# Patient Record
Sex: FEMALE | Race: BLACK OR AFRICAN AMERICAN | NOT HISPANIC OR LATINO | Employment: FULL TIME | URBAN - METROPOLITAN AREA
[De-identification: names, ages, dates, MRNs, and addresses within clinical notes are randomized per-mention and may not be internally consistent; named-entity substitution may affect disease eponyms.]

---

## 2017-01-23 ENCOUNTER — OFFICE VISIT (OUTPATIENT)
Dept: OPHTHALMOLOGY | Facility: CLINIC | Age: 57
End: 2017-01-23
Payer: COMMERCIAL

## 2017-01-23 DIAGNOSIS — H40.1131 PRIMARY OPEN ANGLE GLAUCOMA OF BOTH EYES, MILD STAGE: Primary | ICD-10-CM

## 2017-01-23 DIAGNOSIS — H43.393 FLOATERS, BILATERAL: ICD-10-CM

## 2017-01-23 PROCEDURE — 99999 PR PBB SHADOW E&M-EST. PATIENT-LVL I: CPT | Mod: PBBFAC,,, | Performed by: OPTOMETRIST

## 2017-01-23 PROCEDURE — 92014 COMPRE OPH EXAM EST PT 1/>: CPT | Mod: S$GLB,,, | Performed by: OPTOMETRIST

## 2017-01-23 RX ORDER — DORZOLAMIDE HCL 20 MG/ML
1 SOLUTION/ DROPS OPHTHALMIC 3 TIMES DAILY
Qty: 10 ML | Refills: 12 | Status: SHIPPED | OUTPATIENT
Start: 2017-01-23 | End: 2017-10-03 | Stop reason: SDUPTHER

## 2017-01-23 NOTE — PROGRESS NOTES
HPI     Glaucoma    Additional comments: Latanoprost           Comments   4 months IOP check. Patient notice floaters in both eye in the morning   when she wakes up x 1 month. No flashes of light.  Medication eye drops if any: Latanoprost hs, added trusopt tid today  Date last eye visit: 09/20/2016  Last full exam:04/18/2016  Last IOP : 19/16  Last dilated eye exam and SDP's:04/18/2016  Last HVF and HRT : 09/20/2016  High IOP: 26/24  CCT:530/541 +1/+1  Family Hx POAG: father         Last edited by Cortez Julien, SOUMYA on 1/23/2017 10:23 AM. (History)            Assessment /Plan     For exam results, see Encounter Report.    Primary open angle glaucoma of both eyes, mild stage  -     dorzolamide (TRUSOPT) 2 % ophthalmic solution; Place 1 drop into both eyes 3 (three) times daily.  Dispense: 10 mL; Refill: 12    Floaters, bilateral      Discussed signs and symptoms of retinal detachment.  Informed patient to return to clinic if any worsening of symptoms or decreased vision.    Last gOCT shows progressive thinning TI, increased IOP last 2 visits, will start trusopt tid OU    RTC 2 months IOP ck with new drops

## 2017-01-31 ENCOUNTER — OFFICE VISIT (OUTPATIENT)
Dept: FAMILY MEDICINE | Facility: CLINIC | Age: 57
End: 2017-01-31
Payer: COMMERCIAL

## 2017-01-31 VITALS
HEIGHT: 62 IN | BODY MASS INDEX: 36.35 KG/M2 | TEMPERATURE: 97 F | OXYGEN SATURATION: 99 % | SYSTOLIC BLOOD PRESSURE: 138 MMHG | WEIGHT: 197.56 LBS | HEART RATE: 71 BPM | RESPIRATION RATE: 18 BRPM | DIASTOLIC BLOOD PRESSURE: 98 MMHG

## 2017-01-31 DIAGNOSIS — J06.9 ACUTE URI: Primary | ICD-10-CM

## 2017-01-31 PROCEDURE — 99213 OFFICE O/P EST LOW 20 MIN: CPT | Mod: 25,S$GLB,, | Performed by: REGISTERED NURSE

## 2017-01-31 PROCEDURE — 96372 THER/PROPH/DIAG INJ SC/IM: CPT | Mod: S$GLB,,, | Performed by: REGISTERED NURSE

## 2017-01-31 PROCEDURE — 3075F SYST BP GE 130 - 139MM HG: CPT | Mod: S$GLB,,, | Performed by: REGISTERED NURSE

## 2017-01-31 PROCEDURE — 99999 PR PBB SHADOW E&M-EST. PATIENT-LVL IV: CPT | Mod: PBBFAC,,, | Performed by: REGISTERED NURSE

## 2017-01-31 PROCEDURE — 3080F DIAST BP >= 90 MM HG: CPT | Mod: S$GLB,,, | Performed by: REGISTERED NURSE

## 2017-01-31 RX ORDER — BETAMETHASONE SODIUM PHOSPHATE AND BETAMETHASONE ACETATE 3; 3 MG/ML; MG/ML
12 INJECTION, SUSPENSION INTRA-ARTICULAR; INTRALESIONAL; INTRAMUSCULAR; SOFT TISSUE
Status: COMPLETED | OUTPATIENT
Start: 2017-01-31 | End: 2017-01-31

## 2017-01-31 RX ADMIN — BETAMETHASONE SODIUM PHOSPHATE AND BETAMETHASONE ACETATE 12 MG: 3; 3 INJECTION, SUSPENSION INTRA-ARTICULAR; INTRALESIONAL; INTRAMUSCULAR; SOFT TISSUE at 10:01

## 2017-01-31 NOTE — MR AVS SNAPSHOT
[facility]  [address] [place] [address]  Phone: [phone]                  Carolee Ortega   2017 10:00 AM   Office Visit    Description:  Female : 1960   Provider:  Marquise Walker NP   Department:  [facility]           Reason for Visit     Dizziness           Diagnoses this Visit        Comments    Acute URI    -  Primary            To Do List           Future Appointments        Provider Department Dept Phone    2017 8:00 AM Genie Walter MD [facility] [phone]    3/20/2017 8:15 AM Cortez Julien OD [facility] Ophthalmology [phone]      Goals (5 Years of Data)     None      [facility]     [facility] -  Assistance  Registered nurses in the [facility] On Call Center provide clinical advisement, health education, appointment booking, and other advisory services.  Call for this free service at [phone].             Medications           Message regarding Medications     Verify the changes and/or additions to your medication regime listed below are the same as discussed with your clinician today.  If any of these changes or additions are incorrect, please notify your healthcare provider.        These medications were administered today        Dose Freq    betamethasone acetate-betamethasone sodium phosphate injection 12 mg 12 mg Clinic/HOD 1 time    Sig: Inject 2 mLs (12 mg total) into the muscle one time.    Class: Normal    Route: Intramuscular           Verify that the below list of medications is an accurate representation of the medications you are currently taking.  If none reported, the list may be blank. If incorrect, please contact your healthcare provider. Carry this list with you in case of emergency.           Current Medications     albuterol (ACCUNEB) 1.25 mg/3 mL Nebu Take 1.25 mg by nebulization every 6 (six) hours as needed.    albuterol  "(PROVENTIL HFA) 90 mcg/actuation inhaler Inhale 2 puffs into the lungs every 4 (four) hours as needed.    alprazolam (XANAX) 0.5 MG tablet Take 1 tablet (0.5 mg total) by mouth 3 (three) times daily as needed.    amlodipine (NORVASC) 5 MG tablet Take 1 tablet (5 mg total) by mouth once daily.    diclofenac (CATAFLAM) 50 MG tablet Take 1 tablet (50 mg total) by mouth every meal as needed.    dorzolamide (TRUSOPT) 2 % ophthalmic solution Place 1 drop into both eyes 3 (three) times daily.    hydrOXYzine HCl (ATARAX) 25 MG tablet TAKE ONE TABLET BY MOUTH AT BEDTIME AS NEEDED for itching    metoprolol tartrate (LOPRESSOR) 25 MG tablet TAKE ONE TABLET BY MOUTH ONE TIME DAILY     mometasone (NASONEX) 50 mcg/actuation nasal spray 2 sprays by Nasal route once daily.    naftifine (NAFTIN) 1 % Gel Apply 1 application topically 2 (two) times daily.    tizanidine (ZANAFLEX) 4 MG tablet Take 1 tablet (4 mg total) by mouth 3 (three) times daily as needed.    valacyclovir (VALTREX) 500 MG tablet Take 500 mg by mouth 2 (two) times daily. Pt unsure of dose, taking for shingles outbreak.    latanoprost 0.005 % ophthalmic solution INSTILL ONE DROP INTO EACH EYE EVERY EVENING           Clinical Reference Information           Vital Signs - Last Recorded  Most recent update: 1/31/2017 10:11 AM by Marquise Walker, NP    BP Pulse Temp Resp Ht Wt    (!) 138/98 71 96.8 °F (36 °C) (Tympanic) 18 5' 2" (1.575 m) 89.6 kg (197 lb 8.5 oz)    SpO2 BMI             99% 36.13 kg/m2         Blood Pressure          Most Recent Value    BP  (!)  138/98      Allergies as of 1/31/2017     Flexeril [Cyclobenzaprine]      Immunizations Administered on Date of Encounter - 1/31/2017     None      "

## 2017-02-01 RX ORDER — METOPROLOL TARTRATE 25 MG/1
TABLET, FILM COATED ORAL
Qty: 30 TABLET | Refills: 0 | Status: SHIPPED | OUTPATIENT
Start: 2017-02-01 | End: 2017-03-08 | Stop reason: SDUPTHER

## 2017-02-23 ENCOUNTER — TELEPHONE (OUTPATIENT)
Dept: INTERNAL MEDICINE | Facility: CLINIC | Age: 57
End: 2017-02-23

## 2017-02-23 ENCOUNTER — LAB VISIT (OUTPATIENT)
Dept: LAB | Facility: HOSPITAL | Age: 57
End: 2017-02-23
Attending: FAMILY MEDICINE
Payer: COMMERCIAL

## 2017-02-23 ENCOUNTER — OFFICE VISIT (OUTPATIENT)
Dept: FAMILY MEDICINE | Facility: CLINIC | Age: 57
End: 2017-02-23
Payer: COMMERCIAL

## 2017-02-23 VITALS
HEIGHT: 63 IN | HEART RATE: 73 BPM | WEIGHT: 196.88 LBS | BODY MASS INDEX: 34.88 KG/M2 | RESPIRATION RATE: 18 BRPM | TEMPERATURE: 98 F | DIASTOLIC BLOOD PRESSURE: 82 MMHG | OXYGEN SATURATION: 98 % | SYSTOLIC BLOOD PRESSURE: 130 MMHG

## 2017-02-23 DIAGNOSIS — Z00.00 PREVENTATIVE HEALTH CARE: Primary | ICD-10-CM

## 2017-02-23 DIAGNOSIS — Z12.31 ENCOUNTER FOR SCREENING MAMMOGRAM FOR MALIGNANT NEOPLASM OF BREAST: ICD-10-CM

## 2017-02-23 DIAGNOSIS — J45.20 MILD INTERMITTENT ASTHMA WITHOUT COMPLICATION: ICD-10-CM

## 2017-02-23 DIAGNOSIS — Z00.00 PREVENTATIVE HEALTH CARE: ICD-10-CM

## 2017-02-23 DIAGNOSIS — Z23 NEED FOR TDAP VACCINATION: ICD-10-CM

## 2017-02-23 DIAGNOSIS — I10 ESSENTIAL HYPERTENSION: ICD-10-CM

## 2017-02-23 DIAGNOSIS — F41.9 ANXIETY: ICD-10-CM

## 2017-02-23 LAB
ALBUMIN SERPL BCP-MCNC: 3.7 G/DL
ALP SERPL-CCNC: 73 U/L
ALT SERPL W/O P-5'-P-CCNC: 19 U/L
ANION GAP SERPL CALC-SCNC: 8 MMOL/L
AST SERPL-CCNC: 23 U/L
BASOPHILS # BLD AUTO: 0.02 K/UL
BASOPHILS NFR BLD: 0.4 %
BILIRUB SERPL-MCNC: 0.6 MG/DL
BUN SERPL-MCNC: 11 MG/DL
CALCIUM SERPL-MCNC: 9.5 MG/DL
CHLORIDE SERPL-SCNC: 105 MMOL/L
CHOLEST/HDLC SERPL: 2.6 {RATIO}
CO2 SERPL-SCNC: 27 MMOL/L
CREAT SERPL-MCNC: 0.8 MG/DL
DIFFERENTIAL METHOD: ABNORMAL
EOSINOPHIL # BLD AUTO: 0.1 K/UL
EOSINOPHIL NFR BLD: 1.5 %
ERYTHROCYTE [DISTWIDTH] IN BLOOD BY AUTOMATED COUNT: 14 %
EST. GFR  (AFRICAN AMERICAN): >60 ML/MIN/1.73 M^2
EST. GFR  (NON AFRICAN AMERICAN): >60 ML/MIN/1.73 M^2
GLUCOSE SERPL-MCNC: 49 MG/DL
HCT VFR BLD AUTO: 42.9 %
HDL/CHOLESTEROL RATIO: 38.4 %
HDLC SERPL-MCNC: 198 MG/DL
HDLC SERPL-MCNC: 76 MG/DL
HGB BLD-MCNC: 13.7 G/DL
LDLC SERPL CALC-MCNC: 103.2 MG/DL
LYMPHOCYTES # BLD AUTO: 1.9 K/UL
LYMPHOCYTES NFR BLD: 39.6 %
MCH RBC QN AUTO: 27.5 PG
MCHC RBC AUTO-ENTMCNC: 31.9 %
MCV RBC AUTO: 86 FL
MONOCYTES # BLD AUTO: 0.3 K/UL
MONOCYTES NFR BLD: 7.2 %
NEUTROPHILS # BLD AUTO: 2.4 K/UL
NEUTROPHILS NFR BLD: 51.3 %
NONHDLC SERPL-MCNC: 122 MG/DL
PLATELET # BLD AUTO: 190 K/UL
PMV BLD AUTO: 12.4 FL
POTASSIUM SERPL-SCNC: 4.2 MMOL/L
PROT SERPL-MCNC: 7.4 G/DL
RBC # BLD AUTO: 4.99 M/UL
SODIUM SERPL-SCNC: 140 MMOL/L
TRIGL SERPL-MCNC: 94 MG/DL
TSH SERPL DL<=0.005 MIU/L-ACNC: 3.9 UIU/ML
WBC # BLD AUTO: 4.75 K/UL

## 2017-02-23 PROCEDURE — 90715 TDAP VACCINE 7 YRS/> IM: CPT | Mod: S$GLB,,, | Performed by: FAMILY MEDICINE

## 2017-02-23 PROCEDURE — 99396 PREV VISIT EST AGE 40-64: CPT | Mod: 25,S$GLB,, | Performed by: FAMILY MEDICINE

## 2017-02-23 PROCEDURE — 85025 COMPLETE CBC W/AUTO DIFF WBC: CPT

## 2017-02-23 PROCEDURE — 99999 PR PBB SHADOW E&M-EST. PATIENT-LVL IV: CPT | Mod: PBBFAC,,, | Performed by: FAMILY MEDICINE

## 2017-02-23 PROCEDURE — 3075F SYST BP GE 130 - 139MM HG: CPT | Mod: S$GLB,,, | Performed by: FAMILY MEDICINE

## 2017-02-23 PROCEDURE — 80053 COMPREHEN METABOLIC PANEL: CPT

## 2017-02-23 PROCEDURE — 36415 COLL VENOUS BLD VENIPUNCTURE: CPT | Mod: PO

## 2017-02-23 PROCEDURE — 84443 ASSAY THYROID STIM HORMONE: CPT

## 2017-02-23 PROCEDURE — 90471 IMMUNIZATION ADMIN: CPT | Mod: S$GLB,,, | Performed by: FAMILY MEDICINE

## 2017-02-23 PROCEDURE — 80061 LIPID PANEL: CPT

## 2017-02-23 PROCEDURE — 3079F DIAST BP 80-89 MM HG: CPT | Mod: S$GLB,,, | Performed by: FAMILY MEDICINE

## 2017-02-23 NOTE — MR AVS SNAPSHOT
[facility]  [address] [address]  Phone: [phone]                  Carolee Ortega   2017 8:00 AM   Office Visit    Description:  Female : 1960   Provider:  Genie Walter MD   Department:  [facility]           Reason for Visit     Annual Exam           Diagnoses this Visit        Comments    Preventative health care    -  Primary     Essential hypertension         Mild intermittent asthma without complication         Anxiety         Need for Tdap vaccination         Encounter for screening mammogram for malignant neoplasm of breast                To Do List           Future Appointments        Provider Department Dept Phone    2017 8:15 AM [facility] [id number] [facility] [phone]    3/20/2017 8:15 AM Cortez Julien OD [facility] - Ophthalmology [phone]      Goals (5 Years of Data)     None      [facility]     [facility] Nurse [facility] -  Assistance  Registered nurses in the [facility] On Call Center provide clinical advisement, health education, appointment booking, and other advisory services.  Call for this free service at [phone].             Medications           Message regarding Medications     Verify the changes and/or additions to your medication regime listed below are the same as discussed with your clinician today.  If any of these changes or additions are incorrect, please notify your healthcare provider.        STOP taking these medications     latanoprost 0.005 % ophthalmic solution INSTILL ONE DROP INTO EACH EYE EVERY EVENING    diclofenac (CATAFLAM) 50 MG tablet Take 1 tablet (50 mg total) by mouth every meal as needed.    tizanidine (ZANAFLEX) 4 MG tablet Take 1 tablet (4 mg total) by mouth 3 (three) times daily as needed.    valacyclovir (VALTREX) 500 MG tablet Take 500 mg by mouth 2 (two) times daily. Pt unsure of dose, taking for shingles outbreak.          "  Verify that the below list of medications is an accurate representation of the medications you are currently taking.  If none reported, the list may be blank. If incorrect, please contact your healthcare provider. Carry this list with you in case of emergency.           Current Medications     albuterol (ACCUNEB) 1.25 mg/3 mL Nebu Take 1.25 mg by nebulization every 6 (six) hours as needed.    alprazolam (XANAX) 0.5 MG tablet Take 1 tablet (0.5 mg total) by mouth 3 (three) times daily as needed.    amlodipine (NORVASC) 5 MG tablet Take 1 tablet (5 mg total) by mouth once daily.    dorzolamide (TRUSOPT) 2 % ophthalmic solution Place 1 drop into both eyes 3 (three) times daily.    hydrOXYzine HCl (ATARAX) 25 MG tablet TAKE ONE TABLET BY MOUTH AT BEDTIME AS NEEDED for itching    metoprolol tartrate (LOPRESSOR) 25 MG tablet TAKE 1 TABLET BY MOUTH EVERY DAY    mometasone (NASONEX) 50 mcg/actuation nasal spray 2 sprays by Nasal route once daily.    naftifine (NAFTIN) 1 % Gel Apply 1 application topically 2 (two) times daily.           Clinical Reference Information           Your Vitals Were     BP Pulse Temp Resp Height Weight    130/82 73 97.8 °F (36.6 °C) (Tympanic) 18 5' 3" (1.6 m) 89.3 kg (196 lb 13.9 oz)    SpO2 BMI             98% 34.87 kg/m2         Blood Pressure          Most Recent Value    BP  130/82      Allergies as of 2/23/2017     Flexeril [Cyclobenzaprine]      Immunizations Administered on Date of Encounter - 2/23/2017     Name Date Dose VIS Date Route    TDAP  Incomplete 0.5 mL 2/24/2015 Intramuscular      Orders Placed During Today's Visit      Normal Orders This Visit    Tdap Vaccine     Future Labs/Procedures Expected by Expires    CBC auto differential  2/23/2017 4/24/2018    Comprehensive metabolic panel  2/23/2017 4/24/2018    Lipid panel  2/23/2017 4/24/2018    Mammo Digital Screening Bilat with CAD  2/23/2017 4/25/2018    TSH  2/23/2017 4/24/2018      Language Assistance Services     ATTENTION: " Language assistance services are available, free of charge. Please call [phone].      ATENCIÓN: Si habla sophia, tiene a flanagan disposición servicios gratuitos de asistencia lingüística. Llame al [phone].     CHÚ Ý: N?u b?n nói Ti?ng Vi?t, có các d?ch v? h? tr? ngôn ng? mi?n phí dành cho b?n. G?i s? [phone].         [facility] complies with applicable Federal civil rights laws and does not discriminate on the basis of race, color, national origin, age, disability, or sex.

## 2017-02-23 NOTE — PROGRESS NOTES
CHIEF COMPLAINT: This is a 56-year-old female here for preventive health exam.    SUBJECTIVE: The patient is doing well without complaints.  She has mild intermittent asthma for which she uses albuterol.  Blood pressure is controlled on amlodipine and metoprolol.  Patient has lost 17 pounds in the last year.    Eye exam February 2017. Mammogram February 2016. Colonoscopy January 2010, due again in January 2020. TD booster September 2005. Pneumovax February 2014. Flu vaccine October 2016.     ROS:  GENERAL: Patient denies fever, chills, night sweats. Patient denies weight gain. Patient denies anorexia, fatigue, weakness or swollen glands.  SKIN: Patient denies rash or hair loss.  HEENT: Patient denies sore throat, ear pain, hearing loss, nasal congestion, or runny nose. Patient denies visual disturbance, eye irritation or discharge.  LUNGS: Patient denies cough, wheeze or hemoptysis.  CARDIOVASCULAR: Patient denies chest pain, shortness of breath, palpitations, syncope or lower extremity edema.  GI: Patient denies abdominal pain, nausea, vomiting, diarrhea, constipation, blood in stool or melena.  GENITOURINARY: Patient denies pelvic pain, vaginal discharge, itch or odor. Patient denies irregular vaginal bleeding. Patient denies dysuria, frequency, hematuria, nocturia, urgency or incontinence.  BREASTS: Patient denies breast pain, mass or nipple discharge.  MUSCULOSKELETAL: Patient denies joint pain, swelling, redness or warmth.  NEUROLOGIC: Patient denies headache, vertigo, paresthesias, weakness in limb, dysarthria, dysphagia or abnormality of gait.  PSYCHIATRIC: Patient denies anxiety, depression, or memory loss.     OBJECTIVE:   GENERAL: Well-developed well-nourished, obese, black female alert and oriented x3, in no acute distress. Memory, judgment and cognition without deficit. Weight loss of 17 pounds in the last year.  SKIN: Clear without rash. Normal color and tone.  HEENT: Eyes: Clear conjunctivae. No  scleral icterus. Pupils equal reactive to light and accommodation. Ears: Clear canals. Clear TMs. Nose: Without congestion. Pharynx: Without injection or exudates.  NECK: Supple, normal range of motion. No masses, lymphadenopathy or enlarged thyroid. No JVD. Carotids 2+ and equal. No bruits.  LUNGS: Clear to auscultation. Normal respiratory effort.  CARDIOVASCULAR: Regular rhythm, normal S1, S2 without murmur, gallop or rub.  BACK: No CVA or spinal tenderness.  BREASTS: No masses, tenderness or nipple discharge.  ABDOMEN: Normal appearance. Active bowel sounds. Soft, nontender without mass or organomegaly. No rebound or guarding.  EXTREMITIES: Without cyanosis, clubbing or edema. Distal pulses 2+ and equal. Normal range of motion in all extremities. No joint effusion, erythema or warmth.  NEUROLOGIC: Cranial nerves II through XII without deficit. Motor strength equal bilaterally. Sensation normal to touch. Deep tendon reflexes 2+ and equal. Gait without abnormality. No tremor. Negative cerebellar signs.  PELVIC: External: Without lesions or inflammation.Vaginal: Clear, cuff intact. Bimanual: No masses, nontender.   RECTOVAGINAL: Confirms heme-negative stool x2.      ASSESSMENT:  1. Preventative health care    2. Essential hypertension    3. Mild intermittent asthma without complication    4. Anxiety    5. Need for Tdap vaccination    6. Encounter for screening mammogram for malignant neoplasm of breast      PLAN:   1.  Weight reduction.  Exercise regularly.  2.  Age-appropriate counseling.  3.  Fasting lab.  4.  Mammogram.  5.  Tdap vaccine.  6.  Refill medications as needed.

## 2017-02-24 ENCOUNTER — HOSPITAL ENCOUNTER (OUTPATIENT)
Dept: RADIOLOGY | Facility: HOSPITAL | Age: 57
Discharge: HOME | End: 2017-02-24
Attending: FAMILY MEDICINE
Payer: COMMERCIAL

## 2017-02-24 DIAGNOSIS — Z12.31 ENCOUNTER FOR SCREENING MAMMOGRAM FOR MALIGNANT NEOPLASM OF BREAST: ICD-10-CM

## 2017-02-24 PROCEDURE — 77063 BREAST TOMOSYNTHESIS BI: CPT | Mod: 26,,, | Performed by: RADIOLOGY

## 2017-02-24 PROCEDURE — 77067 SCR MAMMO BI INCL CAD: CPT | Mod: 26,,, | Performed by: RADIOLOGY

## 2017-02-24 PROCEDURE — 77067 SCR MAMMO BI INCL CAD: CPT | Mod: TC

## 2017-02-24 NOTE — TELEPHONE ENCOUNTER
On call physician. Received call at 21:30  Contacted for low glucose. Reviewed progress note. She was asymptomatic and likely had low glucose due to fasting state. This was over 12 hours ago and I opted not to call her since she has likely eaten and self normalized her glucose by now. She is not on insulin.

## 2017-02-27 RX ORDER — MONTELUKAST SODIUM 10 MG/1
TABLET ORAL
Qty: 30 TABLET | Refills: 11 | Status: SHIPPED | OUTPATIENT
Start: 2017-02-27 | End: 2018-02-18 | Stop reason: SDUPTHER

## 2017-02-27 RX ORDER — AMLODIPINE BESYLATE 5 MG/1
TABLET ORAL
Qty: 30 TABLET | Refills: 11 | Status: SHIPPED | OUTPATIENT
Start: 2017-02-27 | End: 2017-12-18 | Stop reason: SDUPTHER

## 2017-03-08 RX ORDER — HYDROXYZINE HYDROCHLORIDE 25 MG/1
TABLET, FILM COATED ORAL
Qty: 30 TABLET | Refills: 5 | Status: SHIPPED | OUTPATIENT
Start: 2017-03-08 | End: 2017-09-17 | Stop reason: SDUPTHER

## 2017-03-08 RX ORDER — METOPROLOL TARTRATE 25 MG/1
TABLET, FILM COATED ORAL
Qty: 30 TABLET | Refills: 11 | Status: SHIPPED | OUTPATIENT
Start: 2017-03-08 | End: 2018-03-08 | Stop reason: SDUPTHER

## 2017-03-20 ENCOUNTER — OFFICE VISIT (OUTPATIENT)
Dept: OPHTHALMOLOGY | Facility: CLINIC | Age: 57
End: 2017-03-20
Payer: COMMERCIAL

## 2017-03-20 DIAGNOSIS — H40.1131 PRIMARY OPEN ANGLE GLAUCOMA OF BOTH EYES, MILD STAGE: Primary | ICD-10-CM

## 2017-03-20 PROCEDURE — 99999 PR PBB SHADOW E&M-EST. PATIENT-LVL I: CPT | Mod: PBBFAC,,, | Performed by: OPTOMETRIST

## 2017-03-20 PROCEDURE — 92012 INTRM OPH EXAM EST PATIENT: CPT | Mod: S$GLB,,, | Performed by: OPTOMETRIST

## 2017-03-20 RX ORDER — LATANOPROST 50 UG/ML
1 SOLUTION/ DROPS OPHTHALMIC NIGHTLY
Qty: 2.5 ML | Refills: 11 | Status: SHIPPED | OUTPATIENT
Start: 2017-03-20 | End: 2017-10-03 | Stop reason: SDUPTHER

## 2017-03-20 NOTE — PROGRESS NOTES
HPI     Glaucoma    Additional comments: Trusopt           Comments   2 months IOP check.  Medication eye drops if any:Trusopt only  Date last eye visit: 01/23/2017  Last full exam: 01/23/2017  Last IOP :20/20  Last dilated eye exam and SDP's: 01/23/2017  Last HVF and HRT : 09/20/2016  High IOP: 26/24  CCT: 530/541 +1/+1  Family Hx POAG: father       Last edited by Cortez Julien, OD on 3/20/2017  8:37 AM. (History)            Assessment /Plan     For exam results, see Encounter Report.    Primary open angle glaucoma of both eyes, mild stage  -     latanoprost (XALATAN) 0.005 % ophthalmic solution; Place 1 drop into both eyes every evening.  Dispense: 2.5 mL; Refill: 11      Pt using only Trusopt by mistake.    Restart latanoprost hs OU  Continue Trusopt tid OU    RTC 3 months IOP ck

## 2017-03-22 ENCOUNTER — OFFICE VISIT (OUTPATIENT)
Dept: FAMILY MEDICINE | Facility: CLINIC | Age: 57
End: 2017-03-22
Payer: COMMERCIAL

## 2017-03-22 VITALS
TEMPERATURE: 98 F | HEART RATE: 75 BPM | BODY MASS INDEX: 34.96 KG/M2 | DIASTOLIC BLOOD PRESSURE: 88 MMHG | WEIGHT: 197.31 LBS | HEIGHT: 63 IN | OXYGEN SATURATION: 98 % | RESPIRATION RATE: 16 BRPM | SYSTOLIC BLOOD PRESSURE: 134 MMHG

## 2017-03-22 DIAGNOSIS — S80.12XA CONTUSION OF LEG, LEFT, INITIAL ENCOUNTER: Primary | ICD-10-CM

## 2017-03-22 PROCEDURE — 3075F SYST BP GE 130 - 139MM HG: CPT | Mod: S$GLB,,, | Performed by: REGISTERED NURSE

## 2017-03-22 PROCEDURE — 1160F RVW MEDS BY RX/DR IN RCRD: CPT | Mod: S$GLB,,, | Performed by: REGISTERED NURSE

## 2017-03-22 PROCEDURE — 99999 PR PBB SHADOW E&M-EST. PATIENT-LVL IV: CPT | Mod: PBBFAC,,, | Performed by: REGISTERED NURSE

## 2017-03-22 PROCEDURE — 99213 OFFICE O/P EST LOW 20 MIN: CPT | Mod: S$GLB,,, | Performed by: REGISTERED NURSE

## 2017-03-22 PROCEDURE — 3079F DIAST BP 80-89 MM HG: CPT | Mod: S$GLB,,, | Performed by: REGISTERED NURSE

## 2017-03-22 NOTE — MR AVS SNAPSHOT
[facility]  [address] [place] [address]  Phone: [phone]                  Carolee Ortega   3/22/2017 9:15 AM   Office Visit    Description:  Female : 1960   Provider:  Marquise Walker NP   Department:  [facility] Medicine           Reason for Visit     Leg Injury           Diagnoses this Visit        Comments    Contusion of leg, left, initial encounter    -  Primary            To Do List           Future Appointments        Provider Department Dept Phone    3/24/2017 10:30 AM [facility] [phone]    2017 8:15 AM Cortez Julien OD [facility] - Ophthalmology [phone]      Goals (5 Years of Data)     None      [facility] On Call     [facility] On Call Nurse Care Line -  Assistance  Registered nurses in the [facility] On Call Center provide clinical advisement, health education, appointment booking, and other advisory services.  Call for this free service at [phone].             Medications           Message regarding Medications     Verify the changes and/or additions to your medication regime listed below are the same as discussed with your clinician today.  If any of these changes or additions are incorrect, please notify your healthcare provider.             Verify that the below list of medications is an accurate representation of the medications you are currently taking.  If none reported, the list may be blank. If incorrect, please contact your healthcare provider. Carry this list with you in case of emergency.           Current Medications     albuterol (ACCUNEB) 1.25 mg/3 mL Nebu Take 1.25 mg by nebulization every 6 (six) hours as needed.    alprazolam (XANAX) 0.5 MG tablet Take 1 tablet (0.5 mg total) by mouth 3 (three) times daily as needed.    amlodipine (NORVASC) 5 MG tablet TAKE 1 TABLET BY MOUTH EVERY DAY    dorzolamide (TRUSOPT) 2 % ophthalmic solution Place 1 drop into both eyes 3 (three)  "times daily.    hydrOXYzine HCl (ATARAX) 25 MG tablet TAKE 1 TABLET BY MOUTH AT BEDTIME AS NEEDED FOR ITCHING    latanoprost (XALATAN) 0.005 % ophthalmic solution Place 1 drop into both eyes every evening.    metoprolol tartrate (LOPRESSOR) 25 MG tablet TAKE 1 TABLET BY MOUTH EVERY DAY    mometasone (NASONEX) 50 mcg/actuation nasal spray 2 sprays by Nasal route once daily.    montelukast (SINGULAIR) 10 mg tablet TAKE 1 TABLET BY MOUTH IN THE EVENING    naftifine (NAFTIN) 1 % Gel Apply 1 application topically 2 (two) times daily.           Clinical Reference Information           Your Vitals Were     BP Pulse Temp Resp Height Weight    134/88 (BP Location: Left arm, Patient Position: Sitting, BP Method: Manual) 75 98.4 °F (36.9 °C) (Tympanic) 16 5' 3" (1.6 m) 89.5 kg (197 lb 5 oz)    SpO2 BMI             98% 34.95 kg/m2         Blood Pressure          Most Recent Value    BP  134/88      Allergies as of 3/22/2017     Flexeril [Cyclobenzaprine]      Immunizations Administered on Date of Encounter - 3/22/2017     None      Orders Placed During Today's Visit     Future Labs/Procedures Expected by Expires    US Soft Tissue Misc  3/22/2017 3/22/2018      Language Assistance Services     ATTENTION: Language assistance services are available, free of charge. Please call [phone].      ATENCIÓN: Si habla kashifañol, tiene a flanagan disposición servicios gratuitos de asistencia lingüística. Llame al [phone].     PAULO Ý: N?u b?n nói Ti?ng Vi?t, có các d?ch v? h? tr? ngôn ng? mi?n phí dành cho b?n. G?i s? [phone].         [facility] complies with applicable Federal civil rights laws and does not discriminate on the basis of race, color, national origin, age, disability, or sex.        "

## 2017-03-23 ENCOUNTER — TELEPHONE (OUTPATIENT)
Dept: RADIOLOGY | Facility: HOSPITAL | Age: 57
End: 2017-03-23

## 2017-03-24 ENCOUNTER — HOSPITAL ENCOUNTER (OUTPATIENT)
Dept: RADIOLOGY | Facility: HOSPITAL | Age: 57
Discharge: HOME | End: 2017-03-24
Attending: REGISTERED NURSE
Payer: COMMERCIAL

## 2017-03-24 DIAGNOSIS — S80.12XA CONTUSION OF LEG, LEFT, INITIAL ENCOUNTER: ICD-10-CM

## 2017-03-24 PROCEDURE — 76999 ECHO EXAMINATION PROCEDURE: CPT | Mod: TC,PO

## 2017-03-24 PROCEDURE — 76882 US LMTD JT/FCL EVL NVASC XTR: CPT | Mod: 26,,, | Performed by: RADIOLOGY

## 2017-03-27 ENCOUNTER — TELEPHONE (OUTPATIENT)
Dept: FAMILY MEDICINE | Facility: CLINIC | Age: 57
End: 2017-03-27

## 2017-03-27 NOTE — TELEPHONE ENCOUNTER
Patient has concern if this is going to travel? What is the ice going to help with hematoma itself or will it help with tenderness? Should she be concerns will it move? How ling should use ice/heat?

## 2017-03-27 NOTE — TELEPHONE ENCOUNTER
US of leg shows small hematoma.  Apply ice 3 to 4 times day with elevation as much as possible.  If worsens or persists, may need to see General Surgery for excision.

## 2017-03-27 NOTE — PROGRESS NOTES
"Subjective:       Patient ID: Carolee Ortega is a 56 y.o. female.    Chief Complaint: Leg Injury    HPI     Mrs. Ortega is here today with c/o hitting her LT lateral leg about 3 months ago.  She applied ice when injury first occurred but nothing since that time.  She has noticed a small lump to there area, pain only mainly w/ touching area.  Denies limping.    Review of Systems   Constitutional: Negative.    Respiratory: Negative.    Cardiovascular: Negative.    Musculoskeletal: Positive for myalgias (LT leg injury).   Neurological: Negative.        Objective:       Vitals:    03/22/17 0918   BP: 134/88   BP Location: Left arm   Patient Position: Sitting   BP Method: Manual   Pulse: 75   Resp: 16   Temp: 98.4 °F (36.9 °C)   TempSrc: Tympanic   SpO2: 98%   Weight: 89.5 kg (197 lb 5 oz)   Height: 5' 3" (1.6 m)       Physical Exam   Constitutional: She is oriented to person, place, and time. She appears well-developed and well-nourished.   Cardiovascular: Intact distal pulses.    Musculoskeletal:        Left lower leg: She exhibits tenderness (small area of injury, with palpable knot (possibly hematoma), skin slightly discolored).        Legs:  Neurological: She is alert and oriented to person, place, and time. She has normal strength. No sensory deficit. She exhibits normal muscle tone. Coordination and gait normal.       Assessment:       1. Contusion of leg, left, initial encounter        Plan:       Carolee was seen today for leg injury.    Diagnoses and all orders for this visit:    Contusion of leg, left, initial encounter  -     US Soft Tissue Misc; Future      US pending.  Ice/heat, elevate.  RTC prn.  "

## 2017-03-28 NOTE — TELEPHONE ENCOUNTER
Does not normally cause blood clots b/c the blood is not in circulation anymore --- is under the skin outside of the vessel.  The ice/heat will help the body reabsorb the blood and heal the area.  Can take up to a few months after injury, but should resolve on its own.

## 2017-04-27 ENCOUNTER — OFFICE VISIT (OUTPATIENT)
Dept: FAMILY MEDICINE | Facility: CLINIC | Age: 57
End: 2017-04-27
Payer: COMMERCIAL

## 2017-04-27 VITALS
HEIGHT: 63 IN | DIASTOLIC BLOOD PRESSURE: 84 MMHG | WEIGHT: 190.94 LBS | RESPIRATION RATE: 18 BRPM | BODY MASS INDEX: 33.83 KG/M2 | OXYGEN SATURATION: 97 % | SYSTOLIC BLOOD PRESSURE: 122 MMHG | HEART RATE: 78 BPM | TEMPERATURE: 97 F

## 2017-04-27 DIAGNOSIS — J06.9 ACUTE URI: Primary | ICD-10-CM

## 2017-04-27 PROCEDURE — 99999 PR PBB SHADOW E&M-EST. PATIENT-LVL IV: CPT | Mod: PBBFAC,,, | Performed by: REGISTERED NURSE

## 2017-04-27 PROCEDURE — 99213 OFFICE O/P EST LOW 20 MIN: CPT | Mod: S$GLB,,, | Performed by: REGISTERED NURSE

## 2017-04-27 PROCEDURE — 3074F SYST BP LT 130 MM HG: CPT | Mod: S$GLB,,, | Performed by: REGISTERED NURSE

## 2017-04-27 PROCEDURE — 1160F RVW MEDS BY RX/DR IN RCRD: CPT | Mod: S$GLB,,, | Performed by: REGISTERED NURSE

## 2017-04-27 PROCEDURE — 3079F DIAST BP 80-89 MM HG: CPT | Mod: S$GLB,,, | Performed by: REGISTERED NURSE

## 2017-04-27 RX ORDER — PROMETHAZINE HYDROCHLORIDE AND DEXTROMETHORPHAN HYDROBROMIDE 6.25; 15 MG/5ML; MG/5ML
5 SYRUP ORAL
Qty: 180 ML | Refills: 0 | Status: SHIPPED | OUTPATIENT
Start: 2017-04-27 | End: 2017-10-03

## 2017-04-27 RX ORDER — PREDNISONE 20 MG/1
TABLET ORAL
Qty: 10 TABLET | Refills: 0 | Status: SHIPPED | OUTPATIENT
Start: 2017-04-27 | End: 2017-07-03 | Stop reason: ALTCHOICE

## 2017-04-27 NOTE — MR AVS SNAPSHOT
[facility]  [address] [facility] [address]  Phone: [phone]                  Carolee Ortega   2017 1:00 PM   Office Visit    Description:  Female : 1960   Provider:  Marquise Walker NP   Department:  [facility]           Reason for Visit     Cough     Nasal Congestion     Sore Throat           Diagnoses this Visit        Comments    Acute URI    -  Primary            To Do List           Future Appointments        Provider Department Dept Phone    2017 8:15 AM Cortez Julien OD [facility] - Ophthalmology [phone]      Goals (5 Years of Data)     None       These Medications        Disp Refills Start End    predniSONE (DELTASONE) 20 MG tablet 10 tablet 0 2017     Take 2 tab daily x 3 days, 1 tab daily x 3 days, then 1/2 tab daily x 2 days.    Pharmacy: [facility]/pharmacy #[id number] [address]. Ph #: [phone]       promethazine-dextromethorphan (PROMETHAZINE-DM) 6.25-15 mg/5 mL Syrp 180 mL 0 2017     Take 5 mLs by mouth every 4 to 6 hours as needed. - Oral    Pharmacy: [facility]/pharmacy #[id number] [address] Ph #: [phone]         [facility]     [facility] Nurse Care Line - 24/7 Assistance  Unless otherwise directed by your provider, please contact [facility], our nurse care line that is available for 24/7 assistance.     Registered nurses in the [facility] provide: appointment scheduling, clinical advisement, health education, and other advisory services.  Call: [phone] (toll free)               Medications           Message regarding Medications     Verify the changes and/or additions to your medication regime listed below are the same as discussed with your clinician today.  If any of these changes or additions are incorrect, please notify your healthcare provider.        START taking these NEW medications        Refills    predniSONE  (DELTASONE) 20 MG tablet 0    Sig: Take 2 tab daily x 3 days, 1 tab daily x 3 days, then 1/2 tab daily x 2 days.    Class: Normal    promethazine-dextromethorphan (PROMETHAZINE-DM) 6.25-15 mg/5 mL Syrp 0    Sig: Take 5 mLs by mouth every 4 to 6 hours as needed.    Class: Normal    Route: Oral           Verify that the below list of medications is an accurate representation of the medications you are currently taking.  If none reported, the list may be blank. If incorrect, please contact your healthcare provider. Carry this list with you in case of emergency.           Current Medications     albuterol (ACCUNEB) 1.25 mg/3 mL Nebu Take 1.25 mg by nebulization every 6 (six) hours as needed.    alprazolam (XANAX) 0.5 MG tablet Take 1 tablet (0.5 mg total) by mouth 3 (three) times daily as needed.    amlodipine (NORVASC) 5 MG tablet TAKE 1 TABLET BY MOUTH EVERY DAY    dorzolamide (TRUSOPT) 2 % ophthalmic solution Place 1 drop into both eyes 3 (three) times daily.    hydrOXYzine HCl (ATARAX) 25 MG tablet TAKE 1 TABLET BY MOUTH AT BEDTIME AS NEEDED FOR ITCHING    latanoprost (XALATAN) 0.005 % ophthalmic solution Place 1 drop into both eyes every evening.    metoprolol tartrate (LOPRESSOR) 25 MG tablet TAKE 1 TABLET BY MOUTH EVERY DAY    mometasone (NASONEX) 50 mcg/actuation nasal spray 2 sprays by Nasal route once daily.    montelukast (SINGULAIR) 10 mg tablet TAKE 1 TABLET BY MOUTH IN THE EVENING    naftifine (NAFTIN) 1 % Gel Apply 1 application topically 2 (two) times daily.    predniSONE (DELTASONE) 20 MG tablet Take 2 tab daily x 3 days, 1 tab daily x 3 days, then 1/2 tab daily x 2 days.    promethazine-dextromethorphan (PROMETHAZINE-DM) 6.25-15 mg/5 mL Syrp Take 5 mLs by mouth every 4 to 6 hours as needed.           Clinical Reference Information           Your Vitals Were     BP Pulse Temp Resp    122/84 (BP Location: Left arm, Patient Position: Sitting, BP Method: Manual) 78 97.3 °F (36.3 °C) (Tympanic) 18    Height  "Weight SpO2 BMI    5' 3" (1.6 m) 86.6 kg (190 lb 14.7 oz) 97% 33.82 kg/m2      Blood Pressure          Most Recent Value    BP  122/84      Allergies as of 4/27/2017     Flexeril [Cyclobenzaprine]      Immunizations Administered on Date of Encounter - 4/27/2017     None      Language Assistance Services     ATTENTION: Language assistance services are available, free of charge. Please call [phone].      ATENCIÓN: Si habla español, tiene a flanagan disposición servicios gratuitos de asistencia lingüística. Llame al [phone].     PAULO Ý: N?u b?n nói Ti?ng Vi?t, có các d?ch v? h? tr? ngôn ng? mi?n phí dành cho b?n. G?i s? [phone].         [facility] complies with applicable Federal civil rights laws and does not discriminate on the basis of race, color, national origin, age, disability, or sex.        "

## 2017-04-27 NOTE — PROGRESS NOTES
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]    Procedure(s) (LRB):  CLOSURE-FISTULA-TRACHEAL (N/A)    Anesthesia: Local MAC    Description of Procedure: Removal of tracheo-cutaneous fistula tract    Description of the findings of the procedure: see op note    Estimated Blood Loss: * No values recorded between 2/2/2017  7:23 AM and 2/2/2017  7:41 AM *         Specimens:   Specimen (12h ago through future)    Start     Ordered    02/02/17 0733  Specimen to Pathology - Surgery  Once     Comments:  Specimen to pathology :  1)  Tracheal Fistula   -permanent, fridge, to pathology    02/02/17 0747         Subjective:       Patient ID: Carolee Ortega is a 56 y.o. female.    Chief Complaint: Cough; Nasal Congestion; and Sore Throat      HPI    Mrs. Ortega is here today with c/o illness x 3 days.  Reports NC, RN, PND, sore throat, and productive (yellow) cough.  Taking Singulair as ordered.  Drinking hot tea, honey/lemon for throat.      Review of Systems   Constitutional: Negative for chills and fever.   HENT: Positive for congestion, postnasal drip, rhinorrhea and sore throat. Negative for ear pain, facial swelling, sinus pressure, sneezing, trouble swallowing and voice change.    Eyes: Negative.    Respiratory: Positive for cough. Negative for shortness of breath and wheezing.    Cardiovascular: Negative.    Neurological: Negative.    Hematological: Negative for adenopathy.         Patient Active Problem List   Diagnosis    Essential hypertension    Herpes labialis    Glaucoma (increased eye pressure)    Obesity (BMI 30-39.9)    Asthma    Allergic rhinitis    Anxiety    Benign paroxysmal positional vertigo         Objective:     Physical Exam   Constitutional: She is oriented to person, place, and time. She appears well-developed and well-nourished.   HENT:   Head: Normocephalic and atraumatic.   Right Ear: Tympanic membrane, external ear and ear canal normal.   Left Ear: Tympanic membrane, external ear and ear canal normal.   Nose: Mucosal edema present. No rhinorrhea. Right sinus exhibits no maxillary sinus tenderness and no frontal sinus tenderness. Left sinus exhibits no maxillary sinus tenderness and no frontal sinus tenderness.   Mouth/Throat: Mucous membranes are normal. Posterior oropharyngeal erythema (PND noted) present. No oropharyngeal exudate or posterior oropharyngeal edema.   Eyes: Conjunctivae and EOM are normal. Pupils are equal, round, and reactive to light. Right eye exhibits no discharge. Left eye exhibits no discharge. No scleral icterus.   Cardiovascular: Normal rate, regular rhythm  and normal heart sounds.    Pulmonary/Chest: Effort normal and breath sounds normal.   Lymphadenopathy:     She has no cervical adenopathy.   Neurological: She is alert and oriented to person, place, and time.   Vitals reviewed.        Medication List with Changes/Refills   Current Medications    ALBUTEROL (ACCUNEB) 1.25 MG/3 ML NEBU    Take 1.25 mg by nebulization every 6 (six) hours as needed.    ALPRAZOLAM (XANAX) 0.5 MG TABLET    Take 1 tablet (0.5 mg total) by mouth 3 (three) times daily as needed.    AMLODIPINE (NORVASC) 5 MG TABLET    TAKE 1 TABLET BY MOUTH EVERY DAY    DORZOLAMIDE (TRUSOPT) 2 % OPHTHALMIC SOLUTION    Place 1 drop into both eyes 3 (three) times daily.    HYDROXYZINE HCL (ATARAX) 25 MG TABLET    TAKE 1 TABLET BY MOUTH AT BEDTIME AS NEEDED FOR ITCHING    LATANOPROST (XALATAN) 0.005 % OPHTHALMIC SOLUTION    Place 1 drop into both eyes every evening.    METOPROLOL TARTRATE (LOPRESSOR) 25 MG TABLET    TAKE 1 TABLET BY MOUTH EVERY DAY    MOMETASONE (NASONEX) 50 MCG/ACTUATION NASAL SPRAY    2 sprays by Nasal route once daily.    MONTELUKAST (SINGULAIR) 10 MG TABLET    TAKE 1 TABLET BY MOUTH IN THE EVENING    NAFTIFINE (NAFTIN) 1 % GEL    Apply 1 application topically 2 (two) times daily.           Diagnosis       1. Acute URI          Assessment/ Plan     Acute URI  -     predniSONE (DELTASONE) 20 MG tablet; Take 2 tab daily x 3 days, 1 tab daily x 3 days, then 1/2 tab daily x 2 days.  Dispense: 10 tablet; Refill: 0  -     promethazine-dextromethorphan (PROMETHAZINE-DM) 6.25-15 mg/5 mL Syrp; Take 5 mLs by mouth every 4 to 6 hours as needed.  Dispense: 180 mL; Refill: 0      Symptomatic care, rest, fluids, hydration.  Follow-up in clinic as needed.      BRET Mays  [facility]

## 2017-07-03 ENCOUNTER — OFFICE VISIT (OUTPATIENT)
Dept: OPHTHALMOLOGY | Facility: CLINIC | Age: 57
End: 2017-07-03
Payer: COMMERCIAL

## 2017-07-03 DIAGNOSIS — H40.1131 PRIMARY OPEN ANGLE GLAUCOMA OF BOTH EYES, MILD STAGE: Primary | ICD-10-CM

## 2017-07-03 PROCEDURE — 92012 INTRM OPH EXAM EST PATIENT: CPT | Mod: S$GLB,,, | Performed by: OPTOMETRIST

## 2017-07-03 PROCEDURE — 99999 PR PBB SHADOW E&M-EST. PATIENT-LVL I: CPT | Mod: PBBFAC,,, | Performed by: OPTOMETRIST

## 2017-07-03 NOTE — PROGRESS NOTES
HPI     3 months IOP check. No visual complaints.  Medication eye drops if any: Trusopt and Latanoprost  Date last eye visit: 03/20/2017  Last full exam: 01/23/2017  Last IOP : 23/20  Last dilated eye exam and SDP's: 01/23/2017  Last HVF and HRT : 09/20/2016  High IOP: 26/24  CCT: 530/541 +1/+1  Family Hx POAG: father    Last edited by Octavia Watts on 7/3/2017  9:00 AM. (History)            Assessment /Plan     For exam results, see Encounter Report.    Primary open angle glaucoma of both eyes, mild stage      Continued elevated IOP  Started Trusopt in Jan 2017      Continue latanoprost hs, trusopt tid OU, reinstructed instillation    RTC 3 months IOP ck vf goct

## 2017-09-11 ENCOUNTER — HOSPITAL ENCOUNTER (OUTPATIENT)
Dept: RADIOLOGY | Facility: HOSPITAL | Age: 57
Discharge: HOME | End: 2017-09-11
Attending: FAMILY MEDICINE
Payer: COMMERCIAL

## 2017-09-11 ENCOUNTER — OFFICE VISIT (OUTPATIENT)
Dept: FAMILY MEDICINE | Facility: CLINIC | Age: 57
End: 2017-09-11
Payer: COMMERCIAL

## 2017-09-11 VITALS
HEART RATE: 77 BPM | TEMPERATURE: 98 F | DIASTOLIC BLOOD PRESSURE: 86 MMHG | SYSTOLIC BLOOD PRESSURE: 120 MMHG | OXYGEN SATURATION: 98 % | RESPIRATION RATE: 16 BRPM | HEIGHT: 63 IN | BODY MASS INDEX: 36.29 KG/M2 | WEIGHT: 204.81 LBS

## 2017-09-11 DIAGNOSIS — S69.92XA THUMB INJURY, LEFT, INITIAL ENCOUNTER: ICD-10-CM

## 2017-09-11 DIAGNOSIS — S62.525A CLOSED NONDISPLACED FRACTURE OF DISTAL PHALANX OF LEFT THUMB, INITIAL ENCOUNTER: Primary | ICD-10-CM

## 2017-09-11 PROCEDURE — 3079F DIAST BP 80-89 MM HG: CPT | Mod: S$GLB,,, | Performed by: FAMILY MEDICINE

## 2017-09-11 PROCEDURE — 99213 OFFICE O/P EST LOW 20 MIN: CPT | Mod: S$GLB,,, | Performed by: FAMILY MEDICINE

## 2017-09-11 PROCEDURE — 3074F SYST BP LT 130 MM HG: CPT | Mod: S$GLB,,, | Performed by: FAMILY MEDICINE

## 2017-09-11 PROCEDURE — 3008F BODY MASS INDEX DOCD: CPT | Mod: S$GLB,,, | Performed by: FAMILY MEDICINE

## 2017-09-11 PROCEDURE — 73140 X-RAY EXAM OF FINGER(S): CPT | Mod: TC,PO

## 2017-09-11 PROCEDURE — 99999 PR PBB SHADOW E&M-EST. PATIENT-LVL IV: CPT | Mod: PBBFAC,,, | Performed by: FAMILY MEDICINE

## 2017-09-11 PROCEDURE — 73140 X-RAY EXAM OF FINGER(S): CPT | Mod: 26,LT,, | Performed by: RADIOLOGY

## 2017-09-11 NOTE — PROGRESS NOTES
CHIEF COMPLAINT: This is a 56-year-old female complaining of injury to left thumb.    SUBJECTIVE: Patient struck the acrylic nail of her left thumb on the car door, causing hyperextension injury approximately 9 days ago.  She continues to have pain at the base of her nail and at the IP joint.  She's concerned of possible infection around the base of her nail.  She's been wearing a splint, which has helped.  She had significant bruising of the pad of her thumb which has mostly resolved.    ROS:  GENERAL: Patient denies fever, chills, night sweats.  Patient denies weight gain or loss. Patient denies anorexia, fatigue, weakness or swollen glands.  SKIN: Patient denies rash.  LUNGS: Patient denies cough, wheeze or hemoptysis.  CARDIOVASCULAR: Patient denies chest pain, shortness of breath, palpitations, syncope or lower extremity edema.  MUSCULOSKELETAL: Patient denies joint swelling, redness or warmth.  NEUROLOGIC: Patient denies headache, vertigo, numbness, tingling, weakness in limb, or abnormality of gait.    OBJECTIVE:   GENERAL: Well-developed well-nourished, obese, black female alert and oriented x3, in no acute distress. Memory, judgment and cognition without deficit.   SKIN: Clear without rash.  Resolving contusion had of left thumb.  HEENT: Eyes: Clear conjunctivae. No scleral icterus.   NECK: Supple, normal range of motion.   LUNGS: Normal respiratory effort.  CARDIOVASCULAR: Regular rhythm, normal S1, S2 without murmur, gallop or rub.  EXTREMITIES: Without cyanosis, clubbing or edema. Distal pulses 2+ and equal. Normal range of motion in left thumb. No joint effusion, erythema or warmth.  Tenderness distal phalanx at base of the nail.  Tenderness with passive range of motion IP joint.   NEUROLOGIC: Motor strength equal bilaterally. Sensation normal to touch. Deep tendon reflexes 2+ and equal. Gait without abnormality. No tremor.     X-ray left thumb: Fracture distal phalanx with minimal  displacement.    ASSESSMENT:  1. Closed nondisplaced fracture of distal phalanx of left thumb, initial encounter    2. Thumb injury, left, initial encounter      PLAN:   1.  Continue wearing splint with IP joint in extension.  2.  Orthopedic consult.

## 2017-09-15 ENCOUNTER — OFFICE VISIT (OUTPATIENT)
Dept: ORTHOPEDICS | Facility: CLINIC | Age: 57
End: 2017-09-15
Payer: COMMERCIAL

## 2017-09-15 VITALS
BODY MASS INDEX: 37.4 KG/M2 | DIASTOLIC BLOOD PRESSURE: 77 MMHG | RESPIRATION RATE: 12 BRPM | WEIGHT: 203.25 LBS | HEART RATE: 61 BPM | SYSTOLIC BLOOD PRESSURE: 118 MMHG | HEIGHT: 62 IN

## 2017-09-15 DIAGNOSIS — M79.645 PAIN OF LEFT THUMB: Primary | ICD-10-CM

## 2017-09-15 DIAGNOSIS — S62.525A CLOSED NONDISPLACED FRACTURE OF DISTAL PHALANX OF LEFT THUMB, INITIAL ENCOUNTER: Primary | ICD-10-CM

## 2017-09-15 PROCEDURE — 3078F DIAST BP <80 MM HG: CPT | Mod: S$GLB,,, | Performed by: PHYSICIAN ASSISTANT

## 2017-09-15 PROCEDURE — 3008F BODY MASS INDEX DOCD: CPT | Mod: S$GLB,,, | Performed by: PHYSICIAN ASSISTANT

## 2017-09-15 PROCEDURE — 99999 PR PBB SHADOW E&M-EST. PATIENT-LVL III: CPT | Mod: PBBFAC,,, | Performed by: PHYSICIAN ASSISTANT

## 2017-09-15 PROCEDURE — 99214 OFFICE O/P EST MOD 30 MIN: CPT | Mod: S$GLB,,, | Performed by: PHYSICIAN ASSISTANT

## 2017-09-15 PROCEDURE — 3074F SYST BP LT 130 MM HG: CPT | Mod: S$GLB,,, | Performed by: PHYSICIAN ASSISTANT

## 2017-09-15 NOTE — LETTER
September 18, 2017      Genie Walter MD  [address]  [place] [address]           [facility]  [address]  [place] [address]  Phone: [phone]  Fax: [phone]          Patient: Carolee Ortega   MR Number: [id number]   YOB: 1960   Date of Visit: 9/15/2017       Dear Dr. Genie Walter:    Thank you for referring Carolee Ortega to me for evaluation. Attached you will find relevant portions of my assessment and plan of care.    If you have questions, please do not hesitate to call me. I look forward to following Carolee Ortega along with you.    Sincerely,        Enclosure  CC:  No Recipients    If you would like to receive this communication electronically, please contact [email or web address] or [phone] to request more information on [facility] access.    For providers and/or their staff who would like to refer a patient to [facility], please contact us through our one-stop-shop provider referral line, Thien Mathews, at [phone].    If you feel you have received this communication in error or would no longer like to receive these types of communications, please e-mail [email or web address]

## 2017-09-18 RX ORDER — HYDROXYZINE HYDROCHLORIDE 25 MG/1
TABLET, FILM COATED ORAL
Qty: 30 TABLET | Refills: 0 | Status: SHIPPED | OUTPATIENT
Start: 2017-09-18 | End: 2017-10-15 | Stop reason: SDUPTHER

## 2017-10-03 ENCOUNTER — OFFICE VISIT (OUTPATIENT)
Dept: OPHTHALMOLOGY | Facility: CLINIC | Age: 57
End: 2017-10-03
Payer: COMMERCIAL

## 2017-10-03 DIAGNOSIS — H40.1131 PRIMARY OPEN ANGLE GLAUCOMA OF BOTH EYES, MILD STAGE: ICD-10-CM

## 2017-10-03 PROCEDURE — 92133 CPTRZD OPH DX IMG PST SGM ON: CPT | Mod: S$GLB,,, | Performed by: OPTOMETRIST

## 2017-10-03 PROCEDURE — 92012 INTRM OPH EXAM EST PATIENT: CPT | Mod: S$GLB,,, | Performed by: OPTOMETRIST

## 2017-10-03 PROCEDURE — 92083 EXTENDED VISUAL FIELD XM: CPT | Mod: S$GLB,,, | Performed by: OPTOMETRIST

## 2017-10-03 PROCEDURE — 99999 PR PBB SHADOW E&M-EST. PATIENT-LVL I: CPT | Mod: PBBFAC,,, | Performed by: OPTOMETRIST

## 2017-10-03 RX ORDER — DORZOLAMIDE HYDROCHLORIDE AND TIMOLOL MALEATE 20; 5 MG/ML; MG/ML
1 SOLUTION/ DROPS OPHTHALMIC EVERY 12 HOURS
Qty: 1 BOTTLE | Refills: 6 | Status: SHIPPED | OUTPATIENT
Start: 2017-10-03 | End: 2018-09-28 | Stop reason: SDUPTHER

## 2017-10-03 RX ORDER — DORZOLAMIDE HCL 20 MG/ML
1 SOLUTION/ DROPS OPHTHALMIC 3 TIMES DAILY
Qty: 10 ML | Refills: 12 | Status: SHIPPED | OUTPATIENT
Start: 2017-10-03 | End: 2017-10-03 | Stop reason: ALTCHOICE

## 2017-10-03 RX ORDER — LATANOPROST 50 UG/ML
1 SOLUTION/ DROPS OPHTHALMIC NIGHTLY
Qty: 2.5 ML | Refills: 11 | Status: SHIPPED | OUTPATIENT
Start: 2017-10-03 | End: 2018-07-24 | Stop reason: SDUPTHER

## 2017-10-03 NOTE — PROGRESS NOTES
HPI     3 months IOP check. Review 24-2 and GOCT.  Decrease near visual acuity   hard to see small print  Medication eye drops if any: Trusopt and Latanoprost  Date last eye visit: 07/03/2017  Last full exam: 01/23/2017  Last IOP : 24/22  Last dilated eye exam and SDP's: 01/23/2017  Last HVF and HRT : today  High IOP: 26/24  CCT: 530/541 +1/+1  Family Hx POAG: father    Last edited by Octavia Watts on 10/3/2017  8:40 AM. (History)            Assessment /Plan     For exam results, see Encounter Report.    Primary open angle glaucoma of both eyes, mild stage  -     latanoprost (XALATAN) 0.005 % ophthalmic solution; Place 1 drop into both eyes every evening.  Dispense: 2.5 mL; Refill: 11  -     Discontinue: dorzolamide (TRUSOPT) 2 % ophthalmic solution; Place 1 drop into both eyes 3 (three) times daily.  Dispense: 10 mL; Refill: 12  -     Nolen Visual Field - OU - Extended - Both Eyes  -     Posterior Segment OCT Optic Nerve- Both eyes  -     dorzolamide-timolol 2-0.5% (COSOPT) 22.3-6.8 mg/mL ophthalmic solution; Place 1 drop into both eyes every 12 (twelve) hours.  Dispense: 1 Bottle; Refill: 6    gOCT  No evidence of glaucomatous changes OS.  OD shows continuing thinning at TI, possible corresponding notch forming.    VF normal OU    IOP has decreased from last visit.    Change Trusopt tid to Cosopt bid OU  Continue latanoprost hs OU    RTC 3 months IOP ck and SDP's, consider SLT

## 2017-10-04 ENCOUNTER — IMMUNIZATION (OUTPATIENT)
Dept: FAMILY MEDICINE | Facility: CLINIC | Age: 57
End: 2017-10-04
Payer: COMMERCIAL

## 2017-10-04 PROCEDURE — 90471 IMMUNIZATION ADMIN: CPT | Mod: S$GLB,,, | Performed by: INTERNAL MEDICINE

## 2017-10-04 PROCEDURE — 90686 IIV4 VACC NO PRSV 0.5 ML IM: CPT | Mod: S$GLB,,, | Performed by: INTERNAL MEDICINE

## 2017-10-13 ENCOUNTER — HOSPITAL ENCOUNTER (OUTPATIENT)
Dept: RADIOLOGY | Facility: HOSPITAL | Age: 57
Discharge: HOME | End: 2017-10-13
Attending: PHYSICIAN ASSISTANT
Payer: COMMERCIAL

## 2017-10-13 ENCOUNTER — OFFICE VISIT (OUTPATIENT)
Dept: ORTHOPEDICS | Facility: CLINIC | Age: 57
End: 2017-10-13
Payer: COMMERCIAL

## 2017-10-13 VITALS
BODY MASS INDEX: 37.4 KG/M2 | WEIGHT: 203.25 LBS | RESPIRATION RATE: 14 BRPM | SYSTOLIC BLOOD PRESSURE: 162 MMHG | HEART RATE: 56 BPM | HEIGHT: 62 IN | DIASTOLIC BLOOD PRESSURE: 16 MMHG

## 2017-10-13 DIAGNOSIS — M79.645 PAIN OF LEFT THUMB: Primary | ICD-10-CM

## 2017-10-13 DIAGNOSIS — S62.525D NONDISPLACED FRACTURE OF DISTAL PHALANX OF LEFT THUMB, SUBSEQUENT ENCOUNTER FOR FRACTURE WITH ROUTINE HEALING: Primary | ICD-10-CM

## 2017-10-13 DIAGNOSIS — M79.645 PAIN OF LEFT THUMB: ICD-10-CM

## 2017-10-13 PROCEDURE — 99213 OFFICE O/P EST LOW 20 MIN: CPT | Mod: S$GLB,,, | Performed by: PHYSICIAN ASSISTANT

## 2017-10-13 PROCEDURE — 73140 X-RAY EXAM OF FINGER(S): CPT | Mod: TC,PO

## 2017-10-13 PROCEDURE — 99999 PR PBB SHADOW E&M-EST. PATIENT-LVL III: CPT | Mod: PBBFAC,,, | Performed by: PHYSICIAN ASSISTANT

## 2017-10-13 PROCEDURE — 73140 X-RAY EXAM OF FINGER(S): CPT | Mod: 26,LT,, | Performed by: RADIOLOGY

## 2017-10-13 NOTE — PROGRESS NOTES
Patient ID: Carolee Ortega is a 56 y.o. female.    Chief Complaint: Finger Injury and Pain of the Left Hand      HPI: Carolee Ortega  is a 56 y.o. female who c/o Finger Injury and Pain of the Left Hand   for duration of just over 3 weeks.  I saw her about 2 weeks ago and put her into a Stax splint for her left thumb distal phalanx fracture.  Pain is much improved.  It's only worsened when she hit it against something.  It is 0 out of 10 at present.  When she hits it the quality is aching, throbbing, and sharp.  It's improved with over-the-counter medications.        Objective:                Left Hand/Wrist Exam     Comments:  2+ radial pulse.  Capillary refill less than 2 seconds.  Slightly decreased sensaion on the left thumb tip.  However, sensation is intact otherwise.  Extension and flexion are intact to the IP joint of the left thumb as well as the MCP joint.  No atrophy of the hand is noted.  Swelling is improved.  No ecchymosis.  Strength testing not assessed in the left thumb secondary to known fracture in the distal phalanx.  Thumb distal phalanx fracture not displaced                  X for repeat x-rays ay:   Left thumb from today images and report were reviewed today.  I agree with the radiologist's interpretation.  There is a transverse healing fracture involving the distal phalanx of 1st digit.  The alignment is stable when compared to prior exam.  No significant callus formation noted.  Fracture margins are more blurred.    Assessment:       Encounter Diagnosis   Name Primary?    Nondisplaced fracture of distal phalanx of left thumb, subsequent encounter for fracture with routine healing Yes          Plan:       Carolee was seen today for finger injury and pain.    Diagnoses and all orders for this visit:    Nondisplaced fracture of distal phalanx of left thumb, subsequent encounter for fracture with routine healing    Ms. Zarco comes in today and follow-up on her left thumb fracture.  Fracture  alignment is unchanged.  It is essentially nondisplaced.  Fracture line is still clearly evident.  I recommend continued use of the splint.  She can start gentle range of motion exercises to decrease the likelihood of stiffness in the IP joint of the left thumb.  She should do this a couple of times a day, but otherwise have the splint in place.  Patient verbalizes understanding and agrees with the above.  I will see her in approximately 4 weeks for repeat x-rays.  Return in about 4 weeks (around 11/10/2017).          The patient understands, chooses and consents to this plan and accepts all   the risks which include but are not limited to the risks mentioned above.     Disclaimer: This note was prepared using a voice recognition system and is likely to have sound alike errors within the text.

## 2017-10-16 RX ORDER — HYDROXYZINE HYDROCHLORIDE 25 MG/1
TABLET, FILM COATED ORAL
Qty: 30 TABLET | Refills: 0 | Status: SHIPPED | OUTPATIENT
Start: 2017-10-16 | End: 2017-11-15 | Stop reason: SDUPTHER

## 2017-10-23 ENCOUNTER — PATIENT MESSAGE (OUTPATIENT)
Dept: ADMINISTRATIVE | Facility: OTHER | Age: 57
End: 2017-10-23

## 2017-11-07 RX ORDER — HYDROXYZINE HYDROCHLORIDE 25 MG/1
25 TABLET, FILM COATED ORAL NIGHTLY PRN
Qty: 90 TABLET | Refills: 0 | OUTPATIENT
Start: 2017-11-07

## 2017-11-09 ENCOUNTER — TELEPHONE (OUTPATIENT)
Dept: OPHTHALMOLOGY | Facility: CLINIC | Age: 57
End: 2017-11-09

## 2017-11-09 NOTE — TELEPHONE ENCOUNTER
----- Message from More Watts sent at 11/9/2017  7:47 AM CST -----  Contact: pt  [phone] is returning Mrs. Landa call from yesterday pls call back this morning

## 2017-11-10 ENCOUNTER — OFFICE VISIT (OUTPATIENT)
Dept: ORTHOPEDICS | Facility: CLINIC | Age: 57
End: 2017-11-10
Payer: COMMERCIAL

## 2017-11-10 ENCOUNTER — HOSPITAL ENCOUNTER (OUTPATIENT)
Dept: RADIOLOGY | Facility: HOSPITAL | Age: 57
Discharge: HOME | End: 2017-11-10
Attending: PHYSICIAN ASSISTANT
Payer: COMMERCIAL

## 2017-11-10 VITALS — RESPIRATION RATE: 12 BRPM | HEART RATE: 62 BPM | DIASTOLIC BLOOD PRESSURE: 82 MMHG | SYSTOLIC BLOOD PRESSURE: 124 MMHG

## 2017-11-10 DIAGNOSIS — S62.525D NONDISPLACED FRACTURE OF DISTAL PHALANX OF LEFT THUMB, SUBSEQUENT ENCOUNTER FOR FRACTURE WITH ROUTINE HEALING: Primary | ICD-10-CM

## 2017-11-10 DIAGNOSIS — G89.29 CHRONIC PAIN OF LEFT THUMB: Primary | ICD-10-CM

## 2017-11-10 DIAGNOSIS — M79.645 CHRONIC PAIN OF LEFT THUMB: Primary | ICD-10-CM

## 2017-11-10 DIAGNOSIS — M79.645 PAIN OF LEFT THUMB: ICD-10-CM

## 2017-11-10 PROCEDURE — 99999 PR PBB SHADOW E&M-EST. PATIENT-LVL III: CPT | Mod: PBBFAC,,, | Performed by: PHYSICIAN ASSISTANT

## 2017-11-10 PROCEDURE — 73140 X-RAY EXAM OF FINGER(S): CPT | Mod: 26,LT,, | Performed by: RADIOLOGY

## 2017-11-10 PROCEDURE — 73140 X-RAY EXAM OF FINGER(S): CPT | Mod: TC,PO

## 2017-11-10 PROCEDURE — 99213 OFFICE O/P EST LOW 20 MIN: CPT | Mod: S$GLB,,, | Performed by: PHYSICIAN ASSISTANT

## 2017-11-10 NOTE — PROGRESS NOTES
Patient ID: Carolee Ortega is a 56 y.o. female.    Chief Complaint: Pain of the Left Hand      HPI: Carolee Ortega  is a 56 y.o. female who c/o Pain of the Left Hand   for duration of nearly 2 mos.  she comes in today for reevaluation of left thumb distal phalanx fracture.  Pain level at present is 0 out of 10.  She has still been using her splint intermittently.  Quality is an aching pain that is intermittent.  At present is 0 out of 10.  Actors include protecting it.  Aggravating factors include bumping the thumb and bending it.          Objective:        General    Nursing note and vitals reviewed.  Constitutional: She is oriented to person, place, and time. She appears well-developed and well-nourished.   HENT:   Head: Normocephalic and atraumatic.   Eyes: EOM are normal.   Cardiovascular: Normal rate and regular rhythm.    Pulmonary/Chest: Effort normal.   Abdominal: Soft.   Neurological: She is alert and oriented to person, place, and time.   Psychiatric: She has a normal mood and affect. Her behavior is normal.         Left Hand/Wrist Exam     Inspection   Scars: Hand -  absent  Effusion: Hand -  absent  Bruising: Hand -  absent  Deformity: Hand -  absent    Range of Motion     Wrist   Extension: normal   Flexion: normal   Pronation: normal   Supination: normal     Finger Flexion   MP Thumb: normal   DIP Thumb: abnormal     Finger Extension   MP Thumb: normal  DIP Thumb: normal    Tests     Atrophy  Thenar:  Negative  Hypothenar:  negative  Intrinsic: negative  1st Dorsal Interosseous:  negative    Other     Sensory Exam  Median Distribution: normal  Ulnar Distribution: normal  Radial Distribution: normal    Comments:  2+ radial pulse.  Mild TTP over the distal phalanx left thumb.            Muscle Strength   Left Upper Extremity  Wrist Extension: 5/5/5   Wrist Flexion: 5/5/5   :  5/5/5   Pinch Mechanism: 5/5    Vascular Exam       Capillary Refill  Left Hand: normal capillary refill            Xray:    Left thumb from today images and report were reviewed today.  I agree with the radiologist's interpretation.  Decreased soft tissue swelling of first digit.  Healing transverse fracture through the mid shaft of the first digit distal phalanx again noted with good alignment and approximation of fragments.  Normal articulation at the IP joint.  Mild degenerative change at the first CMC joint.    Assessment:       Encounter Diagnosis   Name Primary?    Nondisplaced fracture of distal phalanx of left thumb, subsequent encounter for fracture with routine healing Yes          Plan:       Carolee was seen today for pain.    Diagnoses and all orders for this visit:    Nondisplaced fracture of distal phalanx of left thumb, subsequent encounter for fracture with routine healing    Ms. Zarco comes in today for an established problem of the left thumb.  She is improving.  I want her to discontinue the splint altogether.  I'm encouraging active and passive range of motion of the left thumb.  Her stance she should not be doing any heavy lifting or a lot of grasping and gripping with the left hand.  While the fracture is healing, it is not fully healed.  I have offered physical therapy to work on range of motion in the thumb, but she is on a fixed income as she is retired.  Therapy is a financial limitations for her.  I will plan to see her back in the office in 2 months for what will hopefully be final x-rays.  If she has problems before then, she will notify the office.  Patient verbalizes understanding and agrees.  Return in about 2 months (around 1/10/2018).        The patient understands, chooses and consents to this plan and accepts all   the risks which include but are not limited to the risks mentioned above.     Disclaimer: This note was prepared using a voice recognition system and is likely to have sound alike errors within the text.

## 2017-11-15 ENCOUNTER — PATIENT MESSAGE (OUTPATIENT)
Dept: ADMINISTRATIVE | Facility: OTHER | Age: 57
End: 2017-11-15

## 2017-11-15 RX ORDER — HYDROXYZINE HYDROCHLORIDE 25 MG/1
25 TABLET, FILM COATED ORAL EVERY 4 HOURS PRN
Qty: 30 TABLET | Refills: 3 | Status: SHIPPED | OUTPATIENT
Start: 2017-11-15 | End: 2018-02-26 | Stop reason: SDUPTHER

## 2017-11-19 RX ORDER — HYDROXYZINE HYDROCHLORIDE 25 MG/1
TABLET, FILM COATED ORAL
Qty: 30 TABLET | Refills: 0 | Status: SHIPPED | OUTPATIENT
Start: 2017-11-19 | End: 2018-01-09 | Stop reason: SDUPTHER

## 2017-11-30 ENCOUNTER — PATIENT MESSAGE (OUTPATIENT)
Dept: ADMINISTRATIVE | Facility: OTHER | Age: 57
End: 2017-11-30

## 2017-12-01 ENCOUNTER — PATIENT OUTREACH (OUTPATIENT)
Dept: OTHER | Facility: OTHER | Age: 57
End: 2017-12-01

## 2017-12-01 NOTE — PROGRESS NOTES
Mrs. Carolee Ortega is a 57 y.o. female who is newly enrolled in the [facility].     The following information was reviewed/updated:  Preferred pharmacy   [facility]pharm[facility] #[id number] - [place] - [address].  [address].  [place] [address]  Phone: [phone] Fax: [phone]    Patient prefers a 90 days supply  Review of patient's allergies indicates:   Allergen Reactions    Flexeril [cyclobenzaprine] Palpitations     Current Outpatient Prescriptions   Medication Sig    albuterol (ACCUNEB) 1.25 mg/3 mL Nebu Take 1.25 mg by nebulization every 6 (six) hours as needed.    alprazolam (XANAX) 0.5 MG tablet Take 1 tablet (0.5 mg total) by mouth 3 (three) times daily as needed.    amlodipine (NORVASC) 5 MG tablet TAKE 1 TABLET BY MOUTH EVERY DAY    dorzolamide-timolol 2-0.5% (COSOPT) 22.3-6.8 mg/mL ophthalmic solution Place 1 drop into both eyes every 12 (twelve) hours.    hydrOXYzine HCl (ATARAX) 25 MG tablet TAKE 1 TABLET BY MOUTH AT BEDTIME AS NEEDED FOR ITCHING    hydrOXYzine HCl (ATARAX) 25 MG tablet Take 1 tablet (25 mg total) by mouth every 4 (four) hours as needed for Itching.    latanoprost (XALATAN) 0.005 % ophthalmic solution Place 1 drop into both eyes every evening.    metoprolol tartrate (LOPRESSOR) 25 MG tablet TAKE 1 TABLET BY MOUTH EVERY DAY    mometasone (NASONEX) 50 mcg/actuation nasal spray 2 sprays by Nasal route once daily.    montelukast (SINGULAIR) 10 mg tablet TAKE 1 TABLET BY MOUTH IN THE EVENING     No current facility-administered medications for this visit.          Reviewed non-pharmacologic therapies and impact on BP:    1. Low-sodium diet- 11 mmHg reduction 2-4 weeks. I have reviewed [facility] diet sodium restrictions (<2000mg/day).  cooks and avoids salt. She does not add salt to foods and isn't currently measuring her sodium intake.   2. Exercise- 7 mmHg reduction 4-12 weeks. I have recommended patient engage in exercise as  "tolerated at least 30 minutes 5x per week to improve cardiovascular health. Patient does not exercise at this time.  3. Alcohol intake- 3 mmHg reduction 4-12 weeks. I have discussed with patient the maximum recommended number of 2 drink(s) per day for men/women. Patient is a social drinker and consumes 2 glass of wine per week.    Explained that we expect patient to obtain several blood pressures per week at random times of day.   Our goal is to get  BP to consistently below 130/80mmHg and make the process convenient so patient can avoid extra trips to the office. Getting your blood pressure below 130/80mmHg (definition of control) will reduce your risk for heart attack, kidney failure, stroke and death (as well as kidney failure, eye disease, & dementia).     Patient is not meeting the goal already.   When asked what the patient thinks is causing BP to be elevated, she attributes her weight to the issue. She stated she has "no stress. Wonderful life."    Instructed patient not to allow anyone else to use phone and BP cuff.   I'm not available for emergencies. Patient will call [facility] on-call ([phone] or [phone]) or [phone] if needed.     Discussed appropriate BP measuring technique:  Before taking your blood pressure  Find a quiet place. You will need to listen for your heartbeat.  Roll up the sleeve on your left arm or remove any tight-sleeved clothing, if needed. (It's best to take your blood pressure from your left arm if you are right-handed.You can use the other arm if you have been told by your health care provider to do so.)  Rest in a chair next to a table for 5 to 10 minutes. (Your left arm should rest comfortably at heart level.)  Sit up straight with your back against the chair, legs uncrossed and on the ground.  Rest your forearm on the table with the palm of your hand facing up.  You should not talk, read the newspaper, or watch television during this process.      Patient and I agreed that " she will continue to monitor blood pressure and sodium intake, and continue to remain adherent to medications.     I will plan to follow-up with the patient in 2 weeks.   Emailed patient link to [facility]'s HTN webpage and my contact information in case she has any questions.

## 2017-12-04 ENCOUNTER — PATIENT OUTREACH (OUTPATIENT)
Dept: OTHER | Facility: OTHER | Age: 57
End: 2017-12-04

## 2017-12-04 NOTE — PROGRESS NOTES
Last 5 Patient Entered Readings Current 30 Day Average: 144/91     Recent Readings 12/3/2017 12/3/2017 12/2/2017 12/2/2017 12/1/2017    Systolic BP (mmHg) 153 154 150 135 139    Diastolic BP (mmHg) 103 96 91 88 85    Pulse 59 58 67 55 55          Patient states being unaware of how long she should wait until she takes her BP readings. Patient is aware of amount of time to wait until she takes BP reading.     Hypertension Digital Medicine Program (HDMP): Health  Introduction    Introduced Ms. Carolee Ortega to HDMP. Discussed health  role and recommended lifestyle modifications.    Diet (i.e. Low sodium, food labels): Patient reports her  cooking. She states not adding salt to her meals. Patient reports eating shrimp with fried chicken wings. Patient reports baked chicken with green vegetables. Patient reports drinking a lot of water. Patient reports drinking green tea. Patient reports eating chicken salads often. Patient expresses eating healthy as evidenced by her stating not eating out frequently. Patient requested DASH Diet information.  Exercise: Patients reports achieving little activity. She states achieving lite exercise due to house chores 1 or 2x a week.  Alcohol/Tobacco: Patient reports being a non-smoker. She reports drinking 2-3 cocktails a week.  Medication Adherence: has been compliant with the medicaiton regimen Patient reports no abnormal signs or symptoms with BP medication.   Other goals:    Reviewed AHA recommendations:  Limit sodium intake to <2000mg/day  Perform 150 minutes of physical activity per week    Patient is aware of the importance of taking daily BP readings at various times of the day  Patient aware that the health  can be used as a resource for lifestyle modifications to help reduce or maintain a healthy BP  Patient is aware of the importance of medication adherence.  Patient is aware that HDMP team is not available for emergencies. Patient should call 911 or  [facility] on [facility] if one arises.

## 2017-12-15 ENCOUNTER — PATIENT OUTREACH (OUTPATIENT)
Dept: OTHER | Facility: OTHER | Age: 57
End: 2017-12-15

## 2017-12-15 DIAGNOSIS — I10 ESSENTIAL HYPERTENSION: Primary | ICD-10-CM

## 2017-12-15 NOTE — PROGRESS NOTES
Last 5 Patient Entered Readings Current 30 Day Average: 134/87       Units 12/15/2017 12/15/2017 12/14/2017 12/14/2017 12/14/2017    Time -  9:31 AM 12:00 AM  5:22 PM  8:34 AM 12:00 AM    Systolic Blood Pressure - 143 - 149 136 -    Diastolic Blood Pressure - 94 - 97 94 -    Pulse bpm 62 - 59 63 -    Steps Walked - - 2211 - - 1125        Patient's BP average is above goal of <130/80.     Patient denies s/s of hypotension (lightheadedness, dizziness, nausea, fatigue) associated with low readings. Instructed patient to inform me if this occurs, patient confirms understanding.      Patient denies s/s of hypertension (SOB, CP, severe headaches, changes in vision) associated with high readings. Instructed patient to go to the ED if BP > 180/110 and accompanied by hypertensive s/s, patient confirms understanding.    Will continue to monitor regularly. Will follow up in 2-3 weeks, sooner if BP begins to trend upward or downward.    Patient has my contact information and knows to call with any concerns or clinical changes.     Current HTN regimen:  Hypertension Medications             amlodipine (NORVASC) 5 MG tablet TAKE 1 TABLET BY MOUTH EVERY DAY    metoprolol tartrate (LOPRESSOR) 25 MG tablet TAKE 1 TABLET BY MOUTH EVERY DAY      Patient's blood pressure is on an downward trend. Patient and I agreed to maintain her current blood pressure regimen and will follow-up in two weeks. If her blood pressure remains above goal, I will recommend increasing amlodipine to 10mg daily.

## 2017-12-18 RX ORDER — AMLODIPINE BESYLATE 5 MG/1
10 TABLET ORAL DAILY
Qty: 30 TABLET | Refills: 11
Start: 2017-12-18 | End: 2018-01-12 | Stop reason: SDUPTHER

## 2017-12-18 NOTE — PROGRESS NOTES
Last 5 Patient Entered Readings Current 30 Day Average: 133/86       Units 12/18/2017 12/18/2017 12/17/2017 12/16/2017 12/16/2017    Time -  9:36 AM 12:00 AM 12:00 AM  8:40 AM 12:00 AM    Systolic Blood Pressure - 133 - - 142 -    Diastolic Blood Pressure - 79 - - 92 -    Pulse bpm 61 - - 63 -    Steps Walked - - 407 401 - 1896        Ms. Ortega spoke with her  and they decided to go ahead and increase amlodipine to 10mg daily today. I agree with this recommendation. We went through hypotensive symptoms and side effects of an increase in amlodipine. I will need to call in a new prescription for amlodipine 10mg daily when we speak in two weeks as long as she can tolerate the change in dose.

## 2017-12-29 ENCOUNTER — PATIENT OUTREACH (OUTPATIENT)
Dept: OTHER | Facility: OTHER | Age: 57
End: 2017-12-29

## 2018-01-03 ENCOUNTER — PATIENT OUTREACH (OUTPATIENT)
Dept: OTHER | Facility: OTHER | Age: 58
End: 2018-01-03

## 2018-01-03 NOTE — PROGRESS NOTES
Last 5 Patient Entered Readings Current 30 Day Average: 129/85     Recent Readings 1/2/2018 12/31/2017 12/31/2017 12/30/2017 12/29/2017    SBP (mmHg) 132 136 141 132 135    DBP (mmHg) 82 93 83 81 85    Pulse 64 63 64 59 58        Patient's BP average is controlled based on 2017 ACC/AHA HTN guidelines of goal BP <130/80.      Patient denies s/s of hypotension (lightheadedness, dizziness, nausea, fatigue) associated with low readings. Instructed patient to inform me if this occurs, patient confirms understanding.      Patient denies s/s of hypertension (SOB, CP, severe headaches, changes in vision) associated with high readings. Instructed patient to go to the ED if BP > 180/110 and accompanied by hypertensive s/s, patient confirms understanding.     Patient's health , Hector Yu, will be following up every 3-4 weeks. I will continue to monitor regularly and will follow up in one month, sooner if BP begins to trend upward or downward.    Patient has my contact information and knows to call with any concerns or clinical changes.     Current HTN regimen:  Hypertension Medications             amLODIPine (NORVASC) 5 MG tablet Take 2 tablets (10 mg total) by mouth once daily.    metoprolol tartrate (LOPRESSOR) 25 MG tablet TAKE 1 TABLET BY MOUTH EVERY DAY      Patient denies any side effects since starting amlodipine. She will maintain her current regimen with follow-up in one month. Her BP is dropping nicely and close to goal.

## 2018-01-09 ENCOUNTER — HOSPITAL ENCOUNTER (OUTPATIENT)
Dept: RADIOLOGY | Facility: HOSPITAL | Age: 58
Discharge: HOME | End: 2018-01-09
Attending: PHYSICIAN ASSISTANT
Payer: COMMERCIAL

## 2018-01-09 ENCOUNTER — OFFICE VISIT (OUTPATIENT)
Dept: OPHTHALMOLOGY | Facility: CLINIC | Age: 58
End: 2018-01-09
Payer: COMMERCIAL

## 2018-01-09 DIAGNOSIS — G89.29 CHRONIC PAIN OF LEFT THUMB: ICD-10-CM

## 2018-01-09 DIAGNOSIS — M79.645 CHRONIC PAIN OF LEFT THUMB: ICD-10-CM

## 2018-01-09 DIAGNOSIS — H40.1131 PRIMARY OPEN ANGLE GLAUCOMA OF BOTH EYES, MILD STAGE: Primary | ICD-10-CM

## 2018-01-09 PROCEDURE — 99999 PR PBB SHADOW E&M-EST. PATIENT-LVL I: CPT | Mod: PBBFAC,,, | Performed by: OPTOMETRIST

## 2018-01-09 PROCEDURE — 92014 COMPRE OPH EXAM EST PT 1/>: CPT | Mod: S$GLB,,, | Performed by: OPTOMETRIST

## 2018-01-09 PROCEDURE — 73140 X-RAY EXAM OF FINGER(S): CPT | Mod: 26,LT,, | Performed by: RADIOLOGY

## 2018-01-09 PROCEDURE — 73140 X-RAY EXAM OF FINGER(S): CPT | Mod: TC,FY,PO

## 2018-01-09 NOTE — PROGRESS NOTES
HPI     3 months IOP check and SDP's DFE.  Medication eye drops if any: Cosopt and Latanoprost  Date last eye visit: 10/03/2017  Last full exam: 01/23/2017  Last IOP : 18/16  Last dilated eye exam and SDP's: 01/23/2017  Last HVF and HRT : 10/03/2017  High IOP: 26/24  CCT: 530/541 +1/+1  Family Hx POAG: father    Last edited by Octavia Watts on 1/9/2018  8:11 AM. (History)            Assessment /Plan     For exam results, see Encounter Report.    Primary open angle glaucoma of both eyes, mild stage      Elevated IOP    Continue cosopt and latanoprost OU    gOCT unchanged, SDP's next visit    RTC 4 months IOP ck

## 2018-01-12 ENCOUNTER — PATIENT MESSAGE (OUTPATIENT)
Dept: OTHER | Facility: OTHER | Age: 58
End: 2018-01-12

## 2018-01-12 DIAGNOSIS — I10 ESSENTIAL HYPERTENSION: ICD-10-CM

## 2018-01-12 RX ORDER — AMLODIPINE BESYLATE 10 MG/1
10 TABLET ORAL DAILY
Qty: 30 TABLET | Refills: 5 | Status: SHIPPED | OUTPATIENT
Start: 2018-01-12 | End: 2018-02-19

## 2018-01-13 ENCOUNTER — PATIENT MESSAGE (OUTPATIENT)
Dept: ORTHOPEDICS | Facility: CLINIC | Age: 58
End: 2018-01-13

## 2018-01-19 ENCOUNTER — PATIENT OUTREACH (OUTPATIENT)
Dept: OTHER | Facility: OTHER | Age: 58
End: 2018-01-19

## 2018-01-19 NOTE — PROGRESS NOTES
Last 5 Patient Entered Readings                                      Current 30 Day Average: 128/82     Recent Readings 1/17/2018 1/17/2018 1/16/2018 1/16/2018 1/15/2018    SBP (mmHg) 134 117 128 128 110    DBP (mmHg) 84 86 68 84 72    Pulse 61 62 59 66 67        Patient was very short with responses and short encounter.     Hypertension Digital Medicine Program (HDMP): Health  Follow Up    Lifestyle Modifications:    1.Low sodium diet: yes Patient reports eating a lot of fresh fruits and vegetables in her diet. She states she has decreased eating a lot of fried foods and increased her salad consumption. Patient reports drinking a lot of water also. Congratulated patient on maintaining her diet.    2.Physical activity: no Patient states she has not increased her physical activity. She states needing to increase her physical activity. Encouraged patient to increase her activity level when able.     3.Hypotension/Hypertension symptoms: no Patient reports no abnormal signs or symptoms with BP medication.   Frequency/Alleviating factors/Precipitating factors, etc.     4.Patient has been compliant with the medication regimen.     Follow up with Ms. Carolee Ortega completed. No further questions or concerns. I will follow up in a few weeks to assess progress.

## 2018-01-31 ENCOUNTER — PATIENT OUTREACH (OUTPATIENT)
Dept: OTHER | Facility: OTHER | Age: 58
End: 2018-01-31

## 2018-01-31 NOTE — PROGRESS NOTES
Last 5 Patient Entered Readings                                      Current 30 Day Average: 126/80     Recent Readings 1/30/2018 1/30/2018 1/30/2018 1/29/2018 1/28/2018    SBP (mmHg) 144 150 146 110 129    DBP (mmHg) 90 89 84 72 86    Pulse 51 51 52 66 62        Patient's BP average is controlled based on 2017 ACC/AHA HTN guidelines of goal BP <130/80.      Patient denies s/s of hypotension (lightheadedness, dizziness, nausea, fatigue) associated with low readings. Instructed patient to inform me if this occurs, patient confirms understanding.      Patient denies s/s of hypertension (SOB, CP, severe headaches, changes in vision) associated with high readings. Instructed patient to go to the ED if BP > 180/110 and accompanied by hypertensive s/s, patient confirms understanding.     Patient's health , Hector Yu, will be following up every 3-4 weeks. I will continue to monitor regularly and will follow up in 3-4 months, sooner if BP begins to trend upward or downward.    Patient has my contact information and knows to call with any concerns or clinical changes.     Current HTN regimen:  Hypertension Medications             amLODIPine (NORVASC) 10 MG tablet Take 1 tablet (10 mg total) by mouth once daily.    metoprolol tartrate (LOPRESSOR) 25 MG tablet TAKE 1 TABLET BY MOUTH EVERY DAY

## 2018-02-01 NOTE — PROGRESS NOTES
Last 5 Patient Entered Readings                                      Current 30 Day Average: 127/80     Recent Readings 2/1/2018 2/1/2018 1/30/2018 1/30/2018 1/30/2018    SBP (mmHg) 145 145 144 150 146    DBP (mmHg) 81 82 90 89 84    Pulse 57 58 51 51 52        Patient called this morning informing me she has a new job at [employer] and working from 11:30am-8pm. She would like our team to call her in the morning. Her BP has increased since starting the medication over stress of a new job and learning the job. She is feeling fine and if this continues, I will adjust medications with our next visit on 2/12.

## 2018-02-14 ENCOUNTER — PATIENT OUTREACH (OUTPATIENT)
Dept: OTHER | Facility: OTHER | Age: 58
End: 2018-02-14

## 2018-02-14 NOTE — PROGRESS NOTES
Last 5 Patient Entered Readings                                      Current 30 Day Average: 127/80     Recent Readings 2/13/2018 2/12/2018 2/12/2018 2/10/2018 2/9/2018    SBP (mmHg) 132 122 142 118 135    DBP (mmHg) 84 76 82 76 81    Pulse 55 61 63 53 61        2/14-Patient answered stating she was on her way to take  to ER as he was having an asthma attack. Patient states she will return call when available. Will call again on 2/28.    Hypertension Digital Medicine Program (HDMP): Health  Follow Up    Lifestyle Modifications:    1.Low sodium diet: no Patient states her  cooks most foods. She states having a bad diet. Patient reports trying to make positive changes but her  has long work hours and struggles with wanting to incorporate him in these changes. Patient states her  will have a shift change soon and she plans on communicating with him the importance of making changes. Encouraged patient to communicate importance of living healthier to .     2.Physical activity: no Patient reports she is not exercising as she should be. Patient reports enjoying walking but does not have a walking partner. She states having work out videos and plans on using them this next week. Encouraged patient to use the DVDs at least once by next week.    3.Hypotension/Hypertension symptoms: no Patient reports no abnormal signs or symptoms with BP medication.  Frequency/Alleviating factors/Precipitating factors, etc.     4.Patient has been compliant with the medication regimen.     Follow up with Mrs. Carolee PARISH Shannon completed. No further questions or concerns. I will follow up in a few weeks to assess progress.

## 2018-02-19 ENCOUNTER — PATIENT OUTREACH (OUTPATIENT)
Dept: OTHER | Facility: OTHER | Age: 58
End: 2018-02-19

## 2018-02-19 DIAGNOSIS — I10 ESSENTIAL HYPERTENSION: Primary | ICD-10-CM

## 2018-02-19 RX ORDER — AMLODIPINE BESYLATE 5 MG/1
TABLET ORAL
Qty: 30 TABLET | Refills: 0 | Status: SHIPPED | OUTPATIENT
Start: 2018-02-19 | End: 2018-03-09 | Stop reason: SDUPTHER

## 2018-02-19 RX ORDER — MONTELUKAST SODIUM 10 MG/1
TABLET ORAL
Qty: 30 TABLET | Refills: 0 | Status: SHIPPED | OUTPATIENT
Start: 2018-02-19 | End: 2018-03-20 | Stop reason: SDUPTHER

## 2018-02-19 NOTE — PROGRESS NOTES
Last 5 Patient Entered Readings                                      Current 30 Day Average: 127/81     Recent Readings 2/16/2018 2/13/2018 2/12/2018 2/12/2018 2/10/2018    SBP (mmHg) 128 132 122 142 118    DBP (mmHg) 82 84 76 82 76    Pulse 55 55 61 63 53

## 2018-02-26 ENCOUNTER — HOSPITAL ENCOUNTER (OUTPATIENT)
Dept: RADIOLOGY | Facility: HOSPITAL | Age: 58
Discharge: HOME | End: 2018-02-26
Attending: FAMILY MEDICINE
Payer: COMMERCIAL

## 2018-02-26 ENCOUNTER — OFFICE VISIT (OUTPATIENT)
Dept: FAMILY MEDICINE | Facility: CLINIC | Age: 58
End: 2018-02-26
Payer: COMMERCIAL

## 2018-02-26 VITALS
WEIGHT: 213.38 LBS | RESPIRATION RATE: 18 BRPM | DIASTOLIC BLOOD PRESSURE: 80 MMHG | BODY MASS INDEX: 39.27 KG/M2 | TEMPERATURE: 98 F | HEIGHT: 62 IN | HEART RATE: 71 BPM | SYSTOLIC BLOOD PRESSURE: 138 MMHG

## 2018-02-26 DIAGNOSIS — Z12.31 ENCOUNTER FOR SCREENING MAMMOGRAM FOR MALIGNANT NEOPLASM OF BREAST: ICD-10-CM

## 2018-02-26 DIAGNOSIS — E66.9 OBESITY (BMI 30-39.9): ICD-10-CM

## 2018-02-26 DIAGNOSIS — I10 ESSENTIAL HYPERTENSION: ICD-10-CM

## 2018-02-26 DIAGNOSIS — Z00.00 PREVENTATIVE HEALTH CARE: Primary | ICD-10-CM

## 2018-02-26 DIAGNOSIS — F41.9 ANXIETY: ICD-10-CM

## 2018-02-26 DIAGNOSIS — J30.89 CHRONIC NONSEASONAL ALLERGIC RHINITIS DUE TO OTHER ALLERGEN: ICD-10-CM

## 2018-02-26 DIAGNOSIS — B00.1 HERPES LABIALIS: ICD-10-CM

## 2018-02-26 PROBLEM — S62.525D: Status: RESOLVED | Noted: 2017-10-13 | Resolved: 2018-02-26

## 2018-02-26 PROCEDURE — 77067 SCR MAMMO BI INCL CAD: CPT | Mod: TC,PO

## 2018-02-26 PROCEDURE — 99396 PREV VISIT EST AGE 40-64: CPT | Mod: S$GLB,,, | Performed by: FAMILY MEDICINE

## 2018-02-26 PROCEDURE — 77063 BREAST TOMOSYNTHESIS BI: CPT | Mod: 26,,, | Performed by: RADIOLOGY

## 2018-02-26 PROCEDURE — 77067 SCR MAMMO BI INCL CAD: CPT | Mod: 26,,, | Performed by: RADIOLOGY

## 2018-02-26 PROCEDURE — 99999 PR PBB SHADOW E&M-EST. PATIENT-LVL III: CPT | Mod: PBBFAC,,, | Performed by: FAMILY MEDICINE

## 2018-02-26 RX ORDER — HYDROXYZINE HYDROCHLORIDE 25 MG/1
25 TABLET, FILM COATED ORAL EVERY 4 HOURS PRN
Qty: 30 TABLET | Refills: 5 | Status: SHIPPED | OUTPATIENT
Start: 2018-02-26 | End: 2018-09-17 | Stop reason: SDUPTHER

## 2018-02-26 RX ORDER — VALACYCLOVIR HYDROCHLORIDE 1 G/1
2000 TABLET, FILM COATED ORAL EVERY 12 HOURS
Qty: 4 TABLET | Refills: 5 | Status: SHIPPED | OUTPATIENT
Start: 2018-02-26 | End: 2021-05-26 | Stop reason: SDUPTHER

## 2018-02-26 NOTE — PROGRESS NOTES
CHIEF COMPLAINT: This is a 57-year-old female here for preventive health exam.    SUBJECTIVE: The patient complains of stiffness and pain in middle finger, especially in the morning.  She often cannot make a fist easily.  The patient has mild intermittent asthma for which she uses albuterol.  Blood pressure is controlled on amlodipine and metoprolol.  Patient takes hydroxyzine 1 tablet daily for itching.  Patient uses Nasonex for allergic rhinitis.  She is followed by ophthalmology for glaucoma.     Eye exam February 2018. Mammogram February 2017. Colonoscopy January 2010, due again in January 2020.  Tdap February 2017. Pneumovax February 2014. Flu vaccine October 2017.     ROS:  GENERAL: Patient denies fever, chills, night sweats. Patient denies weight gain. Patient denies anorexia, fatigue, weakness or swollen glands.  SKIN: Patient denies rash or hair loss.  HEENT: Patient denies sore throat, ear pain, hearing loss, nasal congestion, or runny nose. Patient denies visual disturbance, eye irritation or discharge.  LUNGS: Patient denies cough, wheeze or hemoptysis.  CARDIOVASCULAR: Patient denies chest pain, shortness of breath, palpitations, syncope or lower extremity edema.  GI: Patient denies abdominal pain, nausea, vomiting, diarrhea, constipation, blood in stool or melena.  GENITOURINARY: Patient denies pelvic pain, vaginal discharge, itch or odor. Patient denies irregular vaginal bleeding. Patient denies dysuria, frequency, hematuria, nocturia, urgency or incontinence.  BREASTS: Patient denies breast pain, mass or nipple discharge.  MUSCULOSKELETAL: Patient denies joint pain, swelling, redness or warmth.  NEUROLOGIC: Patient denies headache, vertigo, paresthesias, weakness in limb, dysarthria, dysphagia or abnormality of gait.  PSYCHIATRIC: Patient denies anxiety, depression, or memory loss.     OBJECTIVE:   GENERAL: Well-developed well-nourished, obese, black female alert and oriented x3, in no acute  distress. Memory, judgment and cognition without deficit. Weight gain of 17 pounds in the last year.  SKIN: Clear without rash. Normal color and tone.  Papulovesicular lesion left upper lip with crustiness.  HEENT: Eyes: Clear conjunctivae. No scleral icterus. Pupils equal reactive to light and accommodation. Ears: Clear canals. Clear TMs. Nose: Without congestion. Pharynx: Without injection or exudates.  NECK: Supple, normal range of motion. No masses, lymphadenopathy or enlarged thyroid. No JVD. Carotids 2+ and equal. No bruits.  LUNGS: Clear to auscultation. Normal respiratory effort.  CARDIOVASCULAR: Regular rhythm, normal S1, S2 without murmur, gallop or rub.  BACK: No CVA or spinal tenderness.  BREASTS: No masses, tenderness or nipple discharge.  ABDOMEN: Normal appearance. Active bowel sounds. Soft, nontender without mass or organomegaly. No rebound or guarding.  EXTREMITIES: Without cyanosis, clubbing or edema. Distal pulses 2+ and equal. Normal range of motion in all extremities. No joint effusion, erythema or warmth.  NEUROLOGIC: Cranial nerves II through XII without deficit. Motor strength equal bilaterally. Sensation normal to touch. Deep tendon reflexes 2+ and equal. Gait without abnormality. No tremor. Negative cerebellar signs.  PELVIC: External: Without lesions or inflammation.Vaginal: Clear, cuff intact. Bimanual: No masses, nontender.   RECTOVAGINAL: Confirms heme-negative stool x2.     ASSESSMENT:  1. Preventative health care    2. Chronic nonseasonal allergic rhinitis due to other allergen    3. Anxiety    4. Essential hypertension    5. Obesity (BMI 30-39.9)    6. Herpes labialis    7. Encounter for screening mammogram for malignant neoplasm of breast      PLAN:   1.  Weight reduction.  Exercise regularly.  2.  Age-appropriate counseling.  3.  Fasting lab.  4.  Mammogram.  5.  Refill medications.  6.  Take ibuprofen or Aleve at bedtime for finger joint pain.  7.  Valtrex 1000 mg 2 tablets now  and repeat in 12 hours.  Dispense #4 with 5 refills.

## 2018-03-07 ENCOUNTER — PATIENT MESSAGE (OUTPATIENT)
Dept: FAMILY MEDICINE | Facility: CLINIC | Age: 58
End: 2018-03-07

## 2018-03-08 RX ORDER — METOPROLOL TARTRATE 25 MG/1
TABLET, FILM COATED ORAL
Qty: 30 TABLET | Refills: 11 | Status: SHIPPED | OUTPATIENT
Start: 2018-03-08 | End: 2019-01-20 | Stop reason: SDUPTHER

## 2018-03-09 RX ORDER — AMLODIPINE BESYLATE 5 MG/1
10 TABLET ORAL DAILY
Qty: 30 TABLET | Refills: 0
Start: 2018-03-09 | End: 2018-04-30

## 2018-03-09 NOTE — PROGRESS NOTES
Last 5 Patient Entered Readings                                      Current 30 Day Average: 128/81     Recent Readings 3/8/2018 3/7/2018 3/7/2018 3/6/2018 3/1/2018    SBP (mmHg) 130 107 109 111 126    DBP (mmHg) 85 77 79 82 91    Pulse 56 63 64 72 62        Patient's BP average is controlled based on 2017 ACC/AHA HTN guidelines of goal BP <130/80.      Patient denies s/s of hypotension (lightheadedness, dizziness, nausea, fatigue) associated with low readings. Instructed patient to inform me if this occurs, patient confirms understanding.      Patient denies s/s of hypertension (SOB, CP, severe headaches, changes in vision) associated with high readings. Instructed patient to go to the ED if BP > 180/110 and accompanied by hypertensive s/s, patient confirms understanding.     Patient's health , Hector Yu, will be following up every 3-4 weeks. I will continue to monitor regularly and will follow up in 2 months, sooner if BP begins to trend upward or downward.    Patient has my contact information and knows to call with any concerns or clinical changes.     Current HTN regimen:  Hypertension Medications             amLODIPine (NORVASC) 10 MG tablet Take 10 mg by mouth once daily.    metoprolol tartrate (LOPRESSOR) 25 MG tablet TAKE 1 TABLET BY MOUTH EVERY DAY        Patient is taking amlodipine 10mg daily with no side effects. I will follow-up in 8 weeks or sooner if a negative trend occurs.

## 2018-03-20 RX ORDER — MONTELUKAST SODIUM 10 MG/1
TABLET ORAL
Qty: 30 TABLET | Refills: 11 | Status: SHIPPED | OUTPATIENT
Start: 2018-03-20 | End: 2019-02-12 | Stop reason: SDUPTHER

## 2018-03-21 ENCOUNTER — PATIENT OUTREACH (OUTPATIENT)
Dept: OTHER | Facility: OTHER | Age: 58
End: 2018-03-21

## 2018-03-21 NOTE — PROGRESS NOTES
Last 5 Patient Entered Readings                                      Current 30 Day Average: 129/83     Recent Readings 3/19/2018 3/16/2018 3/16/2018 3/15/2018 3/14/2018    SBP (mmHg) 139 110 120 132 128    DBP (mmHg) 92 85 99 82 85    Pulse 74 66 71 54 57        Patient reports not achieving much exercise due to her father-in-law dying. She states her and her  have been eating healthier overall.    Hypertension Digital Medicine Program (HDMP): Health  Follow Up    Lifestyle Modifications:    1.Low sodium diet: yes Patient reports consuming mostly salads. She states eliminating fried foods. Patient states increasing her fruits and vegetables intake.     2.Physical activity: no  Patient reports not achieving much exercise.    3.Hypotension/Hypertension symptoms: no Patient reports no abnormal signs or symptoms with BP medication.   Frequency/Alleviating factors/Precipitating factors, etc.     4.Patient has been compliant with the medication regimen.     Follow up with Mrs. Carolee PARISH Shannon completed. No further questions or concerns. I will follow up in a few weeks to assess progress.

## 2018-03-26 ENCOUNTER — PATIENT MESSAGE (OUTPATIENT)
Dept: FAMILY MEDICINE | Facility: CLINIC | Age: 58
End: 2018-03-26

## 2018-03-26 RX ORDER — PENCICLOVIR 10 MG/G
CREAM TOPICAL
Qty: 1.5 G | Refills: 5 | Status: SHIPPED | OUTPATIENT
Start: 2018-03-26 | End: 2024-02-26

## 2018-04-11 ENCOUNTER — PATIENT OUTREACH (OUTPATIENT)
Dept: OTHER | Facility: OTHER | Age: 58
End: 2018-04-11

## 2018-04-11 NOTE — PROGRESS NOTES
"Last 5 Patient Entered Readings                                      Current 30 Day Average: 129/84     Recent Readings 4/9/2018 4/9/2018 4/7/2018 4/6/2018 3/29/2018    SBP (mmHg) 139 144 121 124 119    DBP (mmHg) 78 87 76 86 78    Pulse 63 67 60 69 54        4/11-LVM. Follow up attempt. Will call again on 4/19.    Hypertension Digital Medicine Program (HDMP): Health  Follow Up    Lifestyle Modifications:    1.Low sodium diet: no Patient reports having a seasoned turkey surekha, a banana, and green tea. She state she has not been consuming candy bars. She states consuming a grilled steak and added "a pinch of salt" to the steak while it was cooking. Encouraged patient to be mindful of amount of salt she is consuming and read food labels.      2.Physical activity: yes Patient reports she and her  have been walking 2 miles, 2x week. She states they will try to walk at least 3x week. She states she is using her stationary bike on days she is not walking. She expresses the importance of her losing weight to previous chronic diseases. She expresses the importance of doing some form of activity at least 3x week. Created SMG. Encouraged patient to continue the workout regimen.      3.Hypotension/Hypertension symptoms: no Patient reports no abnormal signs or symptoms with BP medication.   Frequency/Alleviating factors/Precipitating factors, etc.     4.Patient has been compliant with the medication regimen.     Follow up with Mrs. Carolee PARISH Pérezthomas completed. No further questions or concerns. I will follow up in a few weeks to assess progress.       "

## 2018-04-30 ENCOUNTER — TELEPHONE (OUTPATIENT)
Dept: FAMILY MEDICINE | Facility: CLINIC | Age: 58
End: 2018-04-30

## 2018-04-30 ENCOUNTER — HOSPITAL ENCOUNTER (OUTPATIENT)
Dept: RADIOLOGY | Facility: HOSPITAL | Age: 58
Discharge: HOME | End: 2018-04-30
Attending: REGISTERED NURSE
Payer: COMMERCIAL

## 2018-04-30 ENCOUNTER — OFFICE VISIT (OUTPATIENT)
Dept: FAMILY MEDICINE | Facility: CLINIC | Age: 58
End: 2018-04-30
Payer: COMMERCIAL

## 2018-04-30 VITALS
OXYGEN SATURATION: 99 % | RESPIRATION RATE: 18 BRPM | HEART RATE: 82 BPM | BODY MASS INDEX: 38.99 KG/M2 | HEIGHT: 62 IN | DIASTOLIC BLOOD PRESSURE: 84 MMHG | SYSTOLIC BLOOD PRESSURE: 122 MMHG | WEIGHT: 211.88 LBS | TEMPERATURE: 98 F

## 2018-04-30 DIAGNOSIS — M79.644 PAIN OF RIGHT MIDDLE FINGER: ICD-10-CM

## 2018-04-30 DIAGNOSIS — M79.644 PAIN OF RIGHT MIDDLE FINGER: Primary | ICD-10-CM

## 2018-04-30 PROCEDURE — 73140 X-RAY EXAM OF FINGER(S): CPT | Mod: 26,F7,, | Performed by: RADIOLOGY

## 2018-04-30 PROCEDURE — 3079F DIAST BP 80-89 MM HG: CPT | Mod: CPTII,S$GLB,, | Performed by: REGISTERED NURSE

## 2018-04-30 PROCEDURE — 73140 X-RAY EXAM OF FINGER(S): CPT | Mod: TC,FY,PO

## 2018-04-30 PROCEDURE — 99213 OFFICE O/P EST LOW 20 MIN: CPT | Mod: S$GLB,,, | Performed by: REGISTERED NURSE

## 2018-04-30 PROCEDURE — 99999 PR PBB SHADOW E&M-EST. PATIENT-LVL IV: CPT | Mod: PBBFAC,,, | Performed by: REGISTERED NURSE

## 2018-04-30 PROCEDURE — 3074F SYST BP LT 130 MM HG: CPT | Mod: CPTII,S$GLB,, | Performed by: REGISTERED NURSE

## 2018-04-30 RX ORDER — AMLODIPINE BESYLATE 10 MG/1
10 TABLET ORAL DAILY
Refills: 5 | COMMUNITY
Start: 2018-04-20 | End: 2018-08-03

## 2018-04-30 RX ORDER — DICLOFENAC SODIUM 75 MG/1
75 TABLET, DELAYED RELEASE ORAL 2 TIMES DAILY PRN
Qty: 60 TABLET | Refills: 2 | Status: SHIPPED | OUTPATIENT
Start: 2018-04-30 | End: 2018-09-21

## 2018-05-01 NOTE — TELEPHONE ENCOUNTER
Spoke with pt, aware of results, Expressed understanding. Wants to know what it is causing the pain?

## 2018-05-04 ENCOUNTER — PATIENT OUTREACH (OUTPATIENT)
Dept: OTHER | Facility: OTHER | Age: 58
End: 2018-05-04

## 2018-05-04 DIAGNOSIS — I10 ESSENTIAL HYPERTENSION: Primary | ICD-10-CM

## 2018-05-04 RX ORDER — CHLORTHALIDONE 25 MG/1
TABLET ORAL
Qty: 20 TABLET | Refills: 3 | Status: SHIPPED | OUTPATIENT
Start: 2018-05-04 | End: 2018-10-02 | Stop reason: SDUPTHER

## 2018-05-04 NOTE — PROGRESS NOTES
Last 5 Patient Entered Readings                                      Current 30 Day Average: 122/83     Recent Readings 4/28/2018 4/26/2018 4/23/2018 4/23/2018 4/23/2018    SBP (mmHg) 117 130 128 140 135    DBP (mmHg) 89 81 91 97 104    Pulse 60 55 53 56 60      Patient's BP average is above goal of <130/80.     Patient denies s/s of hypotension (lightheadedness, dizziness, nausea, fatigue) associated with low readings. Instructed patient to inform me if this occurs, patient confirms understanding.      Patient denies s/s of hypertension (SOB, CP, severe headaches, changes in vision) associated with high readings. Instructed patient to go to the ED if BP > 180/110 and accompanied by hypertensive s/s, patient confirms understanding.    Will continue to monitor regularly. Will follow up in 2-3 weeks, sooner if BP begins to trend upward or downward.    Patient has my contact information and knows to call with any concerns or clinical changes.     Current HTN regimen:  Hypertension Medications             amLODIPine (NORVASC) 10 MG tablet Take 10 mg by mouth once daily.    metoprolol tartrate (LOPRESSOR) 25 MG tablet TAKE 1 TABLET BY MOUTH EVERY DAY      Patient is walking 3-4 times a week for 3-4 miles with her . I am starting chlorthalidone 12.5mg daily. She will have a BMP 5/18 and I will follow-up 5/21.

## 2018-05-08 ENCOUNTER — OFFICE VISIT (OUTPATIENT)
Dept: OPHTHALMOLOGY | Facility: CLINIC | Age: 58
End: 2018-05-08
Payer: COMMERCIAL

## 2018-05-08 DIAGNOSIS — H40.1131 PRIMARY OPEN ANGLE GLAUCOMA OF BOTH EYES, MILD STAGE: Primary | ICD-10-CM

## 2018-05-08 PROCEDURE — 92012 INTRM OPH EXAM EST PATIENT: CPT | Mod: S$GLB,,, | Performed by: OPTOMETRIST

## 2018-05-08 PROCEDURE — 99999 PR PBB SHADOW E&M-EST. PATIENT-LVL I: CPT | Mod: PBBFAC,,, | Performed by: OPTOMETRIST

## 2018-05-08 NOTE — PROGRESS NOTES
HPI     Glaucoma    Additional comments: Cosopt, Latanoprost           Comments   3 months IOP check . No visual complaints.  Medication eye drops if any: Cosopt and Latanoprost  Date last eye visit: 01/09/2018  Last full exam: 01/09/2018  Last IOP : 21/19  Last dilated eye exam and SDP's: 01/09/2018  Last HVF and HRT : 10/03/2017  High IOP: 26/24  CCT: 530/541 +1/+1  Family Hx POAG: father       Last edited by Octavia Watts on 5/8/2018  8:10 AM. (History)            Assessment /Plan     For exam results, see Encounter Report.    Primary open angle glaucoma of both eyes, mild stage      Lower IOP    RTC 4 months 24-2 gOCT IOP ck

## 2018-05-14 ENCOUNTER — PATIENT MESSAGE (OUTPATIENT)
Dept: ADMINISTRATIVE | Facility: OTHER | Age: 58
End: 2018-05-14

## 2018-05-15 ENCOUNTER — PATIENT OUTREACH (OUTPATIENT)
Dept: OTHER | Facility: OTHER | Age: 58
End: 2018-05-15

## 2018-05-15 NOTE — PROGRESS NOTES
"Last 5 Patient Entered Readings                                      Current 30 Day Average: 118/82     Recent Readings 5/14/2018 5/10/2018 5/9/2018 5/9/2018 5/9/2018    SBP (mmHg) 116 112 - - -    DBP (mmHg) 79 78 - - -    Pulse 64 60 56 56 55          Digital Medicine: Health  Follow Up    Lifestyle Modifications:    1.Dietary Modifications (Sodium intake <2,000mg/day, food labels, dining out): Patient reports not adding salt to foods. She states making "healthy smoothies." She states consuming a boiled egg, bowl of cheerios, and turkey absurto for breakfast. Patient states consuming chicken breast and side salad on lunch. Patient reports reading food labels often. She states trying not to eat after 6 and if she does its a fruit and small piece of protein-chicken. Patient states her  will make turkey meat and vegetables on the grill often.     2.Physical Activity: Patient reports walking or using the stationary bike at least 3x week for 30-45 minutes. Encouraged patient to continue her exercise regimen.     3.Medication Therapy: Patient has been compliant with the medication regimen.    4.Patient has the following medication side effects/concerns:   (Frequency/Alleviating factors/Precipitating factors, etc.)     Follow up with Mrs. Carolee PARISH Shannon completed. No further questions or concerns. Will continue follow up to achieve health goals.  "

## 2018-05-18 ENCOUNTER — LAB VISIT (OUTPATIENT)
Dept: LAB | Facility: HOSPITAL | Age: 58
End: 2018-05-18
Attending: FAMILY MEDICINE
Payer: COMMERCIAL

## 2018-05-18 ENCOUNTER — OFFICE VISIT (OUTPATIENT)
Dept: ORTHOPEDICS | Facility: CLINIC | Age: 58
End: 2018-05-18
Payer: COMMERCIAL

## 2018-05-18 VITALS
DIASTOLIC BLOOD PRESSURE: 81 MMHG | HEIGHT: 62 IN | WEIGHT: 206.81 LBS | RESPIRATION RATE: 12 BRPM | SYSTOLIC BLOOD PRESSURE: 119 MMHG | HEART RATE: 60 BPM | BODY MASS INDEX: 38.06 KG/M2

## 2018-05-18 DIAGNOSIS — S62.525D NONDISPLACED FRACTURE OF DISTAL PHALANX OF LEFT THUMB, SUBSEQUENT ENCOUNTER FOR FRACTURE WITH ROUTINE HEALING: ICD-10-CM

## 2018-05-18 DIAGNOSIS — M65.331 TRIGGER FINGER, RIGHT MIDDLE FINGER: Primary | ICD-10-CM

## 2018-05-18 DIAGNOSIS — I10 ESSENTIAL HYPERTENSION: ICD-10-CM

## 2018-05-18 LAB
ANION GAP SERPL CALC-SCNC: 9 MMOL/L
BUN SERPL-MCNC: 23 MG/DL
CALCIUM SERPL-MCNC: 9.9 MG/DL
CHLORIDE SERPL-SCNC: 101 MMOL/L
CO2 SERPL-SCNC: 29 MMOL/L
CREAT SERPL-MCNC: 0.8 MG/DL
EST. GFR  (AFRICAN AMERICAN): >60 ML/MIN/1.73 M^2
EST. GFR  (NON AFRICAN AMERICAN): >60 ML/MIN/1.73 M^2
GLUCOSE SERPL-MCNC: 104 MG/DL
POTASSIUM SERPL-SCNC: 4.2 MMOL/L
SODIUM SERPL-SCNC: 139 MMOL/L

## 2018-05-18 PROCEDURE — 99214 OFFICE O/P EST MOD 30 MIN: CPT | Mod: 25,S$GLB,, | Performed by: PHYSICIAN ASSISTANT

## 2018-05-18 PROCEDURE — 36415 COLL VENOUS BLD VENIPUNCTURE: CPT | Mod: PO

## 2018-05-18 PROCEDURE — 99999 PR PBB SHADOW E&M-EST. PATIENT-LVL IV: CPT | Mod: PBBFAC,,, | Performed by: PHYSICIAN ASSISTANT

## 2018-05-18 PROCEDURE — 20550 NJX 1 TENDON SHEATH/LIGAMENT: CPT | Mod: F7,S$GLB,, | Performed by: PHYSICIAN ASSISTANT

## 2018-05-18 PROCEDURE — 3074F SYST BP LT 130 MM HG: CPT | Mod: CPTII,S$GLB,, | Performed by: PHYSICIAN ASSISTANT

## 2018-05-18 PROCEDURE — 3079F DIAST BP 80-89 MM HG: CPT | Mod: CPTII,S$GLB,, | Performed by: PHYSICIAN ASSISTANT

## 2018-05-18 PROCEDURE — 80048 BASIC METABOLIC PNL TOTAL CA: CPT

## 2018-05-18 PROCEDURE — 3008F BODY MASS INDEX DOCD: CPT | Mod: CPTII,S$GLB,, | Performed by: PHYSICIAN ASSISTANT

## 2018-05-18 RX ORDER — METHYLPREDNISOLONE ACETATE 40 MG/ML
40 INJECTION, SUSPENSION INTRA-ARTICULAR; INTRALESIONAL; INTRAMUSCULAR; SOFT TISSUE ONCE
Status: COMPLETED | OUTPATIENT
Start: 2018-05-18 | End: 2018-05-18

## 2018-05-18 RX ADMIN — METHYLPREDNISOLONE ACETATE 40 MG: 40 INJECTION, SUSPENSION INTRA-ARTICULAR; INTRALESIONAL; INTRAMUSCULAR; SOFT TISSUE at 08:05

## 2018-05-18 NOTE — PROGRESS NOTES
Patient ID: Carolee Ortega is a 57 y.o. female.    Chief Complaint: Finger Pain of the Right Hand      HPI: Carolee Ortega  is a 57 y.o. female who c/o Finger Pain of the Right Hand   for duration of about 6 months.  She denies any inciting injury.  She complains of pain at the right 3rd MCP joint on the palmar aspect of the hand.  She also complains of it locking up on her.  Pain level is 4/10 in severity.  Quality is aching and throbbing.  Alleviating factors include resting the hand.  Aggravating factors include flexion of the 3rd digit. She denies associated numbness and tingling.  She denies swelling. I have also treated her for a left thumb distal phalanx fracture. She has no complaints with it. She says she is doing great.  She is not having any limitations.  Pain is completely resolved.        Objective:        General    Nursing note and vitals reviewed.  Constitutional: She is oriented to person, place, and time. She appears well-developed and well-nourished.   HENT:   Head: Normocephalic and atraumatic.   Eyes: EOM are normal.   Cardiovascular: Normal rate and regular rhythm.    Pulmonary/Chest: Effort normal.   Abdominal: Soft.   Neurological: She is alert and oriented to person, place, and time.   Psychiatric: She has a normal mood and affect. Her behavior is normal.             Right Hand/Wrist Exam     Inspection   Scars: Wrist - absent Hand -  absent  Effusion: Wrist - absent Hand -  absent  Bruising: Wrist - absent Hand -  absent  Deformity: Wrist - deformity Hand -  deformity    Pain   Hand - The patient exhibits pain of the middle MCP.    Tenderness   The patient is tender to palpation of the willams area.    Range of Motion     Wrist   Extension: normal   Flexion: normal   Pronation: normal   Supination: normal     Tests     Atrophy   Thenar:  negative  Hypothenar:  negative  Intrinsic:  negative  1st Dorsal Interosseous: negative    Other     Neuorologic Exam    Median Distribution:  normal  Ulnar Distribution: normal  Radial Distribution: normal    Comments:  2+ radial pulse.  TTP over the A-1 pulley of the middle finger.  Active triggering on exam today      Left Hand/Wrist Exam     Inspection   Scars: Hand -  absent  Effusion: Hand -  absent  Bruising: Hand -  absent  Deformity: Hand -  absent    Range of Motion     Finger Flexion   MP Thumb: normal   DIP Thumb: normal     Finger Extension   MP Thumb: normal  DIP Thumb: normal    Tests     Atrophy  Thenar:  Negative  Hypothenar:  negative  Intrinsic: negative  1st Dorsal Interosseous:  negative    Other     Sensory Exam  Median Distribution: normal  Ulnar Distribution: normal  Radial Distribution: normal    Comments:  No tenderness to palpation over the distal phalanx of the left thumb.  2+ radial pulley.          Muscle Strength   Right Upper Extremity   Wrist Extension: 5/5/5   Wrist Flexion: 5/5/5   : 5/5/5   Pinch Mechanism: 5/5  Left Upper Extremity  :  5/5/5   Pinch Mechanism: 5/5    Vascular Exam       Capillary Refill  Right Hand: normal capillary refill  Left Hand: normal capillary refill            Xray:   Right middle finger from 04/30/2018 images and report were reviewed today.  I agree with the radiologist's interpretation.  There is no evidence to suggest acute fracture or dislocation.    Assessment:       Encounter Diagnoses   Name Primary?    Trigger finger, right middle finger Yes    Nondisplaced fracture of distal phalanx of left thumb, subsequent encounter for fracture with routine healing           Plan:       Carolee was seen today for finger pain.    Diagnoses and all orders for this visit:    Trigger finger, right middle finger  -     methylPREDNISolone acetate injection 40 mg; 1 mL (40 mg total) by Intra-Lesional route once.    Nondisplaced fracture of distal phalanx of left thumb, subsequent encounter for fracture with routine healing     Ms. Zarco comes in today for a new problem of the right middle finger.   She has a trigger finger.  We have discussed risks and benefits of a steroid injection.  She wishes to proceed.  If she does not get any relief from the injection, she may benefit from a trigger finger release.  Additionally, she is functionally healed with the nondisplaced fracture of the distal phalanx on the left thumb.  I offered to get a new x-ray today, but she politely declined stating she has no problems in full motion. She will follow up with me on an as-needed basis.  She verbalizes understanding and agrees.  Follow-up if symptoms worsen or fail to improve.    Right Trigger finger Injection Report:  After verbal consent was obtained for right trigger finger injection, patient ID, site, and side were verified.  The  right middle finger was sterilly prepped at the A-1 pulley in standard fashion.  A 25-gauge needle was introduced at the A-1 pulley site without complication. It was then injected with 10 mg lidocaine plain and 40 mg depomedrol.  A sterile bandaid was applied.  The patient was informed to apply an ice pack approximately 10min once arriving home and not to do anything strenuous for 24hours. She  was instructed to call if there were any problems. The patient was discharged in stable condition.    The patient understands, chooses and consents to this plan and accepts all   the risks which include but are not limited to the risks mentioned above.     Disclaimer: This note was prepared using a voice recognition system and is likely to have sound alike errors within the text.

## 2018-05-21 ENCOUNTER — PATIENT OUTREACH (OUTPATIENT)
Dept: OTHER | Facility: OTHER | Age: 58
End: 2018-05-21

## 2018-05-21 NOTE — PROGRESS NOTES
Last 5 Patient Entered Readings                                      Current 30 Day Average: 121/82     Recent Readings 5/20/2018 5/20/2018 5/20/2018 5/14/2018 5/10/2018    SBP (mmHg) 129 119 119 116 112    DBP (mmHg) 80 85 87 79 78    Pulse 62 52 57 64 60        Patient's BP average is above goal of <130/80.     Patient denies s/s of hypotension (lightheadedness, dizziness, nausea, fatigue) associated with low readings. Instructed patient to inform me if this occurs, patient confirms understanding.      Patient denies s/s of hypertension (SOB, CP, severe headaches, changes in vision) associated with high readings. Instructed patient to go to the ED if BP > 180/110 and accompanied by hypertensive s/s, patient confirms understanding.    Will continue to monitor regularly. Will follow up in 2-3 weeks, sooner if BP begins to trend upward or downward.    Patient has my contact information and knows to call with any concerns or clinical changes.     Current HTN regimen:  Hypertension Medications             amLODIPine (NORVASC) 10 MG tablet Take 10 mg by mouth once daily.    chlorthalidone (HYGROTEN) 25 MG Tab Take one-half tablet (12.5mg) by mouth once daily.    metoprolol tartrate (LOPRESSOR) 25 MG tablet TAKE 1 TABLET BY MOUTH EVERY DAY        Patient's labs returned WNL since starting chlorthalidone. Her blood pressure is trending down nicely. She will maintain her current medications at this time. If he is not within goal in two weeks I will increase chlorthalidone.

## 2018-06-01 ENCOUNTER — PATIENT MESSAGE (OUTPATIENT)
Dept: ORTHOPEDICS | Facility: CLINIC | Age: 58
End: 2018-06-01

## 2018-06-01 NOTE — TELEPHONE ENCOUNTER
Called patient to get her scheduled for a surgery consult with Dr. Fairbanks. Patient is scheduled to be seen in the office of Dr. Fairbanks  on 6/4/18 at 7:40 am.  SJ

## 2018-06-04 ENCOUNTER — PATIENT OUTREACH (OUTPATIENT)
Dept: OTHER | Facility: OTHER | Age: 58
End: 2018-06-04

## 2018-06-04 ENCOUNTER — OFFICE VISIT (OUTPATIENT)
Dept: ORTHOPEDICS | Facility: CLINIC | Age: 58
End: 2018-06-04
Payer: COMMERCIAL

## 2018-06-04 VITALS
HEIGHT: 62 IN | BODY MASS INDEX: 38.3 KG/M2 | SYSTOLIC BLOOD PRESSURE: 135 MMHG | RESPIRATION RATE: 18 BRPM | HEART RATE: 61 BPM | WEIGHT: 208.13 LBS | DIASTOLIC BLOOD PRESSURE: 90 MMHG

## 2018-06-04 DIAGNOSIS — M65.331 TRIGGER MIDDLE FINGER OF RIGHT HAND: Primary | ICD-10-CM

## 2018-06-04 PROCEDURE — 3075F SYST BP GE 130 - 139MM HG: CPT | Mod: CPTII,S$GLB,, | Performed by: ORTHOPAEDIC SURGERY

## 2018-06-04 PROCEDURE — 3080F DIAST BP >= 90 MM HG: CPT | Mod: CPTII,S$GLB,, | Performed by: ORTHOPAEDIC SURGERY

## 2018-06-04 PROCEDURE — 99213 OFFICE O/P EST LOW 20 MIN: CPT | Mod: S$GLB,,, | Performed by: ORTHOPAEDIC SURGERY

## 2018-06-04 PROCEDURE — 99999 PR PBB SHADOW E&M-EST. PATIENT-LVL III: CPT | Mod: PBBFAC,,, | Performed by: ORTHOPAEDIC SURGERY

## 2018-06-04 PROCEDURE — 3008F BODY MASS INDEX DOCD: CPT | Mod: CPTII,S$GLB,, | Performed by: ORTHOPAEDIC SURGERY

## 2018-06-04 NOTE — LETTER
June 4, 2018      Hedy Trejo PA-C  [address] [place] [address]           [facility]  [address]  [place] [address]  Phone: [phone]  Fax: [phone]          Patient: Carolee Ortega   MR Number: [id number]   YOB: 1960   Date of Visit: 6/4/2018       Dear Hedy Trejo:    Thank you for referring Carolee Ortega to me for evaluation. Attached you will find relevant portions of my assessment and plan of care.    If you have questions, please do not hesitate to call me. I look forward to following Carolee Ortega along with you.    Sincerely,    Shaq Fairbanks MD    Enclosure  CC:  No Recipients    If you would like to receive this communication electronically, please contact [email or web address] or [phone] to request more information on [facility] access.    For providers and/or their staff who would like to refer a patient to [facility], please contact us through our one-stop-shop provider referral line, [facility] , at [phone].    If you feel you have received this communication in error or would no longer like to receive these types of communications, please e-mail [email or web address]

## 2018-06-04 NOTE — PROGRESS NOTES
Last 5 Patient Entered Readings                                      Current 30 Day Average: 118/79     Recent Readings 6/4/2018 5/27/2018 5/23/2018 5/22/2018 5/20/2018    SBP (mmHg) 119 119 115 114 129    DBP (mmHg) 85 74 74 81 80    Pulse 56 70 52 61 62        Left voicemail and [facility] message to follow up with Ms. Carolee PARISH Shannon.    Per 30 day average, blood pressure is well controlled 118/79 mmHg. Encouraged adherence to low sodium diet and physical activity guidelines. Advised patient to call or message with questions or concerns. WCB in 8 weeks.

## 2018-06-04 NOTE — PROGRESS NOTES
Subjective:     Patient ID: Carolee Ortega is a 57 y.o. female.    Chief Complaint: No chief complaint on file.    Carolee Ortega is a 57 y.o. female here for right hand trigger finger, had CSI with some mild improvement 2wk ago.      Hand Pain    The pain is present in the right hand. This is a new problem. The current episode started more than 1 month ago. There has been no history of extremity trauma. The injury was the result of a twisting, lifting and pulling action The problem occurs daily. The problem has been gradually worsening. The quality of the pain is described as throbbing. The pain is at a severity of 4/10. Associated symptoms include joint locking. Pertinent negatives include no fever or numbness. The symptoms are aggravated by twisting. She has tried cold and heat for the symptoms. The treatment provided no relief. Physical therapy was not tried.      Past Medical History:   Diagnosis Date    Allergic rhinitis     Allergy     Anxiety     Asthma     JAGDISH (generalized anxiety disorder)     Glaucoma (increased eye pressure)     Herpes labialis     Hypertension     Obesity     Seborrheic dermatitis     Shingles 2016     Past Surgical History:   Procedure Laterality Date    BREAST BIOPSY      right     SECTION      HYSTERECTOMY      MARYANN and USO      Secondary to fibroids    TONSILLECTOMY  2011     Family History   Problem Relation Age of Onset    Heart attack Father     Coronary artery disease Father     Glaucoma Father     Heart disease Mother 32    Breast cancer Maternal Grandmother     Hypertension Sister     Kidney disease Sister     Breast cancer Other         Great aunts    Diabetes Other         Paternal great-grandmother    Breast cancer Maternal Cousin     Breast cancer Maternal Cousin      Social History     Social History    Marital status:      Spouse name: N/A    Number of children: N/A    Years of education: N/A      Occupational History    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                      Medication List with Changes/Refills   Current Medications    ALBUTEROL (ACCUNEB) 1.25 MG/3 ML NEBU    Take 1.25 mg by nebulization every 6 (six) hours as needed.    ALPRAZOLAM (XANAX) 0.5 MG TABLET    Take 1 tablet (0.5 mg total) by mouth 3 (three) times daily as needed.    AMLODIPINE (NORVASC) 10 MG TABLET    Take 10 mg by mouth once daily.    CHLORTHALIDONE (HYGROTEN) 25 MG TAB    Take one-half tablet (12.5mg) by mouth once daily.    DICLOFENAC (VOLTAREN) 75 MG EC TABLET    Take 1 tablet (75 mg total) by mouth 2 (two) times daily as needed (AS NEEDED FOR PAIN//TAKE WITH FOOD).    DORZOLAMIDE-TIMOLOL 2-0.5% (COSOPT) 22.3-6.8 MG/ML OPHTHALMIC SOLUTION    Place 1 drop into both eyes every 12 (twelve) hours.    HYDROXYZINE HCL (ATARAX) 25 MG TABLET    Take 1 tablet (25 mg total) by mouth every 4 (four) hours as needed for Itching.    LATANOPROST (XALATAN) 0.005 % OPHTHALMIC SOLUTION    Place 1 drop into both eyes every evening.    METOPROLOL TARTRATE (LOPRESSOR) 25 MG TABLET    TAKE 1 TABLET BY MOUTH EVERY DAY    MOMETASONE (NASONEX) 50 MCG/ACTUATION NASAL SPRAY    2 sprays by Nasal route once daily.    MONTELUKAST (SINGULAIR) 10 MG TABLET    TAKE 1 TABLET BY MOUTH IN THE EVENING    PENCICLOVIR (DENAVIR) 1 % CREAM    Apply topically every 2 (two) hours.    VALACYCLOVIR (VALTREX) 1000 MG TABLET    Take 2 tablets (2,000 mg total) by mouth every 12 (twelve) hours.     Review of patient's allergies indicates:   Allergen Reactions    Flexeril [cyclobenzaprine] Palpitations     Review of Systems   Constitution: Negative  for fever and night sweats.   HENT: Negative for hearing loss.    Eyes: Negative for blurred vision and visual disturbance.   Cardiovascular: Negative for chest pain and leg swelling.   Respiratory: Negative for shortness of breath.    Endocrine: Negative for polyuria.   Hematologic/Lymphatic: Negative for bleeding problem.   Skin: Negative for rash.   Musculoskeletal: Negative for back pain, joint pain, joint swelling, muscle cramps and muscle weakness.   Gastrointestinal: Negative for melena.   Genitourinary: Negative for hematuria.   Neurological: Negative for loss of balance, numbness and paresthesias.   Psychiatric/Behavioral: Negative for altered mental status.       Objective:   Body mass index is 38.06 kg/m².  Vitals:    06/04/18 0746   BP: (!) 135/90   Pulse: 61   Resp: 18       General: Carolee is well-developed, well-nourished, appears stated age, in no acute distress, alert and oriented to time, place and person.       General    Vitals reviewed.  Constitutional: She is oriented to person, place, and time. She appears well-developed and well-nourished. No distress.   HENT:   Head: Normocephalic and atraumatic.   Right Ear: External ear normal.   Left Ear: External ear normal.   Nose: Nose normal.   Eyes: EOM are normal. Pupils are equal, round, and reactive to light. Right eye exhibits no discharge. Left eye exhibits no discharge.   Neck: Normal range of motion. No tracheal deviation present.   Cardiovascular: Normal rate and regular rhythm.    Pulmonary/Chest: Effort normal and breath sounds normal. No respiratory distress.   Abdominal: Soft.   Neurological: She is alert and oriented to person, place, and time. She has normal reflexes. No cranial nerve deficit. She exhibits normal muscle tone. Coordination normal.   Psychiatric: She has a normal mood and affect. Her behavior is normal. Judgment and thought content normal.             Right Hand/Wrist Exam     Inspection   Scars: Wrist - absent    Effusion: Wrist - absent   Bruising: Wrist - absent   Deformity: Wrist - deformity     Range of Motion     Wrist   Extension:  50 normal   Flexion:  70 normal   Abduction: 20 normal  Adduction: 35 normal    Tests   Phalens Sign: negative  Tinels Sign (Medial Nerve): negative  Carpal Tunnel Compression Test: negative  Cubital Tunnel Compression Test: negative      Other     Neuorologic Exam    Median Distribution: normal  Ulnar Distribution: normal  Radial Distribution: normal      Left Hand/Wrist Exam     Inspection   Scars: Wrist - absent   Effusion: Wrist - absent   Bruising: Wrist - absent   Deformity: Wrist - absent     Range of Motion     Wrist   Extension:  50 normal   Flexion:  70 normal   Abduction: 20 normal  Adduction: 35 normal    Tests   Phalens Sign: negative  Tinels Sign (Medial Nerve): negative  Carpal Tunnel Compression Test: negative  Cubital Tunnel Compression Test: negative      Other     Sensory Exam  Median Distribution: normal  Ulnar Distribution: normal  Radial Distribution: normal      Right Elbow Exam     Inspection   Scars: absent  Effusion: absent  Bruising: absent  Deformity: absent  Atrophy: absent    Range of Motion   Extension:  0 normal   Flexion:  130 normal   Pronation: normal   Supination:  80 normal     Tests Varus: negative  Valgus: negative  Tinel's Sign (cubital tunnel): negative  Tennis Elbow: negative  Golfer's Elbow: negative  Radial Capitellar Grind: negative    Other   Sensation: normal    Comments:  Mild triggering R MF  TTP over a1 pulley      Left Elbow Exam     Inspection   Scars: absent  Effusion: absent  Bruising: absent  Deformity: absent  Atrophy: absent    Range of Motion   Extension:  0 normal   Flexion:  130 normal   Pronation: normal   Supination:  80 normal     Tests Varus: negative  Tinel's Sign (cubital tunnel): negative  Tennis Elbow: negative  Golfer's Elbow: negative  Radial Capitellar Grind: negative    Other   Sensation: normal        Muscle  Strength   Right Upper Extremity   Wrist Extension: 5/5/5   Wrist Flexion: 5/5/5   : 5/5/5   Pinch Mechanism: 5/5  Elbow Pronation:  5/5   Elbow Supination:  5/5   Elbow Extension: 5/5  Elbow Flexion: 5/5  Intrinsics: 5/5  EPL (Extensor Pollicis Longus): 5/5  Left Upper Extremity  Wrist Extension: 5/5/5   Wrist Flexion: 5/5/5   :  5/5/5   Pinch Mechanism: 5/5  Elbow Pronation:  5/5   Elbow Supination:  5/5   Elbow Extension: 5/5  Elbow Flexion: 5/5  Intrinsics: 5/5  EPL (Extensor Pollicis Longus): 5/5    Vascular Exam     Right Pulses      Radial:                    2+      Left Pulses      Radial:                    2+      Capillary Refill  Right Hand: normal capillary refill  Left Hand: normal capillary refill    Edema  Right Forearm: absent  Left Forearm: absent    Xray finger shows no arthritis right middle finger; No fracture or dislocation.      Assessment:     Encounter Diagnosis   Name Primary?    Trigger middle finger of right hand Yes        Plan:     1. Just had steroid 2 wk ago    2. Continue HEP    3. Night splinting    4. Continue NSAIDs    5. F/up 6-8wk

## 2018-06-12 ENCOUNTER — PATIENT OUTREACH (OUTPATIENT)
Dept: OTHER | Facility: OTHER | Age: 58
End: 2018-06-12

## 2018-06-12 NOTE — PROGRESS NOTES
Last 5 Patient Entered Readings                                      Current 30 Day Average: 119/79     Recent Readings 6/7/2018 6/4/2018 5/27/2018 5/23/2018 5/22/2018    SBP (mmHg) 122 119 119 115 114    DBP (mmHg) 78 85 74 74 81    Pulse 60 56 70 52 61          Digital Medicine: Health  Follow Up    Left voicemail to follow up with Mrs. Carolee PARISH Shannon.  Current BP average 119/79 mmHg is at goal, [130/80].

## 2018-07-03 NOTE — PROGRESS NOTES
Last 5 Patient Entered Readings                                      Current 30 Day Average: 117/82     Recent Readings 7/2/2018 6/26/2018 6/21/2018 6/20/2018 6/15/2018    SBP (mmHg) 119 109 120 125 105    DBP (mmHg) 87 80 81 86 81    Pulse 69 63 69 66 70        Digital Medicine: Health  Follow Up    Left voicemail to follow up with Mrs. Carolee PARISH Shannon.  Current BP average 117/82 mmHg is not at goal, [130/80].

## 2018-07-17 NOTE — PROGRESS NOTES
Last 5 Patient Entered Readings                                      Current 30 Day Average: 120/82     Recent Readings 7/10/2018 7/10/2018 7/10/2018 7/10/2018 7/2/2018    SBP (mmHg) 125 137 127 119 119    DBP (mmHg) 78 96 67 76 87    Pulse 76 76 78 66 69          Digital Medicine: Health  Follow Up    Left voicemail to follow up with Mrs. Carolee PARISH Pérezthomas. Sent no contact [employer] message.   Current BP average 120/82 mmHg is not at goal, [130/80].

## 2018-07-24 DIAGNOSIS — H40.1131 PRIMARY OPEN ANGLE GLAUCOMA OF BOTH EYES, MILD STAGE: ICD-10-CM

## 2018-07-24 RX ORDER — LATANOPROST 50 UG/ML
1 SOLUTION/ DROPS OPHTHALMIC NIGHTLY
Qty: 2.5 ML | Refills: 11 | Status: SHIPPED | OUTPATIENT
Start: 2018-07-24 | End: 2018-07-24 | Stop reason: SDUPTHER

## 2018-07-25 RX ORDER — LATANOPROST 50 UG/ML
1 SOLUTION/ DROPS OPHTHALMIC NIGHTLY
Qty: 2.5 ML | Refills: 11 | Status: SHIPPED | OUTPATIENT
Start: 2018-07-25 | End: 2019-08-07 | Stop reason: SDUPTHER

## 2018-08-01 NOTE — PROGRESS NOTES
"Last 5 Patient Entered Readings                                      Current 30 Day Average: 125/82     Recent Readings 7/30/2018 7/30/2018 7/23/2018 7/23/2018 7/20/2018    SBP (mmHg) 132 135 120 123 124    DBP (mmHg) 81 83 82 93 85    Pulse 56 53 59 58 63        Brief encounter as patient was shopping in [employer]. Encounter mostly consisted of patient asking about pursuing a fresh fruit and vegetable smoothie cleanse for 10 days.     Digital Medicine: Health  Follow Up    Lifestyle Modifications:    1.Dietary Modifications (Sodium intake <2,000mg/day, food labels, dining out): Patient reports her diet has been "more salty" than normal. She states she may try a modified, 10-day green smoothie cleanse (i.e. Consume 2 fresh fruit and veg smoothies with few small snacks between smoothies). Encouraged patient to try the smoothies every other day to see if she enjoys them first prior to "jumping in head first" and experiencing early burn out. She states she will try this.     2.Physical Activity: Deferred    3.Medication Therapy: Patient has been compliant with the medication regimen.    4.Patient has the following medication side effects/concerns:   (Frequency/Alleviating factors/Precipitating factors, etc.)     Follow up with Mrs. Carolee PARISH Shannon completed. No further questions or concerns. Will continue follow up to achieve health goals.  "

## 2018-08-03 DIAGNOSIS — I10 ESSENTIAL HYPERTENSION: ICD-10-CM

## 2018-08-03 RX ORDER — AMLODIPINE BESYLATE 10 MG/1
10 TABLET ORAL DAILY
Qty: 30 TABLET | Refills: 5 | Status: SHIPPED | OUTPATIENT
Start: 2018-08-03 | End: 2019-01-15 | Stop reason: SDUPTHER

## 2018-08-07 NOTE — PROGRESS NOTES
Last 5 Patient Entered Readings                                      Current 30 Day Average: 124/81     Recent Readings 8/5/2018 7/30/2018 7/30/2018 7/23/2018 7/23/2018    SBP (mmHg) 113 132 135 120 123    DBP (mmHg) 80 81 83 82 93    Pulse 64 56 53 59 58      Reviewed chart and no medication adjustments are needed at this time. She will maintain her current regimen until follow-up.

## 2018-08-10 ENCOUNTER — OFFICE VISIT (OUTPATIENT)
Dept: ORTHOPEDICS | Facility: CLINIC | Age: 58
End: 2018-08-10
Payer: COMMERCIAL

## 2018-08-10 VITALS
DIASTOLIC BLOOD PRESSURE: 84 MMHG | BODY MASS INDEX: 39.19 KG/M2 | WEIGHT: 212.94 LBS | SYSTOLIC BLOOD PRESSURE: 130 MMHG | HEART RATE: 62 BPM | HEIGHT: 62 IN

## 2018-08-10 DIAGNOSIS — M65.331 TRIGGER MIDDLE FINGER OF RIGHT HAND: ICD-10-CM

## 2018-08-10 DIAGNOSIS — G56.01 CARPAL TUNNEL SYNDROME, RIGHT: Primary | ICD-10-CM

## 2018-08-10 PROCEDURE — 99214 OFFICE O/P EST MOD 30 MIN: CPT | Mod: S$GLB,,, | Performed by: ORTHOPAEDIC SURGERY

## 2018-08-10 PROCEDURE — 3075F SYST BP GE 130 - 139MM HG: CPT | Mod: CPTII,S$GLB,, | Performed by: ORTHOPAEDIC SURGERY

## 2018-08-10 PROCEDURE — 99999 PR PBB SHADOW E&M-EST. PATIENT-LVL III: CPT | Mod: PBBFAC,,, | Performed by: ORTHOPAEDIC SURGERY

## 2018-08-10 PROCEDURE — 3008F BODY MASS INDEX DOCD: CPT | Mod: CPTII,S$GLB,, | Performed by: ORTHOPAEDIC SURGERY

## 2018-08-10 PROCEDURE — 3079F DIAST BP 80-89 MM HG: CPT | Mod: CPTII,S$GLB,, | Performed by: ORTHOPAEDIC SURGERY

## 2018-08-10 RX ORDER — MELOXICAM 15 MG/1
15 TABLET ORAL DAILY
Qty: 30 TABLET | Refills: 2 | Status: SHIPPED | OUTPATIENT
Start: 2018-08-10 | End: 2018-11-15 | Stop reason: SDUPTHER

## 2018-08-10 NOTE — PROGRESS NOTES
Subjective:     Patient ID: Carolee Ortega is a 57 y.o. female.    Chief Complaint: No chief complaint on file.    Carolee Ortega is a 57 y.o. female here for right hand trigger finger, had CSI with some mild improvement but has since returned. Also c/o some numbness in that finger.      Hand Pain    The pain is present in the right hand. This is a new problem. The current episode started more than 1 month ago. There has been no history of extremity trauma. The injury was the result of a twisting, lifting and pulling action The problem occurs constantly. The problem has been gradually worsening. The quality of the pain is described as throbbing. The pain is at a severity of 5/10. Associated symptoms include joint locking. Pertinent negatives include no fever or numbness. The symptoms are aggravated by twisting. She has tried cold and heat for the symptoms. The treatment provided no relief. Physical therapy was not tried.      Past Medical History:   Diagnosis Date    Allergic rhinitis     Allergy     Anxiety     Asthma     JAGDISH (generalized anxiety disorder)     Glaucoma (increased eye pressure)     Herpes labialis     Hypertension     Obesity     Seborrheic dermatitis     Shingles 2016     Past Surgical History:   Procedure Laterality Date    BREAST BIOPSY      right     SECTION      HYSTERECTOMY      MARYANN and USO      Secondary to fibroids    TONSILLECTOMY  2011     Family History   Problem Relation Age of Onset    Heart attack Father     Coronary artery disease Father     Glaucoma Father     Heart disease Mother 32    Breast cancer Maternal Grandmother     Hypertension Sister     Kidney disease Sister     Breast cancer Other         Great aunts    Diabetes Other         Paternal great-grandmother    Breast cancer Maternal Cousin     Breast cancer Maternal Cousin      Social History     Social History    Marital status:      Spouse name: N/A    Number  of children: N/A    Years of education: N/A     Occupational History    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                      Medication List with Changes/Refills   New Medications    MELOXICAM (MOBIC) 15 MG TABLET    Take 1 tablet (15 mg total) by mouth once daily.   Current Medications    ALBUTEROL (ACCUNEB) 1.25 MG/3 ML NEBU    Take 1.25 mg by nebulization every 6 (six) hours as needed.    ALPRAZOLAM (XANAX) 0.5 MG TABLET    Take 1 tablet (0.5 mg total) by mouth 3 (three) times daily as needed.    AMLODIPINE (NORVASC) 10 MG TABLET    TAKE 1 TABLET (10 MG TOTAL) BY MOUTH ONCE DAILY.    CHLORTHALIDONE (HYGROTEN) 25 MG TAB    Take one-half tablet (12.5mg) by mouth once daily.    DICLOFENAC (VOLTAREN) 75 MG EC TABLET    Take 1 tablet (75 mg total) by mouth 2 (two) times daily as needed (AS NEEDED FOR PAIN//TAKE WITH FOOD).    DORZOLAMIDE-TIMOLOL 2-0.5% (COSOPT) 22.3-6.8 MG/ML OPHTHALMIC SOLUTION    Place 1 drop into both eyes every 12 (twelve) hours.    HYDROXYZINE HCL (ATARAX) 25 MG TABLET    Take 1 tablet (25 mg total) by mouth every 4 (four) hours as needed for Itching.    LATANOPROST (XALATAN) 0.005 % OPHTHALMIC SOLUTION    Place 1 drop into both eyes every evening.    METOPROLOL TARTRATE (LOPRESSOR) 25 MG TABLET    TAKE 1 TABLET BY MOUTH EVERY DAY    MOMETASONE (NASONEX) 50 MCG/ACTUATION NASAL SPRAY    2 sprays by Nasal route once daily.    MONTELUKAST (SINGULAIR) 10 MG TABLET    TAKE 1 TABLET BY MOUTH IN THE EVENING    PENCICLOVIR (DENAVIR) 1 % CREAM    Apply topically every 2 (two) hours.    VALACYCLOVIR (VALTREX) 1000 MG TABLET    Take 2 tablets (2,000 mg total) by mouth every 12  (twelve) hours.     Review of patient's allergies indicates:   Allergen Reactions    Flexeril [cyclobenzaprine] Palpitations     Review of Systems   Constitution: Negative for fever and night sweats.   HENT: Negative for hearing loss.    Eyes: Negative for blurred vision and visual disturbance.   Cardiovascular: Negative for chest pain and leg swelling.   Respiratory: Negative for shortness of breath.    Endocrine: Negative for polyuria.   Hematologic/Lymphatic: Negative for bleeding problem.   Skin: Negative for rash.   Musculoskeletal: Positive for joint pain. Negative for back pain, joint swelling, muscle cramps and muscle weakness.   Gastrointestinal: Negative for melena.   Genitourinary: Negative for hematuria.   Neurological: Negative for loss of balance, numbness and paresthesias.   Psychiatric/Behavioral: Negative for altered mental status.       Objective:   Body mass index is 38.95 kg/m².  Vitals:    08/10/18 0735   BP: 130/84   Pulse: 62       General: Carolee is well-developed, well-nourished, appears stated age, in no acute distress, alert and oriented to time, place and person.       General    Vitals reviewed.  Constitutional: She is oriented to person, place, and time. She appears well-developed and well-nourished. No distress.   HENT:   Head: Normocephalic and atraumatic.   Right Ear: External ear normal.   Left Ear: External ear normal.   Nose: Nose normal.   Eyes: EOM are normal. Pupils are equal, round, and reactive to light. Right eye exhibits no discharge. Left eye exhibits no discharge.   Neck: Normal range of motion. No tracheal deviation present.   Cardiovascular: Normal rate and regular rhythm.    Pulmonary/Chest: Effort normal and breath sounds normal. No respiratory distress.   Abdominal: Soft.   Neurological: She is alert and oriented to person, place, and time. She has normal reflexes. No cranial nerve deficit. She exhibits normal muscle tone. Coordination normal.   Psychiatric: She has  a normal mood and affect. Her behavior is normal. Judgment and thought content normal.             Right Hand/Wrist Exam     Inspection   Scars: Wrist - absent   Effusion: Wrist - absent   Bruising: Wrist - absent   Deformity: Wrist - deformity     Range of Motion     Wrist   Extension:  50 normal   Flexion:  70 normal   Abduction: 20 normal  Adduction: 35 normal    Tests   Phalens Sign: negative  Tinels Sign (Medial Nerve): negative  Carpal Tunnel Compression Test: positive  Cubital Tunnel Compression Test: negative      Other     Neuorologic Exam    Median Distribution: normal  Ulnar Distribution: normal  Radial Distribution: normal      Left Hand/Wrist Exam     Inspection   Scars: Wrist - absent   Effusion: Wrist - absent   Bruising: Wrist - absent   Deformity: Wrist - absent     Range of Motion     Wrist   Extension:  50 normal   Flexion:  70 normal   Abduction: 20 normal  Adduction: 35 normal    Tests   Phalens Sign: negative  Tinels Sign (Medial Nerve): negative  Carpal Tunnel Compression Test: negative  Cubital Tunnel Compression Test: negative      Other     Sensory Exam  Median Distribution: normal  Ulnar Distribution: normal  Radial Distribution: normal      Right Elbow Exam     Inspection   Scars: absent  Effusion: absent  Bruising: absent  Deformity: absent  Atrophy: absent    Range of Motion   Extension:  0 normal   Flexion:  130 normal   Pronation: normal   Supination:  80 normal     Tests Varus: negative  Valgus: negative  Tinel's Sign (cubital tunnel): negative  Tennis Elbow: negative  Golfer's Elbow: negative  Radial Capitellar Grind: negative    Other   Sensation: normal    Comments:  Mild triggering R MF with  TTP over a1 pulley      Left Elbow Exam     Inspection   Scars: absent  Effusion: absent  Bruising: absent  Deformity: absent  Atrophy: absent    Range of Motion   Extension:  0 normal   Flexion:  130 normal   Pronation: normal   Supination:  80 normal     Tests Varus: negative  Tinel's  Sign (cubital tunnel): negative  Tennis Elbow: negative  Golfer's Elbow: negative  Radial Capitellar Grind: negative    Other   Sensation: normal        Muscle Strength   Right Upper Extremity   Wrist Extension: 5/5/5   Wrist Flexion: 5/5/5   : 5/5/5   Pinch Mechanism: 5/5  Elbow Pronation:  5/5   Elbow Supination:  5/5   Elbow Extension: 5/5  Elbow Flexion: 5/5  Intrinsics: 5/5  EPL (Extensor Pollicis Longus): 5/5  Left Upper Extremity  Wrist Extension: 5/5/5   Wrist Flexion: 5/5/5   :  5/5/5   Pinch Mechanism: 5/5  Elbow Pronation:  5/5   Elbow Supination:  5/5   Elbow Extension: 5/5  Elbow Flexion: 5/5  Intrinsics: 5/5  EPL (Extensor Pollicis Longus): 5/5    Vascular Exam     Right Pulses      Radial:                    2+      Left Pulses      Radial:                    2+      Capillary Refill  Right Hand: normal capillary refill  Left Hand: normal capillary refill    Edema  Right Forearm: absent  Left Forearm: absent    Xray finger shows no arthritis right middle finger; No fracture or dislocation.    Assessment:     Encounter Diagnoses   Name Primary?    Carpal tunnel syndrome, right Yes    Trigger middle finger of right hand         Plan:     1. EMG/NCS to eval for carpal tunnel    2. Continue night splinting    3. Continue nsaids    4. F/up after above study

## 2018-08-15 ENCOUNTER — PATIENT OUTREACH (OUTPATIENT)
Dept: OTHER | Facility: OTHER | Age: 58
End: 2018-08-15

## 2018-08-15 NOTE — PROGRESS NOTES
Last 5 Patient Entered Readings                                      Current 30 Day Average: 121/80     Recent Readings 8/13/2018 8/5/2018 7/30/2018 7/30/2018 7/23/2018    SBP (mmHg) 106 113 132 135 120    DBP (mmHg) 74 80 81 83 82    Pulse 63 64 56 53 59          Digital Medicine: Health  Follow Up    Lifestyle Modifications:    1.Dietary Modifications (Sodium intake <2,000mg/day, food labels, dining out): Patient reports her and  were adhering to a modified smoothie diet for 10 days.  She reports having 2 green (kale, spinach, and bernie lettuce) smoothies and a hearty salad for dinner. Patient reports her BM were more regular. Patient reports she may return to the diet but is not sure. She states returning to eating chicken breasts and vegetables. Encouraged patient to continue a fresh, limited processed food diet. She states she is trying.    2.Physical Activity: Patient reports her and  are trying to walk 2-3 miles, 3x/week. She states using her stationary bike in the mornings for 10-15 minutes a few days of the week. Encouraged patient to continue increasing her physical activity.      3.Medication Therapy: Patient has been compliant with the medication regimen.    4.Patient has the following medication side effects/concerns:   (Frequency/Alleviating factors/Precipitating factors, etc.)     Follow up with Mrs. Carolee PARISH Shannon completed. No further questions or concerns. Will continue follow up to achieve health goals.

## 2018-09-04 NOTE — PROGRESS NOTES
Last 5 Patient Entered Readings                                      Current 30 Day Average: 121/78     Recent Readings 8/31/2018 8/31/2018 8/29/2018 8/29/2018 8/29/2018    SBP (mmHg) 115 126 144 153 144    DBP (mmHg) 83 86 91 96 87    Pulse 66 67 51 50 52      Reviewed chart and no BP medication adjustments needed at this time. BMP needed 5/19.

## 2018-09-05 ENCOUNTER — PATIENT OUTREACH (OUTPATIENT)
Dept: OTHER | Facility: OTHER | Age: 58
End: 2018-09-05

## 2018-09-05 NOTE — PROGRESS NOTES
Last 5 Patient Entered Readings                                      Current 30 Day Average: 123/78     Recent Readings 8/31/2018 8/31/2018 8/29/2018 8/29/2018 8/29/2018    SBP (mmHg) 115 126 144 153 144    DBP (mmHg) 83 86 91 96 87    Pulse 66 67 51 50 52        Digital Medicine: Health  Follow Up    Left voicemail to follow up with Mrs. Carolee PARISH Shannon.  Current BP average 123/78 mmHg is at goal, [130/80]. BP is controlled.

## 2018-09-06 ENCOUNTER — TELEPHONE (OUTPATIENT)
Dept: ORTHOPEDICS | Facility: CLINIC | Age: 58
End: 2018-09-06

## 2018-09-06 NOTE — TELEPHONE ENCOUNTER
Spoke to patient informing her that we would not have her nerve conduction study results tomorrow for her follow up appointment to review them since she is having the test done tomorrow at 9:00am and her review at 10:20am. Informed her we will need to reschedule. Rescheduled patient for 9/14/18 to review the results. Explained to her our treatment plan would depend on the results of the nerve conduction study. Patient verbalized her understanding and agreed.

## 2018-09-07 ENCOUNTER — OFFICE VISIT (OUTPATIENT)
Dept: PHYSICAL MEDICINE AND REHAB | Facility: CLINIC | Age: 58
End: 2018-09-07
Payer: COMMERCIAL

## 2018-09-07 VITALS
BODY MASS INDEX: 39.01 KG/M2 | SYSTOLIC BLOOD PRESSURE: 127 MMHG | HEART RATE: 64 BPM | HEIGHT: 62 IN | DIASTOLIC BLOOD PRESSURE: 83 MMHG | WEIGHT: 212 LBS | RESPIRATION RATE: 14 BRPM

## 2018-09-07 DIAGNOSIS — G56.03 BILATERAL CARPAL TUNNEL SYNDROME: ICD-10-CM

## 2018-09-07 PROCEDURE — 3008F BODY MASS INDEX DOCD: CPT | Mod: CPTII,S$GLB,, | Performed by: PHYSICAL MEDICINE & REHABILITATION

## 2018-09-07 PROCEDURE — 3074F SYST BP LT 130 MM HG: CPT | Mod: CPTII,S$GLB,, | Performed by: PHYSICAL MEDICINE & REHABILITATION

## 2018-09-07 PROCEDURE — 99999 PR PBB SHADOW E&M-EST. PATIENT-LVL III: CPT | Mod: PBBFAC,,, | Performed by: PHYSICAL MEDICINE & REHABILITATION

## 2018-09-07 PROCEDURE — 95913 NRV CNDJ TEST 13/> STUDIES: CPT | Mod: S$GLB,,, | Performed by: PHYSICAL MEDICINE & REHABILITATION

## 2018-09-07 PROCEDURE — 3079F DIAST BP 80-89 MM HG: CPT | Mod: CPTII,S$GLB,, | Performed by: PHYSICAL MEDICINE & REHABILITATION

## 2018-09-07 PROCEDURE — 99204 OFFICE O/P NEW MOD 45 MIN: CPT | Mod: 25,S$GLB,, | Performed by: PHYSICAL MEDICINE & REHABILITATION

## 2018-09-07 NOTE — PATIENT INSTRUCTIONS
Carpal Tunnel Syndrome Prevention Tips  Some repetitive hand activities put you at higher risk for carpal tunnel syndrome (CTS). But you can reduce your risk. Learn how to change the way you use your hands. Below are tips for at home and on the job. Be sure to also follow the hand and wrist safety policies at your workplace.      Keep your wrist in a neutral (straight) position when exercising.      Keep your wrist in neutral  Keep a neutral (straight) wrist position as often as you can. Dont use your wrist in a bent (flexed) position for long periods of time. This includes extended or twisted positions.  Watch your   Dont just use your thumb and index finger to grasp or lift. This can put stress on your wrist. When you can, use your whole hand and all its fingers to grasp an object.  Minimize repetition  Dont move your arms or hands or hold an object in the same way for long periods of time. Even simple, light tasks can cause injury this way. Instead, alternate tasks or switch hands.  Rest your hands  Give your hands a break from time to time with a rest. Even a few minutes once an hour can help.  Reduce speed and force  Slow down the speed in which you do a forceful, repetitive motion. This gives your wrist time to recover from the effort. Use power tools to help reduce the force.  Strengthen the muscles  Weak muscles may lead to a poor wrist or arm position. Exercises will make your hand and arm muscles stronger. This can help you keep a better position.  Date Last Reviewed: 9/11/2015 © 2000-2017 [employer]. [address] [place] [address], [place] [address]. All rights reserved. This information is not intended as a substitute for professional medical care. Always follow your healthcare professional's instructions.        Carpal Tunnel Syndrome    Carpal tunnel syndrome is a painful condition of the wrist and arm. It is caused by pressure on the median nerve.  The median nerve is one of the  nerves that give feeling and movement to the hand. It passes through a tunnel in the wrist called the carpal tunnel. This tunnel is made up of bones and ligaments. Narrowing of this tunnel or swelling of the tissues inside the tunnel puts pressure on the median nerve. This causes numbness, pins and needles, or electric shooting pains in your hand and forearm. Often the pain is worse at night and may wake you when you are asleep.  Carpal tunnel syndrome may occur during pregnancy and with use of birth control pills. It is more common in workers who must often bend their wrists. It is also common in people who work with power tools that cause strong vibrations.  Home care  · Rest the painful wrist. Avoid repeated bending of the wrist back and forth. This puts pressure on the median nerve. Avoid using power tools with strong vibrations.  · If you were given a splint, wear it at night while you sleep. You may also wear it during the day for comfort.  · Move your fingers and wrists often to avoid stiffness.  · Elevate your arms on pillows when you lie down.  · Try using the unaffected hand more.  · Try not to hold your wrists in a bent, downward position.  · Sometimes changes in the work place may ease symptoms. If you type most of the day, it may help to change the position of your keyboard or add a wrist support. Your wrist should be in a neutral position and not bent back when typing.  · You may use over-the-counter pain medicine to treat pain and inflammation, unless another medicine was prescribed. Anti-inflammatory pain medicines, such as ibuprofen or naproxen may be more effective than acetaminophen, which treats pain, but not inflammation. If you have chronic liver or kidney disease or ever had a stomach ulcer or GI bleeding, talk with your doctor before using these medicines.  · Opioid pain medicine will only give temporary relief and does not treat the problem. If pain continues, you may need a shot of a  steroid drug into your wrist.  · If the above methods fail, you may need surgery. This will open the carpal tunnel and release the pressure on the trapped nerve.  Follow-up care  Follow up with your healthcare provider, or as advised, if the pain doesnt begin to improve within the next week.  If X-rays were taken, you will be notified of any new findings that may affect your care.  When to seek medical advice  Call your healthcare provider right away if any of these occur:  · Pain not improving with the above treatment  · Fingers or hand become cold, blue, numb, or tingly  · Your whole arm becomes swollen or weak  Date Last Reviewed: 11/23/2015  © 5767-8381 [employer]. [address] [place] [address], [place] [address]. All rights reserved. This information is not intended as a substitute for professional medical care. Always follow your healthcare professional's instructions.

## 2018-09-07 NOTE — PROGRESS NOTES
[facility] [address] [place] [address]  Phone: [phone]          Full Name: carrie luke YOB: 1960  Patient ID: [id number]      Visit Date: 9/7/2018 08:58  Age: 57 Years 9 Months Old  Examining Physician: Monica Roche M.D.  Referring Physician:   Reason for Referral: uex pain        Chief Complaint   Patient presents with    Hand Pain     bilateral;   trigger finger on the right hand     HPI: This is a 57 y.o.  female being seen in clinic today for evaluation of bilateral hand pain with right hand 3rd digit trigger finger. She feels achy pain and numbness/tingling in her hands and forearms-often at night when trying to rest.  Shaking her hands, rubbing them, or changing position provide some relief.      History obtained from patient    Past family, medical, social, and surgical history reviewed in chart    Review of Systems:     General- denies lethargy, weight change, fever, chills  Head/neck- denies swallowing difficulties  ENT- denies hearing changes  Cardiovascular-denies chest pain  Pulmonary- denies shortness of breath  GI- denies constipation or bowel incontinence  - denies bladder incontinence  Skin- denies wounds or rashes  Musculoskeletal- denies weakness, +pain  Neurologic- +numbness and tingling  Psychiatric- denies depressive or psychotic features, +anxiety  Lymphatic-denies swelling  Endocrine- denies hypoglycemic symptoms/DM history  All other pertinent systems negative     Physical Examination:  General: Well developed, well nourished female, NAD  HEENT:NCAT EOMI bilaterally   Pulmonary:Normal respirations    Spinal Examination: CERVICAL  Active ROM is within normal limits.  Inspection: No deformity of spinal alignment.    Spinal Examination: LUMBAR or THORACIC  Active ROM is within normal limits.    Musculoskeletal Tests:  Phalen:   Elbow compression (ulnar): neg  Tinels at wrist: +bilaterally    Bilateral Upper and Lower  Extremities:  Pulses are 2+ at radial bilaterally.  Shoulder/Elbow/Wrist/Hand ROM wnl  Hip/Knee/Ankle ROM wnl  Bilateral Extremities show normal capillary refill.  No signs of cyanosis, rubor, edema, skin changes, or dysvascular changes of appendages.  Nails appear intact.    Neurological Exam:  Cranial Nerves:  II-XII grossly intact    Manual Muscle Testing: (Motor 5=normal)  5/5 strength bilateral upper extremities    No focal atrophy is noted of either upper extremity.    Bilateral Reflexes:1+bic tric br  Peres's response is absent bilaterally.    Sensation: tested to light touch  - intact in arms  Gait: Narrow base and good arm swing.      Entire procedure explained to patient prior to proceeding.  Verbal consent obtained        SNC      Nerve / Sites Rec. Site Onset Lat Peak Lat Amp Segments Distance Velocity     ms ms µV  mm m/s   R Median - Digit II (Antidromic)      Wrist Dig II 2.8 3.7 25.4 Wrist - Dig  50   L Median - Digit II (Antidromic)      Wrist Dig II 2.9 3.6 23.4 Wrist - Dig  49   R Ulnar - Digit V (Antidromic)      Wrist Dig V 2.2 3.1 15.8 Wrist - Dig V 140 63   L Ulnar - Digit V (Antidromic)      Wrist Dig V 2.4 3.2 23.8 Wrist - Dig V 140 58   R Radial - Anatomical snuff box (Forearm)      Forearm Wrist 1.8 2.3 16.6 Forearm - Wrist 100 56   L Radial - Anatomical snuff box (Forearm)      Forearm Wrist 1.9 2.2 13.4 Forearm - Wrist 100 53       CSI      Nerve / Sites Rec. Site Peak Lat NP Amp Segments Peak Diff     ms µV  ms   R Median - CSI      Median Thumb 3.1 17.3 Median - Radial 0.8      Radial Thumb 2.3 14.3 Median - Ulnar 0.8      Median Ring 3.7 22.3 Median palm - Ulnar palm       Ulnar Ring 2.9 22.2        CSI    CSI 1.6   L Median - CSI      Median Thumb 2.9 16.7 Median - Radial 0.7      Radial Thumb 2.2 11.3 Median - Ulnar 0.8      Median Ring 3.8 20.2 Median palm - Ulnar palm       Ulnar Ring 3.0 24.9        CSI    CSI 1.5       MNC      Nerve / Sites Muscle Latency  Amplitude Duration Rel Amp Segments Distance Lat Diff Velocity     ms mV ms %  mm ms m/s   R Median - APB      Wrist APB 3.4 10.7 6.7 100 Wrist - APB 80        Elbow APB 7.2 10.8 6.5 101 Elbow - Wrist 225 3.8 59   L Median - APB      Wrist APB 3.6 6.8 7.4 100 Wrist - APB 80        Elbow APB 7.0 6.3 7.3 92.5 Elbow - Wrist 215 3.4 64   R Ulnar - ADM      Wrist ADM 2.4 7.3 7.0 100 Wrist - ADM 80        B.Elbow ADM 5.3 7.1 7.5 98.2 B.Elbow - Wrist 210 2.9 72      A.Elbow ADM 6.8 6.3 7.4 88.4 A.Elbow - B.Elbow 110 1.5 73         A.Elbow - Wrist  4.4    L Ulnar - ADM      Wrist ADM 2.4 8.6 8.0 100 Wrist - ADM 80        B.Elbow ADM 5.4 8.4 7.9 98.3 B.Elbow - Wrist 220 3.0 73      A.Elbow ADM 6.9 7.8 7.7 93.1 A.Elbow - B.Elbow 100 1.5 69         A.Elbow - Wrist  4.5                                         INTERPRETATION  -Bilateral median motor nerve conduction study showed normal latency, amplitude, and conduction velocity  -Bilateral median sensory nerve conduction study showed normal peak latency and amplitude  -Bilateral ulnar motor nerve conduction study showed normal latency, amplitude, and conduction velocity  -Bilateral ulnar sensory nerve conduction study showed normal peak latency and amplitude  -Bilateral radial sensory nerve conduction study showed normal peak latency and amplitude  -Bilateral combined sensory index showed a significant latency of 1.6 msec on the right and 1.5 msec on the left    IMPRESSION  1. ABNORMAL study  2. There is electrodiagnostic evidence of a MILD demyelinating median neuropathy (Carpal tunnel syndrome) across BILATERAL wrists    PLAN  1. Follow up with referring provider: Dr. Shaq Fairbanks  2. Handouts on CTS prevention provided.   3. This study is good for one year. If symptoms worsen or do not improve, please re-consult.    Monica Roche M.D.  Physical Medicine and Rehab

## 2018-09-07 NOTE — LETTER
September 7, 2018      Shaq Fairbanks MD  [address] [place] [id number]           [facility] - Physiatry  [address] [place] [address]  Phone: [phone]  Fax: [phone]          Patient: Carolee Ortega   MR Number: [id number]   YOB: 1960   Date of Visit: 9/7/2018       Dear Dr. Shaq Fairbanks:    Thank you for referring Carolee Ortega to me for evaluation. Attached you will find relevant portions of my assessment and plan of care.    If you have questions, please do not hesitate to call me. I look forward to following Carolee Ortega along with you.    Sincerely,    Monica Roche MD    Enclosure  CC:  No Recipients    If you would like to receive this communication electronically, please contact [email or web address] or [phone] to request more information on "[email or web address] Link access.    For providers and/or their staff who would like to refer a patient to [facility], please contact us through our one-stop-shop provider referral line, [facility], at [phone].    If you feel you have received this communication in error or would no longer like to receive these types of communications, please e-mail [email or web address]

## 2018-09-14 ENCOUNTER — OFFICE VISIT (OUTPATIENT)
Dept: ORTHOPEDICS | Facility: CLINIC | Age: 58
End: 2018-09-14
Payer: COMMERCIAL

## 2018-09-14 ENCOUNTER — CLINICAL SUPPORT (OUTPATIENT)
Dept: CARDIOLOGY | Facility: CLINIC | Age: 58
End: 2018-09-14
Payer: COMMERCIAL

## 2018-09-14 ENCOUNTER — HOSPITAL ENCOUNTER (OUTPATIENT)
Dept: RADIOLOGY | Facility: HOSPITAL | Age: 58
Discharge: HOME | End: 2018-09-14
Attending: ORTHOPAEDIC SURGERY
Payer: COMMERCIAL

## 2018-09-14 VITALS
HEART RATE: 60 BPM | SYSTOLIC BLOOD PRESSURE: 144 MMHG | BODY MASS INDEX: 39.01 KG/M2 | WEIGHT: 212 LBS | HEIGHT: 62 IN | DIASTOLIC BLOOD PRESSURE: 83 MMHG

## 2018-09-14 DIAGNOSIS — Z01.818 PRE-OP TESTING: Primary | ICD-10-CM

## 2018-09-14 DIAGNOSIS — Z01.818 PRE-OP TESTING: ICD-10-CM

## 2018-09-14 DIAGNOSIS — M65.331 TRIGGER MIDDLE FINGER OF RIGHT HAND: ICD-10-CM

## 2018-09-14 DIAGNOSIS — G56.03 BILATERAL CARPAL TUNNEL SYNDROME: ICD-10-CM

## 2018-09-14 PROCEDURE — 93000 ELECTROCARDIOGRAM COMPLETE: CPT | Mod: S$GLB,,, | Performed by: INTERNAL MEDICINE

## 2018-09-14 PROCEDURE — 99214 OFFICE O/P EST MOD 30 MIN: CPT | Mod: S$GLB,,, | Performed by: ORTHOPAEDIC SURGERY

## 2018-09-14 PROCEDURE — 3008F BODY MASS INDEX DOCD: CPT | Mod: CPTII,S$GLB,, | Performed by: ORTHOPAEDIC SURGERY

## 2018-09-14 PROCEDURE — 71046 X-RAY EXAM CHEST 2 VIEWS: CPT | Mod: 26,,, | Performed by: RADIOLOGY

## 2018-09-14 PROCEDURE — 99999 PR PBB SHADOW E&M-EST. PATIENT-LVL III: CPT | Mod: PBBFAC,,, | Performed by: ORTHOPAEDIC SURGERY

## 2018-09-14 PROCEDURE — 3077F SYST BP >= 140 MM HG: CPT | Mod: CPTII,S$GLB,, | Performed by: ORTHOPAEDIC SURGERY

## 2018-09-14 PROCEDURE — 3079F DIAST BP 80-89 MM HG: CPT | Mod: CPTII,S$GLB,, | Performed by: ORTHOPAEDIC SURGERY

## 2018-09-14 PROCEDURE — 71046 X-RAY EXAM CHEST 2 VIEWS: CPT | Mod: TC,FY,PO

## 2018-09-14 NOTE — H&P (VIEW-ONLY)
Subjective:     Patient ID: Carolee Ortega is a 57 y.o. female.    Chief Complaint: No chief complaint on file.    Patient is here for a follow up on her right wrist. Here for the EMG results. States the wrist is doing well today but, her trigger finger is giving her issues.    Carolee Ortega is a 57 y.o. female here for right hand trigger finger, had CSI with some mild improvement but has since returned. Also c/o some numbness in that finger.        Hand Pain    The pain is present in the right hand. This is a new problem. The current episode started more than 1 month ago. There has been no history of extremity trauma. The injury was the result of a twisting, lifting and pulling action The problem occurs constantly. The problem has been unchanged. The quality of the pain is described as dull, aching and throbbing. The pain is at a severity of 3/10. Associated symptoms include joint locking. Pertinent negatives include no fever or numbness. The symptoms are aggravated by lifting (gripping). She has tried cold, heat and NSAIDs for the symptoms. The treatment provided moderate relief. Physical therapy was not tried.      Past Medical History:   Diagnosis Date    Allergic rhinitis     Allergy     Anxiety     Asthma     JAGDISH (generalized anxiety disorder)     Glaucoma (increased eye pressure)     Herpes labialis     Hypertension     Obesity     Seborrheic dermatitis     Shingles 2016     Past Surgical History:   Procedure Laterality Date    BREAST BIOPSY      right     SECTION      HYSTERECTOMY      MARYANN and USO      Secondary to fibroids    TONSILLECTOMY  2011     Family History   Problem Relation Age of Onset    Heart attack Father     Coronary artery disease Father     Glaucoma Father     Heart disease Mother 32    Breast cancer Maternal Grandmother     Hypertension Sister     Kidney disease Sister     Breast cancer Other         Great aunts    Diabetes Other          Paternal great-grandmother    Breast cancer Maternal Cousin     Breast cancer Maternal Cousin      Social History     Socioeconomic History    Marital status:      Spouse name: Not on file    Number of children: Not on file    Years of education: Not on file    Highest education level: Not on file   Social Needs    Financial resource strain: Not on file    Food insecurity - worry: Not on file    Food insecurity - inability: Not on file    Transportation needs - medical: Not on file    Transportation needs - non-medical: Not on file   Occupational History    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                     Medication List           Accurate as of 9/14/18  1:04 PM. If you have any questions, ask your nurse or doctor.               CHANGE how you take these medications    penciclovir 1 % cream  Commonly known as:  DENAVIR  Apply topically every 2 (two) hours.  What changed:    · when to take this  · reasons to take this     valACYclovir 1000 MG tablet  Commonly known as:  VALTREX  Take 2 tablets (2,000 mg total) by mouth every 12 (twelve) hours.  What changed:    · when to take this  · reasons to take this        CONTINUE taking these medications    albuterol 1.25 mg/3 mL Nebu  Commonly known as:  ACCUNEB     ALPRAZolam 0.5 MG tablet  Commonly known as:  XANAX  Take 1 tablet (0.5 mg total) by mouth 3 (three) times daily as needed.     amLODIPine 10 MG tablet  Commonly known as:  NORVASC  TAKE 1 TABLET (10 MG TOTAL) BY MOUTH ONCE DAILY.     chlorthalidone 25 MG Tab  Commonly known as:  HYGROTEN  Take  one-half tablet (12.5mg) by mouth once daily.     diclofenac 75 MG EC tablet  Commonly known as:  VOLTAREN  Take 1 tablet (75 mg total) by mouth 2 (two) times daily as needed (AS NEEDED FOR PAIN//TAKE WITH FOOD).     dorzolamide-timolol 2-0.5% 22.3-6.8 mg/mL ophthalmic solution  Commonly known as:  COSOPT  Place 1 drop into both eyes every 12 (twelve) hours.     hydrOXYzine HCl 25 MG tablet  Commonly known as:  ATARAX  Take 1 tablet (25 mg total) by mouth every 4 (four) hours as needed for Itching.     latanoprost 0.005 % ophthalmic solution  Commonly known as:  XALATAN  Place 1 drop into both eyes every evening.     meloxicam 15 MG tablet  Commonly known as:  MOBIC  Take 1 tablet (15 mg total) by mouth once daily.     metoprolol tartrate 25 MG tablet  Commonly known as:  LOPRESSOR  TAKE 1 TABLET BY MOUTH EVERY DAY     mometasone 50 mcg/actuation nasal spray  Commonly known as:  NASONEX  2 sprays by Nasal route once daily.     montelukast 10 mg tablet  Commonly known as:  SINGULAIR  TAKE 1 TABLET BY MOUTH IN THE EVENING          Review of patient's allergies indicates:   Allergen Reactions    Flexeril [cyclobenzaprine] Palpitations     Review of Systems   Constitution: Negative for fever.   HENT: Negative for hearing loss.    Eyes: Negative for blurred vision.   Cardiovascular: Negative for chest pain and leg swelling.   Respiratory: Negative for shortness of breath.    Endocrine: Negative for polyuria.   Hematologic/Lymphatic: Negative for bleeding problem.   Skin: Negative for rash.   Musculoskeletal: Positive for joint pain. Negative for back pain, joint swelling, muscle cramps and muscle weakness.   Gastrointestinal: Negative for melena.   Genitourinary: Negative for hematuria.   Neurological: Negative for loss of balance, numbness and paresthesias.   Psychiatric/Behavioral: Negative for altered mental status.       Objective:   Body mass index is 38.78 kg/m².  Vitals:    09/14/18 0850   BP: (!) 144/83   Pulse:  60       General: Carolee is well-developed, well-nourished, appears stated age, in no acute distress, alert and oriented to time, place and person.       General    Vitals reviewed.  Constitutional: She is oriented to person, place, and time. She appears well-developed and well-nourished. No distress.   HENT:   Head: Normocephalic and atraumatic.   Right Ear: External ear normal.   Left Ear: External ear normal.   Nose: Nose normal.   Eyes: EOM are normal. Pupils are equal, round, and reactive to light. Right eye exhibits no discharge. Left eye exhibits no discharge.   Neck: Normal range of motion. No tracheal deviation present.   Cardiovascular: Normal rate and regular rhythm.    Pulmonary/Chest: Effort normal and breath sounds normal. No respiratory distress.   Abdominal: Soft.   Neurological: She is alert and oriented to person, place, and time. She has normal reflexes. No cranial nerve deficit. She exhibits normal muscle tone. Coordination normal.   Psychiatric: She has a normal mood and affect. Her behavior is normal. Judgment and thought content normal.             Right Hand/Wrist Exam     Inspection   Scars: Wrist - absent   Effusion: Wrist - absent   Bruising: Wrist - absent   Deformity: Wrist - deformity     Range of Motion     Wrist   Extension:  50 normal   Flexion:  70 normal   Abduction: 20 normal  Adduction: 35 normal    Tests   Phalens Sign: negative  Tinel's sign (median nerve): negative  Carpal Tunnel Compression Test: positive  Cubital Tunnel Compression Test: negative      Other     Neuorologic Exam    Median Distribution: normal  Ulnar Distribution: normal  Radial Distribution: normal      Left Hand/Wrist Exam     Inspection   Scars: Wrist - absent   Effusion: Wrist - absent   Bruising: Wrist - absent   Deformity: Wrist - absent     Range of Motion     Wrist   Extension:  50 normal   Flexion:  70 normal   Abduction: 20 normal  Adduction: 35 normal    Tests   Phalens Sign: negative  Tinel's  sign (median nerve): negative  Carpal Tunnel Compression Test: negative  Cubital Tunnel Compression Test: negative      Other     Sensory Exam  Median Distribution: normal  Ulnar Distribution: normal  Radial Distribution: normal      Right Elbow Exam     Inspection   Scars: absent  Effusion: absent  Bruising: absent  Deformity: absent  Atrophy: absent    Range of Motion   Extension:  0 normal   Flexion:  130 normal   Pronation: normal   Supination:  80 normal     Tests   Varus: negative  Valgus: negative  Tinel's sign (cubital tunnel): negative  Tennis Elbow: negative  Golfer's Elbow: negative  Radial Capitellar Grind: negative    Other   Sensation: normal    Comments:  Mild triggering R MF with  TTP over a1 pulley      Left Elbow Exam     Inspection   Scars: absent  Effusion: absent  Bruising: absent  Deformity: absent  Atrophy: absent    Range of Motion   Extension:  0 normal   Flexion:  130 normal   Pronation: normal   Supination:  80 normal     Tests   Varus: negative  Tinel's sign (cubital tunnel): negative  Tennis Elbow: negative  Golfer's Elbow: negative  Radial Capitellar Grind: negative    Other   Sensation: normal        Muscle Strength   Right Upper Extremity   Wrist extension: 5/5/5   Wrist flexion: 5/5/5   : 5/5/5   Pinch Mechanism: 5/5  Elbow Pronation:  5/5   Elbow Supination:  5/5   Elbow Extension: 5/5  Elbow Flexion: 5/5  Intrinsics: 5/5  EPL (Extensor Pollicis Longus): 5/5  Left Upper Extremity  Wrist extension: 5/5/5   Wrist flexion: 5/5/5   :  5/5/5   Pinch Mechanism: 5/5  Elbow Pronation:  5/5   Elbow Supination:  5/5   Elbow Extension: 5/5  Elbow Flexion: 5/5  Intrinsics: 5/5  EPL (Extensor Pollicis Longus): 5/5    Vascular Exam     Right Pulses      Radial:                    2+      Left Pulses      Radial:                    2+      Capillary Refill  Right Hand: normal capillary refill  Left Hand: normal capillary refill    Edema  Right Forearm: absent  Left Forearm: absent    Xray  finger shows no arthritis right middle finger; No fracture or dislocation.    INTERPRETATION  -Bilateral median motor nerve conduction study showed normal latency, amplitude, and conduction velocity  -Bilateral median sensory nerve conduction study showed normal peak latency and amplitude  -Bilateral ulnar motor nerve conduction study showed normal latency, amplitude, and conduction velocity  -Bilateral ulnar sensory nerve conduction study showed normal peak latency and amplitude  -Bilateral radial sensory nerve conduction study showed normal peak latency and amplitude  -Bilateral combined sensory index showed a significant latency of 1.6 msec on the right and 1.5 msec on the left     IMPRESSION  1. ABNORMAL study  2. There is electrodiagnostic evidence of a MILD demyelinating median neuropathy (Carpal tunnel syndrome) across BILATERAL wrists     PLAN  1. Follow up with referring provider: Dr. Shaq Fairbanks  2. Handouts on CTS prevention provided.   3. This study is good for one year. If symptoms worsen or do not improve, please re-consult.       Assessment:     Encounter Diagnoses   Name Primary?    Pre-op testing Yes    Trigger middle finger of right hand     Bilateral carpal tunnel syndrome         Plan:     We reviewed with Carolee today, the pathology and natural history of her diagnosis. We have discussed a variety of treatment options including medications, physical therapy and other alternative treatments. I also explained the indications, risks and benefits of surgery. After discussion, Carolee decided to proceed with surgery. She is having symptoms of carpal tunnel at least 3-4 times a week it is waking her up. The decision was made to go forward with    1. Right carpal tunnel release   2. Right middle finger trigger release    The details of the surgical procedure were explained, including the location of probable incisions and a description of likely hardware and/or grafts to be used.  The  patient understands the likely convalescence after surgery.  Also, we have thoroughly discussed the risks, benefits and alternatives to surgery, including, but not limited to, the risk of infection, joint stiffness, blood clot (including DVT and/or pulmonary embolus), neurologic and vascular injury.  It was explained that, if tissue has been repaired or reconstructed, there is a chance of failure, which may require further management.      All of the patient's questions were answered and informed consent was obtained. The patient will contact us if they have any questions or concerns in the interim.

## 2018-09-14 NOTE — PROGRESS NOTES
Subjective:     Patient ID: Carolee Ortega is a 57 y.o. female.    Chief Complaint: No chief complaint on file.    Patient is here for a follow up on her right wrist. Here for the EMG results. States the wrist is doing well today but, her trigger finger is giving her issues.    Carolee Ortega is a 57 y.o. female here for right hand trigger finger, had CSI with some mild improvement but has since returned. Also c/o some numbness in that finger.        Hand Pain    The pain is present in the right hand. This is a new problem. The current episode started more than 1 month ago. There has been no history of extremity trauma. The injury was the result of a twisting, lifting and pulling action The problem occurs constantly. The problem has been unchanged. The quality of the pain is described as dull, aching and throbbing. The pain is at a severity of 3/10. Associated symptoms include joint locking. Pertinent negatives include no fever or numbness. The symptoms are aggravated by lifting (gripping). She has tried cold, heat and NSAIDs for the symptoms. The treatment provided moderate relief. Physical therapy was not tried.      Past Medical History:   Diagnosis Date    Allergic rhinitis     Allergy     Anxiety     Asthma     JAGDISH (generalized anxiety disorder)     Glaucoma (increased eye pressure)     Herpes labialis     Hypertension     Obesity     Seborrheic dermatitis     Shingles 2016     Past Surgical History:   Procedure Laterality Date    BREAST BIOPSY      right     SECTION      HYSTERECTOMY      MARYANN and USO      Secondary to fibroids    TONSILLECTOMY  2011     Family History   Problem Relation Age of Onset    Heart attack Father     Coronary artery disease Father     Glaucoma Father     Heart disease Mother 32    Breast cancer Maternal Grandmother     Hypertension Sister     Kidney disease Sister     Breast cancer Other         Great aunts    Diabetes Other          Paternal great-grandmother    Breast cancer Maternal Cousin     Breast cancer Maternal Cousin      Social History     Socioeconomic History    Marital status:      Spouse name: Not on file    Number of children: Not on file    Years of education: Not on file    Highest education level: Not on file   Social Needs    Financial resource strain: Not on file    Food insecurity - worry: Not on file    Food insecurity - inability: Not on file    Transportation needs - medical: Not on file    Transportation needs - non-medical: Not on file   Occupational History    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                     Medication List           Accurate as of 9/14/18  1:04 PM. If you have any questions, ask your nurse or doctor.               CHANGE how you take these medications    penciclovir 1 % cream  Commonly known as:  DENAVIR  Apply topically every 2 (two) hours.  What changed:    · when to take this  · reasons to take this     valACYclovir 1000 MG tablet  Commonly known as:  VALTREX  Take 2 tablets (2,000 mg total) by mouth every 12 (twelve) hours.  What changed:    · when to take this  · reasons to take this        CONTINUE taking these medications    albuterol 1.25 mg/3 mL Nebu  Commonly known as:  ACCUNEB     ALPRAZolam 0.5 MG tablet  Commonly known as:  XANAX  Take 1 tablet (0.5 mg total) by mouth 3 (three) times daily as needed.     amLODIPine 10 MG tablet  Commonly known as:  NORVASC  TAKE 1 TABLET (10 MG TOTAL) BY MOUTH ONCE DAILY.     chlorthalidone 25 MG Tab  Commonly known as:  HYGROTEN  Take  one-half tablet (12.5mg) by mouth once daily.     diclofenac 75 MG EC tablet  Commonly known as:  VOLTAREN  Take 1 tablet (75 mg total) by mouth 2 (two) times daily as needed (AS NEEDED FOR PAIN//TAKE WITH FOOD).     dorzolamide-timolol 2-0.5% 22.3-6.8 mg/mL ophthalmic solution  Commonly known as:  COSOPT  Place 1 drop into both eyes every 12 (twelve) hours.     hydrOXYzine HCl 25 MG tablet  Commonly known as:  ATARAX  Take 1 tablet (25 mg total) by mouth every 4 (four) hours as needed for Itching.     latanoprost 0.005 % ophthalmic solution  Commonly known as:  XALATAN  Place 1 drop into both eyes every evening.     meloxicam 15 MG tablet  Commonly known as:  MOBIC  Take 1 tablet (15 mg total) by mouth once daily.     metoprolol tartrate 25 MG tablet  Commonly known as:  LOPRESSOR  TAKE 1 TABLET BY MOUTH EVERY DAY     mometasone 50 mcg/actuation nasal spray  Commonly known as:  NASONEX  2 sprays by Nasal route once daily.     montelukast 10 mg tablet  Commonly known as:  SINGULAIR  TAKE 1 TABLET BY MOUTH IN THE EVENING          Review of patient's allergies indicates:   Allergen Reactions    Flexeril [cyclobenzaprine] Palpitations     Review of Systems   Constitution: Negative for fever.   HENT: Negative for hearing loss.    Eyes: Negative for blurred vision.   Cardiovascular: Negative for chest pain and leg swelling.   Respiratory: Negative for shortness of breath.    Endocrine: Negative for polyuria.   Hematologic/Lymphatic: Negative for bleeding problem.   Skin: Negative for rash.   Musculoskeletal: Positive for joint pain. Negative for back pain, joint swelling, muscle cramps and muscle weakness.   Gastrointestinal: Negative for melena.   Genitourinary: Negative for hematuria.   Neurological: Negative for loss of balance, numbness and paresthesias.   Psychiatric/Behavioral: Negative for altered mental status.       Objective:   Body mass index is 38.78 kg/m².  Vitals:    09/14/18 0850   BP: (!) 144/83   Pulse:  60       General: Carolee is well-developed, well-nourished, appears stated age, in no acute distress, alert and oriented to time, place and person.       General    Vitals reviewed.  Constitutional: She is oriented to person, place, and time. She appears well-developed and well-nourished. No distress.   HENT:   Head: Normocephalic and atraumatic.   Right Ear: External ear normal.   Left Ear: External ear normal.   Nose: Nose normal.   Eyes: EOM are normal. Pupils are equal, round, and reactive to light. Right eye exhibits no discharge. Left eye exhibits no discharge.   Neck: Normal range of motion. No tracheal deviation present.   Cardiovascular: Normal rate and regular rhythm.    Pulmonary/Chest: Effort normal and breath sounds normal. No respiratory distress.   Abdominal: Soft.   Neurological: She is alert and oriented to person, place, and time. She has normal reflexes. No cranial nerve deficit. She exhibits normal muscle tone. Coordination normal.   Psychiatric: She has a normal mood and affect. Her behavior is normal. Judgment and thought content normal.             Right Hand/Wrist Exam     Inspection   Scars: Wrist - absent   Effusion: Wrist - absent   Bruising: Wrist - absent   Deformity: Wrist - deformity     Range of Motion     Wrist   Extension:  50 normal   Flexion:  70 normal   Abduction: 20 normal  Adduction: 35 normal    Tests   Phalens Sign: negative  Tinel's sign (median nerve): negative  Carpal Tunnel Compression Test: positive  Cubital Tunnel Compression Test: negative      Other     Neuorologic Exam    Median Distribution: normal  Ulnar Distribution: normal  Radial Distribution: normal      Left Hand/Wrist Exam     Inspection   Scars: Wrist - absent   Effusion: Wrist - absent   Bruising: Wrist - absent   Deformity: Wrist - absent     Range of Motion     Wrist   Extension:  50 normal   Flexion:  70 normal   Abduction: 20 normal  Adduction: 35 normal    Tests   Phalens Sign: negative  Tinel's  sign (median nerve): negative  Carpal Tunnel Compression Test: negative  Cubital Tunnel Compression Test: negative      Other     Sensory Exam  Median Distribution: normal  Ulnar Distribution: normal  Radial Distribution: normal      Right Elbow Exam     Inspection   Scars: absent  Effusion: absent  Bruising: absent  Deformity: absent  Atrophy: absent    Range of Motion   Extension:  0 normal   Flexion:  130 normal   Pronation: normal   Supination:  80 normal     Tests   Varus: negative  Valgus: negative  Tinel's sign (cubital tunnel): negative  Tennis Elbow: negative  Golfer's Elbow: negative  Radial Capitellar Grind: negative    Other   Sensation: normal    Comments:  Mild triggering R MF with  TTP over a1 pulley      Left Elbow Exam     Inspection   Scars: absent  Effusion: absent  Bruising: absent  Deformity: absent  Atrophy: absent    Range of Motion   Extension:  0 normal   Flexion:  130 normal   Pronation: normal   Supination:  80 normal     Tests   Varus: negative  Tinel's sign (cubital tunnel): negative  Tennis Elbow: negative  Golfer's Elbow: negative  Radial Capitellar Grind: negative    Other   Sensation: normal        Muscle Strength   Right Upper Extremity   Wrist extension: 5/5/5   Wrist flexion: 5/5/5   : 5/5/5   Pinch Mechanism: 5/5  Elbow Pronation:  5/5   Elbow Supination:  5/5   Elbow Extension: 5/5  Elbow Flexion: 5/5  Intrinsics: 5/5  EPL (Extensor Pollicis Longus): 5/5  Left Upper Extremity  Wrist extension: 5/5/5   Wrist flexion: 5/5/5   :  5/5/5   Pinch Mechanism: 5/5  Elbow Pronation:  5/5   Elbow Supination:  5/5   Elbow Extension: 5/5  Elbow Flexion: 5/5  Intrinsics: 5/5  EPL (Extensor Pollicis Longus): 5/5    Vascular Exam     Right Pulses      Radial:                    2+      Left Pulses      Radial:                    2+      Capillary Refill  Right Hand: normal capillary refill  Left Hand: normal capillary refill    Edema  Right Forearm: absent  Left Forearm: absent    Xray  finger shows no arthritis right middle finger; No fracture or dislocation.    INTERPRETATION  -Bilateral median motor nerve conduction study showed normal latency, amplitude, and conduction velocity  -Bilateral median sensory nerve conduction study showed normal peak latency and amplitude  -Bilateral ulnar motor nerve conduction study showed normal latency, amplitude, and conduction velocity  -Bilateral ulnar sensory nerve conduction study showed normal peak latency and amplitude  -Bilateral radial sensory nerve conduction study showed normal peak latency and amplitude  -Bilateral combined sensory index showed a significant latency of 1.6 msec on the right and 1.5 msec on the left     IMPRESSION  1. ABNORMAL study  2. There is electrodiagnostic evidence of a MILD demyelinating median neuropathy (Carpal tunnel syndrome) across BILATERAL wrists     PLAN  1. Follow up with referring provider: Dr. Shaq Fairbanks  2. Handouts on CTS prevention provided.   3. This study is good for one year. If symptoms worsen or do not improve, please re-consult.       Assessment:     Encounter Diagnoses   Name Primary?    Pre-op testing Yes    Trigger middle finger of right hand     Bilateral carpal tunnel syndrome         Plan:     We reviewed with Carolee today, the pathology and natural history of her diagnosis. We have discussed a variety of treatment options including medications, physical therapy and other alternative treatments. I also explained the indications, risks and benefits of surgery. After discussion, Carolee decided to proceed with surgery. She is having symptoms of carpal tunnel at least 3-4 times a week it is waking her up. The decision was made to go forward with    1. Right carpal tunnel release   2. Right middle finger trigger release    The details of the surgical procedure were explained, including the location of probable incisions and a description of likely hardware and/or grafts to be used.  The  patient understands the likely convalescence after surgery.  Also, we have thoroughly discussed the risks, benefits and alternatives to surgery, including, but not limited to, the risk of infection, joint stiffness, blood clot (including DVT and/or pulmonary embolus), neurologic and vascular injury.  It was explained that, if tissue has been repaired or reconstructed, there is a chance of failure, which may require further management.      All of the patient's questions were answered and informed consent was obtained. The patient will contact us if they have any questions or concerns in the interim.

## 2018-09-17 DIAGNOSIS — M65.331 TRIGGER MIDDLE FINGER OF RIGHT HAND: ICD-10-CM

## 2018-09-17 DIAGNOSIS — G56.01 RIGHT CARPAL TUNNEL SYNDROME: Primary | ICD-10-CM

## 2018-09-17 RX ORDER — HYDROXYZINE HYDROCHLORIDE 25 MG/1
25 TABLET, FILM COATED ORAL EVERY 4 HOURS PRN
Qty: 30 TABLET | Refills: 5 | Status: SHIPPED | OUTPATIENT
Start: 2018-09-17 | End: 2019-03-21 | Stop reason: SDUPTHER

## 2018-09-21 ENCOUNTER — OFFICE VISIT (OUTPATIENT)
Dept: FAMILY MEDICINE | Facility: CLINIC | Age: 58
End: 2018-09-21
Payer: COMMERCIAL

## 2018-09-21 VITALS
HEIGHT: 62 IN | BODY MASS INDEX: 39.71 KG/M2 | TEMPERATURE: 99 F | HEART RATE: 70 BPM | DIASTOLIC BLOOD PRESSURE: 80 MMHG | WEIGHT: 215.81 LBS | SYSTOLIC BLOOD PRESSURE: 124 MMHG | RESPIRATION RATE: 18 BRPM

## 2018-09-21 DIAGNOSIS — Z01.818 PRE-OP EXAMINATION: Primary | ICD-10-CM

## 2018-09-21 DIAGNOSIS — I10 ESSENTIAL HYPERTENSION: ICD-10-CM

## 2018-09-21 DIAGNOSIS — J45.20 MILD INTERMITTENT ASTHMA WITHOUT COMPLICATION: ICD-10-CM

## 2018-09-21 DIAGNOSIS — G56.01 CARPAL TUNNEL SYNDROME OF RIGHT WRIST: ICD-10-CM

## 2018-09-21 DIAGNOSIS — Z23 NEEDS FLU SHOT: ICD-10-CM

## 2018-09-21 DIAGNOSIS — M65.331 TRIGGER FINGER, RIGHT MIDDLE FINGER: ICD-10-CM

## 2018-09-21 PROCEDURE — 99999 PR PBB SHADOW E&M-EST. PATIENT-LVL III: CPT | Mod: PBBFAC,,, | Performed by: FAMILY MEDICINE

## 2018-09-21 PROCEDURE — 99243 OFF/OP CNSLTJ NEW/EST LOW 30: CPT | Mod: 25,S$GLB,, | Performed by: FAMILY MEDICINE

## 2018-09-24 PROCEDURE — 90471 IMMUNIZATION ADMIN: CPT | Mod: S$GLB,,, | Performed by: FAMILY MEDICINE

## 2018-09-24 PROCEDURE — 90686 IIV4 VACC NO PRSV 0.5 ML IM: CPT | Mod: S$GLB,,, | Performed by: FAMILY MEDICINE

## 2018-09-24 NOTE — PROGRESS NOTES
Last 5 Patient Entered Readings                                      Current 30 Day Average: 122/81     Recent Readings 9/19/2018 9/14/2018 9/8/2018 9/8/2018 9/6/2018    SBP (mmHg) 128 113 116 125 110    DBP (mmHg) 82 82 84 81 88    Pulse 53 58 57 58 69            Digital Medicine: Health  Follow Up    Left voicemail to follow up with Mrs. Carolee PARISH Shannon.  Current BP average 122/81 mmHg is not at goal, [130/80].

## 2018-09-27 DIAGNOSIS — F41.9 ANXIETY: ICD-10-CM

## 2018-09-27 RX ORDER — ALPRAZOLAM 0.5 MG/1
0.5 TABLET ORAL 3 TIMES DAILY PRN
Qty: 90 TABLET | Refills: 0 | Status: SHIPPED | OUTPATIENT
Start: 2018-09-27 | End: 2023-01-13 | Stop reason: SDUPTHER

## 2018-09-27 NOTE — PROGRESS NOTES
"Last 5 Patient Entered Readings                                      Current 30 Day Average: 122/83     Recent Readings 9/26/2018 9/26/2018 9/26/2018 9/26/2018 9/19/2018    SBP (mmHg) 131 140 132 141 128    DBP (mmHg) 74 89 93 87 82    Pulse 52 49 52 51 53        9/27-Patient LVM on HC phone.     Patient states "not feeling well and is under the weather."     Patient reports everything is going well. She states no problems with the BP and "is still watching the salt intake." She states trying to exercise.     She states having surgery next week.     HC will call in 4 weeks to allow recovery from illness and surgery. Responded to patient via [employer].     "

## 2018-09-28 DIAGNOSIS — H40.1131 PRIMARY OPEN ANGLE GLAUCOMA OF BOTH EYES, MILD STAGE: ICD-10-CM

## 2018-09-28 RX ORDER — DORZOLAMIDE HYDROCHLORIDE AND TIMOLOL MALEATE 20; 5 MG/ML; MG/ML
1 SOLUTION/ DROPS OPHTHALMIC EVERY 12 HOURS
Qty: 1 BOTTLE | Refills: 12 | Status: SHIPPED | OUTPATIENT
Start: 2018-09-28 | End: 2019-11-08 | Stop reason: SDUPTHER

## 2018-10-02 DIAGNOSIS — I10 ESSENTIAL HYPERTENSION: ICD-10-CM

## 2018-10-02 RX ORDER — CHLORTHALIDONE 25 MG/1
TABLET ORAL
Qty: 20 TABLET | Refills: 3 | Status: SHIPPED | OUTPATIENT
Start: 2018-10-02 | End: 2018-12-18

## 2018-10-02 NOTE — PRE-PROCEDURE INSTRUCTIONS
Pre op instructions reviewed with patient per phone:    To confirm, Your surgeon has instructed you:  Surgery is scheduled 10/4/18 at 1000.      Please report to [facility] 1st floor main lobby by 0830.   Pre admit office to call afternoon prior to surgery with final arrival time      INSTRUCTIONS IMPORTANT!!!  ¨ Do not eat, drink, or smoke after 12 midnight-including water. OK to brush teeth, no gum, candy or mints!    ¨ Take only these medicines with a small swallow of water-morning of surgery.  Cosopt eye drops  ____  Do not wear makeup, including mascara.  ____  No powder, lotions or creams to surgical area.  ____  Please remove all jewelry, including piercings and leave at home.  ____  No money or valuables needed. Please leave at home.  ____  Please bring identification and insurance information to hospital.  ____  If going home the same day, arrange for a ride home. You will not be able to   drive if Anesthesia was used.  ____  Children, under 12 years old, must remain in the waiting room with an adult.  They are not allowed in patient areas.  ____  Wear loose fitting clothing. Allow for dressings, bandages.  ____  Stop Aspirin, Ibuprofen, Motrin and Aleve at least 5-7 days before surgery, unless otherwise instructed by your doctor, or the nurse.   You MAY use Tylenol/acetaminophen until day of surgery.  ____  If you take diabetic medication, do not take am of surgery unless instructed by   Doctor.  ____ Stop taking any Fish Oil supplement or any Vitamins that contain Vitamin E at least 5 days prior to surgery.          Bathing Instructions-- The night before surgery and the morning prior to coming to the hospital:   -Do not shave the surgical area.   -Shower and wash your hair and body as usual with anti-bacterial  soap and shampoo.   -Rinse your hair and body completely.   -Use one packet of hibiclens to wash the surgical site (using your hand) gently for 5 minutes.  Do not scrub  you skin too hard.   -Do not use hibiclens on your head, face, or genitals.   -Do not wash with anti-bacterial soap after you use the hibiclens.   -Rinse your body thoroughly.   -Dry with clean, soft towel.  Do not use lotion, cream, deodorant, or powders on   the surgical site.    Use antibacterial soap in place of hibiclens if your surgery is on the head, face or genitals.         Surgical Site Infection    Prevention of surgical site infections:     -Keep incisions clean and dry.   -Do not soak/submerge incisions in water until completely healed.   -Do not apply lotions, powders, creams, or deodorants to site.   -Always make sure hands are cleaned with antibacterial soap/ alcohol-based   prior to touching the surgical site.  (This includes doctors, nurses, staff, and yourself.)    Signs and symptoms:   -Redness and pain around the area where you had surgery   -Drainage of cloudy fluid from your surgical wound   -Fever over 100.4  I have read or had read and explained to me, and understand the above information.

## 2018-10-03 ENCOUNTER — ANESTHESIA EVENT (OUTPATIENT)
Dept: SURGERY | Facility: HOSPITAL | Age: 58
End: 2018-10-03
Payer: COMMERCIAL

## 2018-10-04 ENCOUNTER — HOSPITAL ENCOUNTER (OUTPATIENT)
Facility: HOSPITAL | Age: 58
Discharge: HOME | End: 2018-10-04
Attending: ORTHOPAEDIC SURGERY | Admitting: ORTHOPAEDIC SURGERY
Payer: COMMERCIAL

## 2018-10-04 ENCOUNTER — ANESTHESIA (OUTPATIENT)
Dept: SURGERY | Facility: HOSPITAL | Age: 58
End: 2018-10-04
Payer: COMMERCIAL

## 2018-10-04 DIAGNOSIS — M65.331 TRIGGER MIDDLE FINGER OF RIGHT HAND: Primary | ICD-10-CM

## 2018-10-04 DIAGNOSIS — G56.03 BILATERAL CARPAL TUNNEL SYNDROME: ICD-10-CM

## 2018-10-04 DIAGNOSIS — G56.01 RIGHT CARPAL TUNNEL SYNDROME: ICD-10-CM

## 2018-10-04 PROCEDURE — 25000003 PHARM REV CODE 250: Performed by: ANESTHESIOLOGY

## 2018-10-04 PROCEDURE — 25000003 PHARM REV CODE 250: Performed by: NURSE ANESTHETIST, CERTIFIED REGISTERED

## 2018-10-04 PROCEDURE — 63600175 PHARM REV CODE 636 W HCPCS: Performed by: NURSE ANESTHETIST, CERTIFIED REGISTERED

## 2018-10-04 PROCEDURE — 71000033 HC RECOVERY, INTIAL HOUR: Performed by: ORTHOPAEDIC SURGERY

## 2018-10-04 PROCEDURE — 71000015 HC POSTOP RECOV 1ST HR: Performed by: ORTHOPAEDIC SURGERY

## 2018-10-04 PROCEDURE — 63600175 PHARM REV CODE 636 W HCPCS: Performed by: ANESTHESIOLOGY

## 2018-10-04 PROCEDURE — S0020 INJECTION, BUPIVICAINE HYDRO: HCPCS | Performed by: ORTHOPAEDIC SURGERY

## 2018-10-04 PROCEDURE — 37000009 HC ANESTHESIA EA ADD 15 MINS: Performed by: ORTHOPAEDIC SURGERY

## 2018-10-04 PROCEDURE — 26055 INCISE FINGER TENDON SHEATH: CPT | Mod: 51,F7,, | Performed by: ORTHOPAEDIC SURGERY

## 2018-10-04 PROCEDURE — 64721 CARPAL TUNNEL SURGERY: CPT | Mod: RT,,, | Performed by: ORTHOPAEDIC SURGERY

## 2018-10-04 PROCEDURE — 36000706: Performed by: ORTHOPAEDIC SURGERY

## 2018-10-04 PROCEDURE — 25000003 PHARM REV CODE 250: Performed by: ORTHOPAEDIC SURGERY

## 2018-10-04 PROCEDURE — 37000008 HC ANESTHESIA 1ST 15 MINUTES: Performed by: ORTHOPAEDIC SURGERY

## 2018-10-04 PROCEDURE — 36000707: Performed by: ORTHOPAEDIC SURGERY

## 2018-10-04 RX ORDER — FENTANYL CITRATE 50 UG/ML
INJECTION, SOLUTION INTRAMUSCULAR; INTRAVENOUS
Status: DISCONTINUED | OUTPATIENT
Start: 2018-10-04 | End: 2018-10-04

## 2018-10-04 RX ORDER — HYDROCODONE BITARTRATE AND ACETAMINOPHEN 5; 325 MG/1; MG/1
1 TABLET ORAL EVERY 4 HOURS PRN
Status: DISCONTINUED | OUTPATIENT
Start: 2018-10-04 | End: 2018-10-04 | Stop reason: HOSPADM

## 2018-10-04 RX ORDER — METOCLOPRAMIDE HYDROCHLORIDE 5 MG/ML
10 INJECTION INTRAMUSCULAR; INTRAVENOUS EVERY 10 MIN PRN
Status: DISCONTINUED | OUTPATIENT
Start: 2018-10-04 | End: 2018-10-04 | Stop reason: HOSPADM

## 2018-10-04 RX ORDER — MIDAZOLAM HYDROCHLORIDE 1 MG/ML
INJECTION, SOLUTION INTRAMUSCULAR; INTRAVENOUS
Status: DISCONTINUED | OUTPATIENT
Start: 2018-10-04 | End: 2018-10-04

## 2018-10-04 RX ORDER — CHLORHEXIDINE GLUCONATE ORAL RINSE 1.2 MG/ML
10 SOLUTION DENTAL 2 TIMES DAILY
Status: DISCONTINUED | OUTPATIENT
Start: 2018-10-04 | End: 2018-10-04 | Stop reason: HOSPADM

## 2018-10-04 RX ORDER — GLYCOPYRROLATE 0.2 MG/ML
INJECTION INTRAMUSCULAR; INTRAVENOUS
Status: DISCONTINUED | OUTPATIENT
Start: 2018-10-04 | End: 2018-10-04

## 2018-10-04 RX ORDER — LIDOCAINE HYDROCHLORIDE 10 MG/ML
INJECTION, SOLUTION EPIDURAL; INFILTRATION; INTRACAUDAL; PERINEURAL
Status: DISCONTINUED | OUTPATIENT
Start: 2018-10-04 | End: 2018-10-04 | Stop reason: HOSPADM

## 2018-10-04 RX ORDER — SODIUM CHLORIDE, SODIUM LACTATE, POTASSIUM CHLORIDE, CALCIUM CHLORIDE 600; 310; 30; 20 MG/100ML; MG/100ML; MG/100ML; MG/100ML
INJECTION, SOLUTION INTRAVENOUS CONTINUOUS
Status: DISCONTINUED | OUTPATIENT
Start: 2018-10-04 | End: 2018-10-04 | Stop reason: HOSPADM

## 2018-10-04 RX ORDER — MEPERIDINE HYDROCHLORIDE 50 MG/ML
12.5 INJECTION INTRAMUSCULAR; INTRAVENOUS; SUBCUTANEOUS ONCE AS NEEDED
Status: DISCONTINUED | OUTPATIENT
Start: 2018-10-04 | End: 2018-10-04 | Stop reason: HOSPADM

## 2018-10-04 RX ORDER — LIDOCAINE HYDROCHLORIDE 10 MG/ML
INJECTION INFILTRATION; PERINEURAL
Status: DISCONTINUED | OUTPATIENT
Start: 2018-10-04 | End: 2018-10-04

## 2018-10-04 RX ORDER — NEOSTIGMINE METHYLSULFATE 1 MG/ML
INJECTION, SOLUTION INTRAVENOUS
Status: DISCONTINUED | OUTPATIENT
Start: 2018-10-04 | End: 2018-10-04

## 2018-10-04 RX ORDER — MORPHINE SULFATE 4 MG/ML
2 INJECTION, SOLUTION INTRAMUSCULAR; INTRAVENOUS EVERY 5 MIN PRN
Status: DISCONTINUED | OUTPATIENT
Start: 2018-10-04 | End: 2018-10-04 | Stop reason: HOSPADM

## 2018-10-04 RX ORDER — BUPIVACAINE HYDROCHLORIDE 5 MG/ML
INJECTION, SOLUTION EPIDURAL; INTRACAUDAL
Status: DISCONTINUED | OUTPATIENT
Start: 2018-10-04 | End: 2018-10-04 | Stop reason: HOSPADM

## 2018-10-04 RX ORDER — SODIUM CHLORIDE 0.9 % (FLUSH) 0.9 %
3 SYRINGE (ML) INJECTION EVERY 8 HOURS
Status: DISCONTINUED | OUTPATIENT
Start: 2018-10-04 | End: 2018-10-04 | Stop reason: HOSPADM

## 2018-10-04 RX ORDER — PROPOFOL 10 MG/ML
VIAL (ML) INTRAVENOUS
Status: DISCONTINUED | OUTPATIENT
Start: 2018-10-04 | End: 2018-10-04

## 2018-10-04 RX ORDER — SODIUM CHLORIDE 0.9 % (FLUSH) 0.9 %
3 SYRINGE (ML) INJECTION
Status: DISCONTINUED | OUTPATIENT
Start: 2018-10-04 | End: 2018-10-04 | Stop reason: HOSPADM

## 2018-10-04 RX ORDER — ROCURONIUM BROMIDE 10 MG/ML
INJECTION, SOLUTION INTRAVENOUS
Status: DISCONTINUED | OUTPATIENT
Start: 2018-10-04 | End: 2018-10-04

## 2018-10-04 RX ORDER — HYDROCODONE BITARTRATE AND ACETAMINOPHEN 5; 325 MG/1; MG/1
1 TABLET ORAL EVERY 6 HOURS PRN
Qty: 20 TABLET | Refills: 0 | Status: ON HOLD | OUTPATIENT
Start: 2018-10-04 | End: 2018-12-06 | Stop reason: HOSPADM

## 2018-10-04 RX ORDER — ONDANSETRON 2 MG/ML
INJECTION INTRAMUSCULAR; INTRAVENOUS
Status: DISCONTINUED | OUTPATIENT
Start: 2018-10-04 | End: 2018-10-04

## 2018-10-04 RX ORDER — OXYCODONE HYDROCHLORIDE 5 MG/1
5 TABLET ORAL
Status: DISCONTINUED | OUTPATIENT
Start: 2018-10-04 | End: 2018-10-04 | Stop reason: HOSPADM

## 2018-10-04 RX ORDER — CHLORHEXIDINE GLUCONATE ORAL RINSE 1.2 MG/ML
10 SOLUTION DENTAL
Status: DISCONTINUED | OUTPATIENT
Start: 2018-10-04 | End: 2018-10-04 | Stop reason: HOSPADM

## 2018-10-04 RX ADMIN — ROCURONIUM BROMIDE 30 MG: 10 INJECTION, SOLUTION INTRAVENOUS at 11:10

## 2018-10-04 RX ADMIN — OXYCODONE HYDROCHLORIDE 5 MG: 5 TABLET ORAL at 01:10

## 2018-10-04 RX ADMIN — MORPHINE SULFATE 2 MG: 4 INJECTION INTRAVENOUS at 01:10

## 2018-10-04 RX ADMIN — NEOSTIGMINE METHYLSULFATE 3 MG: 1 INJECTION INTRAVENOUS at 12:10

## 2018-10-04 RX ADMIN — CEFAZOLIN 2 G: 1 INJECTION, POWDER, FOR SOLUTION INTRAMUSCULAR; INTRAVENOUS at 11:10

## 2018-10-04 RX ADMIN — FENTANYL CITRATE 100 MCG: 50 INJECTION, SOLUTION INTRAMUSCULAR; INTRAVENOUS at 11:10

## 2018-10-04 RX ADMIN — PROPOFOL 200 MG: 10 INJECTION, EMULSION INTRAVENOUS at 11:10

## 2018-10-04 RX ADMIN — LIDOCAINE HYDROCHLORIDE 40 MG: 10 INJECTION, SOLUTION INFILTRATION; PERINEURAL at 11:10

## 2018-10-04 RX ADMIN — MIDAZOLAM 2 MG: 1 INJECTION INTRAMUSCULAR; INTRAVENOUS at 11:10

## 2018-10-04 RX ADMIN — SODIUM CHLORIDE, SODIUM LACTATE, POTASSIUM CHLORIDE, AND CALCIUM CHLORIDE: 600; 310; 30; 20 INJECTION, SOLUTION INTRAVENOUS at 11:10

## 2018-10-04 RX ADMIN — ROBINUL 0.4 MG: 0.2 INJECTION INTRAMUSCULAR; INTRAVENOUS at 12:10

## 2018-10-04 RX ADMIN — ONDANSETRON 4 MG: 2 INJECTION, SOLUTION INTRAMUSCULAR; INTRAVENOUS at 12:10

## 2018-10-04 NOTE — TRANSFER OF CARE
"Anesthesia Transfer of Care Note    Patient: Carolee Ortega    Procedure(s) Performed: Procedure(s) (LRB):  RELEASE, CARPAL TUNNEL (Right)  RELEASE, TRIGGER FINGER (Right)    Patient location: PACU    Anesthesia Type: general    Transport from OR: Transported from OR on room air with adequate spontaneous ventilation    Post pain: adequate analgesia    Post assessment: no apparent anesthetic complications    Post vital signs: stable    Level of consciousness: awake    Nausea/Vomiting: no nausea/vomiting    Complications: none    Transfer of care protocol was followed      Last vitals:   Visit Vitals  BP (!) 150/82 (BP Location: Right arm, Patient Position: Lying)   Pulse (!) 52   Temp 36.7 °C (98.1 °F) (Skin)   Resp 16   Ht 5' 2" (1.575 m)   Wt 97.9 kg (215 lb 13.3 oz)   SpO2 98%   Breastfeeding? No   BMI 39.48 kg/m²     "

## 2018-10-04 NOTE — OP NOTE
PREOPERATIVE DIAGNOSIS:1. Right carpal tunnel syndrome.   2. Right middle finger trigger finger     POSTOPERATIVE DIAGNOSIS: same     PROCEDURES PERFORMED: Right  carpal tunnel release, right middle trigger finger release  SURGEON: Shaq Fairbanks M.D.     ASSISTANT: Ya FELIZ    The use of Ya FELIZ  was medically necessary for patient positioning, skin retraction, closure and assistance with this procedure. The procedure could not be performed properly without the use of her as an assistant surgeon. There was no qualified resident/fellow available for assistance with this procedure.        ANESTHESIA:  General    BLOOD LOSS: 2ml    DRAINS: None.      OPERATIVE INDICATIONS:Carolee Ortega is a 57 y.o. female   who was diagnosed by nerve studies with carpal tunnel and failed conservative treatment and had trigger finger middle finger that failed conservative treatment. Risks and benefits of surgery were discussed and consents were signed pre-operatively.    COMPLICATIONS: None. The patient was moved to the recovery room in stable condition with compartments soft and cap refill less than a second in all digits.      DESCRIPTION OF PROCEDURE: The patient was brought to the Operating Suite after   general anesthesia was induced, the proposed incision sites were numbed with 0.25%   Marcaine and the tourniquet was inflated for hemostasis after   sterile prep and drape. Carpal tunnel was addressed first. A midline incision was utilized in line with the fourth   ray. Dissection was carried down carefully until the   transverse carpal ligament was identified. This was divided in line with the   fourth ray on the ulnar side of the median nerve with care to avoid the   recurrent median nerve branch. The distal   and proximal extents were confirmed with the gloved finger.      Oblique incision made distal to palmar crease over index A1 pulley. Neurovascular structures protected.   Blunt dissection was  carried down carefully until   The A1 pulley was identified.  This was divided on the   carefullly while protecting the NV bundles.     The distal and proximal extents were confirmed with a direct visualization.  The tendons were inspected. No lesion was noted.     The wounds were copiously irrigated and closed with nylon suture. The patient was   placed in a sterile postoperative dressing, awakened, and transferred to the   Recovery Room in good condition with compartments soft and cap refill less than a second in all digits.

## 2018-10-04 NOTE — DISCHARGE SUMMARY
[facility]    Brief Operative Note     SUMMARY     Surgery Date: 10/4/2018     Surgeon(s) and Role:     * Shaq Fairbanks MD - Primary    Assisting Surgeon: None    Pre-op Diagnosis:  Right carpal tunnel syndrome [G56.01]  Trigger middle finger of right hand [M65.331]    Post-op Diagnosis:  Post-Op Diagnosis Codes:     * Right carpal tunnel syndrome [G56.01]     * Trigger middle finger of right hand [M65.331]    Procedure: Procedure(s) (LRB):  RELEASE, CARPAL TUNNEL (Right)  RELEASE, TRIGGER FINGER (Right)    Anesthesia: General    Description of the findings of the procedure: See Dictation    Findings/Key Components: see op note    Estimated Blood Loss: * No values recorded between 10/4/2018 11:57 AM and 10/4/2018 12:38 PM *         Specimens:   Specimen (12h ago, onward)    None          Disposition: Patient tolerated the procedure well and was transferred to PACU in stable condition.      Discharge Note    SUMMARY     Admit Date: 10/4/2018    Discharge Date and Time:   10/04/2018 12:38 PM    Pre-op Diagnosis:  Right carpal tunnel syndrome [G56.01]  Trigger middle finger of right hand [M65.331]    Post-op Diagnosis:  Post-Op Diagnosis Codes:     * Right carpal tunnel syndrome [G56.01]     * Trigger middle finger of right hand [M65.331]    Procedure: Procedure(s) (LRB):  RELEASE, CARPAL TUNNEL (Right)  RELEASE, TRIGGER FINGER (Right)    Hospital Course (synopsis of major diagnoses, care, treatment, and services provided during the course of the hospital stay):  Patient underwent outpatient hand surgery and was transferred to PACU in stable condition.  In PACU, patient received appropriate post-operative care and discharged home with plans for  follow-up with the operative surgeon.       Final Diagnosis: Post-Op Diagnosis Codes:     * Right carpal tunnel syndrome [G56.01]     * Trigger middle finger of right hand [M65.331]    Disposition: Home or Self Care    Follow Up/Patient Instructions:      Medications:  Reconciled Home Medications:      Medication List      START taking these medications    HYDROcodone-acetaminophen 5-325 mg per tablet  Commonly known as:  NORCO  Take 1 tablet by mouth every 6 (six) hours as needed for Pain.        CHANGE how you take these medications    amLODIPine 10 MG tablet  Commonly known as:  NORVASC  TAKE 1 TABLET (10 MG TOTAL) BY MOUTH ONCE DAILY.  What changed:  when to take this     chlorthalidone 25 MG Tab  Commonly known as:  HYGROTEN  TAKE ONE-HALF TABLET (12.5MG) BY MOUTH ONCE DAILY.  What changed:  See the new instructions.     metoprolol tartrate 25 MG tablet  Commonly known as:  LOPRESSOR  TAKE 1 TABLET BY MOUTH EVERY DAY  What changed:  See the new instructions.     penciclovir 1 % cream  Commonly known as:  DENAVIR  Apply topically every 2 (two) hours.  What changed:    · when to take this  · reasons to take this     valACYclovir 1000 MG tablet  Commonly known as:  VALTREX  Take 2 tablets (2,000 mg total) by mouth every 12 (twelve) hours.  What changed:    · when to take this  · reasons to take this        CONTINUE taking these medications    albuterol 1.25 mg/3 mL Nebu  Commonly known as:  ACCUNEB  Take 1.25 mg by nebulization every 6 (six) hours as needed.     ALPRAZolam 0.5 MG tablet  Commonly known as:  XANAX  Take 1 tablet (0.5 mg total) by mouth 3 (three) times daily as needed.     dorzolamide-timolol 2-0.5% 22.3-6.8 mg/mL ophthalmic solution  Commonly known as:  COSOPT  Place 1 drop into both eyes every 12 (twelve) hours.     hydrOXYzine HCl 25 MG tablet  Commonly known as:  ATARAX  Take 1 tablet (25 mg total) by mouth every 4 (four) hours as needed for Itching.     latanoprost 0.005 % ophthalmic solution  Commonly known as:  XALATAN  Place 1 drop into both eyes every evening.     meloxicam 15 MG tablet  Commonly known as:  MOBIC  Take 1 tablet (15 mg total) by mouth once daily.     mometasone 50 mcg/actuation nasal spray  Commonly known as:  NASONEX  2  sprays by Nasal route once daily.     montelukast 10 mg tablet  Commonly known as:  SINGULAIR  TAKE 1 TABLET BY MOUTH IN THE EVENING          Discharge Procedure Orders   Diet general     Call MD for:  temperature >100.4     Call MD for:  persistent nausea and vomiting     Call MD for:  severe uncontrolled pain     Call MD for:  difficulty breathing, headache or visual disturbances     Call MD for:  redness, tenderness, or signs of infection (pain, swelling, redness, odor or green/yellow discharge around incision site)     Call MD for:  hives     Call MD for:  persistent dizziness or light-headedness     Call MD for:  extreme fatigue     Remove dressing in 72 hours   Order Comments: Place waterproof bandages over incision. Keep dry at all times     Weight bearing restrictions (specify)   Order Comments: 2lb     Follow-up Information     Shaq Fairbanks MD In 2 weeks.    Specialty:  Orthopedic Surgery  Contact information:  [address]  [place] [address] [phone]

## 2018-10-04 NOTE — ANESTHESIA PREPROCEDURE EVALUATION
10/04/2018  Carolee Ortega is a 57 y.o., female.    Pre-op Assessment    I have reviewed the Patient Summary Reports.     I have reviewed the Nursing Notes.   I have reviewed the Medications.     Review of Systems  Anesthesia Hx:  No problems with previous Anesthesia  Denies Family Hx of Anesthesia complications.   Denies Personal Hx of Anesthesia complications.   Social:  Non-Smoker    Cardiovascular:   Hypertension    Pulmonary:   Asthma mild    Neurological:   Neuromuscular Disease,    Psych:   Psychiatric History          Physical Exam  General:  Well nourished    Airway/Jaw/Neck:  Airway Findings: Tongue: Normal Mallampati: I      Dental:  Partial is out   Chest/Lungs:  Chest/Lungs Findings: Normal Respiratory Rate     Heart/Vascular:  Heart Findings: Normal            Anesthesia Plan  Type of Anesthesia, risks & benefits discussed:  Anesthesia Type:  general  Patient's Preference:   Intra-op Monitoring Plan:   Intra-op Monitoring Plan Comments:   Post Op Pain Control Plan:   Post Op Pain Control Plan Comments:   Induction:   IV  Beta Blocker:  Patient is not currently on a Beta-Blocker (No further documentation required).       Informed Consent: Patient understands risks and agrees with Anesthesia plan.  Questions answered.   ASA Score: 2     Day of Surgery Review of History & Physical: I have interviewed and examined the patient. I have reviewed the patient's H&P dated:  There are no significant changes.

## 2018-10-04 NOTE — ANESTHESIA POSTPROCEDURE EVALUATION
"Anesthesia Post Evaluation    Patient: Carolee Ortega    Procedure(s) Performed: Procedure(s) (LRB):  RELEASE, CARPAL TUNNEL (Right)  RELEASE, TRIGGER FINGER (Right)    Final Anesthesia Type: general  Patient location during evaluation: PACU  Patient participation: Yes- Able to Participate  Level of consciousness: awake and alert  Post-procedure vital signs: reviewed and stable  Pain management: adequate  Airway patency: patent  PONV status at discharge: No PONV  Anesthetic complications: no      Cardiovascular status: blood pressure returned to baseline and stable  Respiratory status: unassisted and spontaneous ventilation          Visit Vitals  BP (!) 166/79   Pulse (!) 50   Temp 36.4 °C (97.5 °F) (Temporal)   Resp 16   Ht 5' 2" (1.575 m)   Wt 97.9 kg (215 lb 13.3 oz)   SpO2 100%   Breastfeeding? No   BMI 39.48 kg/m²       Pain/Jeanna Score: Pain Assessment Performed: Yes (10/4/2018  1:25 PM)  Presence of Pain: denies (10/4/2018  1:25 PM)  Pain Rating Prior to Med Admin: 6 (10/4/2018  1:09 PM)  Jeanna Score: 10 (10/4/2018  1:25 PM)        "

## 2018-10-04 NOTE — INTERVAL H&P NOTE
The patient has been examined and the H&P has been reviewed:    I concur with the findings and no changes have occurred since H&P was written.    Anesthesia/Surgery risks, benefits and alternative options discussed and understood by patient/family.          Active Hospital Problems    Diagnosis  POA    Right carpal tunnel syndrome [G56.01]  Yes      Resolved Hospital Problems   No resolved problems to display.

## 2018-10-08 VITALS
DIASTOLIC BLOOD PRESSURE: 79 MMHG | BODY MASS INDEX: 39.71 KG/M2 | OXYGEN SATURATION: 100 % | HEART RATE: 50 BPM | TEMPERATURE: 98 F | HEIGHT: 62 IN | WEIGHT: 215.81 LBS | SYSTOLIC BLOOD PRESSURE: 166 MMHG | RESPIRATION RATE: 16 BRPM

## 2018-10-09 ENCOUNTER — OFFICE VISIT (OUTPATIENT)
Dept: OPHTHALMOLOGY | Facility: CLINIC | Age: 58
End: 2018-10-09
Payer: COMMERCIAL

## 2018-10-09 DIAGNOSIS — H40.1131 PRIMARY OPEN ANGLE GLAUCOMA OF BOTH EYES, MILD STAGE: Primary | ICD-10-CM

## 2018-10-09 PROCEDURE — 92012 INTRM OPH EXAM EST PATIENT: CPT | Mod: S$GLB,,, | Performed by: OPTOMETRIST

## 2018-10-09 PROCEDURE — 99999 PR PBB SHADOW E&M-EST. PATIENT-LVL I: CPT | Mod: PBBFAC,,, | Performed by: OPTOMETRIST

## 2018-10-09 PROCEDURE — 92133 CPTRZD OPH DX IMG PST SGM ON: CPT | Mod: S$GLB,,, | Performed by: OPTOMETRIST

## 2018-10-09 PROCEDURE — 92083 EXTENDED VISUAL FIELD XM: CPT | Mod: S$GLB,,, | Performed by: OPTOMETRIST

## 2018-10-09 NOTE — PROGRESS NOTES
HPI     Medication eye drops if any:dorzolamide-timolol (COSOPT) 1 drop OU BID    Latanoprost 1 drop OU QHS  Date last eye visit:05/08/2018  Last full exam:01/09/2018  Last IOP :19/17  Last dilated eye exam and SDP's:01/09/2018  Last HVF and HRT : today  High IOP:26/24  CCT:530/541 9 +1/+1  Family Hx POAG: Father     No visual complaints     Last edited by Cortez Julien, OD on 10/9/2018  8:20 AM. (History)            Assessment /Plan     For exam results, see Encounter Report.    Primary open angle glaucoma of both eyes, mild stage  -     Nolen Visual Field - OU - Extended - Both Eyes  -     Posterior Segment OCT Optic Nerve- Both eyes      TI thinning OD  Normal gOCT OS    VF normal OU    Continue cosopt latanoprost OU    RTC 4 months DFE

## 2018-10-19 ENCOUNTER — OFFICE VISIT (OUTPATIENT)
Dept: ORTHOPEDICS | Facility: CLINIC | Age: 58
End: 2018-10-19
Payer: COMMERCIAL

## 2018-10-19 VITALS
HEIGHT: 62 IN | SYSTOLIC BLOOD PRESSURE: 131 MMHG | BODY MASS INDEX: 39.56 KG/M2 | HEART RATE: 66 BPM | DIASTOLIC BLOOD PRESSURE: 81 MMHG | WEIGHT: 215 LBS

## 2018-10-19 DIAGNOSIS — G56.01 RIGHT CARPAL TUNNEL SYNDROME: Primary | ICD-10-CM

## 2018-10-19 DIAGNOSIS — M65.331 TRIGGER MIDDLE FINGER OF RIGHT HAND: ICD-10-CM

## 2018-10-19 PROCEDURE — 99999 PR PBB SHADOW E&M-EST. PATIENT-LVL III: CPT | Mod: PBBFAC,,, | Performed by: ORTHOPAEDIC SURGERY

## 2018-10-19 PROCEDURE — 99024 POSTOP FOLLOW-UP VISIT: CPT | Mod: S$GLB,,, | Performed by: ORTHOPAEDIC SURGERY

## 2018-10-19 NOTE — PROGRESS NOTES
Subjective:     Patient ID: Carolee Ortega is a 57 y.o. female.    Chief Complaint: Pain and Post-op Evaluation of the Right Hand    2wk postop. Doing well, much improvement in her symptoms.  PREOPERATIVE DIAGNOSIS:  1. Right carpal tunnel syndrome.   2. Right middle finger trigger finger     POSTOPERATIVE DIAGNOSIS: same     PROCEDURES PERFORMED: Right  carpal tunnel release, right middle trigger finger release  SURGEON: Shaq Fairbanks M.D.         Hand Pain    The pain is present in the right hand. This is a new problem. The current episode started more than 1 month ago. There has been no history of extremity trauma. The injury was the result of a twisting, lifting and pulling action The problem occurs constantly. The problem has been unchanged. The quality of the pain is described as dull, aching and throbbing. The pain is at a severity of 0/10. Associated symptoms include joint locking. Pertinent negatives include no fever or numbness. The symptoms are aggravated by lifting (gripping). She has tried cold, heat and NSAIDs for the symptoms. The treatment provided moderate relief. Physical therapy was not tried.      Past Medical History:   Diagnosis Date    Allergic rhinitis     Allergy     Anxiety     Asthma     JAGDISH (generalized anxiety disorder)     Glaucoma (increased eye pressure)     Herpes labialis     Hypertension     Obesity     Seborrheic dermatitis     Shingles 2016     Past Surgical History:   Procedure Laterality Date    BREAST BIOPSY      right    CARPAL TUNNEL RELEASE Right 10/4/2018    Procedure: RELEASE, CARPAL TUNNEL;  Surgeon: Shaq Fairbanks MD;  Location: [facility];  Service: Orthopedics;  Laterality: Right;     SECTION      x1    RELEASE, CARPAL TUNNEL Right 10/4/2018    Performed by Shaq Fairbanks MD at [facility] OR    RELEASE, TRIGGER FINGER Right 10/4/2018    Performed by Shaq Fairbanks MD at [facility] OR    [facility] and O      Secondary to  fibroids    TONSILLECTOMY  April 2011    TRIGGER FINGER RELEASE Right 10/4/2018    Procedure: RELEASE, TRIGGER FINGER;  Surgeon: Shaq Fairbanks MD;  Location: [facility];  Service: Orthopedics;  Laterality: Right;  right middle finger      Family History   Problem Relation Age of Onset    Heart attack Father     Coronary artery disease Father     Glaucoma Father     Heart disease Mother 32    Breast cancer Maternal Grandmother     Hypertension Sister     Kidney disease Sister     Breast cancer Other         Great aunts    Diabetes Other         Paternal great-grandmother    Breast cancer Maternal Cousin     Breast cancer Maternal Cousin      Social History     Socioeconomic History    Marital status:      Spouse name: Not on file    Number of children: Not on file    Years of education: Not on file    Highest education level: Not on file   Social Needs    Financial resource strain: Not on file    Food insecurity - worry: Not on file    Food insecurity - inability: Not on file    Transportation needs - medical: Not on file    Transportation needs - non-medical: Not on file   Occupational History    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         Medication List           Accurate as of 10/19/18  9:14 AM. If you have any questions, ask your nurse or doctor.               CHANGE how you take these medications    amLODIPine 10 MG tablet  Commonly known as:  NORVASC  TAKE 1 TABLET (10 MG TOTAL) BY MOUTH ONCE DAILY.  What changed:  when to take this     chlorthalidone 25 MG Tab  Commonly known as:  HYGROTEN  TAKE  ONE-HALF TABLET (12.5MG) BY MOUTH ONCE DAILY.  What changed:  See the new instructions.     metoprolol tartrate 25 MG tablet  Commonly known as:  LOPRESSOR  TAKE 1 TABLET BY MOUTH EVERY DAY  What changed:    · how much to take  · how to take this  · when to take this     penciclovir 1 % cream  Commonly known as:  DENAVIR  Apply topically every 2 (two) hours.  What changed:    · when to take this  · reasons to take this     valACYclovir 1000 MG tablet  Commonly known as:  VALTREX  Take 2 tablets (2,000 mg total) by mouth every 12 (twelve) hours.  What changed:    · when to take this  · reasons to take this        CONTINUE taking these medications    albuterol 1.25 mg/3 mL Nebu  Commonly known as:  ACCUNEB     ALPRAZolam 0.5 MG tablet  Commonly known as:  XANAX  Take 1 tablet (0.5 mg total) by mouth 3 (three) times daily as needed.     dorzolamide-timolol 2-0.5% 22.3-6.8 mg/mL ophthalmic solution  Commonly known as:  COSOPT  Place 1 drop into both eyes every 12 (twelve) hours.     HYDROcodone-acetaminophen 5-325 mg per tablet  Commonly known as:  NORCO  Take 1 tablet by mouth every 6 (six) hours as needed for Pain.     hydrOXYzine HCl 25 MG tablet  Commonly known as:  ATARAX  Take 1 tablet (25 mg total) by mouth every 4 (four) hours as needed for Itching.     latanoprost 0.005 % ophthalmic solution  Commonly known as:  XALATAN  Place 1 drop into both eyes every evening.     meloxicam 15 MG tablet  Commonly known as:  MOBIC  Take 1 tablet (15 mg total) by mouth once daily.     mometasone 50 mcg/actuation nasal spray  Commonly known as:  NASONEX  2 sprays by Nasal route once daily.     montelukast 10 mg tablet  Commonly known as:  SINGULAIR  TAKE 1 TABLET BY MOUTH IN THE EVENING          Review of patient's allergies indicates:   Allergen Reactions    Flexeril [cyclobenzaprine] Palpitations     Review of Systems   Constitution: Negative for fever.   HENT: Negative for hearing loss.    Eyes: Negative for blurred vision.    Cardiovascular: Negative for chest pain and leg swelling.   Respiratory: Negative for shortness of breath.    Endocrine: Negative for polyuria.   Hematologic/Lymphatic: Negative for bleeding problem.   Skin: Negative for rash.   Musculoskeletal: Positive for joint pain. Negative for back pain, joint swelling, muscle cramps and muscle weakness.   Gastrointestinal: Negative for melena.   Genitourinary: Negative for hematuria.   Neurological: Negative for loss of balance, numbness and paresthesias.   Psychiatric/Behavioral: Negative for altered mental status.       Objective:   Body mass index is 39.32 kg/m².  Vitals:    10/19/18 0729   BP: 131/81   Pulse: 66       General: Carolee is well-developed, well-nourished, appears stated age, in no acute distress, alert and oriented to time, place and person.                   Right Hand/Wrist Exam     Other     Neuorologic Exam    Median Distribution: normal  Ulnar Distribution: normal  Radial Distribution: normal    Comments:  Incisions clean/dry/intact  Sutures in place  No erythema/drainage/signs of infection  No calf tenderness            Muscle Strength   Right Upper Extremity   : 4/5/5     Vascular Exam       Capillary Refill  Right Hand: normal capillary refill    Xray finger shows no arthritis right middle finger; No fracture or dislocation.    INTERPRETATION  -Bilateral median motor nerve conduction study showed normal latency, amplitude, and conduction velocity  -Bilateral median sensory nerve conduction study showed normal peak latency and amplitude  -Bilateral ulnar motor nerve conduction study showed normal latency, amplitude, and conduction velocity  -Bilateral ulnar sensory nerve conduction study showed normal peak latency and amplitude  -Bilateral radial sensory nerve conduction study showed normal peak latency and amplitude  -Bilateral combined sensory index showed a significant latency of 1.6 msec on the right and 1.5 msec on the  left     IMPRESSION  1. ABNORMAL study  2. There is electrodiagnostic evidence of a MILD demyelinating median neuropathy (Carpal tunnel syndrome) across BILATERAL wrists     PLAN  1. Follow up with referring provider: Dr. Shaq Fairbanks  2. Handouts on CTS prevention provided.   3. This study is good for one year. If symptoms worsen or do not improve, please re-consult.       Assessment:     Encounter Diagnoses   Name Primary?    Right carpal tunnel syndrome Yes    Trigger middle finger of right hand         Plan:     1. Sutures out    2.  strength exercises    3. F/up 3mo

## 2018-10-22 ENCOUNTER — PATIENT MESSAGE (OUTPATIENT)
Dept: ORTHOPEDICS | Facility: CLINIC | Age: 58
End: 2018-10-22

## 2018-10-22 ENCOUNTER — PATIENT OUTREACH (OUTPATIENT)
Dept: OTHER | Facility: OTHER | Age: 58
End: 2018-10-22

## 2018-11-02 ENCOUNTER — PATIENT MESSAGE (OUTPATIENT)
Dept: ORTHOPEDICS | Facility: CLINIC | Age: 58
End: 2018-11-02

## 2018-11-06 ENCOUNTER — PATIENT OUTREACH (OUTPATIENT)
Dept: OTHER | Facility: OTHER | Age: 58
End: 2018-11-06

## 2018-11-06 NOTE — PROGRESS NOTES
Last 5 Patient Entered Readings                                      Current 30 Day Average: 120/85     Recent Readings 11/4/2018 10/26/2018 10/21/2018 10/21/2018 10/13/2018    SBP (mmHg) 123 123 120 127 115    DBP (mmHg) 81 84 77 88 87    Pulse 72 65 69 70 59      LMFCB to assess an increase in her diastolic, hypotension symptoms and review HTN medications.

## 2018-11-12 ENCOUNTER — PATIENT OUTREACH (OUTPATIENT)
Dept: OTHER | Facility: OTHER | Age: 58
End: 2018-11-12

## 2018-11-12 NOTE — PROGRESS NOTES
"Last 5 Patient Entered Readings                                      Current 30 Day Average: 121/84     Recent Readings 11/12/2018 11/4/2018 10/26/2018 10/21/2018 10/21/2018    SBP (mmHg) 126 123 123 120 127    DBP (mmHg) 81 81 84 77 88    Pulse 55 72 65 69 70          Digital Medicine: Health  Follow Up    Lifestyle Modifications:    1.Dietary Modifications (Sodium intake <2,000mg/day, food labels, dining out): Patient states she does her grocery shopping on the weekend.  Has been trying to eat healthy, but states she finds it difficult.  Informed patient about low sodium recipes.  Will send to email: [email or web address]    2.Physical Activity: States she has been trying to get some more movement in.  States she is retired and "does not get out a whole lot."  Informed patient about at home exercises.  Patient expressed interest.  Will send to email.    3.Medication Therapy: Patient has been compliant with the medication regimen.    4.Patient has the following medication side effects/concerns: none  (Frequency/Alleviating factors/Precipitating factors, etc.)     Follow up with Mrs. Carolee PARISH Shannon completed. No further questions or concerns. Will continue to follow up to achieve health goals.        "

## 2018-11-15 DIAGNOSIS — G56.01 CARPAL TUNNEL SYNDROME, RIGHT: ICD-10-CM

## 2018-11-15 RX ORDER — MELOXICAM 15 MG/1
TABLET ORAL
Qty: 30 TABLET | Refills: 2 | Status: ON HOLD | OUTPATIENT
Start: 2018-11-15 | End: 2018-12-06 | Stop reason: HOSPADM

## 2018-11-16 ENCOUNTER — LAB VISIT (OUTPATIENT)
Dept: LAB | Facility: HOSPITAL | Age: 58
End: 2018-11-16
Attending: ORTHOPAEDIC SURGERY
Payer: COMMERCIAL

## 2018-11-16 ENCOUNTER — OFFICE VISIT (OUTPATIENT)
Dept: ORTHOPEDICS | Facility: CLINIC | Age: 58
End: 2018-11-16
Payer: COMMERCIAL

## 2018-11-16 VITALS
BODY MASS INDEX: 39.56 KG/M2 | HEART RATE: 61 BPM | DIASTOLIC BLOOD PRESSURE: 84 MMHG | SYSTOLIC BLOOD PRESSURE: 126 MMHG | WEIGHT: 215 LBS | HEIGHT: 62 IN

## 2018-11-16 DIAGNOSIS — G56.02 LEFT CARPAL TUNNEL SYNDROME: Primary | ICD-10-CM

## 2018-11-16 DIAGNOSIS — Z01.818 PRE-OP TESTING: ICD-10-CM

## 2018-11-16 DIAGNOSIS — Z01.818 PREOP TESTING: Primary | ICD-10-CM

## 2018-11-16 DIAGNOSIS — G56.02 LEFT CARPAL TUNNEL SYNDROME: ICD-10-CM

## 2018-11-16 DIAGNOSIS — Z01.818 PRE-OP TESTING: Primary | ICD-10-CM

## 2018-11-16 LAB
ALBUMIN SERPL BCP-MCNC: 3.9 G/DL
ALP SERPL-CCNC: 57 U/L
ALT SERPL W/O P-5'-P-CCNC: 22 U/L
ANION GAP SERPL CALC-SCNC: 9 MMOL/L
AST SERPL-CCNC: 22 U/L
BASOPHILS # BLD AUTO: 0.02 K/UL
BASOPHILS NFR BLD: 0.4 %
BILIRUB SERPL-MCNC: 0.4 MG/DL
BUN SERPL-MCNC: 13 MG/DL
CALCIUM SERPL-MCNC: 10.2 MG/DL
CHLORIDE SERPL-SCNC: 100 MMOL/L
CO2 SERPL-SCNC: 31 MMOL/L
CREAT SERPL-MCNC: 0.9 MG/DL
DIFFERENTIAL METHOD: ABNORMAL
EOSINOPHIL # BLD AUTO: 0.1 K/UL
EOSINOPHIL NFR BLD: 1.2 %
ERYTHROCYTE [DISTWIDTH] IN BLOOD BY AUTOMATED COUNT: 13.5 %
EST. GFR  (AFRICAN AMERICAN): >60 ML/MIN/1.73 M^2
EST. GFR  (NON AFRICAN AMERICAN): >60 ML/MIN/1.73 M^2
GLUCOSE SERPL-MCNC: 88 MG/DL
HCT VFR BLD AUTO: 42.2 %
HGB BLD-MCNC: 13.1 G/DL
IMM GRANULOCYTES # BLD AUTO: 0 K/UL
IMM GRANULOCYTES NFR BLD AUTO: 0 %
LYMPHOCYTES # BLD AUTO: 2.1 K/UL
LYMPHOCYTES NFR BLD: 41.4 %
MCH RBC QN AUTO: 26.3 PG
MCHC RBC AUTO-ENTMCNC: 31 G/DL
MCV RBC AUTO: 85 FL
MONOCYTES # BLD AUTO: 0.4 K/UL
MONOCYTES NFR BLD: 7.2 %
NEUTROPHILS # BLD AUTO: 2.6 K/UL
NEUTROPHILS NFR BLD: 49.8 %
NRBC BLD-RTO: 0 /100 WBC
PLATELET # BLD AUTO: 252 K/UL
PMV BLD AUTO: 12 FL
POTASSIUM SERPL-SCNC: 3.5 MMOL/L
PROT SERPL-MCNC: 8 G/DL
RBC # BLD AUTO: 4.98 M/UL
SODIUM SERPL-SCNC: 140 MMOL/L
WBC # BLD AUTO: 5.17 K/UL

## 2018-11-16 PROCEDURE — 85025 COMPLETE CBC W/AUTO DIFF WBC: CPT

## 2018-11-16 PROCEDURE — 36415 COLL VENOUS BLD VENIPUNCTURE: CPT | Mod: PO

## 2018-11-16 PROCEDURE — 99214 OFFICE O/P EST MOD 30 MIN: CPT | Mod: 24,S$GLB,, | Performed by: ORTHOPAEDIC SURGERY

## 2018-11-16 PROCEDURE — 80053 COMPREHEN METABOLIC PANEL: CPT

## 2018-11-16 PROCEDURE — 3079F DIAST BP 80-89 MM HG: CPT | Mod: CPTII,S$GLB,, | Performed by: ORTHOPAEDIC SURGERY

## 2018-11-16 PROCEDURE — 3008F BODY MASS INDEX DOCD: CPT | Mod: CPTII,S$GLB,, | Performed by: ORTHOPAEDIC SURGERY

## 2018-11-16 PROCEDURE — 3074F SYST BP LT 130 MM HG: CPT | Mod: CPTII,S$GLB,, | Performed by: ORTHOPAEDIC SURGERY

## 2018-11-16 PROCEDURE — 99999 PR PBB SHADOW E&M-EST. PATIENT-LVL III: CPT | Mod: PBBFAC,,, | Performed by: ORTHOPAEDIC SURGERY

## 2018-11-16 NOTE — H&P (VIEW-ONLY)
Subjective:     Patient ID: Carolee Ortega is a 57 y.o. female.    Chief Complaint: Pain and Post-op Evaluation of the Right Hand    6wk postop. Doing well, much improvement in her symptoms.  PREOPERATIVE DIAGNOSIS:  1. Right carpal tunnel syndrome.   2. Right middle finger trigger finger     POSTOPERATIVE DIAGNOSIS: same     PROCEDURES PERFORMED: Right  carpal tunnel release, right middle trigger finger release  SURGEON: Shaq Fairbanks M.D.         Hand Pain    The pain is present in the right hand. This is a new problem. The current episode started more than 1 month ago. There has been no history of extremity trauma. The injury was the result of a twisting, lifting and pulling action The problem occurs constantly. The problem has been unchanged. The quality of the pain is described as dull, aching and throbbing. The pain is at a severity of 0/10. Associated symptoms include joint locking. Pertinent negatives include no fever or numbness. The symptoms are aggravated by lifting (gripping). She has tried cold, heat and NSAIDs for the symptoms. The treatment provided moderate relief. Physical therapy was not tried.      Past Medical History:   Diagnosis Date    Allergic rhinitis     Allergy     Anxiety     Asthma     JAGDISH (generalized anxiety disorder)     Glaucoma (increased eye pressure)     Herpes labialis     Hypertension     Obesity     Seborrheic dermatitis     Shingles 2016     Past Surgical History:   Procedure Laterality Date    BREAST BIOPSY      right    CARPAL TUNNEL RELEASE Right 10/4/2018    Procedure: RELEASE, CARPAL TUNNEL;  Surgeon: Shaq Fairbanks MD;  Location: [facility] OR;  Service: Orthopedics;  Laterality: Right;     SECTION      x1    RELEASE, CARPAL TUNNEL Right 10/4/2018    Performed by Shaq Fairbanks MD at [facility] OR    RELEASE, TRIGGER FINGER Right 10/4/2018    Performed by Shaq Fairbanks MD at [facility] OR    [facility] O      Secondary to  fibroids    TONSILLECTOMY  April 2011    TRIGGER FINGER RELEASE Right 10/4/2018    Procedure: RELEASE, TRIGGER FINGER;  Surgeon: Shaq Fairbanks MD;  Location: [facility];  Service: Orthopedics;  Laterality: Right;  right middle finger      Family History   Problem Relation Age of Onset    Heart attack Father     Coronary artery disease Father     Glaucoma Father     Heart disease Mother 32    Breast cancer Maternal Grandmother     Hypertension Sister     Kidney disease Sister     Breast cancer Other         Great aunts    Diabetes Other         Paternal great-grandmother    Breast cancer Maternal Cousin     Breast cancer Maternal Cousin      Social History     Socioeconomic History    Marital status:      Spouse name: Not on file    Number of children: Not on file    Years of education: Not on file    Highest education level: Not on file   Social Needs    Financial resource strain: Not on file    Food insecurity - worry: Not on file    Food insecurity - inability: Not on file    Transportation needs - medical: Not on file    Transportation needs - non-medical: Not on file   Occupational History    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         Medication List           Accurate as of 11/16/18  8:00 AM. If you have any questions, ask your nurse or doctor.               CHANGE how you take these medications    amLODIPine 10 MG tablet  Commonly known as:  NORVASC  TAKE 1 TABLET (10 MG TOTAL) BY MOUTH ONCE DAILY.  What changed:  when to take this     chlorthalidone 25 MG Tab  Commonly known as:  HYGROTEN  TAKE  ONE-HALF TABLET (12.5MG) BY MOUTH ONCE DAILY.  What changed:  See the new instructions.     metoprolol tartrate 25 MG tablet  Commonly known as:  LOPRESSOR  TAKE 1 TABLET BY MOUTH EVERY DAY  What changed:    · how much to take  · how to take this  · when to take this     penciclovir 1 % cream  Commonly known as:  DENAVIR  Apply topically every 2 (two) hours.  What changed:    · when to take this  · reasons to take this     valACYclovir 1000 MG tablet  Commonly known as:  VALTREX  Take 2 tablets (2,000 mg total) by mouth every 12 (twelve) hours.  What changed:    · when to take this  · reasons to take this        CONTINUE taking these medications    albuterol 1.25 mg/3 mL Nebu  Commonly known as:  ACCUNEB     ALPRAZolam 0.5 MG tablet  Commonly known as:  XANAX  Take 1 tablet (0.5 mg total) by mouth 3 (three) times daily as needed.     dorzolamide-timolol 2-0.5% 22.3-6.8 mg/mL ophthalmic solution  Commonly known as:  COSOPT  Place 1 drop into both eyes every 12 (twelve) hours.     HYDROcodone-acetaminophen 5-325 mg per tablet  Commonly known as:  NORCO  Take 1 tablet by mouth every 6 (six) hours as needed for Pain.     hydrOXYzine HCl 25 MG tablet  Commonly known as:  ATARAX  Take 1 tablet (25 mg total) by mouth every 4 (four) hours as needed for Itching.     latanoprost 0.005 % ophthalmic solution  Commonly known as:  XALATAN  Place 1 drop into both eyes every evening.     meloxicam 15 MG tablet  Commonly known as:  MOBIC  TAKE 1 TABLET BY MOUTH EVERY DAY     mometasone 50 mcg/actuation nasal spray  Commonly known as:  NASONEX  2 sprays by Nasal route once daily.     montelukast 10 mg tablet  Commonly known as:  SINGULAIR  TAKE 1 TABLET BY MOUTH IN THE EVENING          Review of patient's allergies indicates:   Allergen Reactions    Flexeril [cyclobenzaprine] Palpitations     Review of Systems   Constitution: Negative for fever.   HENT: Negative for hearing loss.    Eyes: Negative for blurred vision.    Cardiovascular: Negative for chest pain and leg swelling.   Respiratory: Negative for shortness of breath.    Endocrine: Negative for polyuria.   Hematologic/Lymphatic: Negative for bleeding problem.   Skin: Negative for rash.   Musculoskeletal: Positive for joint pain. Negative for back pain, joint swelling, muscle cramps and muscle weakness.   Gastrointestinal: Negative for melena.   Genitourinary: Negative for hematuria.   Neurological: Negative for loss of balance, numbness and paresthesias.   Psychiatric/Behavioral: Negative for altered mental status.       Objective:   Body mass index is 39.32 kg/m².  Vitals:    11/16/18 0733   BP: 126/84   Pulse: 61       General: Carolee is well-developed, well-nourished, appears stated age, in no acute distress, alert and oriented to time, place and person.                   Right Hand/Wrist Exam     Other     Neuorologic Exam    Median Distribution: normal  Ulnar Distribution: normal  Radial Distribution: normal    Comments:  Incisions well healed  No erythema/drainage/signs of infection  No calf tenderness  No triggering          Muscle Strength   Right Upper Extremity   : 4/5/5     Vascular Exam       Capillary Refill  Right Hand: normal capillary refill    Xray finger shows no arthritis right middle finger; No fracture or dislocation.    INTERPRETATION  -Bilateral median motor nerve conduction study showed normal latency, amplitude, and conduction velocity  -Bilateral median sensory nerve conduction study showed normal peak latency and amplitude  -Bilateral ulnar motor nerve conduction study showed normal latency, amplitude, and conduction velocity  -Bilateral ulnar sensory nerve conduction study showed normal peak latency and amplitude  -Bilateral radial sensory nerve conduction study showed normal peak latency and amplitude  -Bilateral combined sensory index showed a significant latency of 1.6 msec on the right and 1.5 msec on the left     IMPRESSION  1. ABNORMAL  study  2. There is electrodiagnostic evidence of a MILD demyelinating median neuropathy (Carpal tunnel syndrome) across BILATERAL wrists     PLAN  1. Follow up with referring provider: Dr. Shaq Fairbanks  2. Handouts on CTS prevention provided.   3. This study is good for one year. If symptoms worsen or do not improve, please re-consult.       Assessment:     Encounter Diagnoses   Name Primary?    Pre-op testing Yes    Left carpal tunnel syndrome         Plan:     Doing well with right hand, desires left carpal tunnel release.    We reviewed with Carolee today, the pathology and natural history of her diagnosis. We have discussed a variety of treatment options including medications, physical therapy and other alternative treatments. I also explained the indications, risks and benefits of surgery. After discussion, Carolee decided to proceed with surgery. The decision was made to go forward with    1. Left carpal tunnel release    The details of the surgical procedure were explained, including the location of probable incisions and a description of likely hardware and/or grafts to be used.  The patient understands the likely convalescence after surgery.  Also, we have thoroughly discussed the risks, benefits and alternatives to surgery, including, but not limited to, the risk of infection, joint stiffness, blood clot (including DVT and/or pulmonary embolus), neurologic and vascular injury.  It was explained that, if tissue has been repaired or reconstructed, there is a chance of failure, which may require further management.      All of the patient's questions were answered and informed consent was obtained. The patient will contact us if they have any questions or concerns in the interim.

## 2018-11-16 NOTE — PROGRESS NOTES
Subjective:     Patient ID: Carolee Ortega is a 57 y.o. female.    Chief Complaint: Pain and Post-op Evaluation of the Right Hand    6wk postop. Doing well, much improvement in her symptoms.  PREOPERATIVE DIAGNOSIS:  1. Right carpal tunnel syndrome.   2. Right middle finger trigger finger     POSTOPERATIVE DIAGNOSIS: same     PROCEDURES PERFORMED: Right  carpal tunnel release, right middle trigger finger release  SURGEON: Shaq Fairbanks M.D.         Hand Pain    The pain is present in the right hand. This is a new problem. The current episode started more than 1 month ago. There has been no history of extremity trauma. The injury was the result of a twisting, lifting and pulling action The problem occurs constantly. The problem has been unchanged. The quality of the pain is described as dull, aching and throbbing. The pain is at a severity of 0/10. Associated symptoms include joint locking. Pertinent negatives include no fever or numbness. The symptoms are aggravated by lifting (gripping). She has tried cold, heat and NSAIDs for the symptoms. The treatment provided moderate relief. Physical therapy was not tried.      Past Medical History:   Diagnosis Date    Allergic rhinitis     Allergy     Anxiety     Asthma     JAGDISH (generalized anxiety disorder)     Glaucoma (increased eye pressure)     Herpes labialis     Hypertension     Obesity     Seborrheic dermatitis     Shingles 2016     Past Surgical History:   Procedure Laterality Date    BREAST BIOPSY      right    CARPAL TUNNEL RELEASE Right 10/4/2018    Procedure: RELEASE, CARPAL TUNNEL;  Surgeon: Shaq Fairbanks MD;  Location: [facility] OR;  Service: Orthopedics;  Laterality: Right;     SECTION      x1    RELEASE, CARPAL TUNNEL Right 10/4/2018    Performed by Shaq Fairbanks MD at [facility] OR    RELEASE, TRIGGER FINGER Right 10/4/2018    Performed by Shaq Fairbanks MD at [facility] OR    [facility]      Secondary to  fibroids    TONSILLECTOMY  April 2011    TRIGGER FINGER RELEASE Right 10/4/2018    Procedure: RELEASE, TRIGGER FINGER;  Surgeon: Shaq Fairbanks MD;  Location: [facility];  Service: Orthopedics;  Laterality: Right;  right middle finger      Family History   Problem Relation Age of Onset    Heart attack Father     Coronary artery disease Father     Glaucoma Father     Heart disease Mother 32    Breast cancer Maternal Grandmother     Hypertension Sister     Kidney disease Sister     Breast cancer Other         Great aunts    Diabetes Other         Paternal great-grandmother    Breast cancer Maternal Cousin     Breast cancer Maternal Cousin      Social History     Socioeconomic History    Marital status:      Spouse name: Not on file    Number of children: Not on file    Years of education: Not on file    Highest education level: Not on file   Social Needs    Financial resource strain: Not on file    Food insecurity - worry: Not on file    Food insecurity - inability: Not on file    Transportation needs - medical: Not on file    Transportation needs - non-medical: Not on file   Occupational History    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         Medication List           Accurate as of 11/16/18  8:00 AM. If you have any questions, ask your nurse or doctor.               CHANGE how you take these medications    amLODIPine 10 MG tablet  Commonly known as:  NORVASC  TAKE 1 TABLET (10 MG TOTAL) BY MOUTH ONCE DAILY.  What changed:  when to take this     chlorthalidone 25 MG Tab  Commonly known as:  HYGROTEN  TAKE  ONE-HALF TABLET (12.5MG) BY MOUTH ONCE DAILY.  What changed:  See the new instructions.     metoprolol tartrate 25 MG tablet  Commonly known as:  LOPRESSOR  TAKE 1 TABLET BY MOUTH EVERY DAY  What changed:    · how much to take  · how to take this  · when to take this     penciclovir 1 % cream  Commonly known as:  DENAVIR  Apply topically every 2 (two) hours.  What changed:    · when to take this  · reasons to take this     valACYclovir 1000 MG tablet  Commonly known as:  VALTREX  Take 2 tablets (2,000 mg total) by mouth every 12 (twelve) hours.  What changed:    · when to take this  · reasons to take this        CONTINUE taking these medications    albuterol 1.25 mg/3 mL Nebu  Commonly known as:  ACCUNEB     ALPRAZolam 0.5 MG tablet  Commonly known as:  XANAX  Take 1 tablet (0.5 mg total) by mouth 3 (three) times daily as needed.     dorzolamide-timolol 2-0.5% 22.3-6.8 mg/mL ophthalmic solution  Commonly known as:  COSOPT  Place 1 drop into both eyes every 12 (twelve) hours.     HYDROcodone-acetaminophen 5-325 mg per tablet  Commonly known as:  NORCO  Take 1 tablet by mouth every 6 (six) hours as needed for Pain.     hydrOXYzine HCl 25 MG tablet  Commonly known as:  ATARAX  Take 1 tablet (25 mg total) by mouth every 4 (four) hours as needed for Itching.     latanoprost 0.005 % ophthalmic solution  Commonly known as:  XALATAN  Place 1 drop into both eyes every evening.     meloxicam 15 MG tablet  Commonly known as:  MOBIC  TAKE 1 TABLET BY MOUTH EVERY DAY     mometasone 50 mcg/actuation nasal spray  Commonly known as:  NASONEX  2 sprays by Nasal route once daily.     montelukast 10 mg tablet  Commonly known as:  SINGULAIR  TAKE 1 TABLET BY MOUTH IN THE EVENING          Review of patient's allergies indicates:   Allergen Reactions    Flexeril [cyclobenzaprine] Palpitations     Review of Systems   Constitution: Negative for fever.   HENT: Negative for hearing loss.    Eyes: Negative for blurred vision.    Cardiovascular: Negative for chest pain and leg swelling.   Respiratory: Negative for shortness of breath.    Endocrine: Negative for polyuria.   Hematologic/Lymphatic: Negative for bleeding problem.   Skin: Negative for rash.   Musculoskeletal: Positive for joint pain. Negative for back pain, joint swelling, muscle cramps and muscle weakness.   Gastrointestinal: Negative for melena.   Genitourinary: Negative for hematuria.   Neurological: Negative for loss of balance, numbness and paresthesias.   Psychiatric/Behavioral: Negative for altered mental status.       Objective:   Body mass index is 39.32 kg/m².  Vitals:    11/16/18 0733   BP: 126/84   Pulse: 61       General: Carolee is well-developed, well-nourished, appears stated age, in no acute distress, alert and oriented to time, place and person.                   Right Hand/Wrist Exam     Other     Neuorologic Exam    Median Distribution: normal  Ulnar Distribution: normal  Radial Distribution: normal    Comments:  Incisions well healed  No erythema/drainage/signs of infection  No calf tenderness  No triggering          Muscle Strength   Right Upper Extremity   : 4/5/5     Vascular Exam       Capillary Refill  Right Hand: normal capillary refill    Xray finger shows no arthritis right middle finger; No fracture or dislocation.    INTERPRETATION  -Bilateral median motor nerve conduction study showed normal latency, amplitude, and conduction velocity  -Bilateral median sensory nerve conduction study showed normal peak latency and amplitude  -Bilateral ulnar motor nerve conduction study showed normal latency, amplitude, and conduction velocity  -Bilateral ulnar sensory nerve conduction study showed normal peak latency and amplitude  -Bilateral radial sensory nerve conduction study showed normal peak latency and amplitude  -Bilateral combined sensory index showed a significant latency of 1.6 msec on the right and 1.5 msec on the left     IMPRESSION  1. ABNORMAL  study  2. There is electrodiagnostic evidence of a MILD demyelinating median neuropathy (Carpal tunnel syndrome) across BILATERAL wrists     PLAN  1. Follow up with referring provider: Dr. Shaq Fairbanks  2. Handouts on CTS prevention provided.   3. This study is good for one year. If symptoms worsen or do not improve, please re-consult.       Assessment:     Encounter Diagnoses   Name Primary?    Pre-op testing Yes    Left carpal tunnel syndrome         Plan:     Doing well with right hand, desires left carpal tunnel release.    We reviewed with Carolee today, the pathology and natural history of her diagnosis. We have discussed a variety of treatment options including medications, physical therapy and other alternative treatments. I also explained the indications, risks and benefits of surgery. After discussion, Carolee decided to proceed with surgery. The decision was made to go forward with    1. Left carpal tunnel release    The details of the surgical procedure were explained, including the location of probable incisions and a description of likely hardware and/or grafts to be used.  The patient understands the likely convalescence after surgery.  Also, we have thoroughly discussed the risks, benefits and alternatives to surgery, including, but not limited to, the risk of infection, joint stiffness, blood clot (including DVT and/or pulmonary embolus), neurologic and vascular injury.  It was explained that, if tissue has been repaired or reconstructed, there is a chance of failure, which may require further management.      All of the patient's questions were answered and informed consent was obtained. The patient will contact us if they have any questions or concerns in the interim.

## 2018-11-20 ENCOUNTER — PATIENT MESSAGE (OUTPATIENT)
Dept: ADMINISTRATIVE | Facility: OTHER | Age: 58
End: 2018-11-20

## 2018-11-20 NOTE — PROGRESS NOTES
Last 5 Patient Entered Readings                                      Current 30 Day Average: 123/82     Recent Readings 11/12/2018 11/4/2018 10/26/2018 10/21/2018 10/21/2018    SBP (mmHg) 126 123 123 120 127    DBP (mmHg) 81 81 84 77 88    Pulse 55 72 65 69 70        Patient's BP average is close to controlled based on 2017 ACC/AHA HTN guidelines of goal BP <130/80.      Patient denies s/s of hypotension (lightheadedness, dizziness, nausea, fatigue) associated with low readings. Instructed patient to inform me if this occurs, patient confirms understanding.      Patient denies s/s of hypertension (SOB, CP, severe headaches, changes in vision) associated with high readings. Instructed patient to go to the ED if BP > 180/110 and accompanied by hypertensive s/s, patient confirms understanding.     Patient gained seven pounds over the past six weeks attributing to her higher blood pressure readings. She is trying to watch what she eats to loose the weight.    Patient is compliant with her current medications.    She would like to continue working on lifestyle for the next month before making medication adjustments. If her BP does not improve in a month, I will increase chlorthalidone to 25mg daily.      Patient's health , Uzair Lantigua, will be following up every 3-4 weeks. I will continue to monitor regularly and will follow up in 3-4 months, sooner if BP begins to trend upward or downward.    Patient has my contact information and knows to call with any concerns or clinical changes.     Current HTN regimen:  Hypertension Medications             amLODIPine (NORVASC) 10 MG tablet TAKE 1 TABLET (10 MG TOTAL) BY MOUTH ONCE DAILY.    chlorthalidone (HYGROTEN) 25 MG Tab TAKE ONE-HALF TABLET (12.5MG) BY MOUTH ONCE DAILY.    metoprolol tartrate (LOPRESSOR) 25 MG tablet TAKE 1 TABLET BY MOUTH EVERY DAY

## 2018-11-29 ENCOUNTER — PATIENT MESSAGE (OUTPATIENT)
Dept: SURGERY | Facility: HOSPITAL | Age: 58
End: 2018-11-29

## 2018-11-29 ENCOUNTER — PATIENT MESSAGE (OUTPATIENT)
Dept: ORTHOPEDICS | Facility: CLINIC | Age: 58
End: 2018-11-29

## 2018-12-04 NOTE — PRE ADMISSION SCREENING
Pre op instructions reviewed with patient per phone:    To confirm, Your surgeon has instructed you:  Surgery is scheduled 12/6/18 at 0730.      Please report to [facility] 1st floor main lobby by 0600.   Pre admit office to call afternoon prior to surgery with final arrival time      INSTRUCTIONS IMPORTANT!!!  ¨ Do not eat, drink, or smoke after 12 midnight-including water. OK to brush teeth, no gum, candy or mints!    ¨ Take only these medicines with a small swallow of water-morning of surgery.  Use Cosopt ophth.      Remover artificial nails/polish from operative extremity per Dr Fairbanks-states she does not have to remove from RUE          ____  Do not wear makeup, including mascara.  ____  No powder, lotions or creams to surgical area.  ____  Please remove all jewelry, including piercings and leave at home.  ____  No money or valuables needed. Please leave at home.  ____  Please bring identification and insurance information to hospital.  ____  If going home the same day, arrange for a ride home. You will not be able to   drive if Anesthesia was used.  ____  Children, under 12 years old, must remain in the waiting room with an adult.  They are not allowed in patient areas.  ____  Wear loose fitting clothing. Allow for dressings, bandages.  ____  Stop Aspirin, Ibuprofen, Motrin and Aleve at least 5-7 days before surgery, unless otherwise instructed by your doctor, or the nurse.   You MAY use Tylenol/acetaminophen until day of surgery.  ____  If you take diabetic medication, do not take am of surgery unless instructed by   Doctor.  ____ Stop taking any Fish Oil supplement or any Vitamins that contain Vitamin E at least 5 days prior to surgery.          Bathing Instructions-- The night before surgery and the morning prior to coming to the hospital:   -Do not shave the surgical area.   -Shower and wash your hair and body as usual with anti-bacterial  soap and shampoo.   -Rinse your hair and  body completely.   -Use one packet of hibiclens to wash the surgical site (using your hand) gently for 5 minutes.  Do not scrub you skin too hard.   -Do not use hibiclens on your head, face, or genitals.   -Do not wash with anti-bacterial soap after you use the hibiclens.   -Rinse your body thoroughly.   -Dry with clean, soft towel.  Do not use lotion, cream, deodorant, or powders on   the surgical site.    Use antibacterial soap in place of hibiclens if your surgery is on the head, face or genitals.         Surgical Site Infection    Prevention of surgical site infections:     -Keep incisions clean and dry.   -Do not soak/submerge incisions in water until completely healed.   -Do not apply lotions, powders, creams, or deodorants to site.   -Always make sure hands are cleaned with antibacterial soap/ alcohol-based   prior to touching the surgical site.  (This includes doctors, nurses, staff, and yourself.)    Signs and symptoms:   -Redness and pain around the area where you had surgery   -Drainage of cloudy fluid from your surgical wound   -Fever over 100.4  I have read or had read and explained to me, and understand the above information.

## 2018-12-05 ENCOUNTER — ANESTHESIA EVENT (OUTPATIENT)
Dept: SURGERY | Facility: HOSPITAL | Age: 58
End: 2018-12-05
Payer: COMMERCIAL

## 2018-12-06 ENCOUNTER — ANESTHESIA (OUTPATIENT)
Dept: SURGERY | Facility: HOSPITAL | Age: 58
End: 2018-12-06
Payer: COMMERCIAL

## 2018-12-06 ENCOUNTER — TELEPHONE (OUTPATIENT)
Dept: ORTHOPEDICS | Facility: CLINIC | Age: 58
End: 2018-12-06

## 2018-12-06 ENCOUNTER — HOSPITAL ENCOUNTER (OUTPATIENT)
Facility: HOSPITAL | Age: 58
Discharge: HOME | End: 2018-12-06
Attending: ORTHOPAEDIC SURGERY | Admitting: ORTHOPAEDIC SURGERY
Payer: COMMERCIAL

## 2018-12-06 DIAGNOSIS — J39.2 THROAT MASS: Primary | ICD-10-CM

## 2018-12-06 DIAGNOSIS — G56.02 LEFT CARPAL TUNNEL SYNDROME: Primary | ICD-10-CM

## 2018-12-06 PROCEDURE — 37000009 HC ANESTHESIA EA ADD 15 MINS: Performed by: ORTHOPAEDIC SURGERY

## 2018-12-06 PROCEDURE — 36000707: Performed by: ORTHOPAEDIC SURGERY

## 2018-12-06 PROCEDURE — S0020 INJECTION, BUPIVICAINE HYDRO: HCPCS | Performed by: ORTHOPAEDIC SURGERY

## 2018-12-06 PROCEDURE — 63600175 PHARM REV CODE 636 W HCPCS: Performed by: CLINIC/CENTER

## 2018-12-06 PROCEDURE — 37000008 HC ANESTHESIA 1ST 15 MINUTES: Performed by: ORTHOPAEDIC SURGERY

## 2018-12-06 PROCEDURE — 36000706: Performed by: ORTHOPAEDIC SURGERY

## 2018-12-06 PROCEDURE — 25000003 PHARM REV CODE 250: Performed by: ANESTHESIOLOGY

## 2018-12-06 PROCEDURE — 63600175 PHARM REV CODE 636 W HCPCS: Performed by: ANESTHESIOLOGY

## 2018-12-06 PROCEDURE — 25000003 PHARM REV CODE 250: Performed by: ORTHOPAEDIC SURGERY

## 2018-12-06 PROCEDURE — 25000003 PHARM REV CODE 250: Performed by: PHYSICIAN ASSISTANT

## 2018-12-06 PROCEDURE — 71000015 HC POSTOP RECOV 1ST HR: Performed by: ORTHOPAEDIC SURGERY

## 2018-12-06 PROCEDURE — 71000033 HC RECOVERY, INTIAL HOUR: Performed by: ORTHOPAEDIC SURGERY

## 2018-12-06 PROCEDURE — 25000003 PHARM REV CODE 250: Performed by: CLINIC/CENTER

## 2018-12-06 PROCEDURE — 64721 CARPAL TUNNEL SURGERY: CPT | Mod: 79,LT,, | Performed by: ORTHOPAEDIC SURGERY

## 2018-12-06 RX ORDER — CHLORHEXIDINE GLUCONATE ORAL RINSE 1.2 MG/ML
10 SOLUTION DENTAL 2 TIMES DAILY
Status: DISCONTINUED | OUTPATIENT
Start: 2018-12-06 | End: 2018-12-06 | Stop reason: HOSPADM

## 2018-12-06 RX ORDER — SODIUM CHLORIDE 0.9 % (FLUSH) 0.9 %
3 SYRINGE (ML) INJECTION EVERY 8 HOURS
Status: DISCONTINUED | OUTPATIENT
Start: 2018-12-06 | End: 2018-12-06 | Stop reason: HOSPADM

## 2018-12-06 RX ORDER — ONDANSETRON 2 MG/ML
INJECTION INTRAMUSCULAR; INTRAVENOUS
Status: DISCONTINUED | OUTPATIENT
Start: 2018-12-06 | End: 2018-12-06

## 2018-12-06 RX ORDER — SUCCINYLCHOLINE CHLORIDE 20 MG/ML
INJECTION INTRAMUSCULAR; INTRAVENOUS
Status: DISCONTINUED | OUTPATIENT
Start: 2018-12-06 | End: 2018-12-06

## 2018-12-06 RX ORDER — MIDAZOLAM HYDROCHLORIDE 1 MG/ML
INJECTION, SOLUTION INTRAMUSCULAR; INTRAVENOUS
Status: DISCONTINUED | OUTPATIENT
Start: 2018-12-06 | End: 2018-12-06

## 2018-12-06 RX ORDER — PROPOFOL 10 MG/ML
VIAL (ML) INTRAVENOUS
Status: DISCONTINUED | OUTPATIENT
Start: 2018-12-06 | End: 2018-12-06

## 2018-12-06 RX ORDER — BUPIVACAINE HYDROCHLORIDE 5 MG/ML
INJECTION, SOLUTION EPIDURAL; INTRACAUDAL
Status: DISCONTINUED | OUTPATIENT
Start: 2018-12-06 | End: 2018-12-06 | Stop reason: HOSPADM

## 2018-12-06 RX ORDER — OXYCODONE HYDROCHLORIDE 5 MG/1
5 TABLET ORAL
Status: DISCONTINUED | OUTPATIENT
Start: 2018-12-06 | End: 2018-12-06 | Stop reason: HOSPADM

## 2018-12-06 RX ORDER — MORPHINE SULFATE 4 MG/ML
2 INJECTION, SOLUTION INTRAMUSCULAR; INTRAVENOUS EVERY 5 MIN PRN
Status: DISCONTINUED | OUTPATIENT
Start: 2018-12-06 | End: 2018-12-06 | Stop reason: HOSPADM

## 2018-12-06 RX ORDER — CHLORHEXIDINE GLUCONATE ORAL RINSE 1.2 MG/ML
10 SOLUTION DENTAL
Status: DISCONTINUED | OUTPATIENT
Start: 2018-12-06 | End: 2018-12-06 | Stop reason: HOSPADM

## 2018-12-06 RX ORDER — LIDOCAINE HYDROCHLORIDE 10 MG/ML
INJECTION INFILTRATION; PERINEURAL
Status: DISCONTINUED | OUTPATIENT
Start: 2018-12-06 | End: 2018-12-06

## 2018-12-06 RX ORDER — SODIUM CHLORIDE 0.9 % (FLUSH) 0.9 %
3 SYRINGE (ML) INJECTION
Status: DISCONTINUED | OUTPATIENT
Start: 2018-12-06 | End: 2018-12-06 | Stop reason: HOSPADM

## 2018-12-06 RX ORDER — LIDOCAINE HYDROCHLORIDE 10 MG/ML
INJECTION, SOLUTION EPIDURAL; INFILTRATION; INTRACAUDAL; PERINEURAL
Status: DISCONTINUED | OUTPATIENT
Start: 2018-12-06 | End: 2018-12-06 | Stop reason: HOSPADM

## 2018-12-06 RX ORDER — SODIUM CHLORIDE 0.9 % (FLUSH) 0.9 %
3 SYRINGE (ML) INJECTION EVERY 8 HOURS
Status: CANCELLED | OUTPATIENT
Start: 2018-12-06

## 2018-12-06 RX ORDER — METOCLOPRAMIDE HYDROCHLORIDE 5 MG/ML
10 INJECTION INTRAMUSCULAR; INTRAVENOUS EVERY 10 MIN PRN
Status: CANCELLED | OUTPATIENT
Start: 2018-12-06

## 2018-12-06 RX ORDER — MEPERIDINE HYDROCHLORIDE 50 MG/ML
12.5 INJECTION INTRAMUSCULAR; INTRAVENOUS; SUBCUTANEOUS ONCE AS NEEDED
Status: CANCELLED | OUTPATIENT
Start: 2018-12-06 | End: 2018-12-06

## 2018-12-06 RX ORDER — SODIUM CHLORIDE, SODIUM LACTATE, POTASSIUM CHLORIDE, CALCIUM CHLORIDE 600; 310; 30; 20 MG/100ML; MG/100ML; MG/100ML; MG/100ML
INJECTION, SOLUTION INTRAVENOUS CONTINUOUS
Status: DISCONTINUED | OUTPATIENT
Start: 2018-12-06 | End: 2018-12-06 | Stop reason: HOSPADM

## 2018-12-06 RX ORDER — DEXAMETHASONE SODIUM PHOSPHATE 4 MG/ML
INJECTION, SOLUTION INTRA-ARTICULAR; INTRALESIONAL; INTRAMUSCULAR; INTRAVENOUS; SOFT TISSUE
Status: DISCONTINUED | OUTPATIENT
Start: 2018-12-06 | End: 2018-12-06

## 2018-12-06 RX ORDER — METOCLOPRAMIDE HYDROCHLORIDE 5 MG/ML
10 INJECTION INTRAMUSCULAR; INTRAVENOUS EVERY 10 MIN PRN
Status: DISCONTINUED | OUTPATIENT
Start: 2018-12-06 | End: 2018-12-06 | Stop reason: HOSPADM

## 2018-12-06 RX ORDER — HYDROCODONE BITARTRATE AND ACETAMINOPHEN 5; 325 MG/1; MG/1
1 TABLET ORAL EVERY 6 HOURS PRN
Qty: 20 TABLET | Refills: 0 | Status: SHIPPED | OUTPATIENT
Start: 2018-12-06 | End: 2019-02-26

## 2018-12-06 RX ORDER — OXYCODONE HYDROCHLORIDE 5 MG/1
5 TABLET ORAL
Status: CANCELLED | OUTPATIENT
Start: 2018-12-06

## 2018-12-06 RX ORDER — HYDROCODONE BITARTRATE AND ACETAMINOPHEN 5; 325 MG/1; MG/1
1 TABLET ORAL EVERY 4 HOURS PRN
Status: DISCONTINUED | OUTPATIENT
Start: 2018-12-06 | End: 2018-12-06 | Stop reason: HOSPADM

## 2018-12-06 RX ORDER — GLYCOPYRROLATE 0.2 MG/ML
INJECTION INTRAMUSCULAR; INTRAVENOUS
Status: DISCONTINUED | OUTPATIENT
Start: 2018-12-06 | End: 2018-12-06

## 2018-12-06 RX ORDER — MEPERIDINE HYDROCHLORIDE 50 MG/ML
12.5 INJECTION INTRAMUSCULAR; INTRAVENOUS; SUBCUTANEOUS ONCE AS NEEDED
Status: DISCONTINUED | OUTPATIENT
Start: 2018-12-06 | End: 2018-12-06 | Stop reason: HOSPADM

## 2018-12-06 RX ORDER — ROCURONIUM BROMIDE 10 MG/ML
INJECTION, SOLUTION INTRAVENOUS
Status: DISCONTINUED | OUTPATIENT
Start: 2018-12-06 | End: 2018-12-06

## 2018-12-06 RX ORDER — MORPHINE SULFATE 4 MG/ML
2 INJECTION, SOLUTION INTRAMUSCULAR; INTRAVENOUS EVERY 5 MIN PRN
Status: CANCELLED | OUTPATIENT
Start: 2018-12-06

## 2018-12-06 RX ORDER — FENTANYL CITRATE 50 UG/ML
INJECTION, SOLUTION INTRAMUSCULAR; INTRAVENOUS
Status: DISCONTINUED | OUTPATIENT
Start: 2018-12-06 | End: 2018-12-06

## 2018-12-06 RX ADMIN — MORPHINE SULFATE 2 MG: 4 INJECTION, SOLUTION INTRAMUSCULAR; INTRAVENOUS at 09:12

## 2018-12-06 RX ADMIN — MIDAZOLAM 2 MG: 1 INJECTION INTRAMUSCULAR; INTRAVENOUS at 07:12

## 2018-12-06 RX ADMIN — PROPOFOL 150 MG: 10 INJECTION, EMULSION INTRAVENOUS at 07:12

## 2018-12-06 RX ADMIN — PROPOFOL 50 MG: 10 INJECTION, EMULSION INTRAVENOUS at 07:12

## 2018-12-06 RX ADMIN — ONDANSETRON 4 MG: 2 INJECTION, SOLUTION INTRAMUSCULAR; INTRAVENOUS at 07:12

## 2018-12-06 RX ADMIN — FENTANYL CITRATE 100 MCG: 50 INJECTION, SOLUTION INTRAMUSCULAR; INTRAVENOUS at 07:12

## 2018-12-06 RX ADMIN — ROBINUL 0.2 MG: 0.2 INJECTION INTRAMUSCULAR; INTRAVENOUS at 07:12

## 2018-12-06 RX ADMIN — CEFAZOLIN 2 G: 330 INJECTION, POWDER, FOR SOLUTION INTRAMUSCULAR; INTRAVENOUS at 07:12

## 2018-12-06 RX ADMIN — LIDOCAINE HYDROCHLORIDE 10 MG: 10 INJECTION, SOLUTION INFILTRATION; PERINEURAL at 07:12

## 2018-12-06 RX ADMIN — HYDROCODONE BITARTRATE AND ACETAMINOPHEN 1 TABLET: 5; 325 TABLET ORAL at 08:12

## 2018-12-06 RX ADMIN — SODIUM CHLORIDE, SODIUM LACTATE, POTASSIUM CHLORIDE, AND CALCIUM CHLORIDE: 600; 310; 30; 20 INJECTION, SOLUTION INTRAVENOUS at 07:12

## 2018-12-06 RX ADMIN — SUCCINYLCHOLINE CHLORIDE 100 MG: 20 INJECTION, SOLUTION INTRAMUSCULAR; INTRAVENOUS at 07:12

## 2018-12-06 RX ADMIN — ROCURONIUM BROMIDE 5 MG: 10 INJECTION, SOLUTION INTRAVENOUS at 07:12

## 2018-12-06 RX ADMIN — DEXAMETHASONE SODIUM PHOSPHATE 4 MG: 4 INJECTION, SOLUTION INTRA-ARTICULAR; INTRALESIONAL; INTRAMUSCULAR; INTRAVENOUS; SOFT TISSUE at 07:12

## 2018-12-06 RX ADMIN — LIDOCAINE HYDROCHLORIDE 40 MG: 10 INJECTION, SOLUTION INFILTRATION; PERINEURAL at 07:12

## 2018-12-06 NOTE — PLAN OF CARE
Dr. Kennedy made aware of pt's low HR of low to mid 40's but asymptomatic, no new orders obtained. Will cont to monitor.

## 2018-12-06 NOTE — OP NOTE
DATE OF PROCEDURE: 12/06/2018    PREOPERATIVE DIAGNOSIS: left carpal tunnel syndrome.     POSTOPERATIVE DIAGNOSIS: left carpal tunnel syndrome.     PROCEDURES PERFORMED: left carpal tunnel release.     SURGEON: Shaq Fairbanks M.D.     ANESTHESIA: general    BLOOD LOSS: Minimal.     DRAINS: None.     OPERATIVE INDICATIONS: Carolee Ortega is a 58 y.o. female   who was diagnosed by nerve studies with carpal tunnel and failed conservative treatment. Risks and benefits of surgery were discussed and consents were signed pre-operatively.    COMPLICATIONS: None. The patient was moved to the recovery room in stable condition with compartments soft and cap refill less than a second in all digits.     DESCRIPTION OF PROCEDURE: The patient was brought to the Operating Suite after   general anesthesia was induced, the proposed incision site was numbed with 0.25%   Marcaine and the tourniquet was inflated for hemostasis after   sterile prep and drape. A midline incision was utilized in line with the fourth   ray. Dissection was carried down carefully until the   transverse carpal ligament was identified. This was divided in line with the   fourth ray on the ulnar side of the median nerve with care to avoid the   recurrent median nerve branch. The distal   and proximal extents were confirmed with the gloved finger. The wound was   copiously irrigated and closed with nylon suture. The patient was   placed in a sterile postoperative dressing, awakened, and transferred to the   Recovery Room in good condition with compartments soft and cap refill less than a second in all digits.

## 2018-12-06 NOTE — ANESTHESIA POSTPROCEDURE EVALUATION
"Anesthesia Post Evaluation    Patient: Carolee Ortega    Procedure(s) Performed: Procedure(s) (LRB):  RELEASE, CARPAL TUNNEL (Left)    Final Anesthesia Type: general  Patient location during evaluation: PACU  Patient participation: Yes- Able to Participate  Level of consciousness: awake and alert  Post-procedure vital signs: reviewed and stable  Pain management: adequate  Airway patency: patent  PONV status at discharge: No PONV  Anesthetic complications: no      Cardiovascular status: blood pressure returned to baseline  Respiratory status: unassisted  Hydration status: euvolemic  Follow-up not needed.        Visit Vitals  BP (!) 149/75   Pulse (!) 53   Temp 36.3 °C (97.4 °F) (Temporal)   Resp 16   Ht 5' 2" (1.575 m)   Wt 99.8 kg (220 lb 0.3 oz)   SpO2 100%   Breastfeeding? No   BMI 40.24 kg/m²       Pain/Jeanna Score: Pain Assessment Performed: Yes (12/6/2018  9:12 AM)  Presence of Pain: complains of pain/discomfort (12/6/2018  9:12 AM)  Pain Rating Prior to Med Admin: 7 (12/6/2018  9:06 AM)  Jeanna Score: 10 (12/6/2018  9:12 AM)        "

## 2018-12-06 NOTE — ANESTHESIA PREPROCEDURE EVALUATION
12/06/2018  Carolee Ortega is a 58 y.o., female.    Anesthesia Evaluation      I have reviewed the Medications.     Review of Systems  Anesthesia Hx:  History of prior surgery of interest to airway management or planning: Denies Personal Hx of Anesthesia complications.   Social:  Non-Smoker, No Alcohol Use    Hematology/Oncology:  Hematology Normal   Oncology Normal   Denies Current/Recent Cancer   Cardiovascular:   Hypertension Denies MI.    Denies Angina. ECG has been reviewed.    Pulmonary:   Asthma asymptomatic Denies Shortness of breath.    Renal/:  Renal/ Normal     Hepatic/GI:  Hepatic/GI Normal Denies Liver Disease.  Denies Hepatitis.    Neurological:   Neuromuscular Disease,    Endocrine:   Denies Diabetes.    Psych:   anxiety          Physical Exam  General:  Well nourished    Airway/Jaw/Neck:  Airway Findings: Mouth Opening: Normal Tongue: Normal  General Airway Assessment: Adult  Mallampati: I  TM Distance: Normal, at least 6 cm          Chest/Lungs:  Chest/Lungs Findings: Clear to auscultation, Normal Respiratory Rate     Heart/Vascular:  Heart Findings: Rhythm: Regular Rhythm  Sounds: Normal        Mental Status:  Mental Status Findings:  Cooperative, Alert and Oriented         Anesthesia Plan  Type of Anesthesia, risks & benefits discussed:  Anesthesia Type:  general  Patient's Preference:   Intra-op Monitoring Plan:   Intra-op Monitoring Plan Comments:   Post Op Pain Control Plan: multimodal analgesia  Post Op Pain Control Plan Comments:   Induction:   IV  Beta Blocker:  Patient is on a Beta-Blocker and has received one dose within the past 24 hours (No further documentation required).       Informed Consent: Patient understands risks and agrees with Anesthesia plan.  Questions answered. Anesthesia consent signed with patient.  ASA Score: 2     Day of Surgery Review of History &  Physical:            Ready For Surgery From Anesthesia Perspective.

## 2018-12-06 NOTE — PLAN OF CARE
Pt resting on stretcher, no s&s of pain/distress at present. Respirations even and unlabored on 98% face tent with O2 sats of 100% and will wean to room air shortly. VSS. Will cont to monitor.

## 2018-12-06 NOTE — DISCHARGE SUMMARY
[facility]    Brief Operative Note     SUMMARY     Surgery Date: 12/6/2018     Surgeon(s) and Role:     * Shaq Fairbanks MD - Primary    Assisting Surgeon: None    Pre-op Diagnosis:  Left carpal tunnel syndrome [G56.02]    Post-op Diagnosis:  Post-Op Diagnosis Codes:     * Left carpal tunnel syndrome [G56.02]    Procedure: Procedure(s) (LRB):  RELEASE, CARPAL TUNNEL (Left)    Anesthesia: General    Description of the findings of the procedure: See Dictation    Findings/Key Components: see op note    Estimated Blood Loss: 2ml         Specimens:   Specimen (12h ago, onward)    None          Disposition: Patient tolerated the procedure well and was transferred to PACU in stable condition.      Discharge Note    SUMMARY     Admit Date: 12/6/2018    Discharge Date and Time:   12/06/2018 8:09 AM    Pre-op Diagnosis:  Left carpal tunnel syndrome [G56.02]    Post-op Diagnosis:  Post-Op Diagnosis Codes:     * Left carpal tunnel syndrome [G56.02]    Procedure: Procedure(s) (LRB):  RELEASE, CARPAL TUNNEL (Left)    Hospital Course (synopsis of major diagnoses, care, treatment, and services provided during the course of the hospital stay):  Patient underwent outpatient hand surgery and was transferred to PACU in stable condition.  In PACU, patient received appropriate post-operative care and discharged home with plans for  follow-up with the operative surgeon.       Final Diagnosis: Post-Op Diagnosis Codes:     * Left carpal tunnel syndrome [G56.02]    Disposition: Home or Self Care    Follow Up/Patient Instructions:     Medications:  Reconciled Home Medications:      Medication List      CHANGE how you take these medications    amLODIPine 10 MG tablet  Commonly known as:  NORVASC  TAKE 1 TABLET (10 MG TOTAL) BY MOUTH ONCE DAILY.  What changed:  when to take this     chlorthalidone 25 MG Tab  Commonly known as:  HYGROTEN  TAKE ONE-HALF TABLET (12.5MG) BY MOUTH ONCE DAILY.  What changed:  See the new  instructions.     hydrOXYzine HCl 25 MG tablet  Commonly known as:  ATARAX  Take 1 tablet (25 mg total) by mouth every 4 (four) hours as needed for Itching.  What changed:  when to take this     metoprolol tartrate 25 MG tablet  Commonly known as:  LOPRESSOR  TAKE 1 TABLET BY MOUTH EVERY DAY  What changed:    · how much to take  · how to take this  · when to take this     mometasone 50 mcg/actuation nasal spray  Commonly known as:  NASONEX  2 sprays by Nasal route once daily.  What changed:    · when to take this  · reasons to take this     penciclovir 1 % cream  Commonly known as:  DENAVIR  Apply topically every 2 (two) hours.  What changed:    · when to take this  · reasons to take this     valACYclovir 1000 MG tablet  Commonly known as:  VALTREX  Take 2 tablets (2,000 mg total) by mouth every 12 (twelve) hours.  What changed:    · when to take this  · reasons to take this        CONTINUE taking these medications    albuterol 1.25 mg/3 mL Nebu  Commonly known as:  ACCUNEB  Take 1.25 mg by nebulization every 6 (six) hours as needed.     ALPRAZolam 0.5 MG tablet  Commonly known as:  XANAX  Take 1 tablet (0.5 mg total) by mouth 3 (three) times daily as needed.     dorzolamide-timolol 2-0.5% 22.3-6.8 mg/mL ophthalmic solution  Commonly known as:  COSOPT  Place 1 drop into both eyes every 12 (twelve) hours.     HYDROcodone-acetaminophen 5-325 mg per tablet  Commonly known as:  NORCO  Take 1 tablet by mouth every 6 (six) hours as needed for Pain.     latanoprost 0.005 % ophthalmic solution  Commonly known as:  XALATAN  Place 1 drop into both eyes every evening.     montelukast 10 mg tablet  Commonly known as:  SINGULAIR  TAKE 1 TABLET BY MOUTH IN THE EVENING        STOP taking these medications    meloxicam 15 MG tablet  Commonly known as:  MOBIC          Discharge Procedure Orders   Diet general     Call MD for:  temperature >100.4     Call MD for:  persistent nausea and vomiting     Call MD for:  severe uncontrolled  pain     Call MD for:  difficulty breathing, headache or visual disturbances     Call MD for:  redness, tenderness, or signs of infection (pain, swelling, redness, odor or green/yellow discharge around incision site)     Call MD for:  hives     Call MD for:  persistent dizziness or light-headedness     Call MD for:  extreme fatigue     Remove dressing in 72 hours   Order Comments: Place waterproof bandages over incision. Keep dry at all times     Follow-up Information     Ya Chamberlain PA-C In 2 weeks.    Specialty:  Orthopedic Surgery  Contact information:  [address] [place] [address] [address] [place] [id number] [phone]

## 2018-12-06 NOTE — TRANSFER OF CARE
"Anesthesia Transfer of Care Note    Patient: Carolee Ortega    Procedure(s) Performed: Procedure(s) (LRB):  RELEASE, CARPAL TUNNEL (Left)    Patient location: PACU    Anesthesia Type: general    Transport from OR: Transported from OR on room air with adequate spontaneous ventilation    Post pain: adequate analgesia    Post assessment: no apparent anesthetic complications and tolerated procedure well    Post vital signs: stable    Level of consciousness: sedated    Nausea/Vomiting: no nausea/vomiting    Complications: none    Transfer of care protocol was followed      Last vitals:   Visit Vitals  BP (!) 142/77   Pulse 65   Temp 36.2 °C (97.1 °F) (Temporal)   Resp 19   Ht 5' 2" (1.575 m)   Wt 99.8 kg (220 lb 0.3 oz)   SpO2 98%   Breastfeeding? No   BMI 40.24 kg/m²     "

## 2018-12-06 NOTE — INTERVAL H&P NOTE
The patient has been examined and the H&P has been reviewed:    I concur with the findings and no changes have occurred since H&P was written.    Anesthesia/Surgery risks, benefits and alternative options discussed and understood by patient/family.          Active Hospital Problems    Diagnosis  POA    Left carpal tunnel syndrome [G56.02]  Yes      Resolved Hospital Problems   No resolved problems to display.

## 2018-12-06 NOTE — DISCHARGE INSTRUCTIONS
What to expect during recovery    Pain  · You will experience some level of pain after surgery.  Pain medication should help with the pain, but may not be able to eliminate it entirely.  Pain will decrease with time, and most pain will be gone by 4 to 6 weeks after surgery.  · Ice packs may help with pain and can reduce swelling.  · Your prescription pain medication may contain acetaminophen (Tylenol).  If so, you should not take additional acetaminophen (Tylenol) at the same time as your pain medication.   · Do not drive, operate machinery or power tools, or sign legal papers for 24 hours or as long as you are on your postoperative pain medication.   · Prescription pain medication should be taken only as directed.  We are not able to replace pain medication that has been lost or stolen.    Nausea/vomiting  · You may experience nausea or vomiting as a result of anesthesia or pain medication.  · If you experience severe nausea or you are unable to keep fluids down, contact your doctor.    Bleeding  · A small amount of clear or reddish drainage from the incision is normal after surgery.  · For mild bleeding from the incision, apply pressure for five minutes.  · If bleeding is severe or does not stop with pressure, contact your doctor.    Signs of infection  · Notify your doctor if you have the following signs of infection:  · Fever over 101 degrees  · Worsening redness around incsion  · Thick drainage from incision  · Foul smell from incision  · You may experience low fever/chills, this is normal after surgery.    Other post-operative symptoms  · It is safe to take over-the-counter medications for constipation, heartburn, sleep, or itching if needed.    · You may experience light-headedness, dizziness, and sleepiness following surgery. Please do not stay alone. A responsible adult should be with you for this 24 hour period.     Activity  · Try to rest and avoid strenuous activity, but also get out of bed regularly  unless your doctor has ordered strict bedrest.  · Several times every hour while you are awake, pump and flex your feet 5-6 times and do foot circles. This will help prevent blood clots.  · Several times every hour while you are awake, take 2 to 3 deep breaths and cough. If you had stomach surgery, hold a pillow or rolled towel firmly against your stomach before you cough. This will help with any pain the cough might cause.  · Do not smoke after surgery, it decreases your ability to heal and increases the risk of infection and pneumonia.    Nozin: Nasal   · Nozin reduces nasal germs to help decrease the risk of infection after surgery.  · Continue Nozin provided at discharge twice daily for 7 days or until the incision is healed.    · Place 4 drops to cotton swab tip and swab both nostril rims 6 times in each direction.  · See pamphlet for more information.     Diet  · Drink lots of fluids after surgery, unless otherwise instructed.  · You might not have much appetite at first.  Progress slowly to a normal diet unless given other specific diet instructions by your doctor.  Begin with liquids, then soup and crackers, working up to solid foods.  · Do not drink alcoholic beverages including beer for 24 hours or as long as you are on post-operative pain medication.    Follow-up after surgery  · You can contact your doctor through the patient portal using the [employer] [facility] or at [email or web address].  · You can also contact your doctor at any time by calling [phone] for the [facility] on [facility], or [phone] for the [facility] on [facility].  · A nurse will be calling you sometime after surgery. Do not be alarmed. This is our way of finding out how you are doing.

## 2018-12-10 ENCOUNTER — OFFICE VISIT (OUTPATIENT)
Dept: OTOLARYNGOLOGY | Facility: CLINIC | Age: 58
End: 2018-12-10
Payer: COMMERCIAL

## 2018-12-10 VITALS
SYSTOLIC BLOOD PRESSURE: 135 MMHG | BODY MASS INDEX: 40.97 KG/M2 | TEMPERATURE: 98 F | HEART RATE: 57 BPM | WEIGHT: 224 LBS | DIASTOLIC BLOOD PRESSURE: 88 MMHG

## 2018-12-10 DIAGNOSIS — J35.1 LINGUAL TONSIL HYPERTROPHY: Primary | ICD-10-CM

## 2018-12-10 PROCEDURE — 99999 PR PBB SHADOW E&M-EST. PATIENT-LVL II: CPT | Mod: PBBFAC,,, | Performed by: OTOLARYNGOLOGY

## 2018-12-10 PROCEDURE — 31575 DIAGNOSTIC LARYNGOSCOPY: CPT | Mod: S$GLB,,, | Performed by: OTOLARYNGOLOGY

## 2018-12-10 PROCEDURE — 99243 OFF/OP CNSLTJ NEW/EST LOW 30: CPT | Mod: 25,S$GLB,, | Performed by: OTOLARYNGOLOGY

## 2018-12-10 NOTE — PROGRESS NOTES
Referring Provider:    Ya Russell Pa-c  [address] [facility] [place] [address]  Subjective:   Patient: Carolee Ortega [id number], :1960   Visit date:12/10/2018 8:23 AM    Chief Complaint:  Other (mass in throat found by surgeon last 18)    HPI:  Carolee is a 58 y.o. female who I was asked to see in consultation for evaluation of the following issue(s):    Patient underwent CTR on 18. During intubation, they felt that a mass was present. She presents to clinic on 12/10/18  for a follow-up visit regarding abnormal findings. She denies any difficulty swallowing, any chronic throat pain, unintentional weight loss, coughing or spitting up of blood.  She has no smoking history.  She does have a history of tonsillitis and had a tonsillectomy performed a few years ago.    Review of Systems:  Negative unless checked off.  Gen:  []fever   []fatigue  HENT:  []nosebleeds  []dental problem   Eyes:  []photophobia  []visual disturbance  Resp:  []chest tightness []wheezing  Card:  []chest pain  []leg swelling  GI:  []abdominal pain []blood in stool  :  []dysuria  []hematuria  Musc:  []joint swelling  []gait problem  Skin:  []color change  []pallor  Neuro:  []seizures  []numbness  Hem:  []bruise/bleed easily  Psych:  []hallucinations  []behavioral problems  Allergy/Imm: is allergic to flexeril [cyclobenzaprine].    Her meds, allergies, medical, surgical, social & family histories were reviewed & updated:  -     She has a current medication list which includes the following prescription(s): albuterol, alprazolam, amlodipine, chlorthalidone, dorzolamide-timolol 2-0.5%, hydrocodone-acetaminophen, hydroxyzine hcl, latanoprost, metoprolol tartrate, mometasone, montelukast, penciclovir, and valacyclovir.  -     She  has a past medical history of Allergic rhinitis, Allergy, Anxiety, Asthma, JAGDISH (generalized anxiety disorder), Glaucoma (increased eye pressure), Herpes labialis, Hypertension,  Obesity, Seborrheic dermatitis, and Shingles (2016).   -     She does not have any pertinent problems on file.   -     She  has a past surgical history that includes MARYANN and USO (); Breast biopsy (); Tonsillectomy (2011);  section; RELEASE, CARPAL TUNNEL (Left, 2018); RELEASE, CARPAL TUNNEL (Right, 10/4/2018); and RELEASE, TRIGGER FINGER (Right, 10/4/2018).  -     She  reports that  has never smoked. she has never used smokeless tobacco. She reports that she does not drink alcohol or use drugs.  -     Her family history includes Breast cancer in her maternal cousin, maternal cousin, maternal grandmother, and other; Coronary artery disease in her father; Diabetes in her other; Glaucoma in her father; Heart attack in her father; Heart disease (age of onset: 32) in her mother; Hypertension in her sister; Kidney disease in her sister.  -     She is allergic to flexeril [cyclobenzaprine].    Objective:     Physical Exam:  Vitals:  /88   Pulse (!) 57   Temp 98.2 °F (36.8 °C) (Tympanic)   Wt 101.6 kg (223 lb 15.8 oz)   BMI 40.97 kg/m²   General appearance:  Well developed, well nourished    Eyes:  Extraocular motions intact, PERRL    Communication:  no hoarseness, no dysphonia    Ears:  Otoscopy of external auditory canals and tympanic membranes was normal, clinical speech reception thresholds grossly intact, no mass/lesion of auricle.  Nose:  No masses/lesions of external nose, nasal mucosa, septum, and turbinates were within normal limits.  Mouth:  No mass/lesion of lips, teeth, gums, hard/soft palate, tongue, tonsils, or oropharynx.    Cardiovascular:  No pedal edema; Radial Pulses +2     Neck & Lymphatics:  No cervical lymphadenopathy, no neck mass/crepitus/ asymmetry, trachea is midline, no thyroid enlargement/tenderness/mass.    Psych: Oriented x3,  Alert with normal mood and affect.     Respiration/Chest:  Symmetric expansion during respiration, normal respiratory  effort.    Skin:  Warm and intact. No ulcerations of face, scalp, neck.    Assessment & Plan:   Carolee was seen today for other.    Diagnoses and all orders for this visit:    Lingual tonsil hypertrophy      Asymptomatic lingual tonsil hypertrophy.  We discussed her medical conditions, treatments and plan.   Furthermore, I counseled Carolee that while at this point I do not see any evidence of head and neck cancer, this does not eliminate the possibility of a very small malignancy that is currently undetectable on examination or developing head and neck cancer in the future.  I advised them of the signs of head and neck cancer and that they should alert my office should they occur for more than 10-14 days (unintentional weight loss, pain or difficulty with swallowing, bleeding in the oral cavity or throat, coughing or spitting up blood, pain with mouth opening, persistent ulceration of the skin or mucosa, persistent or enlarging neck masses/swelling).  Carolee seemed to have a clear understanding of the concerning nature of these conditions.  Carolee should return to clinic if any issues arise (symptoms worsen or persist), otherwise we will see her back in the clinic only as needed.         Thank you for allowing me to participate in the care of Carolee.    Itz Cobian MD        Patient: Carolee Ortega [id number], :1960  Procedure date:12/10/2018  Patient's medications, allergies, past medical, surgical, social and family histories were reviewed and updated as appropriate.  Chief Complaint:  Other (mass in throat found by surgeon last 18)    HPI:  Carolee is a 58 y.o. female with the history of present illness as discussed in the clinic note from today.    Procedure: Risks, benefits, and alternatives of the procedure were discussed with the patient, and the patient consented to the fiberoptic examination.  We applied a topical nasal decongestant and analgesic.  After adequate anesthesia was obtained,  the flexible fiberoptic scope was passed into each nostril independently.  Each nasal cavity, the entire pharynx (nasopharynx to hypopharynx) and the larynx were visualized. At the end of the examination, the scope was removed. The patient tolerated the procedure well with no complications.     Findings:  -     Laryngeal mucosa is normal  -     Posterior commissure has no hypertrophy  -     Lingual tonsils have significant hypertrophy  -     Adenoids have no  hypertrophy  -     Right vocal fold: normal mobility     mass/lesion: none  -     Left vocal fold: normal mobility     mass/lesion: none  -     Other findings: none    Assessment & Plan:  - see today's clinic note

## 2018-12-10 NOTE — LETTER
December 10, 2018      Ya Chamberlain PA-C  [address] [place] [address]           [facility]  [address] [place] [address]  Phone: [phone]  Fax: [phone]          Patient: Carolee Ortega   MR Number: [id number]   YOB: 1960   Date of Visit: 12/10/2018       Dear Ya Chamberlain:    Thank you for referring Carolee Ortega to me for evaluation. Attached you will find relevant portions of my assessment and plan of care.    If you have questions, please do not hesitate to call me. I look forward to following Carolee Ortega along with you.    Sincerely,    Itz Cobian MD    Enclosure  CC:  No Recipients    If you would like to receive this communication electronically, please contact [email or web address] or [phone] to request more information on [facility] Link access.    For providers and/or their staff who would like to refer a patient to [facility], please contact us through our one-stop-shop provider referral line, [facility], at [phone].    If you feel you have received this communication in error or would no longer like to receive these types of communications, please e-mail [email or web address]

## 2018-12-11 VITALS
SYSTOLIC BLOOD PRESSURE: 149 MMHG | HEIGHT: 62 IN | WEIGHT: 220 LBS | RESPIRATION RATE: 16 BRPM | TEMPERATURE: 97 F | OXYGEN SATURATION: 100 % | BODY MASS INDEX: 40.48 KG/M2 | DIASTOLIC BLOOD PRESSURE: 75 MMHG | HEART RATE: 53 BPM

## 2018-12-12 ENCOUNTER — PATIENT OUTREACH (OUTPATIENT)
Dept: OTHER | Facility: OTHER | Age: 58
End: 2018-12-12

## 2018-12-12 NOTE — PROGRESS NOTES
Last 5 Patient Entered Readings                                      Current 30 Day Average: 132/85     Recent Readings 12/7/2018 11/29/2018 11/28/2018 11/28/2018 11/21/2018    SBP (mmHg) 130 129 146 126 137    DBP (mmHg) 78 83 93 91 88    Pulse 50 52 59 56 51          Digital Medicine: Health  Follow Up    Lifestyle Modifications:    1.Dietary Modifications (Sodium intake <2,000mg/day, food labels, dining out): States she tried some of the recipes and will start halving the recipe because it made too much for her and her .  States they were great.    2.Physical Activity: States she and her  joined a gym, so her goal is to lose weight.    3.Medication Therapy: Patient has been compliant with the medication regimen.    4.Patient has the following medication side effects/concerns: none  (Frequency/Alleviating factors/Precipitating factors, etc.)     Follow up with Mrs. Carolee PARISH Shannon completed. No further questions or concerns. Will continue to follow up to achieve health goals.

## 2018-12-17 ENCOUNTER — TELEPHONE (OUTPATIENT)
Dept: ORTHOPEDICS | Facility: CLINIC | Age: 58
End: 2018-12-17

## 2018-12-17 ENCOUNTER — PATIENT MESSAGE (OUTPATIENT)
Dept: ORTHOPEDICS | Facility: CLINIC | Age: 58
End: 2018-12-17

## 2018-12-17 NOTE — TELEPHONE ENCOUNTER
Spoke with pt to reschedule her 12-21-18 appt, Dr. Fairbanks not available for clinic due to surgeries. Pt states she will come 12-24-18 720. Pt aware of new date.

## 2018-12-18 ENCOUNTER — PATIENT MESSAGE (OUTPATIENT)
Dept: ADMINISTRATIVE | Facility: OTHER | Age: 58
End: 2018-12-18

## 2018-12-18 ENCOUNTER — PATIENT OUTREACH (OUTPATIENT)
Dept: OTHER | Facility: OTHER | Age: 58
End: 2018-12-18

## 2018-12-18 DIAGNOSIS — I10 ESSENTIAL HYPERTENSION: Primary | ICD-10-CM

## 2018-12-18 RX ORDER — CHLORTHALIDONE 25 MG/1
25 TABLET ORAL DAILY
Qty: 90 TABLET | Refills: 1 | Status: SHIPPED | OUTPATIENT
Start: 2018-12-18 | End: 2019-01-21 | Stop reason: DRUGHIGH

## 2018-12-18 NOTE — PROGRESS NOTES
Last 5 Patient Entered Readings                                      Current 30 Day Average: 130/84     Recent Readings 12/18/2018 12/17/2018 12/16/2018 12/10/2018 12/7/2018    SBP (mmHg) 132 119 131 119 130    DBP (mmHg) 80 78 85 84 78    Pulse 55 61 64 62 50        Patient's BP average is above goal of <130/80.     Patient denies s/s of hypotension (lightheadedness, dizziness, nausea, fatigue) associated with low readings. Instructed patient to inform me if this occurs, patient confirms understanding.      Patient denies s/s of hypertension (SOB, CP, severe headaches, changes in vision) associated with high readings. Instructed patient to go to the ED if BP > 180/110 and accompanied by hypertensive s/s, patient confirms understanding.\    Current HTN regimen:  Hypertension Medications             amLODIPine (NORVASC) 10 MG tablet TAKE 1 TABLET (10 MG TOTAL) BY MOUTH ONCE DAILY.    chlorthalidone (HYGROTEN) 25 MG Tab TAKE ONE-HALF TABLET (12.5MG) BY MOUTH ONCE DAILY.    metoprolol tartrate (LOPRESSOR) 25 MG tablet TAKE 1 TABLET BY MOUTH EVERY DAY        Patient will increase chlorthalidone to 25mg daily with a BMP in two weeks on 1/3/19. She will continue to work on lifestyle.    Will continue to monitor regularly. Will follow up in 2-3 weeks, sooner if BP begins to trend upward or downward.    Patient has my contact information and knows to call with any concerns or clinical changes.

## 2018-12-19 ENCOUNTER — OFFICE VISIT (OUTPATIENT)
Dept: ORTHOPEDICS | Facility: CLINIC | Age: 58
End: 2018-12-19
Payer: COMMERCIAL

## 2018-12-19 VITALS
WEIGHT: 223 LBS | HEIGHT: 62 IN | BODY MASS INDEX: 41.04 KG/M2 | HEART RATE: 61 BPM | SYSTOLIC BLOOD PRESSURE: 121 MMHG | DIASTOLIC BLOOD PRESSURE: 75 MMHG | TEMPERATURE: 98 F

## 2018-12-19 DIAGNOSIS — G56.03 BILATERAL CARPAL TUNNEL SYNDROME: Primary | ICD-10-CM

## 2018-12-19 PROCEDURE — 99999 PR PBB SHADOW E&M-EST. PATIENT-LVL III: CPT | Mod: PBBFAC,,, | Performed by: ORTHOPAEDIC SURGERY

## 2018-12-19 PROCEDURE — 99024 POSTOP FOLLOW-UP VISIT: CPT | Mod: S$GLB,,, | Performed by: ORTHOPAEDIC SURGERY

## 2018-12-19 NOTE — PROGRESS NOTES
Subjective:     Patient ID: Carolee Ortega is a 58 y.o. female.    Chief Complaint: Pain and Post-op Evaluation of the Left Hand    2wk postop. Doing well, much improvement in her symptoms.  DATE OF PROCEDURE: 12/06/2018     PREOPERATIVE DIAGNOSIS: left carpal tunnel syndrome.      POSTOPERATIVE DIAGNOSIS: left carpal tunnel syndrome.      PROCEDURES PERFORMED: left carpal tunnel release.      SURGEON: Shaq Fairbanks M.D.        10/4/18  PREOPERATIVE DIAGNOSIS:  1. Right carpal tunnel syndrome.   2. Right middle finger trigger finger     POSTOPERATIVE DIAGNOSIS: same     PROCEDURES PERFORMED: Right  carpal tunnel release, right middle trigger finger release  SURGEON: Shaq Fairbanks M.D.         Hand Pain    The pain is present in the right hand. This is a new problem. The current episode started more than 1 month ago. There has been no history of extremity trauma. The injury was the result of a twisting, lifting and pulling action The problem occurs constantly. The problem has been unchanged. The quality of the pain is described as dull, aching and throbbing. The pain is at a severity of 0/10. Associated symptoms include joint locking. Pertinent negatives include no fever or numbness. The symptoms are aggravated by lifting (gripping). She has tried cold, heat and NSAIDs for the symptoms. The treatment provided moderate relief. Physical therapy was not tried.      Past Medical History:   Diagnosis Date    Allergic rhinitis     Allergy     Anxiety     Asthma     JAGDISH (generalized anxiety disorder)     Glaucoma (increased eye pressure)     Herpes labialis     Hypertension     Obesity     Seborrheic dermatitis     Shingles 4/2016     Past Surgical History:   Procedure Laterality Date    BREAST BIOPSY  1983    right    CARPAL TUNNEL RELEASE Right 10/4/2018    Procedure: RELEASE, CARPAL TUNNEL;  Surgeon: Shqa Fairbanks MD;  [facility];  Service: Orthopedics;  Laterality: Right;     CARPAL TUNNEL RELEASE Left 2018    Procedure: RELEASE, CARPAL TUNNEL;  Surgeon: Shaq Fairbanks MD;  Location: [facility];  Service: Orthopedics;  Laterality: Left;     SECTION      x1    RELEASE, CARPAL TUNNEL Left 2018    Performed by Shaq Fairbanks MD at [facility]    RELEASE, CARPAL TUNNEL Right 10/4/2018    Performed by Shaq Fairbanks MD at [facility]    RELEASE, TRIGGER FINGER Right 10/4/2018    Performed by Shaq Fairbanks MD at [facility]    [facility]      Secondary to fibroids    TONSILLECTOMY  2011    TRIGGER FINGER RELEASE Right 10/4/2018    Procedure: RELEASE, TRIGGER FINGER;  Surgeon: Shaq Fairbanks MD;  Location: [facility];  Service: Orthopedics;  Laterality: Right;  right middle finger      Family History   Problem Relation Age of Onset    Heart attack Father     Coronary artery disease Father     Glaucoma Father     Heart disease Mother 32    Breast cancer Maternal Grandmother     Hypertension Sister     Kidney disease Sister     Breast cancer Other         Great aunts    Diabetes Other         Paternal great-grandmother    Breast cancer Maternal Cousin     Breast cancer Maternal Cousin      Social History     Socioeconomic History    Marital status:      Spouse name: Not on file    Number of children: Not on file    Years of education: Not on file    Highest education level: Not on file   Social Needs    Financial resource strain: Not on file    Food insecurity - worry: Not on file    Food insecurity - inability: Not on file    Transportation needs - medical: Not on file    Transportation needs - non-medical: Not on file   Occupational History    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         Medication List           Accurate as of 12/19/18  8:00 AM. If you have any questions, ask your nurse or doctor.               CHANGE how you take these medications    amLODIPine 10 MG tablet  Commonly known as:  NORVASC  TAKE 1 TABLET (10 MG TOTAL) BY MOUTH ONCE DAILY.  What changed:  when to take this     hydrOXYzine HCl 25 MG tablet  Commonly known as:  ATARAX  Take 1 tablet (25 mg total) by mouth every 4 (four) hours as needed for Itching.  What changed:  when to take this     metoprolol tartrate 25 MG tablet  Commonly known as:  LOPRESSOR  TAKE 1 TABLET BY MOUTH EVERY DAY  What changed:    · how much to take  · how to take this  · when to take this     mometasone 50 mcg/actuation nasal spray  Commonly known as:  NASONEX  2 sprays by Nasal route once daily.  What changed:    · when to take this  · reasons to take this     penciclovir 1 % cream  Commonly known as:  DENAVIR  Apply topically every 2 (two) hours.  What changed:    · when to take this  · reasons to take this     valACYclovir 1000 MG tablet  Commonly known as:  VALTREX  Take 2 tablets (2,000 mg total) by mouth every 12 (twelve) hours.  What changed:    · when to take this  · reasons to take this        CONTINUE taking these medications    albuterol 1.25 mg/3 mL Nebu  Commonly known as:  ACCUNEB     ALPRAZolam 0.5 MG tablet  Commonly known as:  XANAX  Take 1 tablet (0.5 mg total) by mouth 3 (three) times daily as needed.     chlorthalidone 25 MG Tab  Commonly known as:  HYGROTEN  Take 1 tablet (25 mg total) by mouth once daily.     dorzolamide-timolol 2-0.5% 22.3-6.8 mg/mL ophthalmic solution  Commonly known as:  COSOPT  Place 1 drop into both eyes every 12 (twelve) hours.     HYDROcodone-acetaminophen 5-325 mg per tablet  Commonly known as:  NORCO  Take 1 tablet by mouth every 6 (six) hours as  needed for Pain.     latanoprost 0.005 % ophthalmic solution  Commonly known as:  XALATAN  Place 1 drop into both eyes every evening.     montelukast 10 mg tablet  Commonly known as:  SINGULAIR  TAKE 1 TABLET BY MOUTH IN THE EVENING          Review of patient's allergies indicates:   Allergen Reactions    Flexeril [cyclobenzaprine] Palpitations     Review of Systems   Constitution: Negative for fever.   HENT: Negative for hearing loss.    Eyes: Negative for blurred vision.   Cardiovascular: Negative for chest pain and leg swelling.   Respiratory: Negative for shortness of breath.    Endocrine: Negative for polyuria.   Hematologic/Lymphatic: Negative for bleeding problem.   Skin: Negative for rash.   Musculoskeletal: Positive for joint pain. Negative for back pain, joint swelling, muscle cramps and muscle weakness.   Gastrointestinal: Negative for melena.   Genitourinary: Negative for hematuria.   Neurological: Negative for loss of balance, numbness and paresthesias.   Psychiatric/Behavioral: Negative for altered mental status.       Objective:   Body mass index is 40.79 kg/m².  Vitals:    12/19/18 0734   BP: 121/75   Pulse: 61   Temp: 97.7 °F (36.5 °C)       General: Carolee is well-developed, well-nourished, appears stated age, in no acute distress, alert and oriented to time, place and person.                   Right Hand/Wrist Exam     Other     Neuorologic Exam    Median Distribution: normal  Ulnar Distribution: normal  Radial Distribution: normal    Comments:  Incisions well healed  No erythema/drainage/signs of infection  No calf tenderness  No triggering          Muscle Strength   Right Upper Extremity   : 4/5/5     Vascular Exam       Capillary Refill  Right Hand: normal capillary refill    Xray finger shows no arthritis right middle finger; No fracture or dislocation.    INTERPRETATION  -Bilateral median motor nerve conduction study showed normal latency, amplitude, and conduction velocity  -Bilateral  median sensory nerve conduction study showed normal peak latency and amplitude  -Bilateral ulnar motor nerve conduction study showed normal latency, amplitude, and conduction velocity  -Bilateral ulnar sensory nerve conduction study showed normal peak latency and amplitude  -Bilateral radial sensory nerve conduction study showed normal peak latency and amplitude  -Bilateral combined sensory index showed a significant latency of 1.6 msec on the right and 1.5 msec on the left     IMPRESSION  1. ABNORMAL study  2. There is electrodiagnostic evidence of a MILD demyelinating median neuropathy (Carpal tunnel syndrome) across BILATERAL wrists     PLAN  1. Follow up with referring provider: Dr. Shaq Fairbanks  2. Handouts on CTS prevention provided.   3. This study is good for one year. If symptoms worsen or do not improve, please re-consult.       Assessment:     Encounter Diagnosis   Name Primary?    Bilateral carpal tunnel syndrome Yes        Plan:     1. Sutures out    2. 2lb weight lifting restriction for 2 more weeks    3.  strength exercises    4. F/up 4wk

## 2018-12-20 ENCOUNTER — PATIENT MESSAGE (OUTPATIENT)
Dept: ORTHOPEDICS | Facility: CLINIC | Age: 58
End: 2018-12-20

## 2018-12-20 ENCOUNTER — TELEPHONE (OUTPATIENT)
Dept: ORTHOPEDICS | Facility: CLINIC | Age: 58
End: 2018-12-20

## 2018-12-20 ENCOUNTER — OFFICE VISIT (OUTPATIENT)
Dept: ORTHOPEDICS | Facility: CLINIC | Age: 58
End: 2018-12-20
Payer: COMMERCIAL

## 2018-12-20 VITALS — HEART RATE: 59 BPM | DIASTOLIC BLOOD PRESSURE: 82 MMHG | SYSTOLIC BLOOD PRESSURE: 121 MMHG | RESPIRATION RATE: 12 BRPM

## 2018-12-20 DIAGNOSIS — Z98.890 POSTOPERATIVE STATE: ICD-10-CM

## 2018-12-20 DIAGNOSIS — G56.02 LEFT CARPAL TUNNEL SYNDROME: Primary | ICD-10-CM

## 2018-12-20 PROCEDURE — 99024 POSTOP FOLLOW-UP VISIT: CPT | Mod: S$GLB,,, | Performed by: PHYSICIAN ASSISTANT

## 2018-12-20 PROCEDURE — 99999 PR PBB SHADOW E&M-EST. PATIENT-LVL III: CPT | Mod: PBBFAC,,, | Performed by: PHYSICIAN ASSISTANT

## 2018-12-20 NOTE — TELEPHONE ENCOUNTER
Spoke with the patient about oozing in her left hand. I informed the patient that Dr. Fairbanks wanted her to come in and see his PA Hedy Trejo today for the oozing. In got the patient scheduled for 1:00 today. Patient verbalized understanding. Patient was thankful for the call. FP

## 2018-12-20 NOTE — PROGRESS NOTES
Patient ID: Carolee Ortega is a 58 y.o. female.    Chief Complaint: Post-op Evaluation, Pain, and Follow-up of the Left Hand      HPI: Carolee Ortega  is a 58 y.o. female who c/o Post-op Evaluation, Pain, and Follow-up of the Left Hand   for duration of 1 day.  She underwent carpal tunnel release approximately 2 weeks ago.  She had a postop follow-up with Dr. seymour in yesterday.  Sutures removed. She called today stating that she had some drainage and wanted to get it checked out. Otherwise no changes since yesterday's exam.  She denies erythema.  She denies purulent drainage.    Past Medical History:   Diagnosis Date    Allergic rhinitis     Allergy     Anxiety     Asthma     JAGDISH (generalized anxiety disorder)     Glaucoma (increased eye pressure)     Herpes labialis     Hypertension     Obesity     Seborrheic dermatitis     Shingles 2016     Past Surgical History:   Procedure Laterality Date    BREAST BIOPSY      right    CARPAL TUNNEL RELEASE Right 10/4/2018    Procedure: RELEASE, CARPAL TUNNEL;  Surgeon: Shaq Fairbanks MD;  Location: [facility] OR;  Service: Orthopedics;  Laterality: Right;    CARPAL TUNNEL RELEASE Left 2018    Procedure: RELEASE, CARPAL TUNNEL;  Surgeon: Shaq Fairbanks MD;  Location: [facility] OR;  Service: Orthopedics;  Laterality: Left;     SECTION      x1    RELEASE, CARPAL TUNNEL Left 2018    Performed by Shaq Fairbanks MD at [facility] OR    RELEASE, CARPAL TUNNEL Right 10/4/2018    Performed by Shaq Fairbanks MD at [facility] OR    RELEASE, TRIGGER FINGER Right 10/4/2018    Performed by Shaq Fairbanks MD at [facility] OR    [facility]      Secondary to fibroids    TONSILLECTOMY  2011    TRIGGER FINGER RELEASE Right 10/4/2018    Procedure: RELEASE, TRIGGER FINGER;  Surgeon: Shaq Fairbanks MD;  Location: [facility] OR;  Service: Orthopedics;  Laterality: Right;  right middle finger      Family History   Problem Relation Age  of Onset    Heart attack Father     Coronary artery disease Father     Glaucoma Father     Heart disease Mother 32    Breast cancer Maternal Grandmother     Hypertension Sister     Kidney disease Sister     Breast cancer Other         Great aunts    Diabetes Other         Paternal great-grandmother    Breast cancer Maternal Cousin     Breast cancer Maternal Cousin      Social History     Socioeconomic History    Marital status:      Spouse name: Not on file    Number of children: Not on file    Years of education: Not on file    Highest education level: Not on file   Social Needs    Financial resource strain: Not on file    Food insecurity - worry: Not on file    Food insecurity - inability: Not on file    Transportation needs - medical: Not on file    Transportation needs - non-medical: Not on file   Occupational History    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         Medication List           Accurate as of 12/20/18  1:18 PM. If you have any questions, ask your nurse or doctor.               CHANGE how you take these medications    amLODIPine 10 MG tablet  Commonly known as:  NORVASC  TAKE 1 TABLET (10 MG TOTAL) BY MOUTH ONCE DAILY.  What changed:  when to take this     hydrOXYzine HCl 25 MG tablet  Commonly known as:  ATARAX  Take 1 tablet (25 mg total) by mouth every 4 (four) hours as needed for Itching.  What changed:  when to take this     metoprolol tartrate 25 MG tablet  Commonly known as:  LOPRESSOR  TAKE 1 TABLET BY MOUTH EVERY DAY  What changed:    · how much to take  · how to take this  · when to take  this     mometasone 50 mcg/actuation nasal spray  Commonly known as:  NASONEX  2 sprays by Nasal route once daily.  What changed:    · when to take this  · reasons to take this     penciclovir 1 % cream  Commonly known as:  DENAVIR  Apply topically every 2 (two) hours.  What changed:    · when to take this  · reasons to take this     valACYclovir 1000 MG tablet  Commonly known as:  VALTREX  Take 2 tablets (2,000 mg total) by mouth every 12 (twelve) hours.  What changed:    · when to take this  · reasons to take this        CONTINUE taking these medications    albuterol 1.25 mg/3 mL Nebu  Commonly known as:  ACCUNEB     ALPRAZolam 0.5 MG tablet  Commonly known as:  XANAX  Take 1 tablet (0.5 mg total) by mouth 3 (three) times daily as needed.     chlorthalidone 25 MG Tab  Commonly known as:  HYGROTEN  Take 1 tablet (25 mg total) by mouth once daily.     dorzolamide-timolol 2-0.5% 22.3-6.8 mg/mL ophthalmic solution  Commonly known as:  COSOPT  Place 1 drop into both eyes every 12 (twelve) hours.     HYDROcodone-acetaminophen 5-325 mg per tablet  Commonly known as:  NORCO  Take 1 tablet by mouth every 6 (six) hours as needed for Pain.     latanoprost 0.005 % ophthalmic solution  Commonly known as:  XALATAN  Place 1 drop into both eyes every evening.     montelukast 10 mg tablet  Commonly known as:  SINGULAIR  TAKE 1 TABLET BY MOUTH IN THE EVENING          Review of patient's allergies indicates:   Allergen Reactions    Flexeril [cyclobenzaprine] Palpitations           Objective:                    Right Hand/Wrist Exam     Other     Neuorologic Exam    Median Distribution: normal  Ulnar Distribution: normal  Radial Distribution: normal    Comments:  Incision CDI  No erythema/drainage/signs of infection  No calf tenderness  No triggering          Muscle Strength   Right Upper Extremity   : 4/5/5     Vascular Exam       Capillary Refill  Right Hand: normal capillary refill              Assessment:       Encounter  Diagnoses   Name Primary?    Left carpal tunnel syndrome Yes    Postoperative state           Plan:       Carolee was seen today for post-op evaluation, pain and follow-up.    Diagnoses and all orders for this visit:    Left carpal tunnel syndrome    Postoperative state        Ms. Zarco is a postop patient comes in just to have hurts incision checked.  The incision looks good today.  There is no erythema no purulence, no sign or symptom of infection.  The top layer of skin is peeling slightly.  The lower layers of skin are well healed at this time. I have let her know that this is a normal healing carpal tunnel.  I applied more suture strips across the incision since the ones fell off already from yesterday.  She can get it wet with clean running water but should not soak her hand in standing water.  She will keep her follow-up appointment as scheduled with Dr. Fairbanks.  She will let us know if she develops any signs or symptoms of infection.  She verbalizes understanding and agrees      The patient understands, chooses and consents to this plan and accepts all   the risks which include but are not limited to the risks mentioned above.     Disclaimer: This note was prepared using a voice recognition system and is likely to have sound alike errors within the text.

## 2018-12-20 NOTE — Clinical Note
Her incision is fine.  It was the top layer of skin it split open.  I let her know that is completely normal for carpal tunnel on the under the layers of skin looked to be well healed.  She will let us know if she develops any signs or symptoms of infection

## 2019-01-03 ENCOUNTER — LAB VISIT (OUTPATIENT)
Dept: LAB | Facility: HOSPITAL | Age: 59
End: 2019-01-03
Attending: FAMILY MEDICINE
Payer: COMMERCIAL

## 2019-01-03 DIAGNOSIS — I10 ESSENTIAL HYPERTENSION: ICD-10-CM

## 2019-01-03 LAB
ANION GAP SERPL CALC-SCNC: 9 MMOL/L
BUN SERPL-MCNC: 10 MG/DL
CALCIUM SERPL-MCNC: 9.7 MG/DL
CHLORIDE SERPL-SCNC: 95 MMOL/L
CO2 SERPL-SCNC: 32 MMOL/L
CREAT SERPL-MCNC: 0.8 MG/DL
EST. GFR  (AFRICAN AMERICAN): >60 ML/MIN/1.73 M^2
EST. GFR  (NON AFRICAN AMERICAN): >60 ML/MIN/1.73 M^2
GLUCOSE SERPL-MCNC: 83 MG/DL
POTASSIUM SERPL-SCNC: 3.1 MMOL/L
SODIUM SERPL-SCNC: 136 MMOL/L

## 2019-01-03 PROCEDURE — 36415 COLL VENOUS BLD VENIPUNCTURE: CPT | Mod: PO

## 2019-01-03 PROCEDURE — 80048 BASIC METABOLIC PNL TOTAL CA: CPT

## 2019-01-04 ENCOUNTER — PATIENT OUTREACH (OUTPATIENT)
Dept: OTHER | Facility: OTHER | Age: 59
End: 2019-01-04

## 2019-01-04 ENCOUNTER — PATIENT MESSAGE (OUTPATIENT)
Dept: FAMILY MEDICINE | Facility: CLINIC | Age: 59
End: 2019-01-04

## 2019-01-04 DIAGNOSIS — I10 ESSENTIAL HYPERTENSION: Primary | ICD-10-CM

## 2019-01-04 RX ORDER — POTASSIUM CHLORIDE 20 MEQ/1
20 TABLET, EXTENDED RELEASE ORAL DAILY
Qty: 30 TABLET | Refills: 1 | Status: SHIPPED | OUTPATIENT
Start: 2019-01-04 | End: 2019-01-04 | Stop reason: CLARIF

## 2019-01-04 NOTE — TELEPHONE ENCOUNTER
It appears that her BP has been well controlled on the chlorthalidone which is good.  This medication is a diuretic and can cause loss of potassium.  It should not cause kidney problems. I think that OTC potassium might not be strong enough but the usual dose is 99 mg.  I can prescribe potassium if she decides to do that.  She could try 1/2 tablet if it maintains BP.

## 2019-01-04 NOTE — PROGRESS NOTES
Last 5 Patient Entered Readings                                      Current 30 Day Average: 125/82     Recent Readings 1/4/2019 1/3/2019 1/1/2019 12/31/2018 12/30/2018    SBP (mmHg) 115 129 118 131 118    DBP (mmHg) 79 86 80 89 82    Pulse 59 63 64 75 64        Patient spoke with Dr. Walter and she wants to try the OTC potassium supplement versus a prescription. I am cancelling her potassium chloride 20mEq prescription.

## 2019-01-04 NOTE — TELEPHONE ENCOUNTER
I spoke with pt and informed her that her potassium level is related to taking chlorthalidone and to increase her potassium intake in her diet, or get an otc potassium, or if she wanted you to call in a strength potassium prescription. Pt stated that she wanted to know if she should continue taking the medication or should she take a half pill of the chlorthalidone or a whole pill because she doesn't want to keep taking the medication if it's going to cause other complications with her body, she also stated that she was told that it can effect her kidneys as well and is not ok with it. She also wanted to know what mg did you want her to take of potassium if she gets it otc.

## 2019-01-04 NOTE — PROGRESS NOTES
Last 5 Patient Entered Readings                                      Current 30 Day Average: 125/82     Recent Readings 1/4/2019 1/3/2019 1/1/2019 12/31/2018 12/30/2018    SBP (mmHg) 115 129 118 131 118    DBP (mmHg) 79 86 80 89 82    Pulse 59 63 64 75 64        Patient's BP average is slightly above goal of <130/80.     Patient denies s/s of hypotension (lightheadedness, dizziness, nausea, fatigue) associated with low readings. Instructed patient to inform me if this occurs, patient confirms understanding.      Patient denies s/s of hypertension (SOB, CP, severe headaches, changes in vision) associated with high readings. Instructed patient to go to the ED if BP > 180/110 and accompanied by hypertensive s/s, patient confirms understanding.    Her recent labs 1/3 reported hypokalemia. She will begin potassium chloride 20mEq daily and will increase her potassium rich foods in her diet. I will repeat a BMP in two weeks, 1/17/19. She will continue chlorthalidone at this time.    Patient is working on lifestyle through improvement in her diet and increasing exercising by walking. She hopes to reduce BP medications and will reduce chlorthalidone when her BP responds to the change in lifestyle.    Will continue to monitor regularly. Will follow up in 2-3 weeks, sooner if BP begins to trend upward or downward.    Patient has my contact information and knows to call with any concerns or clinical changes.     Current HTN regimen:  Hypertension Medications             amLODIPine (NORVASC) 10 MG tablet TAKE 1 TABLET (10 MG TOTAL) BY MOUTH ONCE DAILY.    chlorthalidone (HYGROTEN) 25 MG Tab Take 1 tablet (25 mg total) by mouth once daily.    metoprolol tartrate (LOPRESSOR) 25 MG tablet TAKE 1 TABLET BY MOUTH EVERY DAY

## 2019-01-04 NOTE — TELEPHONE ENCOUNTER
Call patient.  Tell her that her low potassium level is related to taking chlorthalidone.  She could try increasing potassium in her diet and/or a potassium supplement over-the-counter.  Or I can call in a prescription strength potassium.  Let me know which she wants to do.  Will have to recheck potassium level after she has been taking potassium for 2-4 weeks.

## 2019-01-15 DIAGNOSIS — I10 ESSENTIAL HYPERTENSION: ICD-10-CM

## 2019-01-15 RX ORDER — AMLODIPINE BESYLATE 10 MG/1
10 TABLET ORAL DAILY
Qty: 30 TABLET | Refills: 2 | Status: SHIPPED | OUTPATIENT
Start: 2019-01-15 | End: 2019-04-16 | Stop reason: SDUPTHER

## 2019-01-16 ENCOUNTER — PATIENT OUTREACH (OUTPATIENT)
Dept: OTHER | Facility: OTHER | Age: 59
End: 2019-01-16

## 2019-01-18 ENCOUNTER — LAB VISIT (OUTPATIENT)
Dept: LAB | Facility: HOSPITAL | Age: 59
End: 2019-01-18
Attending: FAMILY MEDICINE
Payer: COMMERCIAL

## 2019-01-18 DIAGNOSIS — I10 ESSENTIAL HYPERTENSION: ICD-10-CM

## 2019-01-18 LAB
ANION GAP SERPL CALC-SCNC: 11 MMOL/L
BUN SERPL-MCNC: 15 MG/DL
CALCIUM SERPL-MCNC: 10.1 MG/DL
CHLORIDE SERPL-SCNC: 97 MMOL/L
CO2 SERPL-SCNC: 30 MMOL/L
CREAT SERPL-MCNC: 0.9 MG/DL
EST. GFR  (AFRICAN AMERICAN): >60 ML/MIN/1.73 M^2
EST. GFR  (NON AFRICAN AMERICAN): >60 ML/MIN/1.73 M^2
GLUCOSE SERPL-MCNC: 165 MG/DL
POTASSIUM SERPL-SCNC: 3 MMOL/L
SODIUM SERPL-SCNC: 138 MMOL/L

## 2019-01-18 PROCEDURE — 36415 COLL VENOUS BLD VENIPUNCTURE: CPT | Mod: PO

## 2019-01-18 PROCEDURE — 80048 BASIC METABOLIC PNL TOTAL CA: CPT

## 2019-01-20 ENCOUNTER — PATIENT MESSAGE (OUTPATIENT)
Dept: FAMILY MEDICINE | Facility: CLINIC | Age: 59
End: 2019-01-20

## 2019-01-20 RX ORDER — METOPROLOL TARTRATE 25 MG/1
TABLET, FILM COATED ORAL
Qty: 30 TABLET | Refills: 1 | Status: SHIPPED | OUTPATIENT
Start: 2019-01-20 | End: 2019-03-16 | Stop reason: SDUPTHER

## 2019-01-21 ENCOUNTER — PATIENT OUTREACH (OUTPATIENT)
Dept: OTHER | Facility: OTHER | Age: 59
End: 2019-01-21

## 2019-01-21 DIAGNOSIS — I10 ESSENTIAL HYPERTENSION: Primary | ICD-10-CM

## 2019-01-21 RX ORDER — TRIAMTERENE/HYDROCHLOROTHIAZID 37.5-25 MG
1 TABLET ORAL DAILY
Qty: 30 TABLET | Refills: 3 | Status: SHIPPED | OUTPATIENT
Start: 2019-01-21 | End: 2019-02-15 | Stop reason: SINTOL

## 2019-01-21 NOTE — TELEPHONE ENCOUNTER
It appears that the pharmacist already changed her to a different diuretic that has less tendency to lose potassium.  Verify with the patient that this is the case.

## 2019-01-21 NOTE — TELEPHONE ENCOUNTER
HPI:  Called patient to follow up on her lab work. Her potassium remained low despite starting potassium chloride 20mEq once daily. Patient endorses adherence to medication regimen. Patient denies hypotensive s/sx (lightheadedness, dizziness, nausea, fatigue); patient denies hypertensive s/sx (SOB, CP, severe headaches, changes in vision, dizziness, fatigue, confusion, anxiety, nosebleeds). Instructed patient to seek medical care if BP > 180/110 and is accompanied by hypertensive s/sx associated, patient confirms understanding.     Assessment:   Hypokalemia.  Reviewed recent readings. Per 2017 ACC/ AHA HTN guidelines (goal of BP < 130/80), current 30-day average needs to be addressed more thoroughly today.     Plan:  Repeat a BMP in two weeks. Change chlorthalidone to triamterene/HCTZ.  Continue current medication regimen. I will continue to monitor regularly and will follow-up in 2 to 3 weeks, sooner if blood pressure begins to trend upward or downward.     Current medication regimen:  Amlodipine 10mg daily; metoprolol 25mg daily    Patient denies having questions or concerns. Patient has my contact information and knows to call with any concerns or clinical changes.

## 2019-01-29 NOTE — PROGRESS NOTES
Last 5 Patient Entered Readings                                      Current 30 Day Average: 120/83     Recent Readings 1/24/2019 1/21/2019 1/16/2019 1/8/2019 1/7/2019    SBP (mmHg) 114 117 125 111 120    DBP (mmHg) 84 85 88 82 79    Pulse 64 67 54 63 59        Patient called informing me she needed to reschedule her lab from 2/4 to 2/1. She is starting a new job on 2/4. Will give her a call once I receive the labs. She is feeling well today.

## 2019-02-01 ENCOUNTER — LAB VISIT (OUTPATIENT)
Dept: LAB | Facility: HOSPITAL | Age: 59
End: 2019-02-01
Attending: FAMILY MEDICINE
Payer: COMMERCIAL

## 2019-02-01 DIAGNOSIS — I10 ESSENTIAL HYPERTENSION: ICD-10-CM

## 2019-02-01 LAB
ANION GAP SERPL CALC-SCNC: 11 MMOL/L
BUN SERPL-MCNC: 13 MG/DL
CALCIUM SERPL-MCNC: 9.9 MG/DL
CHLORIDE SERPL-SCNC: 99 MMOL/L
CO2 SERPL-SCNC: 29 MMOL/L
CREAT SERPL-MCNC: 0.9 MG/DL
EST. GFR  (AFRICAN AMERICAN): >60 ML/MIN/1.73 M^2
EST. GFR  (NON AFRICAN AMERICAN): >60 ML/MIN/1.73 M^2
GLUCOSE SERPL-MCNC: 134 MG/DL
POTASSIUM SERPL-SCNC: 3.3 MMOL/L
SODIUM SERPL-SCNC: 139 MMOL/L

## 2019-02-01 PROCEDURE — 80048 BASIC METABOLIC PNL TOTAL CA: CPT

## 2019-02-01 PROCEDURE — 36415 COLL VENOUS BLD VENIPUNCTURE: CPT | Mod: PO

## 2019-02-04 ENCOUNTER — PATIENT MESSAGE (OUTPATIENT)
Dept: FAMILY MEDICINE | Facility: CLINIC | Age: 59
End: 2019-02-04

## 2019-02-05 ENCOUNTER — PATIENT MESSAGE (OUTPATIENT)
Dept: FAMILY MEDICINE | Facility: CLINIC | Age: 59
End: 2019-02-05

## 2019-02-06 ENCOUNTER — PATIENT OUTREACH (OUTPATIENT)
Dept: OTHER | Facility: OTHER | Age: 59
End: 2019-02-06

## 2019-02-06 DIAGNOSIS — I10 ESSENTIAL HYPERTENSION: Primary | ICD-10-CM

## 2019-02-06 NOTE — PROGRESS NOTES
Last 5 Patient Entered Readings                                      Current 30 Day Average: 117/83     Recent Readings 2/3/2019 2/2/2019 1/30/2019 1/24/2019 1/21/2019    SBP (mmHg) 122 112 117 114 117    DBP (mmHg) 81 80 85 84 85    Pulse 57 67 56 64 67        HPI:  Called patient to follow up ion her labs showing hypokalemia. Patient endorses adherence to medication regimen. Patient denies hypotensive s/sx (lightheadedness, dizziness, nausea, fatigue); patient denies hypertensive s/sx (SOB, CP, severe headaches, changes in vision, dizziness, fatigue, confusion, anxiety, nosebleeds). Instructed patient to seek medical care if BP > 180/110 and is accompanied by hypertensive s/sx associated, patient confirms understanding.     Assessment:  Reviewed recent readings. Per 2017 ACC/ AHA HTN guidelines (goal of BP < 130/80), current 30-day average is close to well controlled.     Plan:  Continue current medication regimen. Increase your potassium rich foods and repeat a BMP 2/14. Continue your current exercise routine on the treadmill for an hour a day. I will continue to monitor regularly and will follow-up in 2 to 3 weeks, sooner if blood pressure begins to trend upward or downward.     Current medication regimen:  Hypertension Medications             amLODIPine (NORVASC) 10 MG tablet TAKE 1 TABLET (10 MG TOTAL) BY MOUTH ONCE DAILY.    metoprolol tartrate (LOPRESSOR) 25 MG tablet TAKE 1 TABLET BY MOUTH EVERY DAY    triamterene-hydrochlorothiazide 37.5-25 mg (MAXZIDE-25) 37.5-25 mg per tablet Take 1 tablet by mouth once daily.        Patient denies having questions or concerns. Patient has my contact information and knows to call with any concerns or clinical changes.

## 2019-02-12 RX ORDER — MONTELUKAST SODIUM 10 MG/1
TABLET ORAL
Qty: 30 TABLET | Refills: 0 | Status: SHIPPED | OUTPATIENT
Start: 2019-02-12 | End: 2019-03-17 | Stop reason: SDUPTHER

## 2019-02-14 ENCOUNTER — LAB VISIT (OUTPATIENT)
Dept: LAB | Facility: HOSPITAL | Age: 59
End: 2019-02-14
Payer: COMMERCIAL

## 2019-02-14 ENCOUNTER — OFFICE VISIT (OUTPATIENT)
Dept: OPHTHALMOLOGY | Facility: CLINIC | Age: 59
End: 2019-02-14
Payer: COMMERCIAL

## 2019-02-14 DIAGNOSIS — I10 ESSENTIAL HYPERTENSION: ICD-10-CM

## 2019-02-14 DIAGNOSIS — H40.1131 PRIMARY OPEN ANGLE GLAUCOMA OF BOTH EYES, MILD STAGE: Primary | ICD-10-CM

## 2019-02-14 LAB
ANION GAP SERPL CALC-SCNC: 10 MMOL/L
BUN SERPL-MCNC: 9 MG/DL
CALCIUM SERPL-MCNC: 10.1 MG/DL
CHLORIDE SERPL-SCNC: 99 MMOL/L
CO2 SERPL-SCNC: 30 MMOL/L
CREAT SERPL-MCNC: 0.8 MG/DL
EST. GFR  (AFRICAN AMERICAN): >60 ML/MIN/1.73 M^2
EST. GFR  (NON AFRICAN AMERICAN): >60 ML/MIN/1.73 M^2
GLUCOSE SERPL-MCNC: 77 MG/DL
POTASSIUM SERPL-SCNC: 3.3 MMOL/L
SODIUM SERPL-SCNC: 139 MMOL/L

## 2019-02-14 PROCEDURE — 99999 PR PBB SHADOW E&M-EST. PATIENT-LVL I: ICD-10-PCS | Mod: PBBFAC,,, | Performed by: OPTOMETRIST

## 2019-02-14 PROCEDURE — 92014 COMPRE OPH EXAM EST PT 1/>: CPT | Mod: S$GLB,,, | Performed by: OPTOMETRIST

## 2019-02-14 PROCEDURE — 92014 PR EYE EXAM, EST PATIENT,COMPREHESV: ICD-10-PCS | Mod: S$GLB,,, | Performed by: OPTOMETRIST

## 2019-02-14 PROCEDURE — 99999 PR PBB SHADOW E&M-EST. PATIENT-LVL I: CPT | Mod: PBBFAC,,, | Performed by: OPTOMETRIST

## 2019-02-14 PROCEDURE — 80048 BASIC METABOLIC PNL TOTAL CA: CPT

## 2019-02-14 PROCEDURE — 36415 COLL VENOUS BLD VENIPUNCTURE: CPT

## 2019-02-14 NOTE — PROGRESS NOTES
HPI     Glaucoma      Additional comments: 4 month DFE              Comments     Last seen by TRF on 10/9/18 for HVF  Patient here today for 4 month DFE  Vision stable  No complaints  Using drops as directed  Drops: Cosopt BID OU and Latanoprost QHS OU  Last full exam: 1/9/18  Last IOP: 16/14  Last SDP's 1/9/18  Last HVF: 10/9/18  CCT: 530/541  FHX POAG: Father            Last edited by Saira Martel, PCT on 2/14/2019  8:28 AM. (History)              Assessment /Plan     For exam results, see Encounter Report.    Primary open angle glaucoma of both eyes, mild stage      Stable IOP  Continue latanoprost hs OU, cosopt bid OU    RTC 4 months VF goct iop ck

## 2019-02-15 ENCOUNTER — PATIENT OUTREACH (OUTPATIENT)
Dept: OTHER | Facility: OTHER | Age: 59
End: 2019-02-15

## 2019-02-15 NOTE — PROGRESS NOTES
Last 5 Patient Entered Readings                                      Current 30 Day Average: 120/84     Recent Readings 2/12/2019 2/7/2019 2/3/2019 2/2/2019 1/30/2019    SBP (mmHg) 128 123 122 112 117    DBP (mmHg) 84 87 81 80 85    Pulse 62 53 57 67 56        HPI:  Called patient to follow up with her labs 2/14. Patient endorses adherence to medication regimen. Patient denies hypotensive s/sx (lightheadedness, dizziness, nausea, fatigue); patient denies hypertensive s/sx (SOB, CP, severe headaches, changes in vision). Instructed patient to seek medical care if BP > 180/110 and is accompanied by hypertensive s/sx associated, patient confirms understanding.     Assessment:  Reviewed recent readings. Per 2017 ACC/ AHA HTN guidelines (goal of BP < 130/80), current 30-day average needs to be addressed more thoroughly today.     Plan:  Stop triamterene/HCTZ due to hypokalemia despite increasing her potassium rich foods. She does not want to add further medication by adding a potassium supplement. If her BP increases with this change, I will add an ARB. I will continue to monitor regularly and will follow-up in 2 to 3 weeks, sooner if blood pressure begins to trend upward or downward.     Current medication regimen:  Hypertension Medications             amLODIPine (NORVASC) 10 MG tablet TAKE 1 TABLET (10 MG TOTAL) BY MOUTH ONCE DAILY.    metoprolol tartrate (LOPRESSOR) 25 MG tablet TAKE 1 TABLET BY MOUTH EVERY DAY             Patient denies having questions or concerns. Patient has my contact information and knows to call with any concerns or clinical changes.

## 2019-02-21 ENCOUNTER — PATIENT OUTREACH (OUTPATIENT)
Dept: OTHER | Facility: OTHER | Age: 59
End: 2019-02-21

## 2019-02-21 NOTE — PROGRESS NOTES
Last 5 Patient Entered Readings                                      Current 30 Day Average: 119/82     Recent Readings 2/19/2019 2/19/2019 2/16/2019 2/12/2019 2/7/2019    SBP (mmHg) 121 123 112 128 123    DBP (mmHg) 81 80 73 84 87    Pulse 58 52 59 62 53          Digital Medicine: Health  Follow Up    Lifestyle Modifications:    1.Dietary Modifications (Sodium intake <2,000mg/day, food labels, dining out): hospitals she and her  have been working on eating healthy.  States they have been cooking on the [employer] and using their air fryer.  States they have been eating a lot of salads.    2.Physical Activity: States she and her  joined the gym last month.  States she is down 15lbs.  States she is walking on the treadmill every day for 30min.    3.Medication Therapy: Patient has been compliant with the medication regimen.    4.Patient has the following medication side effects/concerns: States she took her last diuretic last week.  (Frequency/Alleviating factors/Precipitating factors, etc.)     Follow up with Mrs. Carolee PARISH Shannon completed. No further questions or concerns. Will continue to follow up to achieve health goals.    States she is going on a cruise from March 10-17.  Placing patient on hiatus during that time.

## 2019-02-26 ENCOUNTER — LAB VISIT (OUTPATIENT)
Dept: LAB | Facility: HOSPITAL | Age: 59
End: 2019-02-26
Payer: COMMERCIAL

## 2019-02-26 ENCOUNTER — OFFICE VISIT (OUTPATIENT)
Dept: FAMILY MEDICINE | Facility: CLINIC | Age: 59
End: 2019-02-26
Payer: COMMERCIAL

## 2019-02-26 VITALS
DIASTOLIC BLOOD PRESSURE: 80 MMHG | HEIGHT: 62 IN | SYSTOLIC BLOOD PRESSURE: 132 MMHG | WEIGHT: 207 LBS | BODY MASS INDEX: 38.09 KG/M2 | TEMPERATURE: 98 F | OXYGEN SATURATION: 98 % | RESPIRATION RATE: 17 BRPM | HEART RATE: 64 BPM

## 2019-02-26 DIAGNOSIS — Z00.00 PREVENTATIVE HEALTH CARE: Primary | ICD-10-CM

## 2019-02-26 DIAGNOSIS — Z00.00 PREVENTATIVE HEALTH CARE: ICD-10-CM

## 2019-02-26 DIAGNOSIS — I10 ESSENTIAL HYPERTENSION: ICD-10-CM

## 2019-02-26 DIAGNOSIS — Z12.31 ENCOUNTER FOR SCREENING MAMMOGRAM FOR MALIGNANT NEOPLASM OF BREAST: ICD-10-CM

## 2019-02-26 PROBLEM — G56.01 RIGHT CARPAL TUNNEL SYNDROME: Status: RESOLVED | Noted: 2018-10-04 | Resolved: 2019-02-26

## 2019-02-26 PROBLEM — Z01.818 PRE-OP TESTING: Status: RESOLVED | Noted: 2018-11-16 | Resolved: 2019-02-26

## 2019-02-26 PROBLEM — G56.03 BILATERAL CARPAL TUNNEL SYNDROME: Status: RESOLVED | Noted: 2018-09-07 | Resolved: 2019-02-26

## 2019-02-26 PROBLEM — M65.331 TRIGGER MIDDLE FINGER OF RIGHT HAND: Status: RESOLVED | Noted: 2018-06-04 | Resolved: 2019-02-26

## 2019-02-26 PROBLEM — G56.02 LEFT CARPAL TUNNEL SYNDROME: Status: RESOLVED | Noted: 2018-11-16 | Resolved: 2019-02-26

## 2019-02-26 LAB
ALBUMIN SERPL BCP-MCNC: 3.9 G/DL
ALP SERPL-CCNC: 52 U/L
ALT SERPL W/O P-5'-P-CCNC: 27 U/L
ANION GAP SERPL CALC-SCNC: 10 MMOL/L
AST SERPL-CCNC: 30 U/L
BILIRUB SERPL-MCNC: 0.6 MG/DL
BUN SERPL-MCNC: 5 MG/DL
CALCIUM SERPL-MCNC: 9.8 MG/DL
CHLORIDE SERPL-SCNC: 107 MMOL/L
CHOLEST SERPL-MCNC: 167 MG/DL
CHOLEST/HDLC SERPL: 2.8 {RATIO}
CO2 SERPL-SCNC: 25 MMOL/L
CREAT SERPL-MCNC: 0.8 MG/DL
EST. GFR  (AFRICAN AMERICAN): >60 ML/MIN/1.73 M^2
EST. GFR  (NON AFRICAN AMERICAN): >60 ML/MIN/1.73 M^2
GLUCOSE SERPL-MCNC: 71 MG/DL
HDLC SERPL-MCNC: 60 MG/DL
HDLC SERPL: 35.9 %
LDLC SERPL CALC-MCNC: 95.2 MG/DL
NONHDLC SERPL-MCNC: 107 MG/DL
POTASSIUM SERPL-SCNC: 4.3 MMOL/L
PROT SERPL-MCNC: 7.3 G/DL
SODIUM SERPL-SCNC: 142 MMOL/L
TRIGL SERPL-MCNC: 59 MG/DL
TSH SERPL DL<=0.005 MIU/L-ACNC: 1.36 UIU/ML

## 2019-02-26 PROCEDURE — 80061 LIPID PANEL: CPT

## 2019-02-26 PROCEDURE — 84443 ASSAY THYROID STIM HORMONE: CPT

## 2019-02-26 PROCEDURE — 3079F PR MOST RECENT DIASTOLIC BLOOD PRESSURE 80-89 MM HG: ICD-10-PCS | Mod: CPTII,S$GLB,, | Performed by: FAMILY MEDICINE

## 2019-02-26 PROCEDURE — 99999 PR PBB SHADOW E&M-EST. PATIENT-LVL IV: ICD-10-PCS | Mod: PBBFAC,,, | Performed by: FAMILY MEDICINE

## 2019-02-26 PROCEDURE — 3079F DIAST BP 80-89 MM HG: CPT | Mod: CPTII,S$GLB,, | Performed by: FAMILY MEDICINE

## 2019-02-26 PROCEDURE — 99396 PREV VISIT EST AGE 40-64: CPT | Mod: S$GLB,,, | Performed by: FAMILY MEDICINE

## 2019-02-26 PROCEDURE — 99396 PR PREVENTIVE VISIT,EST,40-64: ICD-10-PCS | Mod: S$GLB,,, | Performed by: FAMILY MEDICINE

## 2019-02-26 PROCEDURE — 99999 PR PBB SHADOW E&M-EST. PATIENT-LVL IV: CPT | Mod: PBBFAC,,, | Performed by: FAMILY MEDICINE

## 2019-02-26 PROCEDURE — 3075F PR MOST RECENT SYSTOLIC BLOOD PRESS GE 130-139MM HG: ICD-10-PCS | Mod: CPTII,S$GLB,, | Performed by: FAMILY MEDICINE

## 2019-02-26 PROCEDURE — 3075F SYST BP GE 130 - 139MM HG: CPT | Mod: CPTII,S$GLB,, | Performed by: FAMILY MEDICINE

## 2019-02-26 PROCEDURE — 80053 COMPREHEN METABOLIC PANEL: CPT

## 2019-02-26 PROCEDURE — 36415 COLL VENOUS BLD VENIPUNCTURE: CPT | Mod: PO

## 2019-02-26 NOTE — PROGRESS NOTES
CHIEF COMPLAINT:  This is a 58-year-old female here for preventive health exam.    SUBJECTIVE:  Patient is doing well without complaints.  She has lost 16 lb in the last 3 months and is exercising regularly.  Her blood pressure is controlled on amlodipine and metoprolol.  She was unable to tolerate diuretic because of hypokalemia.  The patient has mild intermittent asthma for which she uses albuterol.  Patient takes hydroxyzine 1 tablet daily for itching.  Patient uses Nasonex for allergic rhinitis.  She is followed by ophthalmology for glaucoma.     Eye exam February 2019.  Mammogram February 2018.   Colonoscopy January 2010, due again in January 2020.  Tdap February 2017. Pneumovax February 2014. Flu vaccine September 2018.    ROS:  GENERAL: Patient denies fever, chills, night sweats. Patient denies weight gain. Patient denies anorexia, fatigue, weakness or swollen glands.  SKIN: Patient denies rash or hair loss.  HEENT: Patient denies sore throat, ear pain, hearing loss, nasal congestion, or runny nose. Patient denies visual disturbance, eye irritation or discharge.  LUNGS: Patient denies cough, wheeze or hemoptysis.  CARDIOVASCULAR: Patient denies chest pain, shortness of breath, palpitations, syncope or lower extremity edema.  GI: Patient denies abdominal pain, nausea, vomiting, diarrhea, constipation, blood in stool or melena.  GENITOURINARY: Patient denies pelvic pain, vaginal discharge, itch or odor. Patient denies irregular vaginal bleeding. Patient denies dysuria, frequency, hematuria, nocturia, urgency or incontinence.  BREASTS: Patient denies breast pain, mass or nipple discharge.  MUSCULOSKELETAL: Patient denies joint pain, swelling, redness or warmth.  NEUROLOGIC: Patient denies headache, vertigo, paresthesias, weakness in limb, dysarthria, dysphagia or abnormality of gait.  PSYCHIATRIC: Patient denies anxiety, depression, or memory loss.     OBJECTIVE:   GENERAL: Well-developed well-nourished, obese,  black female alert and oriented x3, in no acute distress. Memory, judgment and cognition without deficit. Weight gain of 16 pounds in the last year.  SKIN: Clear without rash. Normal color and tone.  Papulovesicular lesion left upper lip with crustiness.  HEENT: Eyes: Clear conjunctivae. No scleral icterus. Pupils equal reactive to light and accommodation. Ears: Clear canals. Clear TMs. Nose: Without congestion. Pharynx: Without injection or exudates.  NECK: Supple, normal range of motion. No masses, lymphadenopathy or enlarged thyroid. No JVD. Carotids 2+ and equal. No bruits.  LUNGS: Clear to auscultation. Normal respiratory effort.  CARDIOVASCULAR: Regular rhythm, normal S1, S2 without murmur, gallop or rub.  BACK: No CVA or spinal tenderness.  BREASTS: No masses, tenderness or nipple discharge.  ABDOMEN: Normal appearance. Active bowel sounds. Soft, nontender without mass or organomegaly. No rebound or guarding.  EXTREMITIES: Without cyanosis, clubbing or edema. Distal pulses 2+ and equal. Normal range of motion in all extremities. No joint effusion, erythema or warmth.  NEUROLOGIC: Cranial nerves II through XII without deficit. Motor strength equal bilaterally. Sensation normal to touch. Deep tendon reflexes 2+ and equal. Gait without abnormality. No tremor. Negative cerebellar signs.  PELVIC: External: Without lesions or inflammation.Vaginal: Clear, cuff intact. Bimanual: No masses, nontender.   RECTOVAGINAL: Confirms heme-negative stool x2.      ASSESSMENT:  1. Preventative health care    2. Essential hypertension    3. Encounter for screening mammogram for malignant neoplasm of breast      PLAN:   1.  Weight reduction.  Exercise regularly.  2.  Age-appropriate counseling.  3.  Fasting lab.  4.  Mammogram.  5.  Refill medications as needed.  6.  Follow-up annually.

## 2019-03-05 ENCOUNTER — HOSPITAL ENCOUNTER (OUTPATIENT)
Dept: RADIOLOGY | Facility: HOSPITAL | Age: 59
Discharge: HOME | End: 2019-03-05
Attending: FAMILY MEDICINE
Payer: COMMERCIAL

## 2019-03-05 VITALS — WEIGHT: 207 LBS | HEIGHT: 62 IN | BODY MASS INDEX: 38.09 KG/M2

## 2019-03-05 DIAGNOSIS — Z12.31 ENCOUNTER FOR SCREENING MAMMOGRAM FOR MALIGNANT NEOPLASM OF BREAST: ICD-10-CM

## 2019-03-05 PROCEDURE — 77067 SCR MAMMO BI INCL CAD: CPT | Mod: TC

## 2019-03-05 PROCEDURE — 77063 MAMMO DIGITAL SCREENING BILAT WITH TOMOSYNTHESIS_CAD: ICD-10-PCS | Mod: 26,,, | Performed by: RADIOLOGY

## 2019-03-05 PROCEDURE — 77067 MAMMO DIGITAL SCREENING BILAT WITH TOMOSYNTHESIS_CAD: ICD-10-PCS | Mod: 26,,, | Performed by: RADIOLOGY

## 2019-03-05 PROCEDURE — 77063 BREAST TOMOSYNTHESIS BI: CPT | Mod: 26,,, | Performed by: RADIOLOGY

## 2019-03-05 PROCEDURE — 77067 SCR MAMMO BI INCL CAD: CPT | Mod: 26,,, | Performed by: RADIOLOGY

## 2019-03-08 NOTE — PROGRESS NOTES
CHIEF COMPLAINT:  This is a 57-year-old female here for preoperative clearance for right middle trigger finger release and right carpal tunnel repair per Dr. Shaq Fairbanks due to patient's chronic medical conditions.    SUBJECTIVE:  Patient complains of pain and locking of right middle finger.  It occurred last night in her sleep and was extremely painful.  The patient has no previous history of problems with general anesthesia.  She denies family history of perioperative complications.    The patient has mild intermittent asthma for which she uses albuterol as needed.  Her blood pressure is controlled on amlodipine and metoprolol.  Patient takes hydroxyzine 1 tablet daily for itching.  Patient uses Nasonex for allergic rhinitis.  She is followed by ophthalmology for glaucoma.      Past Medical History:   Diagnosis Date    Allergic rhinitis     Allergy     Anxiety     Asthma     JAGDISH (generalized anxiety disorder)     Glaucoma (increased eye pressure)     Herpes labialis     Hypertension     Obesity     Seborrheic dermatitis     Shingles 2016       Past Surgical History:   Procedure Laterality Date    BREAST BIOPSY      right     SECTION      MARYANN and USO      Secondary to fibroids    TONSILLECTOMY  2011       Social History     Socioeconomic History    Marital status:      Spouse name: None    Number of children: None    Years of education: None    Highest education level: None   Social Needs    Financial resource strain: None    Food insecurity - worry: None    Food insecurity - inability: None    Transportation needs - medical: None    Transportation needs - non-medical: None   Occupational History    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 If she wanted to go to [facility] that Dr. Vignehs Wilburn was okay with that. She only needed the formal testing done before prescribing. month    Drug use: No    Sexual activity: None   Other Topics Concern    None   Social History Narrative    Patient is  and is retired [employer].        Family History   Problem Relation Age of Onset    Heart attack Father     Coronary artery disease Father     Glaucoma Father     Heart disease Mother 32    Breast cancer Maternal Grandmother     Hypertension Sister     Kidney disease Sister     Breast cancer Other         Great aunts    Diabetes Other         Paternal great-grandmother    Breast cancer Maternal Cousin     Breast cancer Maternal Cousin           ROS:  GENERAL: Patient denies fever, chills, night sweats. Patient denies weight gain. Patient denies anorexia, fatigue, weakness or swollen glands.  SKIN: Patient denies rash or hair loss.  HEENT: Patient denies sore throat, ear pain, hearing loss, nasal congestion, or runny nose. Patient denies visual disturbance, eye irritation or discharge.  LUNGS: Patient denies cough, wheeze or hemoptysis.  CARDIOVASCULAR: Patient denies chest pain, shortness of breath, palpitations, syncope or lower extremity edema.  GI: Patient denies abdominal pain, nausea, vomiting, diarrhea, constipation, blood in stool or melena.  GENITOURINARY: Patient denies pelvic pain, vaginal discharge, itch or odor. Patient denies irregular vaginal bleeding. Patient denies dysuria, frequency, hematuria, nocturia, urgency or incontinence.  BREASTS: Patient denies breast pain, mass or nipple discharge.  MUSCULOSKELETAL: Patient denies joint pain, swelling, redness or warmth.  NEUROLOGIC: Patient denies headache, vertigo, paresthesias, weakness in limb, dysarthria, dysphagia or abnormality of gait.  PSYCHIATRIC: Patient denies anxiety, depression, or memory loss.     OBJECTIVE:   GENERAL: Well-developed well-nourished, obese, black female alert and oriented x3, in no acute distress. Memory, judgment and cognition without deficit. Weight gain of 17 pounds in the last  year.  SKIN: Clear without rash. Normal color and tone.  Papulovesicular lesion left upper lip with crustiness.  HEENT: Eyes: Clear conjunctivae. No scleral icterus. Pupils equal reactive to light and accommodation. Ears: Clear canals. Clear TMs. Nose: Without congestion. Pharynx: Without injection or exudates.  NECK: Supple, normal range of motion. No masses, lymphadenopathy or enlarged thyroid. No JVD. Carotids 2+ and equal. No bruits.  LUNGS: Clear to auscultation. Normal respiratory effort.  CARDIOVASCULAR: Regular rhythm, normal S1, S2 without murmur, gallop or rub.  BACK: No CVA or spinal tenderness.  ABDOMEN: Normal appearance. Active bowel sounds. Soft, nontender without mass or organomegaly. No rebound or guarding.  EXTREMITIES: Without cyanosis, clubbing or edema. Distal pulses 2+ and equal. Normal range of motion in all extremities. No joint effusion, erythema or warmth.  NEUROLOGIC: Cranial nerves II through XII without deficit. Motor strength equal bilaterally. Sensation normal to touch. Deep tendon reflexes 2+ and equal. Gait without abnormality. No tremor.     Lab:  Acceptable.  Chest x-ray:  No acute cardiopulmonary disease.  EKG:  Normal sinus rhythm. Possible inferior infarct cited on or before April 15, 2013.  No acute changes.    ASSESSMENT:  1. Pre-op examination    2. Trigger finger, right middle finger    3. Carpal tunnel syndrome of right wrist    4. Essential hypertension    5. Mild intermittent asthma without complication      PLAN:   1.  Discontinue aspirin, NSAIDs and Mobic 1 week prior to surgery.  2.  Continue regular medications.  3.  Influenza vaccine.  4.  Patient is cleared for surgery.    Thank you for allowing me to participate in care this patient.

## 2019-03-17 RX ORDER — MONTELUKAST SODIUM 10 MG/1
TABLET ORAL
Qty: 30 TABLET | Refills: 11 | Status: SHIPPED | OUTPATIENT
Start: 2019-03-17 | End: 2020-02-02

## 2019-03-17 RX ORDER — METOPROLOL TARTRATE 25 MG/1
TABLET, FILM COATED ORAL
Qty: 30 TABLET | Refills: 11 | Status: SHIPPED | OUTPATIENT
Start: 2019-03-17 | End: 2020-02-02

## 2019-03-18 ENCOUNTER — PATIENT OUTREACH (OUTPATIENT)
Dept: OTHER | Facility: OTHER | Age: 59
End: 2019-03-18

## 2019-03-18 NOTE — PROGRESS NOTES
Last 5 Patient Entered Readings                                      Current 30 Day Average: 123/84     Recent Readings 3/17/2019 3/10/2019 3/10/2019 3/10/2019 3/8/2019    SBP (mmHg) 137 123 141 141 114    DBP (mmHg) 84 78 106 106 74    Pulse 57 53 59 59 49        LMFCB to discuss her upward trend since stopping triamterene/HCTZ and need for an ARB.

## 2019-03-20 NOTE — PROGRESS NOTES
Last 5 Patient Entered Readings                                      Current 30 Day Average: 125/85     Recent Readings 3/17/2019 3/10/2019 3/10/2019 3/10/2019 3/8/2019    SBP (mmHg) 137 123 141 141 114    DBP (mmHg) 84 78 106 106 74    Pulse 57 53 59 59 49        HPI:  Called patient to follow up on her elevated readings. She was on a cruise last week and ate whatever she wanted causing the trend. Patient endorses adherence to medication regimen. Patient denies hypotensive s/sx (lightheadedness, dizziness, nausea, fatigue); patient denies hypertensive s/sx (SOB, CP, severe headaches, changes in vision). Instructed patient to seek medical care if BP > 180/110 and is accompanied by hypertensive s/sx associated, patient confirms understanding.     Assessment:  Reviewed recent readings. Per 2017 ACC/ AHA HTN guidelines (goal of BP < 130/80), current 30-day average needs to be addressed more thoroughly today.     Plan:  Continue current medication regimen and work on returning to your normal eating habits. I will continue to monitor regularly and will follow-up in 2 to 3 weeks, sooner if blood pressure begins to trend upward or downward.     Current medication regimen:  Hypertension Medications             amLODIPine (NORVASC) 10 MG tablet TAKE 1 TABLET (10 MG TOTAL) BY MOUTH ONCE DAILY.    metoprolol tartrate (LOPRESSOR) 25 MG tablet TAKE 1 TABLET BY MOUTH EVERY DAY          Patient denies having questions or concerns. Patient has my contact information and knows to call with any concerns or clinical changes.

## 2019-03-21 RX ORDER — HYDROXYZINE HYDROCHLORIDE 25 MG/1
25 TABLET, FILM COATED ORAL EVERY 4 HOURS PRN
Qty: 30 TABLET | Refills: 5 | Status: SHIPPED | OUTPATIENT
Start: 2019-03-21 | End: 2019-09-19 | Stop reason: SDUPTHER

## 2019-03-29 ENCOUNTER — PATIENT OUTREACH (OUTPATIENT)
Dept: OTHER | Facility: OTHER | Age: 59
End: 2019-03-29

## 2019-03-29 NOTE — PROGRESS NOTES
Last 5 Patient Entered Readings                                      Current 30 Day Average: 126/86     Recent Readings 3/29/2019 3/24/2019 3/20/2019 3/17/2019 3/10/2019    SBP (mmHg) 132 125 122 137 123    DBP (mmHg) 83 83 80 84 78    Pulse 60 63 61 57 53          3/29: LVM.  Will call in 4 weeks.  Patient close to goal.

## 2019-04-01 NOTE — PROGRESS NOTES
Last 5 Patient Entered Readings                                      Current 30 Day Average: 126/86     Recent Readings 3/29/2019 3/24/2019 3/20/2019 3/17/2019 3/10/2019    SBP (mmHg) 132 125 122 137 123    DBP (mmHg) 83 83 80 84 78    Pulse 60 63 61 57 53          Digital Medicine: Health  Follow Up    Lifestyle Modifications:    1.Dietary Modifications (Sodium intake <2,000mg/day, food labels, dining out): Patient reports elevated reading on 3/10 was from cruise.  States she is getting back on track with diet.  States she is cooking with sea salt.  Encouraged patient to cut out all salt and focus on using herbs and spices.  Patient states she thought sea salt was better than regular salt.  Informed patient salt is salt.    2.Physical Activity: Patient reports her hours changed with work, so she is trying to go to the gym on Saturdays and Sundays.  Reports 18lb weight loss.    3.Medication Therapy: Patient has been compliant with the medication regimen.    4.Patient has the following medication side effects/concerns: none  (Frequency/Alleviating factors/Precipitating factors, etc.)     Follow up with Ms. Caroleeoliver Ortega completed. No further questions or concerns. Will continue to follow up to achieve health goals.    Patient reports she has another health  with [facility] named Eze.  Informed patient he is not with [facility].

## 2019-04-03 ENCOUNTER — PATIENT OUTREACH (OUTPATIENT)
Dept: OTHER | Facility: OTHER | Age: 59
End: 2019-04-03

## 2019-04-03 NOTE — PROGRESS NOTES
Last 5 Patient Entered Readings                                      Current 30 Day Average: 126/86     Recent Readings 3/29/2019 3/24/2019 3/20/2019 3/17/2019 3/10/2019    SBP (mmHg) 132 125 122 137 123    DBP (mmHg) 83 83 80 84 78    Pulse 60 63 61 57 53        HPI:  Called patient to follow up.  Patient seen by Dr. Walter 2/26 with an at goal reading.   Patient endorses adherence to medication regimen.   Patient denies hypotensive s/sx (lightheadedness, dizziness, nausea, fatigue); patient denies hypertensive s/sx (SOB, CP, severe headaches, changes in vision). Instructed patient to seek medical care if BP > 180/110 and is accompanied by hypertensive s/sx associated, patient confirms understanding.     Assessment:  Reviewed recent readings. Per 2017 ACC/ AHA HTN guidelines (goal of BP < 130/80), current 30-day average needs to be addressed more thoroughly today.     Plan:  Continue current medication regimen.   Continue working on lifestyle to improve her diastolic.   I will continue to monitor regularly and will follow-up in 4 weeks, sooner if blood pressure begins to trend upward or downward.     Current medication regimen:  Hypertension Medications             amLODIPine (NORVASC) 10 MG tablet TAKE 1 TABLET (10 MG TOTAL) BY MOUTH ONCE DAILY.    metoprolol tartrate (LOPRESSOR) 25 MG tablet TAKE 1 TABLET BY MOUTH EVERY DAY          Patient denies having questions or concerns. Patient has my contact information and knows to call with any concerns or clinical changes.

## 2019-04-16 DIAGNOSIS — I10 ESSENTIAL HYPERTENSION: ICD-10-CM

## 2019-04-16 RX ORDER — AMLODIPINE BESYLATE 10 MG/1
10 TABLET ORAL DAILY
Qty: 30 TABLET | Refills: 11 | Status: SHIPPED | OUTPATIENT
Start: 2019-04-16 | End: 2020-02-02

## 2019-04-30 ENCOUNTER — OFFICE VISIT (OUTPATIENT)
Dept: FAMILY MEDICINE | Facility: CLINIC | Age: 59
End: 2019-04-30
Payer: COMMERCIAL

## 2019-04-30 VITALS
HEART RATE: 62 BPM | DIASTOLIC BLOOD PRESSURE: 80 MMHG | OXYGEN SATURATION: 99 % | BODY MASS INDEX: 39.63 KG/M2 | HEIGHT: 62 IN | WEIGHT: 215.38 LBS | SYSTOLIC BLOOD PRESSURE: 122 MMHG | TEMPERATURE: 98 F

## 2019-04-30 DIAGNOSIS — K62.5 BRBPR (BRIGHT RED BLOOD PER RECTUM): Primary | ICD-10-CM

## 2019-04-30 PROCEDURE — 3079F DIAST BP 80-89 MM HG: CPT | Mod: CPTII,S$GLB,, | Performed by: FAMILY MEDICINE

## 2019-04-30 PROCEDURE — 3079F PR MOST RECENT DIASTOLIC BLOOD PRESSURE 80-89 MM HG: ICD-10-PCS | Mod: CPTII,S$GLB,, | Performed by: FAMILY MEDICINE

## 2019-04-30 PROCEDURE — 99213 PR OFFICE/OUTPT VISIT, EST, LEVL III, 20-29 MIN: ICD-10-PCS | Mod: S$GLB,,, | Performed by: FAMILY MEDICINE

## 2019-04-30 PROCEDURE — 99213 OFFICE O/P EST LOW 20 MIN: CPT | Mod: S$GLB,,, | Performed by: FAMILY MEDICINE

## 2019-04-30 PROCEDURE — 3074F SYST BP LT 130 MM HG: CPT | Mod: CPTII,S$GLB,, | Performed by: FAMILY MEDICINE

## 2019-04-30 PROCEDURE — 99999 PR PBB SHADOW E&M-EST. PATIENT-LVL III: CPT | Mod: PBBFAC,,, | Performed by: FAMILY MEDICINE

## 2019-04-30 PROCEDURE — 3074F PR MOST RECENT SYSTOLIC BLOOD PRESSURE < 130 MM HG: ICD-10-PCS | Mod: CPTII,S$GLB,, | Performed by: FAMILY MEDICINE

## 2019-04-30 PROCEDURE — 3008F PR BODY MASS INDEX (BMI) DOCUMENTED: ICD-10-PCS | Mod: CPTII,S$GLB,, | Performed by: FAMILY MEDICINE

## 2019-04-30 PROCEDURE — 3008F BODY MASS INDEX DOCD: CPT | Mod: CPTII,S$GLB,, | Performed by: FAMILY MEDICINE

## 2019-04-30 PROCEDURE — 99999 PR PBB SHADOW E&M-EST. PATIENT-LVL III: ICD-10-PCS | Mod: PBBFAC,,, | Performed by: FAMILY MEDICINE

## 2019-04-30 NOTE — PROGRESS NOTES
CHIEF COMPLAINT:  This is a 58-year-old female complaining of bright red blood per rectum.    SUBJECTIVE:  The patient reports that she had a bowel movement 1 week ago and when wiping noticed blood on toilet tissue twice.  She denies any pain or burning.  Bowel movement was not hard her difficult to pass.  Patient has regular daily bowel movements without history of constipation or diarrhea.  She denies blood mixed in with stool or melena.  Patient denies abdominal pain, history of hemorrhoids or anal tears.  Last colonoscopy January 2010, due again in January 2020.    ROS:  GENERAL: Patient denies fever, chills, night sweats. Patient denies weight gain. Patient denies anorexia, fatigue, weakness or swollen glands.  SKIN: Patient denies rash.  LUNGS: Patient denies cough, wheeze or hemoptysis.  CARDIOVASCULAR: Patient denies chest pain, shortness of breath, palpitations, syncope or lower extremity edema.  GI: Patient denies abdominal pain, nausea, vomiting, diarrhea, constipation, or melena.  GENITOURINARY: Patient denies pelvic pain, vaginal discharge, itch or odor. Patient denies irregular vaginal bleeding. Patient denies dysuria, frequency, hematuria, nocturia, urgency or incontinence.    OBJECTIVE:   GENERAL: Well-developed well-nourished, obese, black female alert and oriented x3, in no acute distress. Memory, judgment and cognition without deficit.  Accompanied by spouse.  SKIN: Clear without rash. Normal color and tone.  HEENT: Eyes: Clear conjunctivae. No scleral icterus.  NECK: Supple, normal range of motion.  LUNGS: Clear to auscultation. Normal respiratory effort.  CARDIOVASCULAR: Regular rhythm, normal S1, S2 without murmur, gallop or rub.  ABDOMEN: Normal appearance. Active bowel sounds. Soft, nontender without mass or organomegaly. No rebound or guarding.  ANORECTAL EXAM:  First-degree slightly eroded hemorrhoid at 6:00.  No active bleeding.  Heme-negative stool x2.    ASSESSMENT:  1. BRBPR (bright red  blood per rectum)      PLAN:   1.  Reassurance.  2.  Avoid straining.  3.  Avoid vigorous wiping after bowel movement.  4.  Follow up if no improvement or worsening symptoms.    This note is generated with speech recognition software and is subject to transcription error and sound alike phrases that may be missed by proofreading.

## 2019-05-03 ENCOUNTER — PATIENT OUTREACH (OUTPATIENT)
Dept: OTHER | Facility: OTHER | Age: 59
End: 2019-05-03

## 2019-05-03 DIAGNOSIS — I10 ESSENTIAL HYPERTENSION: Primary | ICD-10-CM

## 2019-05-03 NOTE — PROGRESS NOTES
Last 5 Patient Entered Readings                                      Current 30 Day Average: 134/87     Recent Readings 5/1/2019 5/1/2019 4/24/2019 4/24/2019 4/16/2019    SBP (mmHg) 138 137 138 134 139    DBP (mmHg) 89 94 89 93 85    Pulse 67 67 57 55 55        No answer to discuss adding an ARB.

## 2019-05-06 RX ORDER — VALSARTAN 80 MG/1
80 TABLET ORAL DAILY
Qty: 30 TABLET | Refills: 3 | Status: SHIPPED | OUTPATIENT
Start: 2019-05-06 | End: 2019-08-25 | Stop reason: SDUPTHER

## 2019-05-06 NOTE — PROGRESS NOTES
Last 5 Patient Entered Readings                                      Current 30 Day Average: 132/87     Recent Readings 5/6/2019 5/1/2019 5/1/2019 4/24/2019 4/24/2019    SBP (mmHg) 119 138 137 138 134    DBP (mmHg) 84 89 94 89 93    Pulse 55 67 67 57 55      HPI:  Called patient to follow up to discuss adding an ARB. Her BP today was close to goal.  Patient endorses adherence to medication regimen.   Patient denies hypotensive s/sx (lightheadedness, dizziness, nausea, fatigue); patient denies hypertensive s/sx (SOB, CP, severe headaches, changes in vision). Instructed patient to seek medical care if BP > 180/110 and is accompanied by hypertensive s/sx associated, patient confirms understanding.     Assessment:  Reviewed recent readings. Per 2017 ACC/ AHA HTN guidelines (goal of BP < 130/80), current 30-day average needs to be addressed more thoroughly today.     Plan:  Change valsartan 80mg daily.   I will continue to monitor regularly and will follow-up in 2 to 3 weeks, sooner if blood pressure begins to trend upward or downward.     Current medication regimen:  Hypertension Medications             amLODIPine (NORVASC) 10 MG tablet TAKE 1 TABLET (10 MG TOTAL) BY MOUTH ONCE DAILY.    metoprolol tartrate (LOPRESSOR) 25 MG tablet TAKE 1 TABLET BY MOUTH EVERY DAY          Patient denies having questions or concerns. Patient has my contact information and knows to call with any concerns or clinical changes.

## 2019-05-19 ENCOUNTER — PATIENT MESSAGE (OUTPATIENT)
Dept: ADMINISTRATIVE | Facility: OTHER | Age: 59
End: 2019-05-19

## 2019-05-20 ENCOUNTER — PATIENT OUTREACH (OUTPATIENT)
Dept: OTHER | Facility: OTHER | Age: 59
End: 2019-05-20

## 2019-05-20 NOTE — PROGRESS NOTES
Last 5 Patient Entered Readings                                      Current 30 Day Average: 132/89     Recent Readings 5/19/2019 5/19/2019 5/11/2019 5/6/2019 5/1/2019    SBP (mmHg) 143 146 123 119 138    DBP (mmHg) 91 88 81 84 89    Pulse 57 61 60 55 67        No answer to assess how she is tolerating valsartan 80mg daily.

## 2019-05-29 ENCOUNTER — PATIENT OUTREACH (OUTPATIENT)
Dept: OTHER | Facility: OTHER | Age: 59
End: 2019-05-29

## 2019-05-29 NOTE — PROGRESS NOTES
Last 5 Patient Entered Readings                                      Current 30 Day Average: 131/87     Recent Readings 5/28/2019 5/22/2019 5/21/2019 5/19/2019 5/19/2019    SBP (mmHg) 122 133 139 143 146    DBP (mmHg) 85 89 83 91 88    Pulse 70 64 54 57 61        Reports she started working again in February and prefers to be called after 3pm.  Updated preference.    Digital Medicine: Health  Follow Up    Lifestyle Modifications:    1.Dietary Modifications (Sodium intake <2,000mg/day, food labels, dining out): Reports she has not been monitoring salt intake.  Reports her  does most of the cooking at home.  Encouraged patient to use salt free seasonings like Ms Dash and herbs and spices.    2.Physical Activity: Reports she just got her treadmill and plans on using it every evening.  Will follow up about SMG.    3.Medication Therapy: Patient has been compliant with the medication regimen.    4.Patient has the following medication side effects/concerns: none  (Frequency/Alleviating factors/Precipitating factors, etc.)     Follow up with Alexis Carolee JEM Ortega completed. No further questions or concerns. Will continue to follow up to achieve health goals.

## 2019-05-29 NOTE — PROGRESS NOTES
Last 5 Patient Entered Readings                                      Current 30 Day Average: 131/87     Recent Readings 5/28/2019 5/22/2019 5/21/2019 5/19/2019 5/19/2019    SBP (mmHg) 122 133 139 143 146    DBP (mmHg) 85 89 83 91 88    Pulse 70 64 54 57 61          5/29: LVM.  Will call in 2 weeks.  Will review sodium intake.

## 2019-06-10 NOTE — PROGRESS NOTES
Last 5 Patient Entered Readings                                      Current 30 Day Average: 127/84     Recent Readings 6/4/2019 5/28/2019 5/22/2019 5/21/2019 5/19/2019    SBP (mmHg) 101 122 133 139 143    DBP (mmHg) 73 85 89 83 91    Pulse 63 70 64 54 57        HPI:  Called patient to follow up since starting valsartan 80mg daily.   Patient endorses adherence to medication regimen.   Patient denies hypotensive s/sx (lightheadedness, dizziness, nausea, fatigue); patient denies hypertensive s/sx (SOB, CP, severe headaches, changes in vision). Instructed patient to seek medical care if BP > 180/110 and is accompanied by hypertensive s/sx associated, patient confirms understanding.     Assessment:  Reviewed recent readings. Per 2017 ACC/ AHA HTN guidelines (goal of BP < 130/80), current 30-day average is well controlled.     Plan:  Continue current medication regimen.   I will continue to monitor regularly and will follow-up in 2 to 3 weeks, sooner if blood pressure begins to trend upward or downward.     Current medication regimen:  Hypertension Medications             amLODIPine (NORVASC) 10 MG tablet TAKE 1 TABLET (10 MG TOTAL) BY MOUTH ONCE DAILY.    metoprolol tartrate (LOPRESSOR) 25 MG tablet TAKE 1 TABLET BY MOUTH EVERY DAY    valsartan (DIOVAN) 80 MG tablet Take 1 tablet (80 mg total) by mouth once daily.        Patient denies having questions or concerns. Patient has my contact information and knows to call with any concerns or clinical changes.

## 2019-06-13 ENCOUNTER — OFFICE VISIT (OUTPATIENT)
Dept: OPHTHALMOLOGY | Facility: CLINIC | Age: 59
End: 2019-06-13
Payer: COMMERCIAL

## 2019-06-13 DIAGNOSIS — H40.1131 PRIMARY OPEN ANGLE GLAUCOMA OF BOTH EYES, MILD STAGE: Primary | ICD-10-CM

## 2019-06-13 PROCEDURE — 92012 PR EYE EXAM, EST PATIENT,INTERMED: ICD-10-PCS | Mod: S$GLB,,, | Performed by: OPTOMETRIST

## 2019-06-13 PROCEDURE — 99999 PR PBB SHADOW E&M-EST. PATIENT-LVL I: CPT | Mod: PBBFAC,,, | Performed by: OPTOMETRIST

## 2019-06-13 PROCEDURE — 99999 PR PBB SHADOW E&M-EST. PATIENT-LVL I: ICD-10-PCS | Mod: PBBFAC,,, | Performed by: OPTOMETRIST

## 2019-06-13 PROCEDURE — 92133 CPTRZD OPH DX IMG PST SGM ON: CPT | Mod: S$GLB,,, | Performed by: OPTOMETRIST

## 2019-06-13 PROCEDURE — 92083 HUMPHREY VISUAL FIELD - OU - BOTH EYES: ICD-10-PCS | Mod: S$GLB,,, | Performed by: OPTOMETRIST

## 2019-06-13 PROCEDURE — 92133 POSTERIOR SEGMENT OCT OPTIC NERVE(OCULAR COHERENCE TOMOGRAPHY) - OU - BOTH EYES: ICD-10-PCS | Mod: S$GLB,,, | Performed by: OPTOMETRIST

## 2019-06-13 PROCEDURE — 92083 EXTENDED VISUAL FIELD XM: CPT | Mod: S$GLB,,, | Performed by: OPTOMETRIST

## 2019-06-13 PROCEDURE — 92012 INTRM OPH EXAM EST PATIENT: CPT | Mod: S$GLB,,, | Performed by: OPTOMETRIST

## 2019-06-13 NOTE — PROGRESS NOTES
HPI     4 months IOP check .  Review 24-2 and GOCT.  No visual complaints.  Medication eye drops if any: Cosopt BID OU and Latanoprost QHS OU  Date last eye visit: 02/14/2019  Last full exam: 02/14/2019  Last IOP : 16/17  Last dilated eye exam and SDP's: 02/14/2019  Last HVF and HRT : Today  High IOP: 26/24  CCT: 530/541 +1/+1  Family Hx POAG: father    Last edited by Cortez Julien, OD on 6/13/2019  3:51 PM. (History)            Assessment /Plan     For exam results, see Encounter Report.    Primary open angle glaucoma of both eyes, mild stage  -     Nolen Visual Field - OU - Extended - Both Eyes  -     Posterior Segment OCT Optic Nerve- Both eyes      Stable IOP OU. Continue latanoprost hs ou and cosopt bid ou    No changes in VF or gOCT OU    RTC 4 months iop ck

## 2019-07-09 ENCOUNTER — PATIENT OUTREACH (OUTPATIENT)
Dept: OTHER | Facility: OTHER | Age: 59
End: 2019-07-09

## 2019-07-09 NOTE — PROGRESS NOTES
Last 5 Patient Entered Readings                                      Current 30 Day Average: 130/85     Recent Readings 7/5/2019 6/27/2019 6/18/2019 6/17/2019 6/17/2019    SBP (mmHg) 134 123 135 136 156    DBP (mmHg) 85 76 92 90 88    Pulse 63 58 57 56 57          7/9: Unable to LVM.  Will call in 2 weeks.

## 2019-07-10 ENCOUNTER — PATIENT OUTREACH (OUTPATIENT)
Dept: OTHER | Facility: OTHER | Age: 59
End: 2019-07-10

## 2019-07-10 NOTE — PROGRESS NOTES
Last 5 Patient Entered Readings                                      Current 30 Day Average: 130/85     Recent Readings 7/5/2019 6/27/2019 6/18/2019 6/17/2019 6/17/2019    SBP (mmHg) 134 123 135 136 156    DBP (mmHg) 85 76 92 90 88    Pulse 63 58 57 56 57      LMFCB to discuss her upward trend.

## 2019-07-11 NOTE — PROGRESS NOTES
Last 5 Patient Entered Readings                                      Current 30 Day Average: 132/86     Recent Readings 7/5/2019 6/27/2019 6/18/2019 6/17/2019 6/17/2019    SBP (mmHg) 134 123 135 136 156    DBP (mmHg) 85 76 92 90 88    Pulse 63 58 57 56 57          Digital Medicine: Health  Follow Up    Lifestyle Modifications:    1.Dietary Modifications (Sodium intake <2,000mg/day, food labels, dining out): Reports she has been avoiding carbohydrates like breads, pasta, and rice.  States she has been cutting back on salt intake by using fresh herbs and spices.    2.Physical Activity: Reports she has been using her treadmill for about 30min-1hr every day.  States she feels this is helping the decrease in BP readings.    3.Medication Therapy: Patient has been compliant with the medication regimen.    4.Patient has the following medication side effects/concerns: none  (Frequency/Alleviating factors/Precipitating factors, etc.)     Follow up with Mrs. Carolee PARISH Shannon completed. No further questions or concerns. Will continue to follow up to achieve health goals.

## 2019-07-31 NOTE — PROGRESS NOTES
Last 5 Patient Entered Readings                                      Current 30 Day Average: 131/84     Recent Readings 7/25/2019 7/20/2019 7/13/2019 7/13/2019 7/5/2019    SBP (mmHg) 137 128 123 121 134    DBP (mmHg) 89 86 77 84 85    Pulse 57 70 58 59 63        HPI:  Called patient to follow up on her elevated BP.   Patient endorses adherence to medication regimen.   Patient denies hypotensive s/sx (lightheadedness, dizziness, nausea, fatigue); patient denies hypertensive s/sx (SOB, CP, severe headaches, changes in vision). Instructed patient to seek medical care if BP > 180/110 and is accompanied by hypertensive s/sx associated, patient confirms understanding.     Assessment:  Reviewed recent readings. Per 2017 ACC/ AHA HTN guidelines (goal of BP < 130/80), current 30-day average needs to be addressed more thoroughly today.     Plan:  Continue current medication regimen per patient request.   I will continue to monitor regularly and will follow-up in 3 weeks, sooner if blood pressure begins to trend upward or downward. If she remains elevated, I will increase valsartan to 160mg daily.    Current medication regimen:  Hypertension Medications             amLODIPine (NORVASC) 10 MG tablet TAKE 1 TABLET (10 MG TOTAL) BY MOUTH ONCE DAILY.    metoprolol tartrate (LOPRESSOR) 25 MG tablet TAKE 1 TABLET BY MOUTH EVERY DAY    valsartan (DIOVAN) 80 MG tablet Take 1 tablet (80 mg total) by mouth once daily.        Patient denies having questions or concerns. Patient has my contact information and knows to call with any concerns or clinical changes.

## 2019-08-02 ENCOUNTER — PATIENT MESSAGE (OUTPATIENT)
Dept: OTHER | Facility: OTHER | Age: 59
End: 2019-08-02

## 2019-08-07 DIAGNOSIS — H40.1131 PRIMARY OPEN ANGLE GLAUCOMA OF BOTH EYES, MILD STAGE: ICD-10-CM

## 2019-08-07 RX ORDER — LATANOPROST 50 UG/ML
1 SOLUTION/ DROPS OPHTHALMIC NIGHTLY
Qty: 2.5 ML | Refills: 11 | Status: SHIPPED | OUTPATIENT
Start: 2019-08-07 | End: 2020-08-10

## 2019-08-16 ENCOUNTER — PATIENT OUTREACH (OUTPATIENT)
Dept: OTHER | Facility: OTHER | Age: 59
End: 2019-08-16

## 2019-08-25 DIAGNOSIS — I10 ESSENTIAL HYPERTENSION: ICD-10-CM

## 2019-08-26 RX ORDER — VALSARTAN 80 MG/1
TABLET ORAL
Qty: 30 TABLET | Refills: 5 | Status: SHIPPED | OUTPATIENT
Start: 2019-08-26 | End: 2019-12-03 | Stop reason: SDUPTHER

## 2019-08-29 ENCOUNTER — OFFICE VISIT (OUTPATIENT)
Dept: FAMILY MEDICINE | Facility: CLINIC | Age: 59
End: 2019-08-29
Payer: COMMERCIAL

## 2019-08-29 VITALS
HEART RATE: 88 BPM | TEMPERATURE: 99 F | SYSTOLIC BLOOD PRESSURE: 126 MMHG | HEIGHT: 62 IN | DIASTOLIC BLOOD PRESSURE: 70 MMHG | BODY MASS INDEX: 39.68 KG/M2 | WEIGHT: 215.63 LBS

## 2019-08-29 DIAGNOSIS — L72.3 SEBACEOUS CYST: Primary | ICD-10-CM

## 2019-08-29 PROCEDURE — 3008F BODY MASS INDEX DOCD: CPT | Mod: CPTII,S$GLB,, | Performed by: REGISTERED NURSE

## 2019-08-29 PROCEDURE — 99999 PR PBB SHADOW E&M-EST. PATIENT-LVL IV: CPT | Mod: PBBFAC,,, | Performed by: REGISTERED NURSE

## 2019-08-29 PROCEDURE — 3074F SYST BP LT 130 MM HG: CPT | Mod: CPTII,S$GLB,, | Performed by: REGISTERED NURSE

## 2019-08-29 PROCEDURE — 99213 OFFICE O/P EST LOW 20 MIN: CPT | Mod: S$GLB,,, | Performed by: REGISTERED NURSE

## 2019-08-29 PROCEDURE — 3078F DIAST BP <80 MM HG: CPT | Mod: CPTII,S$GLB,, | Performed by: REGISTERED NURSE

## 2019-08-29 PROCEDURE — 3008F PR BODY MASS INDEX (BMI) DOCUMENTED: ICD-10-PCS | Mod: CPTII,S$GLB,, | Performed by: REGISTERED NURSE

## 2019-08-29 PROCEDURE — 3074F PR MOST RECENT SYSTOLIC BLOOD PRESSURE < 130 MM HG: ICD-10-PCS | Mod: CPTII,S$GLB,, | Performed by: REGISTERED NURSE

## 2019-08-29 PROCEDURE — 3078F PR MOST RECENT DIASTOLIC BLOOD PRESSURE < 80 MM HG: ICD-10-PCS | Mod: CPTII,S$GLB,, | Performed by: REGISTERED NURSE

## 2019-08-29 PROCEDURE — 99213 PR OFFICE/OUTPT VISIT, EST, LEVL III, 20-29 MIN: ICD-10-PCS | Mod: S$GLB,,, | Performed by: REGISTERED NURSE

## 2019-08-29 PROCEDURE — 99999 PR PBB SHADOW E&M-EST. PATIENT-LVL IV: ICD-10-PCS | Mod: PBBFAC,,, | Performed by: REGISTERED NURSE

## 2019-08-29 NOTE — PATIENT INSTRUCTIONS
Epidermoid Cyst (Sebaceous Cyst), No Infection  An epidermoid cyst (sebaceous cyst) is a term that refers to 2 similar types of cysts: those found in the skin (epidermoid), and those found around hair follicles (pilar).  Some general facts about these cysts:  · A cyst is a sac filled with material that is often cheesy, fatty, oily, or fibrous. The material in them can be thick (like cottage cheese) or liquid.  · They form slowly under the skin, and can be found on most parts of the body. They are most often found in hairier areas like the scalp, face, upper back, and genitals.  · You can usually move them slightly if you try.  · They can be smaller than a pea or as large as a few inches.  · They are usually not painful, unless they become inflamed or infected.  · The area around the cyst may smell bad. If the cyst breaks open, the material inside it often smells bad too.  Causes  Epidermoid cysts are caused when skin (epidermal) cells move under the skin surface, or are covered over by it. These cells continue to multiply, like skin does normally. They then form a wall around themselves (cyst) and secrete normal skin fluids (keratin). This may be developmental. But it often happens because of an injury to the skin.  Epidermoid cysts are often found around hair follicles. These follicles are like cysts, but they have openings. Normal lubricating oils for your hair are sent out through these openings. A cyst occurs when an opening becomes blocked or the site inflamed. This often occurs when there is damage to the hair follicles by a scrape or wound.    Pilar cysts are similar to epidermoid cysts. But they start from a different part of the hair follicle, and are more likely to be on the scalp.  Symptoms  · Feeling a lump just beneath the skin  · It may or may not be painful  · The cyst may or may not smell bad  · The cyst may become inflamed or red  · The cyst may leak fluid or thick material  Home care  Epidermoid  cysts often go away without any treatment. If your cyst doesnt go away, and it bothers you, it may be drained or removed. If the cyst drains on its own, it may return. Resist the temptation to squeeze, pop, stick a needle in it, or cut it open. This often leads to an infection and scarring. If it gets severely inflamed or infected, you should seek medical care. Be sure to clean the cyst area when bathing or showering. Watch for the signs of infection listed below.  Follow-up care  Follow up with your healthcare provider, or as advised.  When to seek medical advice  Call your healthcare provider right away if any of these occur:  · Swelling, redness, or pain  · Pus coming from the cyst  Date Last Reviewed: 8/1/2016  © 0251-1434 [employer]. [address] [place] [address], [place] [address]. All rights reserved. This information is not intended as a substitute for professional medical care. Always follow your healthcare professional's instructions.

## 2019-08-29 NOTE — PROGRESS NOTES
Subjective:       Patient ID: Carolee Ortega is a 58 y.o. female.    Chief Complaint   Patient presents with    Cyst     rt temple       HPI    Carolee Ortega is here today with c/o lump to the RT temporal area for the past 5 to 6 months.  Slightly tender, no redness or drainage reported.  No dizziness or headaches reported.      Review of Systems   Constitutional: Negative.    Skin: Positive for wound.   Neurological: Negative.          Review of patient's allergies indicates:   Allergen Reactions    Flexeril [cyclobenzaprine] Palpitations         Medication List with Changes/Refills   Current Medications    ALBUTEROL (ACCUNEB) 1.25 MG/3 ML NEBU    Take 1.25 mg by nebulization every 6 (six) hours as needed.    ALPRAZOLAM (XANAX) 0.5 MG TABLET    Take 1 tablet (0.5 mg total) by mouth 3 (three) times daily as needed.    AMLODIPINE (NORVASC) 10 MG TABLET    TAKE 1 TABLET (10 MG TOTAL) BY MOUTH ONCE DAILY.    DORZOLAMIDE-TIMOLOL 2-0.5% (COSOPT) 22.3-6.8 MG/ML OPHTHALMIC SOLUTION    Place 1 drop into both eyes every 12 (twelve) hours.    HYDROXYZINE HCL (ATARAX) 25 MG TABLET    TAKE 1 TABLET (25 MG TOTAL) BY MOUTH EVERY 4 (FOUR) HOURS AS NEEDED FOR ITCHING.    LATANOPROST 0.005 % OPHTHALMIC SOLUTION    PLACE 1 DROP INTO BOTH EYES EVERY EVENING.    METOPROLOL TARTRATE (LOPRESSOR) 25 MG TABLET    TAKE 1 TABLET BY MOUTH EVERY DAY    MOMETASONE (NASONEX) 50 MCG/ACTUATION NASAL SPRAY    2 sprays by Nasal route once daily.    MONTELUKAST (SINGULAIR) 10 MG TABLET    TAKE 1 TABLET BY MOUTH IN THE EVENING    PENCICLOVIR (DENAVIR) 1 % CREAM    Apply topically every 2 (two) hours.    VALACYCLOVIR (VALTREX) 1000 MG TABLET    Take 2 tablets (2,000 mg total) by mouth every 12 (twelve) hours.    VALSARTAN (DIOVAN) 80 MG TABLET    TAKE 1 TABLET BY MOUTH EVERY DAY       Patient Active Problem List   Diagnosis    Essential hypertension    Herpes labialis    Glaucoma (increased eye pressure)    Obesity (BMI 30-39.9)    Asthma  "   Allergic rhinitis    Anxiety    Benign paroxysmal positional vertigo         Past medical, surgical, family and social histories have been reviewed today.        Objective:     Vitals:    08/29/19 1018   BP: 126/70   Pulse: 88   Temp: 99.1 °F (37.3 °C)   Weight: 97.8 kg (215 lb 9.8 oz)   Height: 5' 2" (1.575 m)   PainSc: 0-No pain         Physical Exam   Constitutional: She is oriented to person, place, and time. She appears well-developed and well-nourished. No distress.   HENT:   Head:       Lymphadenopathy:     She has no cervical adenopathy.   Neurological: She is alert and oriented to person, place, and time.   Skin: She is not diaphoretic.         Diagnosis       1. Sebaceous cyst          Assessment/ Plan     Sebaceous cyst  · Local warm compresses as directed.  · Patient handout provided with further information.  · Follow-up in clinic as needed.        Future Appointments   Date Time Provider Department Center   10/10/2019  3:30 PM Cortez Julien OD [facility]   2/24/2020  8:00 AM Genie Walter MD [facility]       Patient Care Team:  Genie Walter MD as PCP - General (Family Medicine)  Cydney Luciano, PharmD as Hypertension Digital Medicine Clinician (Pharmacist)  Genie Walter MD as Hypertension Digital Medicine Responsible Provider (Family Medicine)  Valerie Collins LPN as Care Coordinator (Internal Medicine)  Uzair Lantigua as Digital Medicine Health       BRET Mays  [facility]   "

## 2019-09-06 NOTE — PROGRESS NOTES
Last 5 Patient Entered Readings                                      Current 30 Day Average: 130/84     Recent Readings 9/2/2019 8/25/2019 8/16/2019 8/6/2019 7/31/2019    SBP (mmHg) 133 128 129 133 113    DBP (mmHg) 89 84 80 84 79    Pulse 59 56 56 58 57            Digital Medicine: Health  Follow Up    Unable to leave voicemail to follow up with Mrs. Carolee PARISH Shannon.  Current BP average 130/84 mmHg is not at goal, 130/80 mmHg.    Unable to LVM.  Will call in 3 weeks.

## 2019-09-19 RX ORDER — HYDROXYZINE HYDROCHLORIDE 25 MG/1
25 TABLET, FILM COATED ORAL EVERY 4 HOURS PRN
Qty: 30 TABLET | Refills: 5 | Status: SHIPPED | OUTPATIENT
Start: 2019-09-19 | End: 2020-03-06

## 2019-10-07 ENCOUNTER — PATIENT MESSAGE (OUTPATIENT)
Dept: OTHER | Facility: OTHER | Age: 59
End: 2019-10-07

## 2019-10-10 ENCOUNTER — OFFICE VISIT (OUTPATIENT)
Dept: OPHTHALMOLOGY | Facility: CLINIC | Age: 59
End: 2019-10-10
Payer: COMMERCIAL

## 2019-10-10 DIAGNOSIS — H40.1131 PRIMARY OPEN ANGLE GLAUCOMA OF BOTH EYES, MILD STAGE: Primary | ICD-10-CM

## 2019-10-10 PROCEDURE — 92012 INTRM OPH EXAM EST PATIENT: CPT | Mod: S$GLB,,, | Performed by: OPTOMETRIST

## 2019-10-10 PROCEDURE — 99999 PR PBB SHADOW E&M-EST. PATIENT-LVL I: ICD-10-PCS | Mod: PBBFAC,,, | Performed by: OPTOMETRIST

## 2019-10-10 PROCEDURE — 92012 PR EYE EXAM, EST PATIENT,INTERMED: ICD-10-PCS | Mod: S$GLB,,, | Performed by: OPTOMETRIST

## 2019-10-10 PROCEDURE — 99999 PR PBB SHADOW E&M-EST. PATIENT-LVL I: CPT | Mod: PBBFAC,,, | Performed by: OPTOMETRIST

## 2019-10-10 NOTE — PROGRESS NOTES
HPI     4 months IOP check .  No visual complaints.  Medication eye drops if any: Cosopt BID OU and Latanoprost QHS OU  Date last eye visit: 02/14/2019  Last full exam: 02/14/2019  Last IOP : 16/17  Last dilated eye exam and SDP's: 02/14/2019  High IOP: 26/24  CCT: 530/541 +1/+1  Family Hx POAG: father    Last edited by Cortez Julien, OD on 10/10/2019  3:27 PM. (History)            Assessment /Plan     For exam results, see Encounter Report.    Primary open angle glaucoma of both eyes, mild stage      Lower IOP    Continue cosopt and latanoprost     RTC 4 months DFE

## 2019-10-23 ENCOUNTER — PATIENT OUTREACH (OUTPATIENT)
Dept: OTHER | Facility: OTHER | Age: 59
End: 2019-10-23

## 2019-10-23 ENCOUNTER — IMMUNIZATION (OUTPATIENT)
Dept: FAMILY MEDICINE | Facility: CLINIC | Age: 59
End: 2019-10-23
Payer: COMMERCIAL

## 2019-10-23 PROCEDURE — 90471 IMMUNIZATION ADMIN: CPT | Mod: S$GLB,,, | Performed by: FAMILY MEDICINE

## 2019-10-23 PROCEDURE — 99999 PR PBB SHADOW E&M-EST. PATIENT-LVL II: ICD-10-PCS | Mod: PBBFAC,,,

## 2019-10-23 PROCEDURE — 90686 FLU VACCINE (QUAD) GREATER THAN OR EQUAL TO 3YO PRESERVATIVE FREE IM: ICD-10-PCS | Mod: S$GLB,,, | Performed by: FAMILY MEDICINE

## 2019-10-23 PROCEDURE — 90686 IIV4 VACC NO PRSV 0.5 ML IM: CPT | Mod: S$GLB,,, | Performed by: FAMILY MEDICINE

## 2019-10-23 PROCEDURE — 90471 FLU VACCINE (QUAD) GREATER THAN OR EQUAL TO 3YO PRESERVATIVE FREE IM: ICD-10-PCS | Mod: S$GLB,,, | Performed by: FAMILY MEDICINE

## 2019-10-23 PROCEDURE — 99999 PR PBB SHADOW E&M-EST. PATIENT-LVL II: CPT | Mod: PBBFAC,,,

## 2019-10-23 NOTE — PROGRESS NOTES
VIS given. Patient tolerated injection well in left deltoid. I asked patient to wait in the clinic for 15 minutes before leaving to verify no adverse reactions. Patient verbally verified understanding./vlw

## 2019-10-23 NOTE — PROGRESS NOTES
Digital Medicine: Clinician Follow-Up    The history is provided by the patient.     Follow Up  Follow-up reason(s): reading review      Readings are trending up due to medication adherence.    Adherence Barriers: patient forgets  Patient ran out of valsartan and missed several doses causing her upward trend.              Medication Affordability    This is preventing medication adherence.    Medication Adherence:   She misses doses: more than once a week      Patient identified the following reasons for non-compliance: patient forgets    Patient ran out of valsartan for at least the past week.       INTERVENTION(S)  reviewed appropriate dose schedule    PLAN  patient verbalizes understanding    Patient will continue her current HTN medications after resumed valsartan yesterday.      There are no preventive care reminders to display for this patient.    Last 5 Patient Entered Readings                                      Current 30 Day Average: 137/87     Recent Readings 10/21/2019 10/21/2019 10/20/2019 10/13/2019 10/5/2019    SBP (mmHg) 133 147 156 139 119    DBP (mmHg) 85 96 91 87 77    Pulse 60 58 56 57 68             Hypertension Medications             amLODIPine (NORVASC) 10 MG tablet TAKE 1 TABLET (10 MG TOTAL) BY MOUTH ONCE DAILY.    metoprolol tartrate (LOPRESSOR) 25 MG tablet TAKE 1 TABLET BY MOUTH EVERY DAY    valsartan (DIOVAN) 80 MG tablet TAKE 1 TABLET BY MOUTH EVERY DAY

## 2019-11-06 ENCOUNTER — PATIENT OUTREACH (OUTPATIENT)
Dept: OTHER | Facility: OTHER | Age: 59
End: 2019-11-06

## 2019-11-06 DIAGNOSIS — I10 ESSENTIAL HYPERTENSION: ICD-10-CM

## 2019-11-08 DIAGNOSIS — H40.1131 PRIMARY OPEN ANGLE GLAUCOMA OF BOTH EYES, MILD STAGE: ICD-10-CM

## 2019-11-08 RX ORDER — DORZOLAMIDE HYDROCHLORIDE AND TIMOLOL MALEATE 20; 5 MG/ML; MG/ML
1 SOLUTION/ DROPS OPHTHALMIC EVERY 12 HOURS
Qty: 10 ML | Refills: 12 | Status: SHIPPED | OUTPATIENT
Start: 2019-11-08 | End: 2021-06-29 | Stop reason: SDUPTHER

## 2019-11-15 ENCOUNTER — PATIENT MESSAGE (OUTPATIENT)
Dept: ADMINISTRATIVE | Facility: OTHER | Age: 59
End: 2019-11-15

## 2019-11-26 ENCOUNTER — PATIENT OUTREACH (OUTPATIENT)
Dept: OTHER | Facility: OTHER | Age: 59
End: 2019-11-26

## 2019-11-26 NOTE — PROGRESS NOTES
"Digital Medicine: Health  Follow-Up    Patient reports she did not sit and rest long enough after taking BP reading on 11/20 of 162/94 mmHg.  Reports she just got out of the shower when she decided to take it.    The history is provided by the patient.     Follow Up  Follow-up reason(s): routine education      Routine Education Topics: eating patterns        INTERVENTION(S)  recommended diet modifications, reviewed monitoring technique and encouragement/support    PLAN  patient verbalizes understanding      There are no preventive care reminders to display for this patient.    Last 5 Patient Entered Readings                                      Current 30 Day Average: 138/88     Recent Readings 11/20/2019 11/20/2019 11/12/2019 11/4/2019 10/27/2019    SBP (mmHg) 146 162 129 142 133    DBP (mmHg) 87 94 82 88 88    Pulse 54 51 68 60 66                      Diet Screening   She has the following dietary restrictions: low carb and ketogenic diet    Reports she is doing a 30 day challenge with people she met in a Keto group.  Reports it is not fully "keto" but she is just monitoring what she eats.    Intervention(s): portion control and reducing sodium intake    Physical Activity Screening   No change to exercise routine.          SDOH  "

## 2019-12-03 RX ORDER — VALSARTAN 160 MG/1
160 TABLET ORAL DAILY
Qty: 30 TABLET | Refills: 5 | Status: SHIPPED | OUTPATIENT
Start: 2019-12-03 | End: 2020-03-13

## 2019-12-03 NOTE — PROGRESS NOTES
Digital Medicine: Clinician Follow-Up    Called patient to discuss her higher blood pressure.     The history is provided by the patient.     Follow Up  Follow-up reason(s): reading review and medication change      Readings are trending up Patient has reached the two year alisson with her blood pressure cuff. We discussed purchasing another cuff however she did not want to purchase another cuff today.     Patient denies any changes to warrant her elevated blood pressure.       INTERVENTION(S)  reviewed appropriate dose schedule, reviewed monitoring technique and recommended med change    PLAN  patient verbalizes understanding and patient amenable to changes    Patient will increase valsartan from 80mg daily to 160mg daily. Patient will continue her current dose of metoprolol and amlodipine.     Patient will charge her blood pressure cuff and is aware a new cuff is needed in the future.      There are no preventive care reminders to display for this patient.    Last 5 Patient Entered Readings                                      Current 30 Day Average: 145/91     Recent Readings 11/30/2019 11/30/2019 11/29/2019 11/20/2019 11/20/2019    SBP (mmHg) 147 154 160 146 162    DBP (mmHg) 95 91 101 87 94    Pulse 64 53 58 54 51             Hypertension Medications             amLODIPine (NORVASC) 10 MG tablet TAKE 1 TABLET (10 MG TOTAL) BY MOUTH ONCE DAILY.    metoprolol tartrate (LOPRESSOR) 25 MG tablet TAKE 1 TABLET BY MOUTH EVERY DAY    valsartan (DIOVAN) 80 MG tablet TAKE 1 TABLET BY MOUTH EVERY DAY                             Medication Adherence Screening   She misses doses: never

## 2019-12-17 ENCOUNTER — PATIENT OUTREACH (OUTPATIENT)
Dept: OTHER | Facility: OTHER | Age: 59
End: 2019-12-17

## 2019-12-17 NOTE — PROGRESS NOTES
Digital Medicine: Clinician Follow-Up    Called patient to discuss how she is tolerating the higher dose of valsartan.    The history is provided by the patient.     Follow Up  Follow-up reason(s): reading review and medication change follow-up      Readings are trending down   Medication Change: dose increase    Is patient tolerating med change?:  YesPatient denies hypotension symptoms.    Patient is tolerating the change in dose of valsartan.      INTERVENTION(S)  reviewed appropriate dose schedule    PLAN  patient verbalizes understanding    Patient will maintain her current HTN medication regimen.      There are no preventive care reminders to display for this patient.    Last 5 Patient Entered Readings                                      Current 30 Day Average: 140/92     Recent Readings 12/13/2019 12/7/2019 11/30/2019 11/30/2019 11/29/2019    SBP (mmHg) 130 116 147 154 160    DBP (mmHg) 90 86 95 91 101    Pulse 54 54 64 53 58             Hypertension Medications             amLODIPine (NORVASC) 10 MG tablet TAKE 1 TABLET (10 MG TOTAL) BY MOUTH ONCE DAILY.    metoprolol tartrate (LOPRESSOR) 25 MG tablet TAKE 1 TABLET BY MOUTH EVERY DAY    valsartan (DIOVAN) 160 MG tablet Take 1 tablet (160 mg total) by mouth once daily.                             Medication Adherence Screening   She misses doses: never

## 2019-12-18 ENCOUNTER — PATIENT MESSAGE (OUTPATIENT)
Dept: ADMINISTRATIVE | Facility: OTHER | Age: 59
End: 2019-12-18

## 2020-01-02 ENCOUNTER — PATIENT OUTREACH (OUTPATIENT)
Dept: OTHER | Facility: OTHER | Age: 60
End: 2020-01-02

## 2020-01-14 DIAGNOSIS — I10 ESSENTIAL HYPERTENSION: ICD-10-CM

## 2020-01-14 RX ORDER — VALSARTAN 80 MG/1
TABLET ORAL
Qty: 90 TABLET | Refills: 0 | Status: SHIPPED | OUTPATIENT
Start: 2020-01-14 | End: 2020-01-27

## 2020-01-27 ENCOUNTER — OFFICE VISIT (OUTPATIENT)
Dept: FAMILY MEDICINE | Facility: CLINIC | Age: 60
End: 2020-01-27
Payer: COMMERCIAL

## 2020-01-27 VITALS
HEART RATE: 79 BPM | WEIGHT: 219.38 LBS | HEIGHT: 62 IN | BODY MASS INDEX: 40.37 KG/M2 | DIASTOLIC BLOOD PRESSURE: 75 MMHG | TEMPERATURE: 98 F | SYSTOLIC BLOOD PRESSURE: 130 MMHG

## 2020-01-27 DIAGNOSIS — J32.9 SINUSITIS, UNSPECIFIED CHRONICITY, UNSPECIFIED LOCATION: Primary | ICD-10-CM

## 2020-01-27 PROCEDURE — 3078F PR MOST RECENT DIASTOLIC BLOOD PRESSURE < 80 MM HG: ICD-10-PCS | Mod: CPTII,S$GLB,, | Performed by: REGISTERED NURSE

## 2020-01-27 PROCEDURE — 99999 PR PBB SHADOW E&M-EST. PATIENT-LVL III: CPT | Mod: PBBFAC,,, | Performed by: REGISTERED NURSE

## 2020-01-27 PROCEDURE — 3078F DIAST BP <80 MM HG: CPT | Mod: CPTII,S$GLB,, | Performed by: REGISTERED NURSE

## 2020-01-27 PROCEDURE — 3075F PR MOST RECENT SYSTOLIC BLOOD PRESS GE 130-139MM HG: ICD-10-PCS | Mod: CPTII,S$GLB,, | Performed by: REGISTERED NURSE

## 2020-01-27 PROCEDURE — 3008F PR BODY MASS INDEX (BMI) DOCUMENTED: ICD-10-PCS | Mod: CPTII,S$GLB,, | Performed by: REGISTERED NURSE

## 2020-01-27 PROCEDURE — 99999 PR PBB SHADOW E&M-EST. PATIENT-LVL III: ICD-10-PCS | Mod: PBBFAC,,, | Performed by: REGISTERED NURSE

## 2020-01-27 PROCEDURE — 3008F BODY MASS INDEX DOCD: CPT | Mod: CPTII,S$GLB,, | Performed by: REGISTERED NURSE

## 2020-01-27 PROCEDURE — 3075F SYST BP GE 130 - 139MM HG: CPT | Mod: CPTII,S$GLB,, | Performed by: REGISTERED NURSE

## 2020-01-27 PROCEDURE — 99214 PR OFFICE/OUTPT VISIT, EST, LEVL IV, 30-39 MIN: ICD-10-PCS | Mod: S$GLB,,, | Performed by: REGISTERED NURSE

## 2020-01-27 PROCEDURE — 99214 OFFICE O/P EST MOD 30 MIN: CPT | Mod: S$GLB,,, | Performed by: REGISTERED NURSE

## 2020-01-27 RX ORDER — AMOXICILLIN AND CLAVULANATE POTASSIUM 875; 125 MG/1; MG/1
1 TABLET, FILM COATED ORAL 2 TIMES DAILY
Qty: 20 TABLET | Refills: 0 | Status: SHIPPED | OUTPATIENT
Start: 2020-01-27 | End: 2020-02-06

## 2020-01-27 NOTE — PROGRESS NOTES
Subjective:       Patient ID: Carolee Ortega is a 59 y.o. female.    Chief Complaint   Patient presents with    Sinusitis       HPI    Carolee Ortega is here today with c/o sinus issues.  She reports symptoms started last month, never seemed to fully recover.  Current symptoms for the past 3-4 days without fever chills or body aches.  She has taken over-the-counter Tylenol cold and sinus medication at times, has helped some.  With reports of right ear popping, postnasal drip, runny nose, and sneezing.  Denies cough.  Reports sneezing with a history of allergic rhinitis, currently taking Nasonex  And Singulair as ordered.        Review of Systems   Constitutional: Negative for activity change, chills, fever and unexpected weight change.   HENT: Positive for congestion, ear pain, nosebleeds (slight bloody nasal d/c), postnasal drip, rhinorrhea and sinus pressure. Negative for hearing loss, sore throat and trouble swallowing.    Eyes: Negative for pain, discharge, itching and visual disturbance.   Respiratory: Negative.  Negative for cough, chest tightness and wheezing.    Cardiovascular: Negative.  Negative for chest pain and palpitations.   Gastrointestinal: Negative for blood in stool, constipation, diarrhea and vomiting.   Endocrine: Negative for polydipsia and polyuria.   Genitourinary: Negative for difficulty urinating, dysuria, hematuria and menstrual problem.   Musculoskeletal: Negative for arthralgias, joint swelling and neck pain.   Allergic/Immunologic: Positive for environmental allergies.   Neurological: Negative.  Negative for weakness and headaches.   Psychiatric/Behavioral: Negative for confusion and dysphoric mood.         Review of patient's allergies indicates:   Allergen Reactions    Flexeril [cyclobenzaprine] Palpitations         Patient Active Problem List   Diagnosis    Essential hypertension    Herpes labialis    Glaucoma (increased eye pressure)    Obesity (BMI 30-39.9)    Asthma     "Allergic rhinitis    Anxiety    Benign paroxysmal positional vertigo       Medication List with Changes/Refills   Current Medications    ALBUTEROL (ACCUNEB) 1.25 MG/3 ML NEBU    Take 1.25 mg by nebulization every 6 (six) hours as needed.    ALPRAZOLAM (XANAX) 0.5 MG TABLET    Take 1 tablet (0.5 mg total) by mouth 3 (three) times daily as needed.    AMLODIPINE (NORVASC) 10 MG TABLET    TAKE 1 TABLET (10 MG TOTAL) BY MOUTH ONCE DAILY.    DORZOLAMIDE-TIMOLOL 2-0.5% (COSOPT) 22.3-6.8 MG/ML OPHTHALMIC SOLUTION    PLACE 1 DROP INTO BOTH EYES EVERY 12 (TWELVE) HOURS.    HYDROXYZINE HCL (ATARAX) 25 MG TABLET    TAKE 1 TABLET (25 MG TOTAL) BY MOUTH EVERY 4 (FOUR) HOURS AS NEEDED FOR ITCHING.    LATANOPROST 0.005 % OPHTHALMIC SOLUTION    PLACE 1 DROP INTO BOTH EYES EVERY EVENING.    METOPROLOL TARTRATE (LOPRESSOR) 25 MG TABLET    TAKE 1 TABLET BY MOUTH EVERY DAY    MOMETASONE (NASONEX) 50 MCG/ACTUATION NASAL SPRAY    2 sprays by Nasal route once daily.    MONTELUKAST (SINGULAIR) 10 MG TABLET    TAKE 1 TABLET BY MOUTH IN THE EVENING    PENCICLOVIR (DENAVIR) 1 % CREAM    Apply topically every 2 (two) hours.    VALACYCLOVIR (VALTREX) 1000 MG TABLET    Take 2 tablets (2,000 mg total) by mouth every 12 (twelve) hours.    VALSARTAN (DIOVAN) 160 MG TABLET    Take 1 tablet (160 mg total) by mouth once daily.    VALSARTAN (DIOVAN) 80 MG TABLET    TAKE 1 TABLET BY MOUTH EVERY DAY             Past medical, surgical, family and social histories have been reviewed today.        Objective:     Vitals:    01/27/20 0808   BP: 130/75   Pulse: 79   Temp: 97.9 °F (36.6 °C)   Weight: 99.5 kg (219 lb 5.7 oz)   Height: 5' 2" (1.575 m)   PainSc: 0-No pain       Estimated body mass index is 40.12 kg/m² as calculated from the following:    Height as of this encounter: 5' 2" (1.575 m).    Weight as of this encounter: 99.5 kg (219 lb 5.7 oz).      Physical Exam   Constitutional: She is oriented to person, place, and time. She appears well-developed " and well-nourished.   HENT:   Head: Normocephalic and atraumatic.   Right Ear: Tympanic membrane is bulging. Tympanic membrane is not injected, not perforated, not erythematous and not retracted.   Left Ear: Tympanic membrane is not injected, not perforated, not erythematous, not retracted and not bulging.   Nose: Mucosal edema, rhinorrhea and sinus tenderness present. Right sinus exhibits frontal sinus tenderness. Left sinus exhibits frontal sinus tenderness.   Mouth/Throat: Oropharynx is clear and moist and mucous membranes are normal.   Eyes: Pupils are equal, round, and reactive to light. Conjunctivae are normal. Right eye exhibits no discharge. Left eye exhibits no discharge.   Cardiovascular: Normal rate and regular rhythm.   Pulmonary/Chest: Effort normal and breath sounds normal.   Musculoskeletal: Normal range of motion. She exhibits no edema.   Lymphadenopathy:     She has no cervical adenopathy.   Neurological: She is alert and oriented to person, place, and time.   Skin: Skin is warm and dry. Capillary refill takes less than 2 seconds. No rash noted.   Psychiatric: She has a normal mood and affect. Her behavior is normal. Judgment and thought content normal.   Vitals reviewed.        Diagnosis       1. Sinusitis, unspecified chronicity, unspecified location          Assessment/ Plan     Sinusitis, unspecified chronicity, unspecified location  -     amoxicillin-clavulanate 875-125mg (AUGMENTIN) 875-125 mg per tablet; Take 1 tablet by mouth 2 (two) times daily. for 10 days  Dispense: 20 tablet; Refill: 0        Continue nasal spray and Singulair.  Symptomatic care, rest and fluids.  Follow-up in clinic as needed.        Patient Care Team:  Genie Walter MD as PCP - General (Family Medicine)  Cydney Luciano PharmD as Hypertension Digital Medicine Clinician (Pharmacist)  Genie Walter MD as Hypertension Digital Medicine Responsible Provider (Family Medicine)  Valerie Collins LPN as Care  Coordinator (Internal Medicine)  Uzair Lantigua as Digital Medicine Health   [employer] as Hypertension Digital Medicine Contract      BRET Mays  [facility] probably no

## 2020-02-01 DIAGNOSIS — I10 ESSENTIAL HYPERTENSION: ICD-10-CM

## 2020-02-02 RX ORDER — MONTELUKAST SODIUM 10 MG/1
TABLET ORAL
Qty: 90 TABLET | Refills: 0 | Status: SHIPPED | OUTPATIENT
Start: 2020-02-02 | End: 2020-04-27

## 2020-02-02 RX ORDER — AMLODIPINE BESYLATE 10 MG/1
10 TABLET ORAL DAILY
Qty: 90 TABLET | Refills: 0 | Status: SHIPPED | OUTPATIENT
Start: 2020-02-02 | End: 2020-04-27

## 2020-02-02 RX ORDER — METOPROLOL TARTRATE 25 MG/1
TABLET, FILM COATED ORAL
Qty: 90 TABLET | Refills: 0 | Status: SHIPPED | OUTPATIENT
Start: 2020-02-02 | End: 2020-04-27

## 2020-02-03 NOTE — PROGRESS NOTES
Recent MD appointment 1/27 showed an at goal blood pressure reading. She is trending down. Will space out my call.

## 2020-02-04 NOTE — PROGRESS NOTES
Digital Medicine: Health  Follow-Up    Patient reports she and Cydney increased dose of amlodipine and has been feeling fine.  Denies hypotensive symptoms.  Reports she and her  are renewing their vows on 3/14 and going to [place] from 3/15-21.  States she will bring her BP cuff, so no need for hiatus.  States she has been planning this for over 1 year now and has been crafting most of the decorations.    The history is provided by the patient.     Follow Up  Follow-up reason(s): reading review      Readings are trending down       INTERVENTION(S)  encouragement/support and denied questions    PLAN  patient verbalizes understanding      There are no preventive care reminders to display for this patient.    Last 5 Patient Entered Readings                                      Current 30 Day Average: 134/86     Recent Readings 1/28/2020 1/21/2020 1/16/2020 1/14/2020 1/6/2020    SBP (mmHg) 130 126 131 147 138    DBP (mmHg) 84 85 80 97 86    Pulse 64 57 55 61 61                  Screenings    SDOH

## 2020-02-13 ENCOUNTER — OFFICE VISIT (OUTPATIENT)
Dept: OPHTHALMOLOGY | Facility: CLINIC | Age: 60
End: 2020-02-13
Payer: COMMERCIAL

## 2020-02-13 DIAGNOSIS — H52.4 BILATERAL PRESBYOPIA: ICD-10-CM

## 2020-02-13 DIAGNOSIS — H40.1131 PRIMARY OPEN ANGLE GLAUCOMA OF BOTH EYES, MILD STAGE: Primary | ICD-10-CM

## 2020-02-13 PROCEDURE — 99999 PR PBB SHADOW E&M-EST. PATIENT-LVL I: CPT | Mod: PBBFAC,,, | Performed by: OPTOMETRIST

## 2020-02-13 PROCEDURE — 99999 PR PBB SHADOW E&M-EST. PATIENT-LVL I: ICD-10-PCS | Mod: PBBFAC,,, | Performed by: OPTOMETRIST

## 2020-02-13 PROCEDURE — 92014 COMPRE OPH EXAM EST PT 1/>: CPT | Mod: S$GLB,,, | Performed by: OPTOMETRIST

## 2020-02-13 PROCEDURE — 92015 DETERMINE REFRACTIVE STATE: CPT | Mod: S$GLB,,, | Performed by: OPTOMETRIST

## 2020-02-13 PROCEDURE — 92014 PR EYE EXAM, EST PATIENT,COMPREHESV: ICD-10-PCS | Mod: S$GLB,,, | Performed by: OPTOMETRIST

## 2020-02-13 PROCEDURE — 92015 PR REFRACTION: ICD-10-PCS | Mod: S$GLB,,, | Performed by: OPTOMETRIST

## 2020-02-13 NOTE — PROGRESS NOTES
HPI     4 months full eye exam .  No visual complaints.  Medication eye drops if any: Cosopt BID OU and Latanoprost QHS OU  Date last eye visit: 10/101875  Last full exam: 02/14/2019  Last IOP : 19/16  Last dilated eye exam and SDP's: 02/14/2019  High IOP: 26/24  CCT: 530/541 +1/+1  Family Hx POAG: father    Last edited by Octavia Watts on 2/13/2020  8:09 AM. (History)            Assessment /Plan     For exam results, see Encounter Report.    Primary open angle glaucoma of both eyes, mild stage    Bilateral presbyopia      Low IOP with Cosopt BID OU and Latanoprost QHS OU    Dispense Final Rx for glasses.  RTC 4 months VF gOCT IOP ck  Discussed above and answered questions.

## 2020-02-24 ENCOUNTER — LAB VISIT (OUTPATIENT)
Dept: LAB | Facility: HOSPITAL | Age: 60
End: 2020-02-24
Payer: COMMERCIAL

## 2020-02-24 ENCOUNTER — OFFICE VISIT (OUTPATIENT)
Dept: FAMILY MEDICINE | Facility: CLINIC | Age: 60
End: 2020-02-24
Payer: COMMERCIAL

## 2020-02-24 VITALS
TEMPERATURE: 98 F | DIASTOLIC BLOOD PRESSURE: 88 MMHG | WEIGHT: 218.69 LBS | SYSTOLIC BLOOD PRESSURE: 132 MMHG | HEIGHT: 62 IN | OXYGEN SATURATION: 96 % | BODY MASS INDEX: 40.25 KG/M2 | HEART RATE: 77 BPM

## 2020-02-24 DIAGNOSIS — Z12.31 ENCOUNTER FOR SCREENING MAMMOGRAM FOR MALIGNANT NEOPLASM OF BREAST: ICD-10-CM

## 2020-02-24 DIAGNOSIS — Z23 NEED FOR VACCINATION: ICD-10-CM

## 2020-02-24 DIAGNOSIS — Z12.11 COLON CANCER SCREENING: ICD-10-CM

## 2020-02-24 DIAGNOSIS — Z00.00 PREVENTATIVE HEALTH CARE: ICD-10-CM

## 2020-02-24 DIAGNOSIS — Z00.00 PREVENTATIVE HEALTH CARE: Primary | ICD-10-CM

## 2020-02-24 DIAGNOSIS — I10 ESSENTIAL HYPERTENSION: Chronic | ICD-10-CM

## 2020-02-24 DIAGNOSIS — E66.9 OBESITY (BMI 30-39.9): ICD-10-CM

## 2020-02-24 DIAGNOSIS — J30.89 NON-SEASONAL ALLERGIC RHINITIS DUE TO OTHER ALLERGIC TRIGGER: ICD-10-CM

## 2020-02-24 LAB
ALBUMIN SERPL BCP-MCNC: 3.8 G/DL (ref 3.5–5.2)
ALP SERPL-CCNC: 77 U/L (ref 55–135)
ALT SERPL W/O P-5'-P-CCNC: 18 U/L (ref 10–44)
ANION GAP SERPL CALC-SCNC: 11 MMOL/L (ref 8–16)
AST SERPL-CCNC: 22 U/L (ref 10–40)
BASOPHILS # BLD AUTO: 0.04 K/UL (ref 0–0.2)
BASOPHILS NFR BLD: 1 % (ref 0–1.9)
BILIRUB SERPL-MCNC: 0.4 MG/DL (ref 0.1–1)
BUN SERPL-MCNC: 8 MG/DL (ref 6–20)
CALCIUM SERPL-MCNC: 10 MG/DL (ref 8.7–10.5)
CHLORIDE SERPL-SCNC: 107 MMOL/L (ref 95–110)
CHOLEST SERPL-MCNC: 222 MG/DL (ref 120–199)
CHOLEST/HDLC SERPL: 3.1 {RATIO} (ref 2–5)
CO2 SERPL-SCNC: 24 MMOL/L (ref 23–29)
CREAT SERPL-MCNC: 0.8 MG/DL (ref 0.5–1.4)
DIFFERENTIAL METHOD: ABNORMAL
EOSINOPHIL # BLD AUTO: 0.1 K/UL (ref 0–0.5)
EOSINOPHIL NFR BLD: 1.8 % (ref 0–8)
ERYTHROCYTE [DISTWIDTH] IN BLOOD BY AUTOMATED COUNT: 13.7 % (ref 11.5–14.5)
EST. GFR  (AFRICAN AMERICAN): >60 ML/MIN/1.73 M^2
EST. GFR  (NON AFRICAN AMERICAN): >60 ML/MIN/1.73 M^2
GLUCOSE SERPL-MCNC: 88 MG/DL (ref 70–110)
HCT VFR BLD AUTO: 45.9 % (ref 37–48.5)
HDLC SERPL-MCNC: 72 MG/DL (ref 40–75)
HDLC SERPL: 32.4 % (ref 20–50)
HGB BLD-MCNC: 13.9 G/DL (ref 12–16)
IMM GRANULOCYTES # BLD AUTO: 0 K/UL (ref 0–0.04)
IMM GRANULOCYTES NFR BLD AUTO: 0 % (ref 0–0.5)
LDLC SERPL CALC-MCNC: 133.4 MG/DL (ref 63–159)
LYMPHOCYTES # BLD AUTO: 1.9 K/UL (ref 1–4.8)
LYMPHOCYTES NFR BLD: 47.7 % (ref 18–48)
MCH RBC QN AUTO: 27.2 PG (ref 27–31)
MCHC RBC AUTO-ENTMCNC: 30.3 G/DL (ref 32–36)
MCV RBC AUTO: 90 FL (ref 82–98)
MONOCYTES # BLD AUTO: 0.3 K/UL (ref 0.3–1)
MONOCYTES NFR BLD: 7.1 % (ref 4–15)
NEUTROPHILS # BLD AUTO: 1.7 K/UL (ref 1.8–7.7)
NEUTROPHILS NFR BLD: 42.4 % (ref 38–73)
NONHDLC SERPL-MCNC: 150 MG/DL
NRBC BLD-RTO: 0 /100 WBC
PLATELET # BLD AUTO: 235 K/UL (ref 150–350)
PMV BLD AUTO: 11.7 FL (ref 9.2–12.9)
POTASSIUM SERPL-SCNC: 4.3 MMOL/L (ref 3.5–5.1)
PROT SERPL-MCNC: 7.8 G/DL (ref 6–8.4)
RBC # BLD AUTO: 5.11 M/UL (ref 4–5.4)
SODIUM SERPL-SCNC: 142 MMOL/L (ref 136–145)
TRIGL SERPL-MCNC: 83 MG/DL (ref 30–150)
TSH SERPL DL<=0.005 MIU/L-ACNC: 1.38 UIU/ML (ref 0.4–4)
WBC # BLD AUTO: 3.96 K/UL (ref 3.9–12.7)

## 2020-02-24 PROCEDURE — 3075F SYST BP GE 130 - 139MM HG: CPT | Mod: CPTII,S$GLB,, | Performed by: FAMILY MEDICINE

## 2020-02-24 PROCEDURE — 3079F DIAST BP 80-89 MM HG: CPT | Mod: CPTII,S$GLB,, | Performed by: FAMILY MEDICINE

## 2020-02-24 PROCEDURE — 80061 LIPID PANEL: CPT

## 2020-02-24 PROCEDURE — 80053 COMPREHEN METABOLIC PANEL: CPT

## 2020-02-24 PROCEDURE — 3075F PR MOST RECENT SYSTOLIC BLOOD PRESS GE 130-139MM HG: ICD-10-PCS | Mod: CPTII,S$GLB,, | Performed by: FAMILY MEDICINE

## 2020-02-24 PROCEDURE — 90471 IMMUNIZATION ADMIN: CPT | Mod: S$GLB,,, | Performed by: FAMILY MEDICINE

## 2020-02-24 PROCEDURE — 99396 PR PREVENTIVE VISIT,EST,40-64: ICD-10-PCS | Mod: 25,S$GLB,, | Performed by: FAMILY MEDICINE

## 2020-02-24 PROCEDURE — 84443 ASSAY THYROID STIM HORMONE: CPT

## 2020-02-24 PROCEDURE — 99999 PR PBB SHADOW E&M-EST. PATIENT-LVL III: CPT | Mod: PBBFAC,,, | Performed by: FAMILY MEDICINE

## 2020-02-24 PROCEDURE — 86703 HIV-1/HIV-2 1 RESULT ANTBDY: CPT

## 2020-02-24 PROCEDURE — 90750 HZV VACC RECOMBINANT IM: CPT | Mod: S$GLB,,, | Performed by: FAMILY MEDICINE

## 2020-02-24 PROCEDURE — 3079F PR MOST RECENT DIASTOLIC BLOOD PRESSURE 80-89 MM HG: ICD-10-PCS | Mod: CPTII,S$GLB,, | Performed by: FAMILY MEDICINE

## 2020-02-24 PROCEDURE — 90750 ZOSTER RECOMBINANT VACCINE: ICD-10-PCS | Mod: S$GLB,,, | Performed by: FAMILY MEDICINE

## 2020-02-24 PROCEDURE — 99396 PREV VISIT EST AGE 40-64: CPT | Mod: 25,S$GLB,, | Performed by: FAMILY MEDICINE

## 2020-02-24 PROCEDURE — 99999 PR PBB SHADOW E&M-EST. PATIENT-LVL III: ICD-10-PCS | Mod: PBBFAC,,, | Performed by: FAMILY MEDICINE

## 2020-02-24 PROCEDURE — 36415 COLL VENOUS BLD VENIPUNCTURE: CPT | Mod: PO

## 2020-02-24 PROCEDURE — 90471 ZOSTER RECOMBINANT VACCINE: ICD-10-PCS | Mod: S$GLB,,, | Performed by: FAMILY MEDICINE

## 2020-02-24 PROCEDURE — 85025 COMPLETE CBC W/AUTO DIFF WBC: CPT

## 2020-02-24 NOTE — PROGRESS NOTES
CHIEF COMPLAINT:  This is a 59-year-old female here for preventive health exam.     SUBJECTIVE:  Patient is doing well without complaints.  Her blood pressure is controlled on amlodipine, valsaratan and metoprolol.  The patient has mild intermittent asthma for which she uses albuterol.  Patient takes hydroxyzine 1 tablet daily for itching.  Patient uses Nasonex and Singulair for allergic rhinitis.  She is followed by ophthalmology for glaucoma.     Eye exam February 2020.  Mammogram March 2019.   Colonoscopy January 2010, due again in January 2020.  Tdap February 2017. Pneumovax February 2014. Flu vaccine October 2019.     ROS:  GENERAL: Patient denies fever, chills, night sweats. Patient denies weight gain. Patient denies anorexia, fatigue, weakness or swollen glands.  SKIN: Patient denies rash or hair loss.  HEENT: Patient denies sore throat, ear pain, hearing loss, nasal congestion, or runny nose. Patient denies visual disturbance, eye irritation or discharge.  LUNGS: Patient denies cough, wheeze or hemoptysis.  CARDIOVASCULAR: Patient denies chest pain, shortness of breath, palpitations, syncope or lower extremity edema.  GI: Patient denies abdominal pain, nausea, vomiting, diarrhea, constipation, blood in stool or melena.  GENITOURINARY: Patient denies pelvic pain, vaginal discharge, itch or odor. Patient denies irregular vaginal bleeding. Patient denies dysuria, frequency, hematuria, nocturia, urgency or incontinence.  BREASTS: Patient denies breast pain, mass or nipple discharge.  MUSCULOSKELETAL: Patient denies joint pain, swelling, redness or warmth.  NEUROLOGIC: Patient denies headache, vertigo, paresthesias, weakness in limb, dysarthria, dysphagia or abnormality of gait.  PSYCHIATRIC: Patient denies anxiety, depression, or memory loss.     OBJECTIVE:   GENERAL: Well-developed well-nourished, obese, black female alert and oriented x3, in no acute distress. Memory, judgment and cognition without deficit.  Weight gain of 16 pounds in the last year.  SKIN: Clear without rash. Normal color and tone.  Papulovesicular lesion left upper lip with crustiness.  HEENT: Eyes: Clear conjunctivae. No scleral icterus. Pupils equal reactive to light and accommodation. Ears: Clear canals. Clear TMs. Nose: Without congestion. Pharynx: Without injection or exudates.  NECK: Supple, normal range of motion. No masses, lymphadenopathy or enlarged thyroid. No JVD. Carotids 2+ and equal. No bruits.  LUNGS: Clear to auscultation. Normal respiratory effort.  CARDIOVASCULAR: Regular rhythm, normal S1, S2 without murmur, gallop or rub.  BACK: No CVA or spinal tenderness.  BREASTS: No masses, tenderness or nipple discharge.  ABDOMEN: Normal appearance. Active bowel sounds. Soft, nontender without mass or organomegaly. No rebound or guarding.  EXTREMITIES: Without cyanosis, clubbing or edema. Distal pulses 2+ and equal. Normal range of motion in all extremities. No joint effusion, erythema or warmth.  NEUROLOGIC: Cranial nerves II through XII without deficit. Motor strength equal bilaterally. Sensation normal to touch. Deep tendon reflexes 2+ and equal. Gait without abnormality. No tremor. Negative cerebellar signs.  PELVIC: External: Without lesions or inflammation.Vaginal: Clear, cuff intact. Bimanual: No masses, nontender.   RECTOVAGINAL: Confirms heme-negative stool x2.     ASSESSMENT:  1. Preventative health care    2. Essential hypertension    3. Obesity (BMI 30-39.9)    4. Non-seasonal allergic rhinitis due to other allergic trigger    5. Encounter for screening mammogram for malignant neoplasm of breast    6. Colon cancer screening    7. Need for vaccination      PLAN:  1.  Weight reduction. Exercise regularly.  2.  Age-appropriate counseling.  3.  Fasting lab.  4.  Mammogram.  5.  Colonoscopy.  6.  Follow-up annually.    This note is generated with speech recognition software and is subject to transcription error and sound alike phrases  that may be missed by proofreading.

## 2020-02-26 LAB — HIV 1+2 AB+HIV1 P24 AG SERPL QL IA: NEGATIVE

## 2020-03-06 ENCOUNTER — PATIENT MESSAGE (OUTPATIENT)
Dept: FAMILY MEDICINE | Facility: CLINIC | Age: 60
End: 2020-03-06

## 2020-03-06 ENCOUNTER — HOSPITAL ENCOUNTER (OUTPATIENT)
Dept: RADIOLOGY | Facility: HOSPITAL | Age: 60
Discharge: HOME | End: 2020-03-06
Attending: FAMILY MEDICINE
Payer: COMMERCIAL

## 2020-03-06 VITALS — HEIGHT: 62 IN | BODY MASS INDEX: 40.25 KG/M2 | WEIGHT: 218.69 LBS

## 2020-03-06 DIAGNOSIS — Z12.31 ENCOUNTER FOR SCREENING MAMMOGRAM FOR MALIGNANT NEOPLASM OF BREAST: ICD-10-CM

## 2020-03-06 PROCEDURE — 77063 BREAST TOMOSYNTHESIS BI: CPT | Mod: 26,,, | Performed by: RADIOLOGY

## 2020-03-06 PROCEDURE — 77067 SCR MAMMO BI INCL CAD: CPT | Mod: TC

## 2020-03-06 PROCEDURE — 77067 SCR MAMMO BI INCL CAD: CPT | Mod: 26,,, | Performed by: RADIOLOGY

## 2020-03-06 PROCEDURE — 77067 MAMMO DIGITAL SCREENING BILAT WITH TOMOSYNTHESIS_CAD: ICD-10-PCS | Mod: 26,,, | Performed by: RADIOLOGY

## 2020-03-06 PROCEDURE — 77063 MAMMO DIGITAL SCREENING BILAT WITH TOMOSYNTHESIS_CAD: ICD-10-PCS | Mod: 26,,, | Performed by: RADIOLOGY

## 2020-03-06 RX ORDER — HYDROXYZINE HYDROCHLORIDE 25 MG/1
25 TABLET, FILM COATED ORAL EVERY 4 HOURS PRN
Qty: 30 TABLET | Refills: 5 | Status: SHIPPED | OUTPATIENT
Start: 2020-03-06 | End: 2020-08-24

## 2020-03-13 DIAGNOSIS — I10 ESSENTIAL HYPERTENSION: ICD-10-CM

## 2020-03-13 RX ORDER — VALSARTAN 160 MG/1
160 TABLET ORAL DAILY
Qty: 90 TABLET | Refills: 3 | Status: SHIPPED | OUTPATIENT
Start: 2020-03-13 | End: 2020-03-30 | Stop reason: DRUGHIGH

## 2020-03-16 ENCOUNTER — PATIENT OUTREACH (OUTPATIENT)
Dept: OTHER | Facility: OTHER | Age: 60
End: 2020-03-16

## 2020-03-16 DIAGNOSIS — I10 ESSENTIAL HYPERTENSION: ICD-10-CM

## 2020-03-17 ENCOUNTER — PATIENT MESSAGE (OUTPATIENT)
Dept: OTHER | Facility: OTHER | Age: 60
End: 2020-03-17

## 2020-03-30 RX ORDER — VALSARTAN 160 MG/1
160 TABLET ORAL 2 TIMES DAILY
Qty: 90 TABLET | Refills: 3 | Status: SHIPPED | OUTPATIENT
Start: 2020-03-30 | End: 2020-04-30

## 2020-03-30 NOTE — PROGRESS NOTES
Digital Medicine: Clinician Follow-Up    HPI    Intervention/Plan    There are no preventive care reminders to display for this patient.    Last 5 Patient Entered Readings                                      Current 30 Day Average: 136/88     Recent Readings 3/23/2020 3/10/2020 3/4/2020 2/28/2020 2/20/2020    SBP (mmHg) 136 129 142 145 130    DBP (mmHg) 85 92 88 86 79    Pulse 61 59 56 62 57             Hypertension Medications             amLODIPine (NORVASC) 10 MG tablet TAKE 1 TABLET (10 MG TOTAL) BY MOUTH ONCE DAILY.    metoprolol tartrate (LOPRESSOR) 25 MG tablet TAKE 1 TABLET BY MOUTH EVERY DAY    valsartan (DIOVAN) 160 MG tablet TAKE 1 TABLET (160 MG TOTAL) BY MOUTH ONCE DAILY.                 Screenings

## 2020-03-30 NOTE — PROGRESS NOTES
"Digital Medicine: Clinician Follow-Up    Called patient for hypertension follow-up due to elevated DBP. Mrs. Zarco reports that she and her  are just returning home from a trip to [place]. She confirmed that DBP is elevated and cannot attribute it to any particular reason. "I have not changed anything with my eating habits. Do we need to increase my valsartan?" She confirmed taking all blood pressures medications at night before bedtime.     The history is provided by the patient. No  was used.     Follow Up  Follow-up reason(s): reading review and medication change      Medication Change: dose increase        INTERVENTION(S)  recommended med change and encouragement/support    PLAN  patient verbalizes understanding, patient amenable to changes and continue monitoring    Current 30-day BP avg of 136/88 is uncontrolled and does not meet goal of <130/80.  Reviewed readings: Morning BP are acceptable after nighttime dose of BP meds and closer to goal; however, evening readings tend to be more elevated.   Discussed need for additional morning dose of BP med. Readings are not quite meeting goal with all 3 meds being dosed at night so adding med as opposed to moving med dose schedule.     Start valsartan 160 mg every morning.  Continue valsartan, amlodipine and metoprolol at bedtime.  Measure BP for monitoring.  Counseled on s/sx of hypotension.  Patient understands to call [facility] if she has any questions/concerns.    Follow-up in 2 weeks.       There are no preventive care reminders to display for this patient.    Last 5 Patient Entered Readings                                      Current 30 Day Average: 136/88     Recent Readings 3/23/2020 3/10/2020 3/4/2020 2/28/2020 2/20/2020    SBP (mmHg) 136 129 142 145 130    DBP (mmHg) 85 92 88 86 79    Pulse 61 59 56 62 57             Hypertension Medications             amLODIPine (NORVASC) 10 MG tablet TAKE 1 TABLET (10 MG TOTAL) BY MOUTH ONCE " DAILY.    metoprolol tartrate (LOPRESSOR) 25 MG tablet TAKE 1 TABLET BY MOUTH EVERY DAY    valsartan (DIOVAN) 160 MG tablet Take 1 tablet (160 mg total) by mouth 2 (two) times daily.                 Screenings

## 2020-04-08 ENCOUNTER — PATIENT OUTREACH (OUTPATIENT)
Dept: OTHER | Facility: OTHER | Age: 60
End: 2020-04-08

## 2020-04-08 NOTE — PROGRESS NOTES
Digital Medicine: Health  Follow-Up    Patient reports they enjoyed their trip to [place] after they renewed their wedding vows, but had to cut their trip short due to COVID 19.  States her brother in law passed after being dx with COVID 19 and her sister is in the hospital who also tested positive.    States temporary pharmacist, April, added another dose of Valsartan and states she has been feeling fine.  Denies hypotensive symptoms.    The history is provided by the patient.       INTERVENTION(S)  encouragement/support and denied questions    PLAN  patient verbalizes understanding and continue monitoring      There are no preventive care reminders to display for this patient.    Last 5 Patient Entered Readings                                      Current 30 Day Average: 134/88     Recent Readings 4/7/2020 3/31/2020 3/23/2020 3/10/2020 3/4/2020    SBP (mmHg) 134 136 136 129 142    DBP (mmHg) 91 84 85 92 88    Pulse 57 58 61 59 56                      Diet Screening   No change to diet.      Physical Activity Screening   No change to exercise routine.          SDOH

## 2020-04-22 ENCOUNTER — TELEPHONE (OUTPATIENT)
Dept: ENDOSCOPY | Facility: HOSPITAL | Age: 60
End: 2020-04-22

## 2020-04-27 DIAGNOSIS — I10 ESSENTIAL HYPERTENSION: ICD-10-CM

## 2020-04-27 RX ORDER — METOPROLOL TARTRATE 25 MG/1
TABLET, FILM COATED ORAL
Qty: 90 TABLET | Refills: 3 | Status: SHIPPED | OUTPATIENT
Start: 2020-04-27 | End: 2021-04-04

## 2020-04-27 RX ORDER — MONTELUKAST SODIUM 10 MG/1
TABLET ORAL
Qty: 90 TABLET | Refills: 3 | Status: SHIPPED | OUTPATIENT
Start: 2020-04-27 | End: 2021-04-23

## 2020-04-27 RX ORDER — AMLODIPINE BESYLATE 10 MG/1
TABLET ORAL
Qty: 90 TABLET | Refills: 3 | Status: SHIPPED | OUTPATIENT
Start: 2020-04-27 | End: 2020-04-30

## 2020-04-29 ENCOUNTER — TELEPHONE (OUTPATIENT)
Dept: ENDOSCOPY | Facility: HOSPITAL | Age: 60
End: 2020-04-29

## 2020-04-30 ENCOUNTER — PATIENT OUTREACH (OUTPATIENT)
Dept: OTHER | Facility: OTHER | Age: 60
End: 2020-04-30

## 2020-04-30 ENCOUNTER — PATIENT MESSAGE (OUTPATIENT)
Dept: ADMINISTRATIVE | Facility: OTHER | Age: 60
End: 2020-04-30

## 2020-04-30 DIAGNOSIS — I10 ESSENTIAL HYPERTENSION: ICD-10-CM

## 2020-04-30 RX ORDER — AMLODIPINE AND VALSARTAN 10; 320 MG/1; MG/1
1 TABLET ORAL DAILY
Qty: 90 TABLET | Refills: 3 | Status: SHIPPED | OUTPATIENT
Start: 2020-04-30 | End: 2021-04-23

## 2020-04-30 RX ORDER — SPIRONOLACTONE 25 MG/1
25 TABLET ORAL DAILY
Qty: 90 TABLET | Refills: 0 | Status: SHIPPED | OUTPATIENT
Start: 2020-04-30 | End: 2020-10-09

## 2020-04-30 RX ORDER — SPIRONOLACTONE 25 MG/1
25 TABLET ORAL DAILY
Qty: 90 TABLET | Refills: 3 | Status: SHIPPED | OUTPATIENT
Start: 2020-04-30 | End: 2020-04-30 | Stop reason: SDUPTHER

## 2020-04-30 NOTE — PROGRESS NOTES
Digital Medicine: Clinician Follow-Up    Called patient for hypertension follow-up. Mrs. Zarco sent message requesting combination tablet for valsartan and amlodipine; she also expressed concern that her quantity count from pharmacy has been incorrect for her present sig to take valsartan. She admits to elevated readings over the past week due to some employment changes for her  that has caused stress/worry. She reports following a low sodium diet.    The history is provided by the patient. No  was used.     Follow Up  Follow-up reason(s): reading review, medication change and routine education      Readings are trending up due to lifestyle change.    Medication Change: new med        INTERVENTION(S)  reviewed appropriate dose schedule, recommended med change and encouragement/support    PLAN  patient verbalizes understanding, patient amenable to changes and continue monitoring    Current 30-day BP avg of 139/91 is uncontrolled, does not meet goal of <130/80.  Reviewed readings: trending upwards where both are elevated above goal. Measures about once a week. Elevated readings exist beyond the past week, evidence of elevated readings over the past few months.   Discussed need for additional therapy - amlodipine at max dose, valsartan at max dose, current dose of metoprolol has pulse at lower end of threshold. Past use of Maxzide where BP responded well but use was discontinued due to hypokalemia. Patient willing to try different diuretic - spironolactone.     Initiate spironolactone.  Continue metoprolol, amlodipine and valsartan.  Modified dosing schedule: Take spironolactone and metoprolol in the morning; take amlodipine-valsartan in the evening.   Requested more BP readings to monitor respond to med change over next month.    Follow-up in 2 weeks.       There are no preventive care reminders to display for this patient.    Last 5 Patient Entered Readings                                       Current 30 Day Average: 139/91     Recent Readings 4/28/2020 4/28/2020 4/28/2020 4/22/2020 4/14/2020    SBP (mmHg) 139 151 159 143 142    DBP (mmHg) 84 92 100 89 90    Pulse 63 61 58 62 58             Hypertension Medications             amlodipine-valsartan (EXFORGE)  mg per tablet Take 1 tablet by mouth once daily.    metoprolol tartrate (LOPRESSOR) 25 MG tablet TAKE 1 TABLET BY MOUTH EVERY DAY    spironolactone (ALDACTONE) 25 MG tablet Take 1 tablet (25 mg total) by mouth once daily.                 Screenings

## 2020-05-19 ENCOUNTER — PATIENT MESSAGE (OUTPATIENT)
Dept: ADMINISTRATIVE | Facility: OTHER | Age: 60
End: 2020-05-19

## 2020-05-19 ENCOUNTER — TELEPHONE (OUTPATIENT)
Dept: ENDOSCOPY | Facility: HOSPITAL | Age: 60
End: 2020-05-19

## 2020-05-19 DIAGNOSIS — Z13.9 SCREENING FOR CONDITION: Primary | ICD-10-CM

## 2020-05-19 RX ORDER — SODIUM, POTASSIUM,MAG SULFATES 17.5-3.13G
1 SOLUTION, RECONSTITUTED, ORAL ORAL DAILY
Qty: 1 KIT | Refills: 0 | Status: SHIPPED | OUTPATIENT
Start: 2020-05-19 | End: 2020-05-21

## 2020-05-19 NOTE — TELEPHONE ENCOUNTER
COVID Screening     1. Have you had a fever in the last 7 days or have you used fever reducing medicines for a fever in the last 7 days?  no    2. Are you experiencing shortness of breath, cough, muscle aches, loss of taste or loss of smell?  no    3. Are you residing with anyone who has tested positive for Covid?  no    If answered yes to any of the above questions, the pt must be scheduled for an appointment with their PCP.    A message also needs to be sent to the endoscopist to ensure the patient gets rescheduled at a later date.     ENDO screening    1. Have you been admitted overnight to the hospital in the past 3 months? no   If yes, schedule an appointment with PCP before scheduling endoscopic procedure.     2. Have you had a stent placed in the last 12 months? no   If yes, for a screening visit, cancel and message the ordering provider.  The patient will need a new order when the time is appropriate.     3. Have you had a stroke or heart attack in the past 6 months? no   If yes, cancel and refer patient to ordering provider for clearance, also message ordering provider to inform.     4. Have you had any chest pain in the past 3 months? no   If yes, Have you been evaluated by your PCP and/or cardiologist and it was determined to not be heart related? not applicable   If No, Pt needs to be seen by PCP or Cardiologist .  Pt can be scheduled once clearance obtained by either of those providers.     5. Do you take prescription weight loss medications?  no   If yes, must stop for 2 weeks prior to procedure.     6. Have you been diagnosed with diverticulitis within the past 3 months? no   If yes, must have been seen by GI within the last 3 months, if not schedule with GI [facility].    If pt has been seen by GI, schedule procedure 8-12 weeks post antibiotic treatment.     7. Are you on Dialysis? no  If yes, schedule procedure for the day AFTER dialysis.  Appt time should be 9am or later, patient arrival time is 2 hours  "prior.  Nulytely or    miralax prep for all patients with kidney disease.     8. Are you diabetic?  no   If yes, schedule morning appt. Advise pt to hold all diabetic meds day of procedure.     9. If pt is older than 80 years of age and HAS NOT been seen by GI or PCP within the last 6 months, needs appt with GI [facility].   If pt has been seen by the GI provider or PCP within the past 6  months AND meets criteria, schedule procedure AND send message to the endoscopist.     10. Is patient on a "high risk" medication (blood thinner/antiplatelet agent)?  no   If yes, has cardiac clearance been obtained within the last 60 days? N/A   If no, a new clearance needs to be obtained.       I have reviewed the last colonoscopy for recommendations regarding next procedure bowel prep.  yes  I have reviewed medications and allergies.  yes  I have verified the pharmacy information and appropriate prep sent if needed. yes  Prep instructions have been mailed or sent to portal per patient request. yes    If answers yes to any of the following, schedule at [facility] ONLY. If No, OK for either location.     Is BMI over 45?   Any complaints of chest pain, new onset or at rest?  Does pt have an AICD?  Is there a diagnosis of heart failure?  Does patient have an insulin pump?  If procedure for esophageal banding?  "

## 2020-05-25 ENCOUNTER — CLINICAL SUPPORT (OUTPATIENT)
Dept: FAMILY MEDICINE | Facility: CLINIC | Age: 60
End: 2020-05-25
Payer: COMMERCIAL

## 2020-05-25 ENCOUNTER — TELEPHONE (OUTPATIENT)
Dept: FAMILY MEDICINE | Facility: CLINIC | Age: 60
End: 2020-05-25

## 2020-05-25 DIAGNOSIS — Z23 NEED FOR VACCINATION: Primary | ICD-10-CM

## 2020-05-25 PROCEDURE — 90471 IMMUNIZATION ADMIN: CPT | Mod: S$GLB,,, | Performed by: FAMILY MEDICINE

## 2020-05-25 PROCEDURE — 99999 PR PBB SHADOW E&M-EST. PATIENT-LVL II: CPT | Mod: PBBFAC,,,

## 2020-05-25 PROCEDURE — 99999 PR PBB SHADOW E&M-EST. PATIENT-LVL II: ICD-10-PCS | Mod: PBBFAC,,,

## 2020-05-25 PROCEDURE — 90750 ZOSTER RECOMBINANT VACCINE: ICD-10-PCS | Mod: S$GLB,,, | Performed by: FAMILY MEDICINE

## 2020-05-25 PROCEDURE — 90750 HZV VACC RECOMBINANT IM: CPT | Mod: S$GLB,,, | Performed by: FAMILY MEDICINE

## 2020-05-25 PROCEDURE — 90471 ZOSTER RECOMBINANT VACCINE: ICD-10-PCS | Mod: S$GLB,,, | Performed by: FAMILY MEDICINE

## 2020-05-25 NOTE — TELEPHONE ENCOUNTER
----- Message from Leslie Gandhi sent at 5/25/2020  8:44 AM CDT -----  Contact: pt  Pt states she been waiting outside since 8:30 for nurse, pt need to know why taking so long to come get her, pt coming to door

## 2020-06-02 ENCOUNTER — PATIENT OUTREACH (OUTPATIENT)
Dept: OTHER | Facility: OTHER | Age: 60
End: 2020-06-02

## 2020-06-02 NOTE — PROGRESS NOTES
Digital Medicine: Health  Follow-Up    Patient reports she is unsure why BP was elevated.  States she may need to have BP cuff replaced since she has had it for 2-3 years.    The history is provided by the patient.     Follow Up  Follow-up reason(s): routine education      Routine Education Topics: eating patterns        INTERVENTION(S)  recommended diet modifications, encouragement/support and denied questions    PLAN  patient verbalizes understanding and continue monitoring    States she has an appt later this month and will take her BP cuff to the [facility].      There are no preventive care reminders to display for this patient.    Last 5 Patient Entered Readings                                      Current 30 Day Average: 128/81     Recent Readings 5/31/2020 5/29/2020 5/29/2020 5/28/2020 5/27/2020    SBP (mmHg) 130 107 100 134 134    DBP (mmHg) 80 64 69 82 82    Pulse 58 58 59 62 62                      Diet Screening       Reports she has been using low sodium products, like Ms. MCKEE.    Intervention(s): reducing sodium intake and DASH diet      SDOH

## 2020-06-18 ENCOUNTER — PATIENT OUTREACH (OUTPATIENT)
Dept: OTHER | Facility: OTHER | Age: 60
End: 2020-06-18

## 2020-06-18 DIAGNOSIS — I10 ESSENTIAL HYPERTENSION: Primary | Chronic | ICD-10-CM

## 2020-06-18 NOTE — PROGRESS NOTES
Digital Medicine: Clinician Follow-Up    Called patient to follow-up on her medication change.    The history is provided by the patient.   Follow Up  Follow-up reason(s): reading review and medication change      Medication Change: new med    Patient started new medication.    Is patient tolerating med change?:  YesPatient's blood pressure looks good with the medication change by adding spironolactone. Her blood pressure is well controlled.         INTERVENTION(S)  reviewed appropriate dose schedule and encouragement/support    PLAN  patient verbalizes understanding and continue monitoring    Patient will maintain her current hypertension medication regimen.    Patient will have a BMP scheduled with this change on 6/25.     Patient is aware to contact us with any changes or problems.      There are no preventive care reminders to display for this patient.    Last 5 Patient Entered Readings                                      Current 30 Day Average: 128/80     Recent Readings 6/17/2020 6/11/2020 6/4/2020 6/2/2020 5/31/2020    SBP (mmHg) 117 124 124 138 130    DBP (mmHg) 76 83 81 87 80    Pulse 63 63 63 51 58             Hypertension Medications             amlodipine-valsartan (EXFORGE)  mg per tablet Take 1 tablet by mouth once daily.    metoprolol tartrate (LOPRESSOR) 25 MG tablet TAKE 1 TABLET BY MOUTH EVERY DAY    spironolactone (ALDACTONE) 25 MG tablet Take 1 tablet (25 mg total) by mouth once daily.                 Screenings

## 2020-06-24 ENCOUNTER — PATIENT OUTREACH (OUTPATIENT)
Dept: ADMINISTRATIVE | Facility: OTHER | Age: 60
End: 2020-06-24

## 2020-06-24 DIAGNOSIS — M25.531 RIGHT WRIST PAIN: Primary | ICD-10-CM

## 2020-06-24 NOTE — PROGRESS NOTES
Chart reviewed.   Immunizations: Triggered Imm Registry     Orders placed: n/a  Upcoming appts to satisfy JHOANA topics: n/a

## 2020-06-25 ENCOUNTER — OFFICE VISIT (OUTPATIENT)
Dept: OPHTHALMOLOGY | Facility: CLINIC | Age: 60
End: 2020-06-25
Payer: COMMERCIAL

## 2020-06-25 ENCOUNTER — HOSPITAL ENCOUNTER (OUTPATIENT)
Dept: RADIOLOGY | Facility: HOSPITAL | Age: 60
Discharge: HOME | End: 2020-06-25
Attending: PHYSICIAN ASSISTANT
Payer: COMMERCIAL

## 2020-06-25 ENCOUNTER — OFFICE VISIT (OUTPATIENT)
Dept: ORTHOPEDICS | Facility: CLINIC | Age: 60
End: 2020-06-25
Payer: COMMERCIAL

## 2020-06-25 VITALS
SYSTOLIC BLOOD PRESSURE: 147 MMHG | HEIGHT: 62 IN | DIASTOLIC BLOOD PRESSURE: 90 MMHG | WEIGHT: 218.69 LBS | BODY MASS INDEX: 40.25 KG/M2 | HEART RATE: 54 BPM

## 2020-06-25 DIAGNOSIS — H40.1131 PRIMARY OPEN ANGLE GLAUCOMA OF BOTH EYES, MILD STAGE: Primary | ICD-10-CM

## 2020-06-25 DIAGNOSIS — M65.341 ACQUIRED TRIGGER FINGER OF RIGHT RING FINGER: Primary | ICD-10-CM

## 2020-06-25 DIAGNOSIS — R22.31 MASS OF RIGHT HAND: ICD-10-CM

## 2020-06-25 DIAGNOSIS — M25.531 RIGHT WRIST PAIN: ICD-10-CM

## 2020-06-25 PROCEDURE — 73110 XR WRIST COMPLETE 3 VIEWS RIGHT: ICD-10-PCS | Mod: 26,RT,, | Performed by: RADIOLOGY

## 2020-06-25 PROCEDURE — 92012 INTRM OPH EXAM EST PATIENT: CPT | Mod: S$GLB,,, | Performed by: OPTOMETRIST

## 2020-06-25 PROCEDURE — 92012 PR EYE EXAM, EST PATIENT,INTERMED: ICD-10-PCS | Mod: S$GLB,,, | Performed by: OPTOMETRIST

## 2020-06-25 PROCEDURE — 3077F SYST BP >= 140 MM HG: CPT | Mod: CPTII,S$GLB,, | Performed by: PHYSICIAN ASSISTANT

## 2020-06-25 PROCEDURE — 99999 PR PBB SHADOW E&M-EST. PATIENT-LVL III: ICD-10-PCS | Mod: PBBFAC,,, | Performed by: PHYSICIAN ASSISTANT

## 2020-06-25 PROCEDURE — 92083 HUMPHREY VISUAL FIELD - OU - BOTH EYES: ICD-10-PCS | Mod: S$GLB,,, | Performed by: OPTOMETRIST

## 2020-06-25 PROCEDURE — 92133 CPTRZD OPH DX IMG PST SGM ON: CPT | Mod: S$GLB,,, | Performed by: OPTOMETRIST

## 2020-06-25 PROCEDURE — 20550 NJX 1 TENDON SHEATH/LIGAMENT: CPT | Mod: F8,S$GLB,, | Performed by: PHYSICIAN ASSISTANT

## 2020-06-25 PROCEDURE — 99999 PR PBB SHADOW E&M-EST. PATIENT-LVL II: ICD-10-PCS | Mod: PBBFAC,,, | Performed by: OPTOMETRIST

## 2020-06-25 PROCEDURE — 73110 X-RAY EXAM OF WRIST: CPT | Mod: TC,RT

## 2020-06-25 PROCEDURE — 99999 PR PBB SHADOW E&M-EST. PATIENT-LVL II: CPT | Mod: PBBFAC,,, | Performed by: OPTOMETRIST

## 2020-06-25 PROCEDURE — 92083 EXTENDED VISUAL FIELD XM: CPT | Mod: S$GLB,,, | Performed by: OPTOMETRIST

## 2020-06-25 PROCEDURE — 3077F PR MOST RECENT SYSTOLIC BLOOD PRESSURE >= 140 MM HG: ICD-10-PCS | Mod: CPTII,S$GLB,, | Performed by: PHYSICIAN ASSISTANT

## 2020-06-25 PROCEDURE — 99214 PR OFFICE/OUTPT VISIT, EST, LEVL IV, 30-39 MIN: ICD-10-PCS | Mod: 25,S$GLB,, | Performed by: PHYSICIAN ASSISTANT

## 2020-06-25 PROCEDURE — 3008F PR BODY MASS INDEX (BMI) DOCUMENTED: ICD-10-PCS | Mod: CPTII,S$GLB,, | Performed by: PHYSICIAN ASSISTANT

## 2020-06-25 PROCEDURE — 99214 OFFICE O/P EST MOD 30 MIN: CPT | Mod: 25,S$GLB,, | Performed by: PHYSICIAN ASSISTANT

## 2020-06-25 PROCEDURE — 99999 PR PBB SHADOW E&M-EST. PATIENT-LVL III: CPT | Mod: PBBFAC,,, | Performed by: PHYSICIAN ASSISTANT

## 2020-06-25 PROCEDURE — 92133 POSTERIOR SEGMENT OCT OPTIC NERVE(OCULAR COHERENCE TOMOGRAPHY) - OU - BOTH EYES: ICD-10-PCS | Mod: S$GLB,,, | Performed by: OPTOMETRIST

## 2020-06-25 PROCEDURE — 3080F DIAST BP >= 90 MM HG: CPT | Mod: CPTII,S$GLB,, | Performed by: PHYSICIAN ASSISTANT

## 2020-06-25 PROCEDURE — 3080F PR MOST RECENT DIASTOLIC BLOOD PRESSURE >= 90 MM HG: ICD-10-PCS | Mod: CPTII,S$GLB,, | Performed by: PHYSICIAN ASSISTANT

## 2020-06-25 PROCEDURE — 73110 X-RAY EXAM OF WRIST: CPT | Mod: 26,RT,, | Performed by: RADIOLOGY

## 2020-06-25 PROCEDURE — 3008F BODY MASS INDEX DOCD: CPT | Mod: CPTII,S$GLB,, | Performed by: PHYSICIAN ASSISTANT

## 2020-06-25 PROCEDURE — 20550 TENDON SHEATH: R RING MCP: ICD-10-PCS | Mod: F8,S$GLB,, | Performed by: PHYSICIAN ASSISTANT

## 2020-06-25 RX ADMIN — METHYLPREDNISOLONE ACETATE 40 MG: 40 INJECTION, SUSPENSION INTRA-ARTICULAR; INTRALESIONAL; INTRAMUSCULAR; SOFT TISSUE at 11:06

## 2020-06-25 NOTE — PROGRESS NOTES
HPI     4 months IOP check .  No visual complaints.  Medication eye drops if any: Cosopt BID OU and Latanoprost QHS OU  Date last eye visit: 02/13/2020  Last full exam: 02/13/2020  Last IOP : 16/17  Last dilated eye exam and SDP's: 02/13/2020  Last HVF and GOCT: Today  High IOP: 26/24  CCT: 530/541 +1/+1  Family Hx POAG: father    Last edited by Octavia Watts on 6/25/2020  9:23 AM. (History)            Assessment /Plan     For exam results, see Encounter Report.    Primary open angle glaucoma of both eyes, mild stage  -     Nolen Visual Field - OU - Extended - Both Eyes  -     Posterior Segment OCT Optic Nerve- Both eyes      No evidence of glaucomatous changes OS gOCT, VF OS normal  gOCT OD shows continuing thinning at TI, possible corresponding notch forming. Nasal step OD on VF with defect also on GCL    Continue cosopt bid OU and latanoprost hs OU    RTC 4 months IOP ck

## 2020-06-25 NOTE — PROCEDURES
Tendon Sheath: R ring MCP    Date/Time: 6/25/2020 11:00 AM  Performed by: Hedy Trejo PA-C  Authorized by: Hedy Trejo PA-C     Consent Done?:  Yes (Verbal)  Indications:  Pain  Site marked: the procedure site was marked    Timeout: prior to procedure the correct patient, procedure, and site was verified    Prep: patient was prepped and draped in usual sterile fashion      Local anesthesia used?: Yes    Location:  Ring finger  Site:  R ring MCP  Ultrasonic guidance for needle placement?: No    Needle size:  25 G  Approach:  Volar  Medications:  40 mg methylPREDNISolone acetate 40 mg/mL  Patient tolerance:  Patient tolerated the procedure well with no immediate complications    Additional Comments: The patient was informed to apply an ice pack approximately 10min once arriving home.  She was instructed to call if there were any problems. The patient was discharged in stable condition.

## 2020-06-25 NOTE — PROGRESS NOTES
Patient ID: Carolee Ortega is a 59 y.o. female.    Chief Complaint: Pain of the Right Wrist and Pain of the Right Hand      HPI: Carolee Ortega  is a 59 y.o. female who c/o Pain of the Right Wrist and Pain of the Right Hand   for duration of about a year and a half.  Her main concern is the right ring finger.  She has locking and catching in it.  She says she noticed it starting after her carpal tunnel release about a year and a half ago.  Pain level is 5/10 in severity.  It is worsened with repetitive use in gripping.  Improved with Tylenol, rest, ibuprofen.  Quality is aching in tenderness.  She also complains of a lump on the dorsal aspect of the wrist.  She sees it when she flexes the fingers and extends them.  No real pain associated with it but wanted to get it checked out.  She has also noticed this since time of surgery as well.    She underwent carpal tunnel release in winter of 2018 with Dr. Fairbanks.  She followed up with him for 1 appointment and me for 1 appointment.  She did not return for further follow ups due to life circumstances.    Past Medical History:   Diagnosis Date    Allergic rhinitis     Anxiety     Asthma     JAGDISH (generalized anxiety disorder)     Glaucoma (increased eye pressure)     Herpes labialis     Hypertension     Obesity     Seborrheic dermatitis     Shingles 2016     Past Surgical History:   Procedure Laterality Date    BREAST BIOPSY      right    CARPAL TUNNEL RELEASE Right 10/4/2018    Procedure: RELEASE, CARPAL TUNNEL;  Surgeon: Shaq Fairbanks MD;  Location: [facility] OR;  Service: Orthopedics;  Laterality: Right;    CARPAL TUNNEL RELEASE Left 2018    Procedure: RELEASE, CARPAL TUNNEL;  Surgeon: Shaq Fairbanks MD;  Location: [facility] OR;  Service: Orthopedics;  Laterality: Left;     SECTION      x1    HYSTERECTOMY      MARYANN and USO      Secondary to fibroids    TONSILLECTOMY  2011    TRIGGER FINGER RELEASE Right 10/4/2018     Procedure: RELEASE, TRIGGER FINGER;  Surgeon: Shaq Fairbanks MD;  Location: [facility] OR;  Service: Orthopedics;  Laterality: Right;  right middle finger      Family History   Problem Relation Age of Onset    Heart attack Father     Coronary artery disease Father     Glaucoma Father     Diabetes Father     Heart disease Mother 32    Breast cancer Maternal Grandmother     Hypertension Sister     Kidney disease Sister     Diabetes Other         Paternal great-grandmother    Breast cancer Maternal Cousin     Breast cancer Maternal Cousin      Social History     Socioeconomic History    Marital status:      Spouse name: Not on file    Number of children: Not on file    Years of education: Not on file    Highest education level: Not on file   Occupational History    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     Employer: [employer]     Comment: State of [place]   Social Needs    Financial resource strain: Not hard at all    Food insecurity     Worry: Never true     Inability: Never true    Transportation needs     Medical: No     Non-medical: No   Tobacco Use    Smoking status: Never Smoker    Smokeless tobacco: Never Used   Substance and Sexual Activity    Alcohol use: No     Alcohol/week: 0.0 standard drinks     Frequency: Never     Binge frequency: Never    Drug use: No    Sexual activity: Not on file   Lifestyle    Physical activity     Days per week: 2 days     Minutes per session: 60 min    Stress: Not at all   Relationships    Social connections     Talks on phone: More than three times a week     Gets together: Once a week     Attends Anglican service: Not on file     Active member of club or organization: Yes     Attends meetings of clubs or organizations: More than 4 times per year     Relationship status:    Other Topics Concern    Not on file   Social History Narrative    Patient is  and retired from [employer]/Medicaid. She has 4 adult children.     Medication List with Changes/Refills    Current Medications    ALBUTEROL (ACCUNEB) 1.25 MG/3 ML NEBU    Take 1.25 mg by nebulization every 6 (six) hours as needed.    ALPRAZOLAM (XANAX) 0.5 MG TABLET    Take 1 tablet (0.5 mg total) by mouth 3 (three) times daily as needed.    AMLODIPINE-VALSARTAN (EXFORGE)  MG PER TABLET    Take 1 tablet by mouth once daily.    DORZOLAMIDE-TIMOLOL 2-0.5% (COSOPT) 22.3-6.8 MG/ML OPHTHALMIC SOLUTION    PLACE 1 DROP INTO BOTH EYES EVERY 12 (TWELVE) HOURS.    HYDROXYZINE HCL (ATARAX) 25 MG TABLET    TAKE 1 TABLET (25 MG TOTAL) BY MOUTH EVERY 4 (FOUR) HOURS AS NEEDED FOR ITCHING.    LATANOPROST 0.005 % OPHTHALMIC SOLUTION    PLACE 1 DROP INTO BOTH EYES EVERY EVENING.    METOPROLOL TARTRATE (LOPRESSOR) 25 MG TABLET    TAKE 1 TABLET BY MOUTH EVERY DAY    MOMETASONE (NASONEX) 50 MCG/ACTUATION NASAL SPRAY    2 sprays by Nasal route once daily.    MONTELUKAST (SINGULAIR) 10 MG TABLET    TAKE 1 TABLET BY MOUTH EVERY DAY IN THE EVENING    PENCICLOVIR (DENAVIR) 1 % CREAM    Apply topically every 2 (two) hours.    SPIRONOLACTONE (ALDACTONE) 25 MG TABLET    Take 1 tablet (25 mg total) by mouth once daily.    VALACYCLOVIR (VALTREX) 1000 MG TABLET    Take 2 tablets (2,000 mg total) by mouth every 12 (twelve) hours.     Review of patient's allergies indicates:   Allergen Reactions    Flexeril [cyclobenzaprine] Palpitations       Objective:        General    Nursing note and vitals reviewed.  Constitutional: She is oriented to person, place, and time. She appears well-developed and well-nourished.   HENT:   Head: Normocephalic and atraumatic.   Eyes: EOM are normal.   Neck: Normal range of motion.   Cardiovascular: Regular rhythm.    Pulmonary/Chest: Effort normal. No respiratory distress.   Abdominal: Soft.   Neurological: She is alert and oriented to person, place, and time.   Psychiatric: She has a normal mood and affect. Her behavior is normal.             Right Hand/Wrist Exam     Inspection   Scars: Hand -  absent  Effusion:  Hand -  absent  Bruising: Hand -  absent  Deformity: Hand -  deformity    Tenderness   The patient is tender to palpation of the willams area.    Range of Motion     Wrist   Extension: normal   Flexion: normal   Pronation: normal   Supination: normal     Tests   Phalens Sign: negative  Tinel's sign (median nerve): negative  Finkelstein's test: negative    Atrophy   Thenar:  negative  Hypothenar:  negative  Intrinsic:  negative  1st Dorsal Interosseous: negative    Other     Neuorologic Exam    Median Distribution: normal  Ulnar Distribution: normal  Radial Distribution: normal    Comments:  2+ radial pulse  TTP A1 pulley ring finger  No Active triggering on exam    Small lump dorsal aspect   Appears to be along the flexor tendons and moves when she makes a fist  Non tender, non pulsatile, no fluid fluctuance.   No erythema, no SOI        Left Hand/Wrist Exam     Range of Motion     Wrist   Extension: normal   Flexion: normal   Pronation: normal   Supination: normal       Right Elbow Exam     Tests   Tinel's sign (cubital tunnel): negative          Muscle Strength   Right Upper Extremity   Wrist extension: 5/5/5   Wrist flexion: 5/5/5   : 5/5/5   Pinch Mechanism: 5/5  Left Upper Extremity  Wrist extension: 5/5/5   Wrist flexion: 5/5/5   :  5/5/5   Pinch Mechanism: 5/5    Vascular Exam       Capillary Refill  Right Hand: normal capillary refill      Xray Images and report were reviewed today.  I agree with the radiologist's interpretation.    X-Ray Wrist Complete Right  Order: [id number]  Status:  Final result   Visible to patient:  No (not released)   Next appt:  10/22/2020 at 08:00 AM in Ophthalmology ([place], [place])   Dx:  Right wrist pain  Details    Reading Physician Reading Date Result Priority   Shaji Mao DO  [phone] 6/25/2020 Routine      Narrative & Impression     EXAMINATION:  XR WRIST COMPLETE 3 VIEWS RIGHT     CLINICAL HISTORY:  Pain in right wrist     TECHNIQUE:  PA, lateral, and  oblique views of the right wrist were performed.     COMPARISON:  None     FINDINGS:  No acute fracture or dislocation.  There is mild degenerative change present at the 1st CMC and triscaphe joints.  Minimal degenerative changes also noted at the radiocarpal joint.     Impression:     As above        Electronically signed by: Shaji Mao DO  Date:                                            06/25/2020  Time:                                           09:08               Assessment:       Encounter Diagnoses   Name Primary?    Acquired trigger finger of right ring finger Yes    Mass of right hand           Plan:       Carolee was seen today for pain and pain.    Diagnoses and all orders for this visit:    Acquired trigger finger of right ring finger  -     Tendon Sheath: R ring MCP    Mass of right hand        Carolee Ortega is an established pt who comes in today for we have discussed risks and benefits of a corticosteroid injection into the right ring finger.  She wishes to proceed.  I think this is unrelated in showed up coincidentally around the time of the carpal tunnel surgery.  If she does not improve, she may benefit from trigger finger release.  She will notify me if she would like to consider surgery and I will get her referred to Dr. Mckay.  As far as the mass on the dorsum of the hand, it is not getting bigger and it is nonpainful.  It is non pulsatile.  I would recommend continued watchful observation and if it changes or becomes painful I would have her follow up with Dr. Mckay.  She verbalizes understanding and agrees.    Follow up if symptoms worsen or fail to improve.    The patient understands, chooses and consents to this plan and accepts all   the risks which include but are not limited to the risks mentioned above.     Addendum:  I discussed the mass on the dorsal aspect of the wrist with Dr. Mckay.  He agreed with what full observation.  If the mass increases in size or gets painful,  he will be happy to evaluate her.  He states this could be a ganglion cyst or a giant cell tumor both of which are benign in nature.    Disclaimer: This note was prepared using a voice recognition system and is likely to have sound alike errors within the text.

## 2020-06-26 ENCOUNTER — TELEPHONE (OUTPATIENT)
Dept: ENDOSCOPY | Facility: HOSPITAL | Age: 60
End: 2020-06-26

## 2020-06-26 NOTE — TELEPHONE ENCOUNTER
COVID Screening     1. Have you had a fever in the last 7 days or have you used fever reducing medicines for a fever in the last 7 days?  no    2. Are you experiencing shortness of breath, cough, muscle aches, loss of taste or loss of smell?  no    3. Are you residing with anyone who has tested positive for Covid?  no    If answered yes to any of the above questions, the pt must be scheduled for an appointment with their PCP.    A message also needs to be sent to the endoscopist to ensure the patient gets rescheduled at a later date.     ENDO screening    1. Have you been admitted overnight to the hospital in the past 3 months? no   If yes, schedule an appointment with PCP before scheduling endoscopic procedure.     2. Have you had a stent placed in the last 12 months? no   If yes, for a screening visit, cancel and message the ordering provider.  The patient will need a new order when the time is appropriate.     3. Have you had a stroke or heart attack in the past 6 months? no   If yes, cancel and refer patient to ordering provider for clearance, also message ordering provider to inform.     4. Have you had any chest pain in the past 3 months? no   If yes, Have you been evaluated by your PCP and/or cardiologist and it was determined to not be heart related? not applicable   If No, Pt needs to be seen by PCP or Cardiologist .  Pt can be scheduled once clearance obtained by either of those providers.     5. Do you take prescription weight loss medications?  no   If yes, must stop for 2 weeks prior to procedure.     6. Have you been diagnosed with diverticulitis within the past 3 months? no   If yes, must have been seen by GI within the last 3 months, if not schedule with GI [facility].    If pt has been seen by GI, schedule procedure 8-12 weeks post antibiotic treatment.     7. Are you on Dialysis? no  If yes, schedule procedure for the day AFTER dialysis.  Appt time should be 9am or later, patient arrival time is 2 hours  "prior.  Nulytely or    miralax prep for all patients with kidney disease.     8. Are you diabetic?  no   If yes, schedule morning appt. Advise pt to hold all diabetic meds day of procedure.     9. If pt is older than 80 years of age and HAS NOT been seen by GI or PCP within the last 6 months, needs appt with GI [facility].   If pt has been seen by the GI provider or PCP within the past 6  months AND meets criteria, schedule procedure AND send message to the endoscopist.     10. Is patient on a "high risk" medication (blood thinner/antiplatelet agent)?  no   If yes, has cardiac clearance been obtained within the last 60 days? N/A   If no, a new clearance needs to be obtained.       I have reviewed the last colonoscopy for recommendations regarding next procedure bowel prep.  no  I have reviewed medications and allergies.  yes  I have verified the pharmacy information and appropriate prep sent if needed. yes  Prep instructions have been mailed or sent to portal per patient request. yes    If answers yes to any of the following, schedule at [facility] ONLY. If No, OK for either location.     Is BMI over 45?   Any complaints of chest pain, new onset or at rest?  Does pt have an AICD?  Is there a diagnosis of heart failure?  Does patient have an insulin pump?  If procedure for esophageal banding?  "

## 2020-06-29 RX ORDER — METHYLPREDNISOLONE ACETATE 40 MG/ML
40 INJECTION, SUSPENSION INTRA-ARTICULAR; INTRALESIONAL; INTRAMUSCULAR; SOFT TISSUE
Status: DISCONTINUED | OUTPATIENT
Start: 2020-06-25 | End: 2020-06-29 | Stop reason: HOSPADM

## 2020-06-30 NOTE — PROGRESS NOTES
Last 5 Patient Entered Readings                                      Current 30 Day Average: 126/79     Recent Readings 10/21/2018 10/21/2018 10/13/2018 10/13/2018 10/6/2018    SBP (mmHg) 120 127 115 138 136    DBP (mmHg) 77 88 87 91 76    Pulse 69 70 59 59 57          Encounter mostly consisted of educating patient on waiting 5-10 minutes after initial reading. Patient was taking 2nd reading immediately (i.e. 1 minute) after initial reading. Encouraged patient to wait appropriate amount of time.     Patient also discussed recent surgery.     Lifestyle factors were not discussed. Will further assess lifestyle factors upon next encounter.     Digital Medicine: Health  Follow Up    Lifestyle Modifications:    1.Dietary Modifications (Sodium intake <2,000mg/day, food labels, dining out): Deferred    2.Physical Activity: Deferred    3.Medication Therapy: Patient has been compliant with the medication regimen.    4.Patient has the following medication side effects/concerns:   (Frequency/Alleviating factors/Precipitating factors, etc.)     Follow up with Alexis Carolee JEM Ortega completed. No further questions or concerns. Will continue to follow up to achieve health goals.   pulse oximetry

## 2020-07-07 ENCOUNTER — PATIENT OUTREACH (OUTPATIENT)
Dept: OTHER | Facility: OTHER | Age: 60
End: 2020-07-07

## 2020-07-07 NOTE — PROGRESS NOTES
Digital Medicine: Clinician Follow-Up    Called patient regarding her voicemail around frequency of blood pressure readings. Patient added spironolactone and was informed to take a reading every other day. She wanted to know if she needed to continue.   Patient is feeling well and has at goal blood pressure readings.  Her BMP 6/25 came back WNL.    The history is provided by the patient.   Follow Up  Follow-up reason(s): reading review and medication change follow-up      Medication Change: new med    Patient started new medication.    Is patient tolerating med change?:  Yes      INTERVENTION(S)  reviewed appropriate dose schedule and encouragement/support    PLAN  patient verbalizes understanding and continue monitoring    Patient will maintain her current hypertension medication regimen.  Patient is aware to contact us with any changes or problems.      There are no preventive care reminders to display for this patient.    Last 5 Patient Entered Readings                                      Current 30 Day Average: 122/81     Recent Readings 7/5/2020 6/29/2020 6/24/2020 6/24/2020 6/22/2020    SBP (mmHg) 121 126 140 141 107    DBP (mmHg) 76 77 92 85 78    Pulse 58 59 56 55 57             Hypertension Medications             amlodipine-valsartan (EXFORGE)  mg per tablet Take 1 tablet by mouth once daily.    metoprolol tartrate (LOPRESSOR) 25 MG tablet TAKE 1 TABLET BY MOUTH EVERY DAY    spironolactone (ALDACTONE) 25 MG tablet Take 1 tablet (25 mg total) by mouth once daily.                 Screenings

## 2020-08-04 ENCOUNTER — PATIENT OUTREACH (OUTPATIENT)
Dept: OTHER | Facility: OTHER | Age: 60
End: 2020-08-04

## 2020-08-04 LAB
CHOLEST SERPL-MSCNC: 217 MG/DL (ref 0–200)
HDL/CHOLESTEROL RATIO: 2.4
HDLC SERPL-MCNC: 90 MG/DL
LDLC SERPL CALC-MCNC: 111 MG/DL
TRIGL SERPL-MCNC: 78 MG/DL
VLDL CHOLESTEROL: 16 MG/DL

## 2020-08-04 NOTE — PROGRESS NOTES
"Digital Medicine: Health  Follow-Up    The history is provided by the patient.             Reason for review: Blood pressure not at goal        Topics Covered on Call: physical activity and metropolol          Diet-Not assessed      Additional diet details:    Physical Activity-Change  She removed walking from Her physical activity.    She identified the following barriers to physical activity: Reports her  is working odd hours        Additional physical activity details: States she has not been active lately due to "laziness." Reports she likes to do workout videos on her [employer] and plans to start that up. Encouraged patient to set goal and to make that first step.      Medication Adherence-Medication adherence was asssessed.  Patient continue taking medication as prescribed.          Reports she had her wellness check for her insurance and the NP mentioned that her pulse was very low. Stated it may have something to do with metoprolol. Will route note to clinician to further explain to the patient.  Substance, Sleep, Stress-Not assessed    PLAN  Provided patient education:    Patient verbalizes understanding. Patient did not express questions or concerns and patient has contact information if needed.    Explained the importance of self-monitoring and medication adherence. Encouraged the patient to communicate with their health  for lifestyle modifications to help improve or maintain a healthy lifestyle.        There are no preventive care reminders to display for this patient.    Last 5 Patient Entered Readings                                      Current 30 Day Average: 127/82     Recent Readings 7/29/2020 7/21/2020 7/13/2020 7/5/2020 6/29/2020    SBP (mmHg) 125 140 120 121 126    DBP (mmHg) 90 84 79 76 77    Pulse 56 56 56 58 59               "

## 2020-08-09 ENCOUNTER — LAB VISIT (OUTPATIENT)
Dept: URGENT CARE | Facility: CLINIC | Age: 60
End: 2020-08-09
Payer: COMMERCIAL

## 2020-08-09 VITALS — OXYGEN SATURATION: 98 % | TEMPERATURE: 98 F | RESPIRATION RATE: 18 BRPM | HEART RATE: 59 BPM

## 2020-08-09 DIAGNOSIS — Z13.9 SCREENING FOR CONDITION: ICD-10-CM

## 2020-08-09 PROCEDURE — U0003 INFECTIOUS AGENT DETECTION BY NUCLEIC ACID (DNA OR RNA); SEVERE ACUTE RESPIRATORY SYNDROME CORONAVIRUS 2 (SARS-COV-2) (CORONAVIRUS DISEASE [COVID-19]), AMPLIFIED PROBE TECHNIQUE, MAKING USE OF HIGH THROUGHPUT TECHNOLOGIES AS DESCRIBED BY CMS-2020-01-R: HCPCS

## 2020-08-10 LAB — SARS-COV-2 RNA RESP QL NAA+PROBE: NOT DETECTED

## 2020-08-11 ENCOUNTER — ANESTHESIA EVENT (OUTPATIENT)
Dept: ENDOSCOPY | Facility: HOSPITAL | Age: 60
End: 2020-08-11
Payer: COMMERCIAL

## 2020-08-11 ENCOUNTER — TELEPHONE (OUTPATIENT)
Dept: PREADMISSION TESTING | Facility: HOSPITAL | Age: 60
End: 2020-08-11

## 2020-08-11 ENCOUNTER — PATIENT OUTREACH (OUTPATIENT)
Dept: ADMINISTRATIVE | Facility: HOSPITAL | Age: 60
End: 2020-08-11

## 2020-08-11 NOTE — TELEPHONE ENCOUNTER
PAT call completed and patient educated on the bowel prep, clear liquid diet and procedure instructions. Medical history discussed and patient informed of arrival time 0600 and 2nd prep dose 0230. Pt will be accompanied by spouse and is made aware of limited-visitor policy, and is made aware that  is to remain during entire visit. All questions and concerns addressed. Informed to take AM medications for b/p and xanax if needed. NPO after 2nd bowel prep. Patient verbalizes understanding of teaching and all instructions.

## 2020-08-11 NOTE — ANESTHESIA PREPROCEDURE EVALUATION
08/11/2020  Carolee Ortega is a 59 y.o., female.    Pre-op Assessment    I have reviewed the Patient Summary Reports.     I have reviewed the Nursing Notes. I have reviewed the NPO Status.   I have reviewed the Medications.     Review of Systems  Anesthesia Hx:  No problems with previous Anesthesia  Neg history of prior surgery.  Denies Personal Hx of Anesthesia complications.   Social:  Non-Smoker, No Alcohol Use    Hematology/Oncology:  Hematology Normal   Oncology Normal     Cardiovascular:   Hypertension ECG has been reviewed.    Pulmonary:   Asthma asymptomatic    Renal/:  Renal/ Normal     Hepatic/GI:  Hepatic/GI Normal    Psych:   anxiety          Physical Exam  General:  Obesity    Airway/Jaw/Neck:  Airway Findings: Mouth Opening: Normal Tongue: Normal  General Airway Assessment: Adult  Mallampati: I  TM Distance: Normal, at least 6 cm        Eyes/Ears/Nose:  Eyes/Ears/Nose Findings:    Dental:  Dental Findings: In tact   Chest/Lungs:  Chest/Lungs Findings: Clear to auscultation, Normal Respiratory Rate     Heart/Vascular:  Heart Findings: Rate: Normal  Rhythm: Regular Rhythm  Sounds: Normal  Heart murmur: negative Vascular Findings: Normal    Abdomen:  Abdomen Findings: Normal    Musculoskeletal:  Musculoskeletal Findings: Normal   Skin:  Skin Findings: Normal    Mental Status:  Mental Status Findings:  Alert and Oriented         Anesthesia Plan  Type of Anesthesia, risks & benefits discussed:  Anesthesia Type:  general  Patient's Preference:   Intra-op Monitoring Plan: standard ASA monitors  Intra-op Monitoring Plan Comments:   Post Op Pain Control Plan: per primary service following discharge from PACU  Post Op Pain Control Plan Comments:   Induction:   IV  Beta Blocker:  Patient is on a Beta-Blocker and has received one dose within the past 24 hours (No further documentation required).        Informed Consent: Patient understands risks and agrees with Anesthesia plan.  Questions answered. Anesthesia consent signed with patient.  ASA Score: 2     Day of Surgery Review of History & Physical:    H&P update referred to the surgeon.         Ready For Surgery From Anesthesia Perspective.        No

## 2020-08-11 NOTE — PROGRESS NOTES
Outside Data entered Per Care Coordinator     Yoli AGUIRRE LPN Care Coordinator  Care Coordination Department  [facility]  [phone]

## 2020-08-12 ENCOUNTER — ANESTHESIA (OUTPATIENT)
Dept: ENDOSCOPY | Facility: HOSPITAL | Age: 60
End: 2020-08-12
Payer: COMMERCIAL

## 2020-08-12 ENCOUNTER — HOSPITAL ENCOUNTER (OUTPATIENT)
Facility: HOSPITAL | Age: 60
Discharge: HOME | End: 2020-08-12
Attending: INTERNAL MEDICINE | Admitting: INTERNAL MEDICINE
Payer: COMMERCIAL

## 2020-08-12 VITALS
DIASTOLIC BLOOD PRESSURE: 69 MMHG | TEMPERATURE: 98 F | HEIGHT: 62 IN | HEART RATE: 48 BPM | SYSTOLIC BLOOD PRESSURE: 132 MMHG | WEIGHT: 220 LBS | OXYGEN SATURATION: 100 % | BODY MASS INDEX: 40.48 KG/M2 | RESPIRATION RATE: 20 BRPM

## 2020-08-12 DIAGNOSIS — Z12.11 ENCOUNTER FOR SCREENING COLONOSCOPY: Primary | ICD-10-CM

## 2020-08-12 PROCEDURE — 88305 TISSUE EXAM BY PATHOLOGIST: CPT | Mod: 26,,, | Performed by: PATHOLOGY

## 2020-08-12 PROCEDURE — 45385 COLONOSCOPY W/LESION REMOVAL: CPT | Performed by: INTERNAL MEDICINE

## 2020-08-12 PROCEDURE — D9220A PRA ANESTHESIA: Mod: 33,CRNA,, | Performed by: NURSE ANESTHETIST, CERTIFIED REGISTERED

## 2020-08-12 PROCEDURE — 63600175 PHARM REV CODE 636 W HCPCS: Performed by: NURSE ANESTHETIST, CERTIFIED REGISTERED

## 2020-08-12 PROCEDURE — 37000008 HC ANESTHESIA 1ST 15 MINUTES: Performed by: INTERNAL MEDICINE

## 2020-08-12 PROCEDURE — 45385 PR COLONOSCOPY,REMV LESN,SNARE: ICD-10-PCS | Mod: 33,,, | Performed by: INTERNAL MEDICINE

## 2020-08-12 PROCEDURE — 45385 COLONOSCOPY W/LESION REMOVAL: CPT | Mod: 33,,, | Performed by: INTERNAL MEDICINE

## 2020-08-12 PROCEDURE — 63600175 PHARM REV CODE 636 W HCPCS: Performed by: ANESTHESIOLOGY

## 2020-08-12 PROCEDURE — 88305 TISSUE EXAM BY PATHOLOGIST: ICD-10-PCS | Mod: 26,,, | Performed by: PATHOLOGY

## 2020-08-12 PROCEDURE — 37000009 HC ANESTHESIA EA ADD 15 MINS: Performed by: INTERNAL MEDICINE

## 2020-08-12 PROCEDURE — D9220A PRA ANESTHESIA: ICD-10-PCS | Mod: 33,CRNA,, | Performed by: NURSE ANESTHETIST, CERTIFIED REGISTERED

## 2020-08-12 PROCEDURE — D9220A PRA ANESTHESIA: Mod: 33,ANES,, | Performed by: ANESTHESIOLOGY

## 2020-08-12 PROCEDURE — D9220A PRA ANESTHESIA: ICD-10-PCS | Mod: 33,ANES,, | Performed by: ANESTHESIOLOGY

## 2020-08-12 PROCEDURE — 88305 TISSUE EXAM BY PATHOLOGIST: CPT | Performed by: PATHOLOGY

## 2020-08-12 PROCEDURE — 27201089 HC SNARE, DISP (ANY): Performed by: INTERNAL MEDICINE

## 2020-08-12 RX ORDER — SODIUM CHLORIDE, SODIUM LACTATE, POTASSIUM CHLORIDE, CALCIUM CHLORIDE 600; 310; 30; 20 MG/100ML; MG/100ML; MG/100ML; MG/100ML
INJECTION, SOLUTION INTRAVENOUS CONTINUOUS
Status: DISCONTINUED | OUTPATIENT
Start: 2020-08-12 | End: 2020-08-12 | Stop reason: HOSPADM

## 2020-08-12 RX ORDER — SODIUM CHLORIDE 0.9 % (FLUSH) 0.9 %
10 SYRINGE (ML) INJECTION
Status: DISCONTINUED | OUTPATIENT
Start: 2020-08-12 | End: 2020-08-12 | Stop reason: HOSPADM

## 2020-08-12 RX ORDER — ONDANSETRON 2 MG/ML
4 INJECTION INTRAMUSCULAR; INTRAVENOUS DAILY PRN
Status: DISCONTINUED | OUTPATIENT
Start: 2020-08-12 | End: 2020-08-12 | Stop reason: HOSPADM

## 2020-08-12 RX ORDER — PROPOFOL 10 MG/ML
VIAL (ML) INTRAVENOUS
Status: DISCONTINUED | OUTPATIENT
Start: 2020-08-12 | End: 2020-08-12

## 2020-08-12 RX ORDER — LIDOCAINE HYDROCHLORIDE 20 MG/ML
INJECTION INTRAVENOUS
Status: DISCONTINUED | OUTPATIENT
Start: 2020-08-12 | End: 2020-08-12

## 2020-08-12 RX ADMIN — PROPOFOL 50 MG: 10 INJECTION, EMULSION INTRAVENOUS at 07:08

## 2020-08-12 RX ADMIN — Medication 100 MG: at 06:08

## 2020-08-12 RX ADMIN — PROPOFOL 50 MG: 10 INJECTION, EMULSION INTRAVENOUS at 06:08

## 2020-08-12 RX ADMIN — SODIUM CHLORIDE, SODIUM LACTATE, POTASSIUM CHLORIDE, AND CALCIUM CHLORIDE: 600; 310; 30; 20 INJECTION, SOLUTION INTRAVENOUS at 06:08

## 2020-08-12 RX ADMIN — PROPOFOL 10 MG: 10 INJECTION, EMULSION INTRAVENOUS at 07:08

## 2020-08-12 NOTE — H&P
Short Stay Endoscopy History and Physical    PCP - Genie Walter MD    Procedure - Colonoscopy  ASA - 2  Mallampati - per anesthesia  History of Anesthesia problems - no  Family history Anesthesia problems -  no     HPI:  This is a 59 y.o. female here for evaluation of :   Active Hospital Problems    Diagnosis  POA    *Encounter for screening colonoscopy [Z12.11]  Not Applicable      Resolved Hospital Problems   No resolved problems to display.         Health Maintenance       Date Due Completion Date    Colorectal Cancer Screening 01/28/2020 1/28/2010    Influenza Vaccine (1) 09/01/2020 10/23/2019    Mammogram 03/06/2021 3/6/2020    Override on 2/11/2013: Done    Lipid Panel 08/04/2021 8/4/2020    TETANUS VACCINE 02/23/2027 2/23/2017 (Done)    Override on 2/23/2017: Done          Screening - yes  History of polyps - no  Diarrhea - no  Anemia - no  Blood in stools - no  Abdominal pain - no  Family History of Colon Cancer- no  Other - no    ROS:  CONSTITUTIONAL: Denies weight change,  fatigue, fevers, chills, night sweats.  CARDIOVASCULAR: Denies chest pain, shortness of breath, orthopnea and edema.  RESPIRATORY: Denies cough, hemoptysis, dyspnea, and wheezing.  GI: See HPI.    Medical History:   Past Medical History:   Diagnosis Date    Allergic rhinitis     Anxiety     Asthma     Carpal tunnel syndrome     JAGDISH (generalized anxiety disorder)     Glaucoma (increased eye pressure)     Herpes labialis     Hypertension     Obesity     Seborrheic dermatitis     Shingles 4/2016       Surgical History:   Past Surgical History:   Procedure Laterality Date    BREAST BIOPSY  1983    right    CARPAL TUNNEL RELEASE Right 10/4/2018    Procedure: RELEASE, CARPAL TUNNEL;  Surgeon: Shaq Fairbanks MD;  Location: [facility] OR;  Service: Orthopedics;  Laterality: Right;    CARPAL TUNNEL RELEASE Left 12/6/2018    Procedure: RELEASE, CARPAL TUNNEL;  Surgeon: Shaq Fairbanks MD;  Location: [facility] OR;  Service:  Orthopedics;  Laterality: Left;     SECTION      x1    HYSTERECTOMY      MARYANN and USO      Secondary to fibroids    TONSILLECTOMY  2011    TRIGGER FINGER RELEASE Right 10/4/2018    Procedure: RELEASE, TRIGGER FINGER;  Surgeon: Shaq Fairbanks MD;  Location: [facility];  Service: Orthopedics;  Laterality: Right;  right middle finger        Family History:   Family History   Problem Relation Age of Onset    Heart attack Father     Coronary artery disease Father     Glaucoma Father     Diabetes Father     Heart disease Mother 32    Breast cancer Maternal Grandmother     Hypertension Sister     Kidney disease Sister     Diabetes Other         Paternal great-grandmother    Breast cancer Maternal Cousin     Breast cancer Maternal Cousin        Social History:   Social History     Tobacco Use    Smoking status: Never Smoker    Smokeless tobacco: Never Used   Substance Use Topics    Alcohol use: No     Alcohol/week: 0.0 standard drinks     Frequency: Never     Binge frequency: Never    Drug use: No       Allergies:   Review of patient's allergies indicates:   Allergen Reactions    Flexeril [cyclobenzaprine] Palpitations       Medications:   No current facility-administered medications on file prior to encounter.      Current Outpatient Medications on File Prior to Encounter   Medication Sig Dispense Refill    ALPRAZolam (XANAX) 0.5 MG tablet Take 1 tablet (0.5 mg total) by mouth 3 (three) times daily as needed. 90 tablet 0    mometasone (NASONEX) 50 mcg/actuation nasal spray 2 sprays by Nasal route once daily. (Patient taking differently: 2 sprays by Nasal route daily as needed. ) 17 g 2    albuterol (ACCUNEB) 1.25 mg/3 mL Nebu Take 1.25 mg by nebulization every 6 (six) hours as needed.      dorzolamide-timolol 2-0.5% (COSOPT) 22.3-6.8 mg/mL ophthalmic solution PLACE 1 DROP INTO BOTH EYES EVERY 12 (TWELVE) HOURS. 10 mL 12    penciclovir (DENAVIR) 1 % cream Apply topically every  2 (two) hours. (Patient taking differently: Apply topically every 2 (two) hours as needed. ) 1.5 g 5    valACYclovir (VALTREX) 1000 MG tablet Take 2 tablets (2,000 mg total) by mouth every 12 (twelve) hours. (Patient taking differently: Take 2,000 mg by mouth every 12 (twelve) hours as needed. ) 4 tablet 5       Physical Exam:  Vital Signs:   Vitals:    08/12/20 0557   BP: 139/75   Pulse: (!) 53   Resp: 16   Temp: 97.9 °F (36.6 °C)     General Appearance: Well appearing in no acute distress  ENT: OP clear  Chest: CTA B  CV: RRR, no m/r/g  Abd: s/nt/nd/nabs  Ext: no edema    Labs:Reviewed    IMP:  Active Hospital Problems    Diagnosis  POA    *Encounter for screening colonoscopy [Z12.11]  Not Applicable      Resolved Hospital Problems   No resolved problems to display.         Plan:   I have explained the risks and benefits of colonoscopy to the patient including but not limited to bleeding, perforation, infection, and death. The patient wishes to proceed.

## 2020-08-12 NOTE — [employer] PATIENT INSTRUCTIONS
Discharge Summary/Instructions after an Endoscopic Procedure  Patient Name: Carolee Ortega  Patient MRN: [id number]  Patient YOB: 1960 Wednesday, August 12, 2020  Jessica Camacho MD  RESTRICTIONS:  During your procedure today, you received medications for sedation.  These   medications may affect your judgment, balance and coordination.  Therefore,   for 24 hours, you have the following restrictions:   - DO NOT drive a car, operate machinery, make legal/financial decisions,   sign important papers or drink alcohol.    ACTIVITY:  Today: no heavy lifting, straining or running due to procedural   sedation/anesthesia.  The following day: return to full activity including work.  DIET:  Eat and drink normally unless instructed otherwise.     TREATMENT FOR COMMON SIDE EFFECTS:  - Mild abdominal pain, nausea, belching, bloating or excessive gas:  rest,   eat lightly and use a heating pad.  - Sore Throat: treat with throat lozenges and/or gargle with warm salt   water.  - Because air was used during the procedure, expelling large amounts of air   from your rectum or belching is normal.  - If a bowel prep was taken, you may not have a bowel movement for 1-3 days.    This is normal.  SYMPTOMS TO WATCH FOR AND REPORT TO YOUR PHYSICIAN:  1. Abdominal pain or bloating, other than gas cramps.  2. Chest pain.  3. Back pain.  4. Signs of infection such as: chills or fever occurring within 24 hours   after the procedure.  5. Rectal bleeding, which would show as bright red, maroon, or black stools.   (A tablespoon of blood from the rectum is not serious, especially if   hemorrhoids are present.)  6. Vomiting.  7. Weakness or dizziness.  GO DIRECTLY TO THE NEAREST EMERGENCY ROOM IF YOU HAVE ANY OF THE FOLLOWING:      Difficulty breathing              Chills and/or fever over 101 F   Persistent vomiting and/or vomiting blood   Severe abdominal pain   Severe chest pain   Black, tarry stools   Bleeding- more than one  tablespoon   Any other symptom or condition that you feel may need urgent attention  Your doctor recommends these additional instructions:  If any biopsies were taken, your doctors clinic will contact you in 1 to 2   weeks with any results.  - Discharge patient to home (via wheelchair).   - Resume previous diet.   - Continue present medications.   - Await pathology results.   - Repeat colonoscopy in 5 years for surveillance.   - Telephone GI clinic for pathology results in 2 weeks.   - Patient has a contact number available for emergencies.  The signs and   symptoms of potential delayed complications were discussed with the   patient.  Return to normal activities tomorrow.  Written discharge   instructions were provided to the patient.  For questions, problems or results please call your physician Jessica Camacho MD at Work:  [phone]  If you have any questions about the above instructions, call the GI   department at [phone] or call the endoscopy unit at [phone]   from 7am until 3 pm.  [facility] [place], EMERGENCY ROOM PHONE NUMBER:   [phone]  IF A COMPLICATION OR EMERGENCY SITUATION ARISES AND YOU ARE UNABLE TO REACH   YOUR PHYSICIAN - GO DIRECTLY TO THE EMERGENCY ROOM.  I have read or have had read to me these discharge instructions for my   procedure and have received a written copy.  I understand these   instructions and will follow-up with my physician if I have any questions.     __________________________________       _____________________________________  Nurse Signature                                          Patient/Designated   Responsible Party Signature  MD Jessica Redman MD  8/12/2020 7:16:06 AM  [employer]

## 2020-08-12 NOTE — ANESTHESIA POSTPROCEDURE EVALUATION
Anesthesia Post Evaluation    Patient: Carolee Ortega    Procedure(s) Performed: Procedure(s) (LRB):  COLONOSCOPY (N/A)    Final Anesthesia Type: general    Patient location during evaluation: PACU  Patient participation: Yes- Able to Participate  Level of consciousness: awake and alert and oriented  Post-procedure vital signs: reviewed and stable  Pain management: adequate  Airway patency: patent    PONV status at discharge: No PONV  Anesthetic complications: no      Cardiovascular status: blood pressure returned to baseline, stable and hemodynamically stable  Respiratory status: unassisted  Hydration status: euvolemic  Follow-up not needed.          Vitals Value Taken Time   /69 08/12/20 0800   Temp 36.7 °C (98.1 °F) 08/12/20 0800   Pulse 48 08/12/20 0800   Resp 20 08/12/20 0800   SpO2 100 % 08/12/20 0800         Event Time   Out of Recovery 08:00:00         Pain/Jeanna Score: Jeanna Score: 10 (8/12/2020  8:00 AM)

## 2020-08-12 NOTE — DISCHARGE SUMMARY
Endoscopy Discharge Summary      Admit Date: 8/12/2020    Discharge Date and Time:  8/12/2020 7:20 AM    Attending Physician: Jessica Camacho MD     Discharge Physician: Jessica Camacho MD     Principal Admitting Diagnoses: Encounter for screening colonoscopy         Discharge Diagnosis: The encounter diagnosis was Encounter for screening colonoscopy.     Discharged Condition: Good    Indication for Admission: Encounter for screening colonoscopy     Hospital Course: Patient was admitted for an inpatient procedure and tolerated the procedure well with no complications.    Significant Diagnostic Studies: Colonoscopy with polypectomy    Estimated Blood Loss: 1 ml.    Discussed with: patient.    Disposition: Home.    Follow Up/Patient Instructions:   Current Discharge Medication List      CONTINUE these medications which have NOT CHANGED    Details   ALPRAZolam (XANAX) 0.5 MG tablet Take 1 tablet (0.5 mg total) by mouth 3 (three) times daily as needed.  Qty: 90 tablet, Refills: 0    Associated Diagnoses: Anxiety      amlodipine-valsartan (EXFORGE)  mg per tablet Take 1 tablet by mouth once daily.  Qty: 90 tablet, Refills: 3    Comments: Pt prefers combination tablet  Associated Diagnoses: Essential hypertension      hydrOXYzine HCl (ATARAX) 25 MG tablet TAKE 1 TABLET (25 MG TOTAL) BY MOUTH EVERY 4 (FOUR) HOURS AS NEEDED FOR ITCHING.  Qty: 30 tablet, Refills: 5      metoprolol tartrate (LOPRESSOR) 25 MG tablet TAKE 1 TABLET BY MOUTH EVERY DAY  Qty: 90 tablet, Refills: 3      mometasone (NASONEX) 50 mcg/actuation nasal spray 2 sprays by Nasal route once daily.  Qty: 17 g, Refills: 2      montelukast (SINGULAIR) 10 mg tablet TAKE 1 TABLET BY MOUTH EVERY DAY IN THE EVENING  Qty: 90 tablet, Refills: 3      spironolactone (ALDACTONE) 25 MG tablet Take 1 tablet (25 mg total) by mouth once daily.  Qty: 90 tablet, Refills: 0    Associated Diagnoses: Essential hypertension      albuterol (ACCUNEB) 1.25 mg/3 mL Nebu  Take 1.25 mg by nebulization every 6 (six) hours as needed.      dorzolamide-timolol 2-0.5% (COSOPT) 22.3-6.8 mg/mL ophthalmic solution PLACE 1 DROP INTO BOTH EYES EVERY 12 (TWELVE) HOURS.  Qty: 10 mL, Refills: 12    Associated Diagnoses: Primary open angle glaucoma of both eyes, mild stage      latanoprost 0.005 % ophthalmic solution PLACE 1 DROP INTO BOTH EYES EVERY EVENING.  Qty: 2.5 mL, Refills: 11    Associated Diagnoses: Primary open angle glaucoma of both eyes, mild stage      penciclovir (DENAVIR) 1 % cream Apply topically every 2 (two) hours.  Qty: 1.5 g, Refills: 5      valACYclovir (VALTREX) 1000 MG tablet Take 2 tablets (2,000 mg total) by mouth every 12 (twelve) hours.  Qty: 4 tablet, Refills: 5             No discharge procedures on file.        Jessica Camacho MD  Gastroenterology and Hepatology

## 2020-08-12 NOTE — TRANSFER OF CARE
"Anesthesia Transfer of Care Note    Patient: Carolee Ortega    Procedure(s) Performed: Procedure(s) (LRB):  COLONOSCOPY (N/A)    Patient location: PACU    Anesthesia Type: general    Transport from OR: Transported from OR on room air with adequate spontaneous ventilation    Post pain: adequate analgesia    Post assessment: no apparent anesthetic complications    Post vital signs: stable    Level of consciousness: awake    Nausea/Vomiting: no nausea/vomiting    Complications: none          Last vitals:   Visit Vitals  BP (!) 98/50 (BP Location: Right arm, Patient Position: Lying)   Pulse (!) 50   Temp 36.2 °C (97.2 °F) (Temporal)   Resp 20   Ht 5' 2" (1.575 m)   Wt 99.8 kg (220 lb 0.3 oz)   SpO2 99%   Breastfeeding No   BMI 40.24 kg/m²     "

## 2020-08-12 NOTE — PLAN OF CARE
Discharge instructions reviewed with pt and spouse, handouts given, verbalized understanding with no further questions at this time aware they are awaiting biopsy results. VSS on RA, no pain or nausea noted, tolerating po fluids without difficulty, no other complaints noted. Fall precautions reviewed, consents in chart, PIV removed. Pt waiting for Dr. Camacho to speak with her prior to discharge.

## 2020-08-12 NOTE — DISCHARGE INSTRUCTIONS
Diverticulosis    Diverticulosis means that small pouches have formed in the wall of your large intestine (colon). Most often, this problem causes no symptoms and is common as people age. But the pouches in the colon are at risk of becoming infected. When this happens, the condition is called diverticulitis. Although most people with diverticulosis never develop diverticulitis, it is still not uncommon. Rectal bleeding can also occur and in less common situations, a type of colon inflammation called colitis.  While most people do not have symptoms, some people with diverticulosis may have:  · Abdominal cramps and pain  · Bloating  · Constipation  · Change in bowel habits  Causes  The exact cause of diverticulosis (and diverticulitis) has not been proved, but a few things are associated with the condition:  · Low-fiber diet  · Constipation  · Lack of exercise  Your healthcare provider will talk with you about how to manage your condition. Diet changes may be all that are needed to help control diverticulosis and prevent progression to diverticulitis. If you develop diverticulitis, you will likely need other treatments.  Home care  You may be told to take fiber supplements daily. Fiber adds bulk to the stool so that it passes through the colon more easily. Stool softeners may be recommended. You may also be given medications for pain relief. Be sure to take all medications as directed.  In the past, people were told to avoid corn, nuts, and seeds. This is no longer necessary.  Follow these guidelines when caring for yourself at home:  · Eat unprocessed foods that are high in fiber. Whole grains, fruits, and vegetables are good choices.  · Drink 6 to 8 glasses of water every day unless your healthcare provider has you limit how much fluid you should have.  · Watch for changes in your bowel movements. Tell your provider if you notice any changes.  · Begin an exercise program. Ask your provider how to get started.  Generally, walking is the best.  · Get plenty of rest and sleep.  Follow-up care  Follow up with your healthcare provider, or as advised. Regular visits may be needed to check on your health. Sometimes special procedures such as colonoscopy, are needed after an episode of diverticulitis or blooding. Be sure to keep all your appointments.  If a stool sample was taken, or cultures were done, you should be told if they are positive, or if your treatment needs to be changed. You can call as directed for the results.  If X-rays were done, a radiologist will look at them. You will be told if there is a change in your treatment.  If antibiotics were prescribed, be sure to finish them all.  When to seek medical advice  Call your healthcare provider right away if any of these occur:  · Fever of 100.4°F (38°C) or higher, or as directed by your healthcare provider  · Severe cramps in the lower left side of the abdomen or pain that is getting worse  · Tenderness in the lower left side of the abdomen or worsening pain throughout the abdomen  · Diarrhea or constipation that doesn't get better within 24 hours  · Nausea and vomiting  · Bleeding from the rectum  Call 911  Call emergency services if any of the following occur:  · Trouble breathing  · Confusion  · Very drowsy or trouble awakening  · Fainting or loss of consciousness  · Rapid heart rate  · Chest pain  Date Last Reviewed: 12/30/2015 © 2000-2017 [employer]. [address] [place] [address]. All rights reserved. This information is not intended as a substitute for professional medical care. Always follow your healthcare professional's instructions.        Understanding Colon and Rectal Polyps    The colon (also called the large intestine) is a muscular tube that forms the last part of the digestive tract. It absorbs water and stores food waste. The colon is about 4 to 6 feet long. The rectum is the last 6 inches of the colon. The colon and rectum  have a smooth lining composed of millions of cells. Changes in these cells can lead to growths in the colon that can become cancerous and should be removed. Multiple tests are available to screen for colon cancer, but the colonoscopy is the most recommended test. During colonoscopy, these polyps can be removed. How often you need this test depends on many things including your condition, your family history, symptoms, and what the findings were at the previous colonoscopy.   When the colon lining changes  Changes that happen in the cells that line the colon or rectum can lead to growths called polyps. Over a period of years, polyps can turn cancerous. Removing polyps early may prevent cancer from ever forming.  Polyps  Polyps are fleshy clumps of tissue that form on the lining of the colon or rectum. Small polyps are usually benign (not cancerous). However, over time, cells in a polyp can change and become cancerous. Certain types of polyps known as adenomatous polyps are premalignant. The risk for invasive cancer increases with the size of the polyp and certain cell and gene features. This means that they can become cancerous if they're not removed. Hyperplastic polyps are benign. They can grow quite large and not turn cancerous.   Cancer  Almost all colorectal cancers start when polyp cells begin growing abnormally. As a cancerous tumor grows, it may involve more and more of the colon or rectum. In time, cancer can also grow beyond the colon or rectum and spread to nearby organs or to glands called lymph nodes. The cells can also travel to other parts of the body. This is known as metastasis. The earlier a cancerous tumor is removed, the better the chance of preventing its spread.    Date Last Reviewed: 8/1/2016  © 0165-8401 [employer]. [address] [place] [address], [place] [address]. All rights reserved. This information is not intended as a substitute for professional medical care. Always follow your  healthcare professional's instructions.

## 2020-08-17 LAB
FINAL PATHOLOGIC DIAGNOSIS: NORMAL
GROSS: NORMAL

## 2020-09-15 ENCOUNTER — PATIENT OUTREACH (OUTPATIENT)
Dept: OTHER | Facility: OTHER | Age: 60
End: 2020-09-15

## 2020-09-30 ENCOUNTER — IMMUNIZATION (OUTPATIENT)
Dept: FAMILY MEDICINE | Facility: CLINIC | Age: 60
End: 2020-09-30
Payer: COMMERCIAL

## 2020-09-30 PROCEDURE — 99999 PR PBB SHADOW E&M-EST. PATIENT-LVL II: CPT | Mod: PBBFAC,,,

## 2020-09-30 PROCEDURE — 90471 IMMUNIZATION ADMIN: CPT | Mod: S$GLB,,, | Performed by: FAMILY MEDICINE

## 2020-09-30 PROCEDURE — 90686 IIV4 VACC NO PRSV 0.5 ML IM: CPT | Mod: S$GLB,,, | Performed by: FAMILY MEDICINE

## 2020-09-30 PROCEDURE — 99999 PR PBB SHADOW E&M-EST. PATIENT-LVL II: ICD-10-PCS | Mod: PBBFAC,,,

## 2020-09-30 PROCEDURE — 90686 FLU VACCINE (QUAD) GREATER THAN OR EQUAL TO 3YO PRESERVATIVE FREE IM: ICD-10-PCS | Mod: S$GLB,,, | Performed by: FAMILY MEDICINE

## 2020-09-30 PROCEDURE — 90471 FLU VACCINE (QUAD) GREATER THAN OR EQUAL TO 3YO PRESERVATIVE FREE IM: ICD-10-PCS | Mod: S$GLB,,, | Performed by: FAMILY MEDICINE

## 2020-10-09 DIAGNOSIS — I10 ESSENTIAL HYPERTENSION: ICD-10-CM

## 2020-10-09 RX ORDER — SPIRONOLACTONE 25 MG/1
TABLET ORAL
Qty: 90 TABLET | Refills: 1 | Status: SHIPPED | OUTPATIENT
Start: 2020-10-09 | End: 2021-02-04

## 2020-10-12 NOTE — PROGRESS NOTES
"Digital Medicine: Health  Follow-Up    The history is provided by the patient.             Reason for review: Blood pressure not at goal        Topics Covered on Call: physical activity, Diet and school    Additional Follow-up details: BP close to goal 120/82 mmHg.  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            Diet-Change      Dietary Improvements:Reports she is getting back on the "healthy trip." States she is cooking more at home and making smoothies. States she makes salads and eats fruits and vegetables.            Intervention(s): reducing processed foods and DASH diet      Physical Activity-no change to routine  No change to exercise routine.     Medication Adherence-Medication Adherence not addressed.      Substance, Sleep, Stress-Not assessed      Continue current diet/physical activity routine.  Provided patient education.       Addressed patient questions and patient has my contact information if needed prior to next outreach. Patient verbalizes understanding.      Explained the importance of self-monitoring and medication adherence. Encouraged the patient to communicate with their health  for lifestyle modifications to help improve or maintain a healthy lifestyle.               There are no preventive care reminders to display for this patient.      Last 5 Patient Entered Readings                                      Current 30 Day Average: 120/82     Recent Readings 10/6/2020 9/30/2020 9/21/2020 9/12/2020 9/3/2020    SBP (mmHg) 120 123 126 111 133    DBP (mmHg) 77 87 85 77 76    Pulse 62 57 56 54 62               "

## 2020-10-14 ENCOUNTER — PATIENT OUTREACH (OUTPATIENT)
Dept: OTHER | Facility: OTHER | Age: 60
End: 2020-10-14

## 2020-10-14 NOTE — PROGRESS NOTES
Patient called [facility] stating that she received a text message stating that it has been 7 days since her last BP readings. The last reading she had taken was from 10/6/2020.   It has been 8 days since we last received a reading.  Patient apologized. She admits losing track of the days and did not realize.   Confirmed with patient that readings are transferring over.    Patient thanked me!

## 2020-10-22 ENCOUNTER — OFFICE VISIT (OUTPATIENT)
Dept: OPHTHALMOLOGY | Facility: CLINIC | Age: 60
End: 2020-10-22
Payer: COMMERCIAL

## 2020-10-22 ENCOUNTER — PATIENT MESSAGE (OUTPATIENT)
Dept: OPHTHALMOLOGY | Facility: CLINIC | Age: 60
End: 2020-10-22

## 2020-10-22 ENCOUNTER — TELEPHONE (OUTPATIENT)
Dept: OPHTHALMOLOGY | Facility: CLINIC | Age: 60
End: 2020-10-22

## 2020-10-22 DIAGNOSIS — H40.1131 PRIMARY OPEN ANGLE GLAUCOMA OF BOTH EYES, MILD STAGE: Primary | ICD-10-CM

## 2020-10-22 PROCEDURE — 92012 PR EYE EXAM, EST PATIENT,INTERMED: ICD-10-PCS | Mod: S$GLB,,, | Performed by: OPTOMETRIST

## 2020-10-22 PROCEDURE — 99999 PR PBB SHADOW E&M-EST. PATIENT-LVL II: ICD-10-PCS | Mod: PBBFAC,,, | Performed by: OPTOMETRIST

## 2020-10-22 PROCEDURE — 92012 INTRM OPH EXAM EST PATIENT: CPT | Mod: S$GLB,,, | Performed by: OPTOMETRIST

## 2020-10-22 PROCEDURE — 99999 PR PBB SHADOW E&M-EST. PATIENT-LVL II: CPT | Mod: PBBFAC,,, | Performed by: OPTOMETRIST

## 2020-10-22 RX ORDER — VALSARTAN 160 MG/1
TABLET ORAL
COMMUNITY
Start: 2020-08-13 | End: 2020-12-10

## 2020-10-22 NOTE — PROGRESS NOTES
HPI     Glaucoma      Additional comments: 4 month iop check              Comments     4 months IO P check .  Medication eye drops if any: Cosopt BID OU and Latanoprost QHS OU  Date last eye visit: 02/13/2020  Last full exam: 02/13/2020  Last IOP : 16/17  Last dilated eye exam and SDP's: 02/13/2020  High IOP: 26/24  CCT: 530/541 +1/+1  Family Hx POAG: father          Last edited by Hedy Mayes on 10/22/2020  8:01 AM. (History)            Assessment /Plan     For exam results, see Encounter Report.    Primary open angle glaucoma of both eyes, mild stage      Slightly higher IOP, emphasized consistent use of drops.    RTC 4 months DFE IOP ck

## 2020-10-22 NOTE — TELEPHONE ENCOUNTER
Called patient advise that she do not need surgery yet, if patient wants we can referral her to  or Nik for SLT.

## 2020-10-26 ENCOUNTER — OFFICE VISIT (OUTPATIENT)
Dept: OPHTHALMOLOGY | Facility: CLINIC | Age: 60
End: 2020-10-26
Payer: COMMERCIAL

## 2020-10-26 DIAGNOSIS — H40.1132 PRIMARY OPEN ANGLE GLAUCOMA (POAG) OF BOTH EYES, MODERATE STAGE: Primary | ICD-10-CM

## 2020-10-26 PROCEDURE — 92002 INTRM OPH EXAM NEW PATIENT: CPT | Mod: S$GLB,,, | Performed by: OPHTHALMOLOGY

## 2020-10-26 PROCEDURE — 92133 POSTERIOR SEGMENT OCT OPTIC NERVE(OCULAR COHERENCE TOMOGRAPHY) - OU - BOTH EYES: ICD-10-PCS | Mod: S$GLB,,, | Performed by: OPHTHALMOLOGY

## 2020-10-26 PROCEDURE — 92133 CPTRZD OPH DX IMG PST SGM ON: CPT | Mod: S$GLB,,, | Performed by: OPHTHALMOLOGY

## 2020-10-26 PROCEDURE — 99999 PR PBB SHADOW E&M-EST. PATIENT-LVL II: ICD-10-PCS | Mod: PBBFAC,,, | Performed by: OPHTHALMOLOGY

## 2020-10-26 PROCEDURE — 99999 PR PBB SHADOW E&M-EST. PATIENT-LVL II: CPT | Mod: PBBFAC,,, | Performed by: OPHTHALMOLOGY

## 2020-10-26 PROCEDURE — 92020 PR SPECIAL EYE EVAL,GONIOSCOPY: ICD-10-PCS | Mod: S$GLB,,, | Performed by: OPHTHALMOLOGY

## 2020-10-26 PROCEDURE — 92020 GONIOSCOPY: CPT | Mod: S$GLB,,, | Performed by: OPHTHALMOLOGY

## 2020-10-26 PROCEDURE — 92002 PR EYE EXAM, NEW PATIENT,INTERMED: ICD-10-PCS | Mod: S$GLB,,, | Performed by: OPHTHALMOLOGY

## 2020-10-26 RX ORDER — KETOROLAC TROMETHAMINE 5 MG/ML
SOLUTION OPHTHALMIC
Qty: 1 BOTTLE | Refills: 0 | Status: SHIPPED | OUTPATIENT
Start: 2020-10-26 | End: 2021-03-11 | Stop reason: ALTCHOICE

## 2020-10-26 NOTE — PROGRESS NOTES
SUBJECTIVE  Carolee Ortega is 59 y.o. female  Corrected distance visual acuity was 20/30 -1 in the right eye and 20/25 -2 in the left eye.   Chief Complaint   Patient presents with    Glaucoma     SLT eval per [facility]          HPI     Glaucoma      Additional comments: SLT eval per [facility]              Comments     Pt states she's been on drops for years and want to see if a laser will   help take the place of the drops.     1. POAG  Tmax 26/24  Minimal hemisphere asymmetry OD 30/26 OS 33/31  2. Thin corneas      dorzolamide-timolol (COSOPT) 1 drop OU BID   Latanoprost 1 drop OU QHS          Last edited by Uzair Stevenson on 10/26/2020 10:02 AM. (History)         Assessment /Plan :  1. Primary open angle glaucoma (POAG) of both eyes, moderate stage OD>OS  Doing well, IOP within acceptable range relative to target IOP and no evidence of progression. Continue current treatment. Reviewed importance of continued compliance with treatment and follow up.  Patient is having problems following the gtt regimen.  Discussed RBA of SLT OD, patient agrees  Schedule SLT OD     Return for the SLT OD

## 2020-11-04 ENCOUNTER — OUTSIDE PLACE OF SERVICE (OUTPATIENT)
Dept: ADMINISTRATIVE | Facility: OTHER | Age: 60
End: 2020-11-04
Payer: COMMERCIAL

## 2020-11-04 PROCEDURE — 65855 PR TRABECULOPLASTY LASER SURGERY: ICD-10-PCS | Mod: RT,,, | Performed by: OPHTHALMOLOGY

## 2020-11-04 PROCEDURE — 65855 TRABECULOPLASTY LASER SURG: CPT | Mod: RT,,, | Performed by: OPHTHALMOLOGY

## 2020-11-09 ENCOUNTER — PATIENT MESSAGE (OUTPATIENT)
Dept: ADMINISTRATIVE | Facility: OTHER | Age: 60
End: 2020-11-09

## 2020-11-30 ENCOUNTER — PATIENT OUTREACH (OUTPATIENT)
Dept: OTHER | Facility: OTHER | Age: 60
End: 2020-11-30

## 2020-12-02 ENCOUNTER — TELEPHONE (OUTPATIENT)
Dept: ORTHOPEDICS | Facility: CLINIC | Age: 60
End: 2020-12-02

## 2020-12-04 NOTE — PROGRESS NOTES
Last 5 Patient Entered Readings Current 30 Day Average: 131/85     Recent Readings 12/29/2017 12/28/2017 12/27/2017 12/26/2017 12/26/2017    SBP (mmHg) 135 135 135 138 145    DBP (mmHg) 85 84 93 89 93    Pulse 58 55 67 52 55          Hypertension Digital Medicine Program (HDMP): Health  Follow Up    Lifestyle Modifications:    1.Low sodium diet: no Patient reports she has been splurging with holiday foods lately. She states she has not been eating out very much. Patient reports  does not add salt to the foods he cooks. Patient reports eating chicken with vegetables frequently.     2.Physical activity: yes Patient reports doing lite cardio in her home for 30 minutes, 3x a week. She states achieving a high step count. Patient expresses the importance of staying active in order to lose weight. She states she needs to increase her activity level after the New Year.     3.Hypotension/Hypertension symptoms: no Patient reports no abnormal signs or symptoms with BP medication.   Frequency/Alleviating factors/Precipitating factors, etc.     4.Patient has been compliant with the medication regimen.     Follow up with Ms. Carolee Ortega completed. No further questions or concerns. I will follow up in a few weeks to assess progress.     
no

## 2020-12-09 ENCOUNTER — OFFICE VISIT (OUTPATIENT)
Dept: ORTHOPEDICS | Facility: CLINIC | Age: 60
End: 2020-12-09
Payer: COMMERCIAL

## 2020-12-09 VITALS
HEART RATE: 56 BPM | SYSTOLIC BLOOD PRESSURE: 144 MMHG | WEIGHT: 220 LBS | HEIGHT: 62 IN | DIASTOLIC BLOOD PRESSURE: 79 MMHG | BODY MASS INDEX: 40.48 KG/M2

## 2020-12-09 DIAGNOSIS — M67.431 GANGLION OF RIGHT WRIST: ICD-10-CM

## 2020-12-09 DIAGNOSIS — M65.30 TRIGGER FINGER, ACQUIRED: Primary | ICD-10-CM

## 2020-12-09 PROCEDURE — 3078F DIAST BP <80 MM HG: CPT | Mod: CPTII,S$GLB,, | Performed by: ORTHOPAEDIC SURGERY

## 2020-12-09 PROCEDURE — 3077F SYST BP >= 140 MM HG: CPT | Mod: CPTII,S$GLB,, | Performed by: ORTHOPAEDIC SURGERY

## 2020-12-09 PROCEDURE — 99999 PR PBB SHADOW E&M-EST. PATIENT-LVL V: ICD-10-PCS | Mod: PBBFAC,,, | Performed by: ORTHOPAEDIC SURGERY

## 2020-12-09 PROCEDURE — 1126F PR PAIN SEVERITY QUANTIFIED, NO PAIN PRESENT: ICD-10-PCS | Mod: S$GLB,,, | Performed by: ORTHOPAEDIC SURGERY

## 2020-12-09 PROCEDURE — 3077F PR MOST RECENT SYSTOLIC BLOOD PRESSURE >= 140 MM HG: ICD-10-PCS | Mod: CPTII,S$GLB,, | Performed by: ORTHOPAEDIC SURGERY

## 2020-12-09 PROCEDURE — 3008F PR BODY MASS INDEX (BMI) DOCUMENTED: ICD-10-PCS | Mod: CPTII,S$GLB,, | Performed by: ORTHOPAEDIC SURGERY

## 2020-12-09 PROCEDURE — 99213 OFFICE O/P EST LOW 20 MIN: CPT | Mod: S$GLB,,, | Performed by: ORTHOPAEDIC SURGERY

## 2020-12-09 PROCEDURE — 1126F AMNT PAIN NOTED NONE PRSNT: CPT | Mod: S$GLB,,, | Performed by: ORTHOPAEDIC SURGERY

## 2020-12-09 PROCEDURE — 3078F PR MOST RECENT DIASTOLIC BLOOD PRESSURE < 80 MM HG: ICD-10-PCS | Mod: CPTII,S$GLB,, | Performed by: ORTHOPAEDIC SURGERY

## 2020-12-09 PROCEDURE — 99999 PR PBB SHADOW E&M-EST. PATIENT-LVL V: CPT | Mod: PBBFAC,,, | Performed by: ORTHOPAEDIC SURGERY

## 2020-12-09 PROCEDURE — 99213 PR OFFICE/OUTPT VISIT, EST, LEVL III, 20-29 MIN: ICD-10-PCS | Mod: S$GLB,,, | Performed by: ORTHOPAEDIC SURGERY

## 2020-12-09 PROCEDURE — 3008F BODY MASS INDEX DOCD: CPT | Mod: CPTII,S$GLB,, | Performed by: ORTHOPAEDIC SURGERY

## 2020-12-09 NOTE — PROGRESS NOTES
Subjective:     Patient ID: Carolee Ortega is a 60 y.o. female.    Chief Complaint: Pain of the Right Hand    The patient is a 60-year-old female with a right wrist dorsal ganglion cyst adherent to extensor tendon long finger as well as a right ring trigger finger unresponsive to conservative measures including several injections.  She wishes to have excision ganglion cyst dorsal right wrist and right ring trigger finger release      Past Medical History:   Diagnosis Date    Allergic rhinitis     Anxiety     Asthma     Carpal tunnel syndrome     JAGDISH (generalized anxiety disorder)     Glaucoma (increased eye pressure)     Herpes labialis     Hypertension     Obesity     Seborrheic dermatitis     Shingles 2016     Past Surgical History:   Procedure Laterality Date    BREAST BIOPSY      right    CARPAL TUNNEL RELEASE Right 10/4/2018    Procedure: RELEASE, CARPAL TUNNEL;  Surgeon: Shaq Fairbanks MD;  Location: [facility] OR;  Service: Orthopedics;  Laterality: Right;    CARPAL TUNNEL RELEASE Left 2018    Procedure: RELEASE, CARPAL TUNNEL;  Surgeon: Shaq Fairbanks MD;  Location: [facility] OR;  Service: Orthopedics;  Laterality: Left;     SECTION      x1    COLONOSCOPY N/A 2020    Procedure: COLONOSCOPY;  Surgeon: Jessica Camacho MD;  Location: [facility] ENDO;  Service: Endoscopy;  Laterality: N/A;    HYSTERECTOMY      MARYANN and USO      Secondary to fibroids    TONSILLECTOMY  2011    TRIGGER FINGER RELEASE Right 10/4/2018    Procedure: RELEASE, TRIGGER FINGER;  Surgeon: Shaq Fairbanks MD;  Location: [facility] OR;  Service: Orthopedics;  Laterality: Right;  right middle finger      Family History   Problem Relation Age of Onset    Heart attack Father     Coronary artery disease Father     Glaucoma Father     Diabetes Father     Heart disease Mother 32    Breast cancer Maternal Grandmother     Hypertension Sister     Kidney disease Sister     Diabetes Other          Paternal great-grandmother    Breast cancer Maternal Cousin     Breast cancer Maternal Cousin      Social History     Socioeconomic History    Marital status:      Spouse name: Not on file    Number of children: Not on file    Years of education: Not on file    Highest education level: Not on file   Occupational History    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     Employer: [employer]     Comment: [employer]   Social Needs    Financial resource strain: Not hard at all    Food insecurity     Worry: Never true     Inability: Never true    Transportation needs     Medical: No     Non-medical: No   Tobacco Use    Smoking status: Never Smoker    Smokeless tobacco: Never Used   Substance and Sexual Activity    Alcohol use: No     Alcohol/week: 0.0 standard drinks     Frequency: Never     Binge frequency: Never    Drug use: No    Sexual activity: Not on file   Lifestyle    Physical activity     Days per week: 2 days     Minutes per session: 60 min    Stress: Not at all   Relationships    Social connections     Talks on phone: More than three times a week     Gets together: Once a week     Attends Rastafarian service: Not on file     Active member of club or organization: Yes     Attends meetings of clubs or organizations: More than 4 times per year     Relationship status:    Other Topics Concern    Not on file   Social History Narrative    Patient is  and retired from [employer]/Medicaid. She has 4 adult children.     Medication List with Changes/Refills   Current Medications    ALBUTEROL (ACCUNEB) 1.25 MG/3 ML NEBU    Take 1.25 mg by nebulization every 6 (six) hours as needed.    ALPRAZOLAM (XANAX) 0.5 MG TABLET    Take 1 tablet (0.5 mg total) by mouth 3 (three) times daily as needed.    AMLODIPINE-VALSARTAN (EXFORGE)  MG PER TABLET    Take 1 tablet by mouth once daily.    DORZOLAMIDE-TIMOLOL 2-0.5% (COSOPT) 22.3-6.8 MG/ML OPHTHALMIC SOLUTION    PLACE 1 DROP INTO BOTH EYES EVERY 12 (TWELVE)  HOURS.    HYDROXYZINE HCL (ATARAX) 25 MG TABLET    TAKE 1 TABLET (25 MG TOTAL) BY MOUTH EVERY 4 (FOUR) HOURS AS NEEDED FOR ITCHING.    KETOROLAC 0.5% (ACULAR) 0.5 % DROP    Put one eyedrop in right eye four times a day. Start first drop morning of laser and stop after five days.    LATANOPROST 0.005 % OPHTHALMIC SOLUTION    PLACE 1 DROP INTO BOTH EYES EVERY EVENING.    METOPROLOL TARTRATE (LOPRESSOR) 25 MG TABLET    TAKE 1 TABLET BY MOUTH EVERY DAY    MOMETASONE (NASONEX) 50 MCG/ACTUATION NASAL SPRAY    2 sprays by Nasal route once daily.    MONTELUKAST (SINGULAIR) 10 MG TABLET    TAKE 1 TABLET BY MOUTH EVERY DAY IN THE EVENING    PENCICLOVIR (DENAVIR) 1 % CREAM    Apply topically every 2 (two) hours.    SPIRONOLACTONE (ALDACTONE) 25 MG TABLET    TAKE 1 TABLET BY MOUTH EVERY DAY    VALACYCLOVIR (VALTREX) 1000 MG TABLET    Take 2 tablets (2,000 mg total) by mouth every 12 (twelve) hours.    VALSARTAN (DIOVAN) 160 MG TABLET    TAKE 1 TABLET (160 MG TOTAL) BY MOUTH 2 (TWO) TIMES DAILY.     Review of patient's allergies indicates:   Allergen Reactions    Flexeril [cyclobenzaprine] Palpitations     Review of Systems   Constitution: Negative for malaise/fatigue.   HENT: Negative for hearing loss.    Eyes: Positive for visual disturbance. Negative for double vision.   Cardiovascular: Negative for chest pain.   Respiratory: Positive for wheezing. Negative for shortness of breath.    Endocrine: Negative for cold intolerance.   Hematologic/Lymphatic: Does not bruise/bleed easily.   Skin: Negative for poor wound healing and suspicious lesions.   Musculoskeletal: Negative for gout, joint pain and joint swelling.   Gastrointestinal: Negative for nausea and vomiting.   Genitourinary: Negative for dysuria.   Neurological: Positive for vertigo. Negative for numbness, paresthesias and sensory change.   Psychiatric/Behavioral: Negative for depression, memory loss and substance abuse. The patient is nervous/anxious.     Allergic/Immunologic: Negative for persistent infections.       Objective:   Body mass index is 40.24 kg/m².  Vitals:    12/09/20 0752   BP: (!) 144/79   Pulse: (!) 56                General    Constitutional: She is oriented to person, place, and time. She appears well-developed and well-nourished. No distress.   HENT:   Head: Normocephalic.   Mouth/Throat: Oropharynx is clear and moist.   Eyes: EOM are normal.   Neck: Normal range of motion.   Cardiovascular: Normal rate.    Pulmonary/Chest: Effort normal.   Abdominal: Soft.   Neurological: She is alert and oriented to person, place, and time. No cranial nerve deficit.   Psychiatric: She has a normal mood and affect.             Right Hand/Wrist Exam     Inspection   Scars: Wrist - absent Hand -  present  Effusion: Wrist - absent Hand -  absent    Pain   Hand - The patient exhibits pain of the ring MCP.    Other     Neuorologic Exam    Median Distribution: normal  Ulnar Distribution: normal  Radial Distribution: normal    Comments:  The patient has a 1 cm dorsal ganglion cyst adherent to the extensor tendon long finger that was within excursion.  She may have an element of synovitis on the dorsal wrist under the retinaculum.  She has active triggering right ring finger with a palpable nodule that moves with tendon excursion.  There are no motor or sensory deficits          Vascular Exam       Capillary Refill  Right Hand: normal capillary refill      Relevant imaging results reviewed and interpreted by me, discussed with the patient and / or family today radiographs right wrist were normal other than early degenerative change in the triscaphe and CMC joints  Assessment:     Encounter Diagnoses   Name Primary?    Trigger finger, acquired Yes    Ganglion of right wrist     right ring trigger finger  Right wrist dorsal ganglion cyst    Plan:     The patient was counseled regarding right ring trigger finger release and excision ganglion cyst dorsal right wrist.   Risk complications and alternatives were discussed including the risk of infection, anesthetic risk, injury to nerves and vessels, loss of motion, and possible need for additional surgeries were discussed.  She seems to understand and agree that surgery.  All questions were answered              Disclaimer: This note was prepared using a voice recognition system and is likely to have sound alike errors within the text.

## 2020-12-10 ENCOUNTER — OFFICE VISIT (OUTPATIENT)
Dept: FAMILY MEDICINE | Facility: CLINIC | Age: 60
End: 2020-12-10
Payer: COMMERCIAL

## 2020-12-10 VITALS
HEART RATE: 68 BPM | HEIGHT: 62 IN | OXYGEN SATURATION: 98 % | WEIGHT: 227.5 LBS | DIASTOLIC BLOOD PRESSURE: 70 MMHG | BODY MASS INDEX: 41.86 KG/M2 | SYSTOLIC BLOOD PRESSURE: 130 MMHG | TEMPERATURE: 98 F

## 2020-12-10 DIAGNOSIS — J30.9 ALLERGIC RHINITIS, UNSPECIFIED SEASONALITY, UNSPECIFIED TRIGGER: ICD-10-CM

## 2020-12-10 DIAGNOSIS — J45.20 MILD INTERMITTENT ASTHMA WITHOUT COMPLICATION: Primary | ICD-10-CM

## 2020-12-10 PROBLEM — Z12.11 ENCOUNTER FOR SCREENING COLONOSCOPY: Status: RESOLVED | Noted: 2018-11-16 | Resolved: 2020-12-10

## 2020-12-10 PROCEDURE — 99999 PR PBB SHADOW E&M-EST. PATIENT-LVL IV: ICD-10-PCS | Mod: PBBFAC,,, | Performed by: REGISTERED NURSE

## 2020-12-10 PROCEDURE — 3078F PR MOST RECENT DIASTOLIC BLOOD PRESSURE < 80 MM HG: ICD-10-PCS | Mod: CPTII,S$GLB,, | Performed by: REGISTERED NURSE

## 2020-12-10 PROCEDURE — 99214 PR OFFICE/OUTPT VISIT, EST, LEVL IV, 30-39 MIN: ICD-10-PCS | Mod: 25,S$GLB,, | Performed by: REGISTERED NURSE

## 2020-12-10 PROCEDURE — 1125F AMNT PAIN NOTED PAIN PRSNT: CPT | Mod: S$GLB,,, | Performed by: REGISTERED NURSE

## 2020-12-10 PROCEDURE — 3078F DIAST BP <80 MM HG: CPT | Mod: CPTII,S$GLB,, | Performed by: REGISTERED NURSE

## 2020-12-10 PROCEDURE — 3075F PR MOST RECENT SYSTOLIC BLOOD PRESS GE 130-139MM HG: ICD-10-PCS | Mod: CPTII,S$GLB,, | Performed by: REGISTERED NURSE

## 2020-12-10 PROCEDURE — 3075F SYST BP GE 130 - 139MM HG: CPT | Mod: CPTII,S$GLB,, | Performed by: REGISTERED NURSE

## 2020-12-10 PROCEDURE — 99999 PR PBB SHADOW E&M-EST. PATIENT-LVL IV: CPT | Mod: PBBFAC,,, | Performed by: REGISTERED NURSE

## 2020-12-10 PROCEDURE — 96372 PR INJECTION,THERAP/PROPH/DIAG2ST, IM OR SUBCUT: ICD-10-PCS | Mod: S$GLB,,, | Performed by: REGISTERED NURSE

## 2020-12-10 PROCEDURE — 3008F PR BODY MASS INDEX (BMI) DOCUMENTED: ICD-10-PCS | Mod: CPTII,S$GLB,, | Performed by: REGISTERED NURSE

## 2020-12-10 PROCEDURE — 96372 THER/PROPH/DIAG INJ SC/IM: CPT | Mod: S$GLB,,, | Performed by: REGISTERED NURSE

## 2020-12-10 PROCEDURE — 3008F BODY MASS INDEX DOCD: CPT | Mod: CPTII,S$GLB,, | Performed by: REGISTERED NURSE

## 2020-12-10 PROCEDURE — 99214 OFFICE O/P EST MOD 30 MIN: CPT | Mod: 25,S$GLB,, | Performed by: REGISTERED NURSE

## 2020-12-10 PROCEDURE — 1125F PR PAIN SEVERITY QUANTIFIED, PAIN PRESENT: ICD-10-PCS | Mod: S$GLB,,, | Performed by: REGISTERED NURSE

## 2020-12-10 RX ORDER — BETAMETHASONE SODIUM PHOSPHATE AND BETAMETHASONE ACETATE 3; 3 MG/ML; MG/ML
9 INJECTION, SUSPENSION INTRA-ARTICULAR; INTRALESIONAL; INTRAMUSCULAR; SOFT TISSUE
Status: COMPLETED | OUTPATIENT
Start: 2020-12-10 | End: 2020-12-10

## 2020-12-10 RX ORDER — ALBUTEROL SULFATE 1.25 MG/3ML
1.25 SOLUTION RESPIRATORY (INHALATION)
Qty: 2 BOX | Refills: 1 | Status: SHIPPED | OUTPATIENT
Start: 2020-12-10

## 2020-12-10 RX ORDER — ALBUTEROL SULFATE 90 UG/1
2 AEROSOL, METERED RESPIRATORY (INHALATION)
Qty: 18 G | Refills: 0 | Status: SHIPPED | OUTPATIENT
Start: 2020-12-10 | End: 2021-01-31

## 2020-12-10 RX ADMIN — BETAMETHASONE SODIUM PHOSPHATE AND BETAMETHASONE ACETATE 9 MG: 3; 3 INJECTION, SUSPENSION INTRA-ARTICULAR; INTRALESIONAL; INTRAMUSCULAR; SOFT TISSUE at 04:12

## 2020-12-10 NOTE — PROGRESS NOTES
"Subjective:       Patient ID: Carolee Ortega is a 60 y.o. female.      Chief Complaint   Patient presents with    Sinus Problem       Mrs. Ortega reports sinus/allergy issues since last week.  Has gotten worse over past few days.  History of asthma, but she states "has been years since I had a problem".  Does have nebulizer machine at home but no medication for the machine and no rescue inhaler.  Reports cough, RN, NC, sneezing and ear popping.  Cough productive of clear mucus.  Some wheezing at times, hard to sleep.  Does take Singulair nightly and Nasonex prn.      Review of Systems   Constitutional: Negative for activity change, chills, fever and unexpected weight change.   HENT: Positive for congestion, postnasal drip, rhinorrhea and sneezing. Negative for hearing loss, sore throat and trouble swallowing.    Eyes: Negative.  Negative for discharge and visual disturbance.   Respiratory: Positive for cough and wheezing. Negative for chest tightness.    Cardiovascular: Negative for chest pain and palpitations.   Gastrointestinal: Negative for blood in stool, constipation, diarrhea and vomiting.   Endocrine: Negative for polydipsia and polyuria.   Genitourinary: Negative for difficulty urinating, dysuria, hematuria and menstrual problem.   Musculoskeletal: Negative for arthralgias, joint swelling and neck pain.   Allergic/Immunologic: Positive for environmental allergies.   Neurological: Positive for headaches. Negative for weakness and light-headedness.   Psychiatric/Behavioral: Negative for confusion and dysphoric mood.         Review of patient's allergies indicates:   Allergen Reactions    Flexeril [cyclobenzaprine] Palpitations         Patient Active Problem List   Diagnosis    Essential hypertension    Herpes labialis    Glaucoma (increased eye pressure)    Morbid obesity    Asthma    Allergic rhinitis    Anxiety    Benign paroxysmal positional vertigo           Current Outpatient Medications:    "  albuterol (ACCUNEB) 1.25 mg/3 mL Nebu, Take 1.25 mg by nebulization every 6 (six) hours as needed., Disp: , Rfl:     ALPRAZolam (XANAX) 0.5 MG tablet, Take 1 tablet (0.5 mg total) by mouth 3 (three) times daily as needed., Disp: 90 tablet, Rfl: 0    amlodipine-valsartan (EXFORGE)  mg per tablet, Take 1 tablet by mouth once daily., Disp: 90 tablet, Rfl: 3    dorzolamide-timolol 2-0.5% (COSOPT) 22.3-6.8 mg/mL ophthalmic solution, PLACE 1 DROP INTO BOTH EYES EVERY 12 (TWELVE) HOURS., Disp: 10 mL, Rfl: 12    hydrOXYzine HCL (ATARAX) 25 MG tablet, TAKE 1 TABLET (25 MG TOTAL) BY MOUTH EVERY 4 (FOUR) HOURS AS NEEDED FOR ITCHING., Disp: 30 tablet, Rfl: 5    ketorolac 0.5% (ACULAR) 0.5 % Drop, Put one eyedrop in right eye four times a day. Start first drop morning of laser and stop after five days., Disp: 1 Bottle, Rfl: 0    latanoprost 0.005 % ophthalmic solution, PLACE 1 DROP INTO BOTH EYES EVERY EVENING., Disp: 2.5 mL, Rfl: 11    metoprolol tartrate (LOPRESSOR) 25 MG tablet, TAKE 1 TABLET BY MOUTH EVERY DAY, Disp: 90 tablet, Rfl: 3    mometasone (NASONEX) 50 mcg/actuation nasal spray, 2 sprays by Nasal route once daily. (Patient taking differently: 2 sprays by Nasal route daily as needed. ), Disp: 17 g, Rfl: 2    montelukast (SINGULAIR) 10 mg tablet, TAKE 1 TABLET BY MOUTH EVERY DAY IN THE EVENING, Disp: 90 tablet, Rfl: 3    penciclovir (DENAVIR) 1 % cream, Apply topically every 2 (two) hours. (Patient taking differently: Apply topically every 2 (two) hours as needed. ), Disp: 1.5 g, Rfl: 5    spironolactone (ALDACTONE) 25 MG tablet, TAKE 1 TABLET BY MOUTH EVERY DAY, Disp: 90 tablet, Rfl: 1    valACYclovir (VALTREX) 1000 MG tablet, Take 2 tablets (2,000 mg total) by mouth every 12 (twelve) hours. (Patient taking differently: Take 2,000 mg by mouth every 12 (twelve) hours as needed. ), Disp: 4 tablet, Rfl: 5    valsartan (DIOVAN) 160 MG tablet, TAKE 1 TABLET (160 MG TOTAL) BY MOUTH 2 (TWO) TIMES DAILY.,  "Disp: , Rfl:         Past medical, surgical, family and social histories have been reviewed today.      Objective:     Vitals:    12/10/20 1633   BP: 130/70   Pulse: 68   Temp: 97.9 °F (36.6 °C)   TempSrc: Temporal   SpO2: 98%   Weight: 103.2 kg (227 lb 8.2 oz)   Height: 5' 2" (1.575 m)   PainSc:   7   PainLoc: Head         Physical Exam  Vitals signs reviewed.   Constitutional:       General: She is not in acute distress.     Appearance: She is well-developed.   HENT:      Head: Normocephalic and atraumatic.      Right Ear: Tympanic membrane, ear canal and external ear normal. There is no impacted cerumen.      Left Ear: Tympanic membrane, ear canal and external ear normal. There is no impacted cerumen.      Nose: Mucosal edema and congestion present. No rhinorrhea.      Right Turbinates: Pale.      Left Turbinates: Pale.      Mouth/Throat:      Mouth: Mucous membranes are moist.      Pharynx: Oropharynx is clear. No oropharyngeal exudate or posterior oropharyngeal erythema.   Eyes:      General:         Right eye: No discharge.         Left eye: No discharge.      Conjunctiva/sclera: Conjunctivae normal.      Pupils: Pupils are equal, round, and reactive to light.   Neck:      Musculoskeletal: Normal range of motion and neck supple.   Cardiovascular:      Rate and Rhythm: Normal rate and regular rhythm.      Pulses: Normal pulses.      Heart sounds: Normal heart sounds. No murmur. No gallop.    Pulmonary:      Effort: Pulmonary effort is normal.      Breath sounds: Wheezing present.   Musculoskeletal: Normal range of motion.   Lymphadenopathy:      Cervical: No cervical adenopathy.   Skin:     General: Skin is warm and dry.      Capillary Refill: Capillary refill takes less than 2 seconds.      Findings: No rash.   Neurological:      Mental Status: She is alert and oriented to person, place, and time.   Psychiatric:         Mood and Affect: Mood normal.         Behavior: Behavior normal.         Thought Content: " Thought content normal.         Judgment: Judgment normal.             Assessment         ICD-10-CM ICD-9-CM    1. Mild intermittent asthma without complication  J45.20 493.90 betamethasone acetate-betamethasone sodium phosphate injection 9 mg      albuterol (ACCUNEB) 1.25 mg/3 mL Nebu      albuterol (PROVENTIL/VENTOLIN HFA) 90 mcg/actuation inhaler    Acute exacerbation of asthma.  Injection today.  Albuterol refilled per neb and MDI.  Continue Singulair nightly as ordered.  Hydration, rest.     2. Allergic rhinitis, unspecified seasonality, unspecified trigger  J30.9 477.9 betamethasone acetate-betamethasone sodium phosphate injection 9 mg    See # 1.  Zyrtec or Claritin or other once daily in AM with Singulair nightly.  Nasonex prn.          Follow-up     Return to clinic in 2 to 3 days if worse or no better.  RTC prn.      BRET Mays  [facility]

## 2020-12-11 ENCOUNTER — PATIENT MESSAGE (OUTPATIENT)
Dept: OPHTHALMOLOGY | Facility: CLINIC | Age: 60
End: 2020-12-11

## 2020-12-11 ENCOUNTER — CLINICAL SUPPORT (OUTPATIENT)
Dept: URGENT CARE | Facility: CLINIC | Age: 60
End: 2020-12-11
Payer: COMMERCIAL

## 2020-12-11 ENCOUNTER — PATIENT MESSAGE (OUTPATIENT)
Dept: FAMILY MEDICINE | Facility: CLINIC | Age: 60
End: 2020-12-11

## 2020-12-11 DIAGNOSIS — J06.9 ACUTE URI: ICD-10-CM

## 2020-12-11 DIAGNOSIS — U07.1 COVID-19 VIRUS DETECTED: ICD-10-CM

## 2020-12-11 DIAGNOSIS — Z11.9 ENCOUNTER FOR SCREENING EXAMINATION FOR INFECTIOUS DISEASE: Primary | ICD-10-CM

## 2020-12-11 LAB
CTP QC/QA: YES
SARS-COV-2 RDRP RESP QL NAA+PROBE: POSITIVE

## 2020-12-11 PROCEDURE — 99211 OFF/OP EST MAY X REQ PHY/QHP: CPT | Mod: S$GLB,,, | Performed by: PHYSICIAN ASSISTANT

## 2020-12-11 PROCEDURE — 99211 PR OFFICE/OUTPT VISIT, EST, LEVL I: ICD-10-PCS | Mod: S$GLB,,, | Performed by: PHYSICIAN ASSISTANT

## 2020-12-11 PROCEDURE — U0002: ICD-10-PCS | Mod: QW,S$GLB,, | Performed by: PHYSICIAN ASSISTANT

## 2020-12-11 PROCEDURE — U0002 COVID-19 LAB TEST NON-CDC: HCPCS | Mod: QW,S$GLB,, | Performed by: PHYSICIAN ASSISTANT

## 2020-12-14 ENCOUNTER — NURSE TRIAGE (OUTPATIENT)
Dept: ADMINISTRATIVE | Facility: CLINIC | Age: 60
End: 2020-12-14

## 2020-12-14 ENCOUNTER — TELEPHONE (OUTPATIENT)
Dept: ADMINISTRATIVE | Facility: CLINIC | Age: 60
End: 2020-12-14

## 2020-12-14 RX ORDER — PROMETHAZINE HYDROCHLORIDE AND DEXTROMETHORPHAN HYDROBROMIDE 6.25; 15 MG/5ML; MG/5ML
5 SYRUP ORAL
Qty: 180 ML | Refills: 0 | Status: SHIPPED | OUTPATIENT
Start: 2020-12-14 | End: 2021-03-11

## 2020-12-14 NOTE — TELEPHONE ENCOUNTER
Patient  This morning with cough and slight SOB while going down and up the stairs.  Patient has history of asthmm and hypertension.    Reason for Disposition   HIGH RISK patient (e.g., age > 64 years, diabetes, heart or lung disease, weak immune system) (Exception: has already been evaluated by healthcare provider and has no new or worsening symptoms)    Additional Information   Negative: Severe difficulty breathing (e.g., struggling for each breath, speaks in single words)   Negative: Difficult to awaken or acting confused (e.g., disoriented, slurred speech)   Negative: Bluish (or gray) lips or face now   Negative: Shock suspected (e.g., cold/pale/clammy skin, too weak to stand, low BP, rapid pulse)   Negative: Sounds like a life-threatening emergency to the triager   Negative: [1] COVID-19 exposure AND [2] no symptoms   Negative: COVID-19 and Breastfeeding, questions about   Negative: [1] Adult with possible COVID-19 symptoms AND [2] triager concerned about severity of symptoms or other causes   Negative: SEVERE or constant chest pain or pressure (Exception: mild central chest pain, present only when coughing)   Negative: MODERATE difficulty breathing (e.g., speaks in phrases, SOB even at rest, pulse 100-120)   Negative: MILD difficulty breathing (e.g., minimal/no SOB at rest, SOB with walking, pulse <100)   Negative: Chest pain   Negative: Patient sounds very sick or weak to the triager   Negative: Fever > 103 F (39.4 C)   Negative: [1] Fever > 101 F (38.3 C) AND [2] age > 60   Negative: [1] Fever > 100.0 F (37.8 C) AND [2] bedridden (e.g., nursing home patient, CVA, chronic illness, recovering from surgery)    Protocols used: CORONAVIRUS (COVID-19) DIAGNOSED OR [id number]

## 2020-12-14 NOTE — TELEPHONE ENCOUNTER
Called patient due to RN escalation in COVID home symptom monitoring. Pt escalated due to Shortness of breath    Patient location: [place]    Vitals: not  Taken home symptom monitoring     60 y.o. female with pertinent PMHx Asthma and HTN on day 4 of Covid symptoms. Positive Covid screen 12/22/20. CXR - None recent.       Attempted to call x 3 and no response left message x2 and mychart message.

## 2020-12-14 NOTE — TELEPHONE ENCOUNTER
Pt states she saw cothern Friday and advised her to go to urgent care. Tested positive for covid. Pt has cough, chills, diarrhea, no fever. Would like something for cough called in to [facility] on [address]

## 2020-12-17 ENCOUNTER — NURSE TRIAGE (OUTPATIENT)
Dept: ADMINISTRATIVE | Facility: CLINIC | Age: 60
End: 2020-12-17

## 2020-12-17 ENCOUNTER — HOSPITAL ENCOUNTER (INPATIENT)
Facility: HOSPITAL | Age: 60
LOS: 2 days | Discharge: HOME | DRG: 177 | End: 2020-12-19
Attending: EMERGENCY MEDICINE | Admitting: HOSPITALIST
Payer: COMMERCIAL

## 2020-12-17 DIAGNOSIS — R06.02 SOB (SHORTNESS OF BREATH): ICD-10-CM

## 2020-12-17 DIAGNOSIS — J12.82 PNEUMONIA DUE TO COVID-19 VIRUS: Primary | ICD-10-CM

## 2020-12-17 DIAGNOSIS — U07.1 PNEUMONIA DUE TO COVID-19 VIRUS: Primary | ICD-10-CM

## 2020-12-17 LAB
ALBUMIN SERPL BCP-MCNC: 3.5 G/DL (ref 3.5–5.2)
ALP SERPL-CCNC: 47 U/L (ref 55–135)
ALT SERPL W/O P-5'-P-CCNC: 36 U/L (ref 10–44)
ANION GAP SERPL CALC-SCNC: 9 MMOL/L (ref 8–16)
AST SERPL-CCNC: 44 U/L (ref 10–40)
BASOPHILS # BLD AUTO: 0.02 K/UL (ref 0–0.2)
BASOPHILS NFR BLD: 0.5 % (ref 0–1.9)
BILIRUB SERPL-MCNC: 0.5 MG/DL (ref 0.1–1)
BUN SERPL-MCNC: 8 MG/DL (ref 8–23)
CALCIUM SERPL-MCNC: 8.5 MG/DL (ref 8.7–10.5)
CHLORIDE SERPL-SCNC: 102 MMOL/L (ref 95–110)
CK SERPL-CCNC: 189 U/L (ref 20–180)
CO2 SERPL-SCNC: 26 MMOL/L (ref 23–29)
CREAT SERPL-MCNC: 0.9 MG/DL (ref 0.5–1.4)
D DIMER PPP IA.FEU-MCNC: 4.24 MG/L FEU
DIFFERENTIAL METHOD: ABNORMAL
EOSINOPHIL # BLD AUTO: 0 K/UL (ref 0–0.5)
EOSINOPHIL NFR BLD: 0 % (ref 0–8)
ERYTHROCYTE [DISTWIDTH] IN BLOOD BY AUTOMATED COUNT: 12.5 % (ref 11.5–14.5)
EST. GFR  (AFRICAN AMERICAN): >60 ML/MIN/1.73 M^2
EST. GFR  (NON AFRICAN AMERICAN): >60 ML/MIN/1.73 M^2
GLUCOSE SERPL-MCNC: 118 MG/DL (ref 70–110)
HCT VFR BLD AUTO: 42.6 % (ref 37–48.5)
HGB BLD-MCNC: 13.8 G/DL (ref 12–16)
IMM GRANULOCYTES # BLD AUTO: 0.01 K/UL (ref 0–0.04)
IMM GRANULOCYTES NFR BLD AUTO: 0.3 % (ref 0–0.5)
LACTATE SERPL-SCNC: 0.8 MMOL/L (ref 0.5–2.2)
LDH SERPL L TO P-CCNC: 443 U/L (ref 110–260)
LYMPHOCYTES # BLD AUTO: 0.9 K/UL (ref 1–4.8)
LYMPHOCYTES NFR BLD: 23.2 % (ref 18–48)
MCH RBC QN AUTO: 27.2 PG (ref 27–31)
MCHC RBC AUTO-ENTMCNC: 32.4 G/DL (ref 32–36)
MCV RBC AUTO: 84 FL (ref 82–98)
MONOCYTES # BLD AUTO: 0.2 K/UL (ref 0.3–1)
MONOCYTES NFR BLD: 5.1 % (ref 4–15)
NEUTROPHILS # BLD AUTO: 2.7 K/UL (ref 1.8–7.7)
NEUTROPHILS NFR BLD: 70.9 % (ref 38–73)
NRBC BLD-RTO: 0 /100 WBC
PLATELET # BLD AUTO: 199 K/UL (ref 150–350)
PMV BLD AUTO: 10.5 FL (ref 9.2–12.9)
POTASSIUM SERPL-SCNC: 3.7 MMOL/L (ref 3.5–5.1)
PROCALCITONIN SERPL IA-MCNC: 0.06 NG/ML
PROT SERPL-MCNC: 7.7 G/DL (ref 6–8.4)
RBC # BLD AUTO: 5.08 M/UL (ref 4–5.4)
SODIUM SERPL-SCNC: 137 MMOL/L (ref 136–145)
TROPONIN I SERPL DL<=0.01 NG/ML-MCNC: <0.006 NG/ML (ref 0–0.03)
WBC # BLD AUTO: 3.75 K/UL (ref 3.9–12.7)

## 2020-12-17 PROCEDURE — 84484 ASSAY OF TROPONIN QUANT: CPT

## 2020-12-17 PROCEDURE — 63600175 PHARM REV CODE 636 W HCPCS: Performed by: HOSPITALIST

## 2020-12-17 PROCEDURE — 99285 EMERGENCY DEPT VISIT HI MDM: CPT | Mod: 25

## 2020-12-17 PROCEDURE — 83605 ASSAY OF LACTIC ACID: CPT

## 2020-12-17 PROCEDURE — 85025 COMPLETE CBC W/AUTO DIFF WBC: CPT

## 2020-12-17 PROCEDURE — 93010 ELECTROCARDIOGRAM REPORT: CPT | Mod: ,,, | Performed by: INTERNAL MEDICINE

## 2020-12-17 PROCEDURE — 63600175 PHARM REV CODE 636 W HCPCS: Performed by: EMERGENCY MEDICINE

## 2020-12-17 PROCEDURE — 25000003 PHARM REV CODE 250: Performed by: HOSPITALIST

## 2020-12-17 PROCEDURE — 85379 FIBRIN DEGRADATION QUANT: CPT

## 2020-12-17 PROCEDURE — 83615 LACTATE (LD) (LDH) ENZYME: CPT

## 2020-12-17 PROCEDURE — 93010 EKG 12-LEAD: ICD-10-PCS | Mod: ,,, | Performed by: INTERNAL MEDICINE

## 2020-12-17 PROCEDURE — 96374 THER/PROPH/DIAG INJ IV PUSH: CPT

## 2020-12-17 PROCEDURE — 63700000 PHARM REV CODE 250 ALT 637 W/O HCPCS: Performed by: HOSPITALIST

## 2020-12-17 PROCEDURE — 80053 COMPREHEN METABOLIC PANEL: CPT

## 2020-12-17 PROCEDURE — 87040 BLOOD CULTURE FOR BACTERIA: CPT

## 2020-12-17 PROCEDURE — 21400001 HC TELEMETRY ROOM

## 2020-12-17 PROCEDURE — 82550 ASSAY OF CK (CPK): CPT

## 2020-12-17 PROCEDURE — 93005 ELECTROCARDIOGRAM TRACING: CPT

## 2020-12-17 PROCEDURE — 84145 PROCALCITONIN (PCT): CPT

## 2020-12-17 PROCEDURE — 25500020 PHARM REV CODE 255: Performed by: EMERGENCY MEDICINE

## 2020-12-17 PROCEDURE — 25000003 PHARM REV CODE 250: Performed by: EMERGENCY MEDICINE

## 2020-12-17 RX ORDER — HEPARIN SODIUM 5000 [USP'U]/ML
5000 INJECTION, SOLUTION INTRAVENOUS; SUBCUTANEOUS EVERY 8 HOURS
Status: DISCONTINUED | OUTPATIENT
Start: 2020-12-17 | End: 2020-12-19 | Stop reason: HOSPADM

## 2020-12-17 RX ORDER — DORZOLAMIDE HYDROCHLORIDE AND TIMOLOL MALEATE 20; 5 MG/ML; MG/ML
1 SOLUTION/ DROPS OPHTHALMIC EVERY 12 HOURS
Status: DISCONTINUED | OUTPATIENT
Start: 2020-12-17 | End: 2020-12-19 | Stop reason: HOSPADM

## 2020-12-17 RX ORDER — AZITHROMYCIN 250 MG/1
500 TABLET, FILM COATED ORAL
Status: DISCONTINUED | OUTPATIENT
Start: 2020-12-17 | End: 2020-12-19

## 2020-12-17 RX ORDER — ACETAMINOPHEN 325 MG/1
650 TABLET ORAL EVERY 8 HOURS PRN
Status: DISCONTINUED | OUTPATIENT
Start: 2020-12-17 | End: 2020-12-19 | Stop reason: HOSPADM

## 2020-12-17 RX ORDER — LATANOPROST 50 UG/ML
1 SOLUTION/ DROPS OPHTHALMIC NIGHTLY
Status: DISCONTINUED | OUTPATIENT
Start: 2020-12-17 | End: 2020-12-19 | Stop reason: HOSPADM

## 2020-12-17 RX ORDER — SPIRONOLACTONE 25 MG/1
25 TABLET ORAL DAILY
Status: DISCONTINUED | OUTPATIENT
Start: 2020-12-18 | End: 2020-12-19 | Stop reason: HOSPADM

## 2020-12-17 RX ORDER — IBUPROFEN 200 MG
24 TABLET ORAL
Status: DISCONTINUED | OUTPATIENT
Start: 2020-12-17 | End: 2020-12-19 | Stop reason: HOSPADM

## 2020-12-17 RX ORDER — GUAIFENESIN 100 MG/5ML
200 SOLUTION ORAL EVERY 4 HOURS PRN
Status: DISCONTINUED | OUTPATIENT
Start: 2020-12-18 | End: 2020-12-19 | Stop reason: HOSPADM

## 2020-12-17 RX ORDER — SODIUM CHLORIDE 0.9 % (FLUSH) 0.9 %
10 SYRINGE (ML) INJECTION
Status: DISCONTINUED | OUTPATIENT
Start: 2020-12-17 | End: 2020-12-19 | Stop reason: HOSPADM

## 2020-12-17 RX ORDER — DEXAMETHASONE SODIUM PHOSPHATE 4 MG/ML
6 INJECTION, SOLUTION INTRA-ARTICULAR; INTRALESIONAL; INTRAMUSCULAR; INTRAVENOUS; SOFT TISSUE
Status: COMPLETED | OUTPATIENT
Start: 2020-12-17 | End: 2020-12-17

## 2020-12-17 RX ORDER — BENZONATATE 100 MG/1
200 CAPSULE ORAL ONCE
Status: COMPLETED | OUTPATIENT
Start: 2020-12-17 | End: 2020-12-17

## 2020-12-17 RX ORDER — AMLODIPINE BESYLATE 10 MG/1
10 TABLET ORAL DAILY
Status: DISCONTINUED | OUTPATIENT
Start: 2020-12-18 | End: 2020-12-19 | Stop reason: HOSPADM

## 2020-12-17 RX ORDER — METOPROLOL TARTRATE 25 MG/1
25 TABLET, FILM COATED ORAL DAILY
Status: DISCONTINUED | OUTPATIENT
Start: 2020-12-18 | End: 2020-12-19 | Stop reason: HOSPADM

## 2020-12-17 RX ORDER — ALBUTEROL SULFATE 90 UG/1
2 AEROSOL, METERED RESPIRATORY (INHALATION) EVERY 8 HOURS
Status: DISCONTINUED | OUTPATIENT
Start: 2020-12-18 | End: 2020-12-19 | Stop reason: HOSPADM

## 2020-12-17 RX ORDER — GLUCAGON 1 MG
1 KIT INJECTION
Status: DISCONTINUED | OUTPATIENT
Start: 2020-12-17 | End: 2020-12-19 | Stop reason: HOSPADM

## 2020-12-17 RX ORDER — IBUPROFEN 200 MG
16 TABLET ORAL
Status: DISCONTINUED | OUTPATIENT
Start: 2020-12-17 | End: 2020-12-19 | Stop reason: HOSPADM

## 2020-12-17 RX ORDER — ALBUTEROL SULFATE 0.83 MG/ML
2.5 SOLUTION RESPIRATORY (INHALATION) EVERY 4 HOURS PRN
Status: DISCONTINUED | OUTPATIENT
Start: 2020-12-17 | End: 2020-12-17

## 2020-12-17 RX ADMIN — BENZONATATE 200 MG: 100 CAPSULE ORAL at 04:12

## 2020-12-17 RX ADMIN — DORZOLAMIDE HYDROCHLORIDE AND TIMOLOL MALEATE 1 DROP: 20; 5 SOLUTION/ DROPS OPHTHALMIC at 09:12

## 2020-12-17 RX ADMIN — HEPARIN SODIUM 5000 UNITS: 5000 INJECTION INTRAVENOUS; SUBCUTANEOUS at 09:12

## 2020-12-17 RX ADMIN — AZITHROMYCIN MONOHYDRATE 500 MG: 250 TABLET ORAL at 07:12

## 2020-12-17 RX ADMIN — LATANOPROST 1 DROP: 50 SOLUTION OPHTHALMIC at 09:12

## 2020-12-17 RX ADMIN — IOHEXOL 100 ML: 350 INJECTION, SOLUTION INTRAVENOUS at 05:12

## 2020-12-17 RX ADMIN — REMDESIVIR 200 MG: 100 INJECTION, POWDER, LYOPHILIZED, FOR SOLUTION INTRAVENOUS at 07:12

## 2020-12-17 RX ADMIN — DEXAMETHASONE SODIUM PHOSPHATE 6 MG: 4 INJECTION INTRA-ARTICULAR; INTRALESIONAL; INTRAMUSCULAR; INTRAVENOUS; SOFT TISSUE at 05:12

## 2020-12-17 NOTE — PROGRESS NOTES
Home Oxygen Evaluation    Date Performed: 2020    1) Patient's Home O2 Sat on room air, while at rest: 97%        If O2 sats on room air at rest are 88% or below, patient qualifies. No additional testing needed. Document N/A in steps 2 and 3. If 89% or above, complete steps 2.      2) Patient's O2 Sat on room air while exercisin%        If O2 sats on room air while exercising remain 89% or above patient does not qualify, no further testing needed Document N/A in step 3. If O2 sats on room air while exercising are 88% or below, continue to step 3.      3) Patient's O2 Sat while exercising on O2: n/a at n/a LPM         (Must show improvement from #2 for patients to qualify)    If O2 sats improve on oxygen, patient qualifies for portable oxygen. If not, the patient does not qualify.

## 2020-12-17 NOTE — TELEPHONE ENCOUNTER
Patient reports fever 101.7 and severe cough.  She has taken tylenol for fever @ 10am and not much relief.  She took a breathing tx @ 10am for cough with good relief.  She also reports body aches, weakness and no appetite, endorses no real food intake since Tuesday.  Overall she reports not feeling well and sounds sick/weak during phone call.  Patient reports she will go to ED.     Reason for Disposition   Patient sounds very sick or weak to the triager    Additional Information   Negative: Severe difficulty breathing (e.g., struggling for each breath, speaks in single words)   Negative: Difficult to awaken or acting confused (e.g., disoriented, slurred speech)   Negative: Bluish (or gray) lips or face now   Negative: Shock suspected (e.g., cold/pale/clammy skin, too weak to stand, low BP, rapid pulse)   Negative: Sounds like a life-threatening emergency to the triager   Negative: SEVERE or constant chest pain or pressure (Exception: mild central chest pain, present only when coughing)   Negative: MODERATE difficulty breathing (e.g., speaks in phrases, SOB even at rest, pulse 100-120)    Protocols used: CORONAVIRUS (COVID-19) DIAGNOSED OR [id number]

## 2020-12-17 NOTE — ED PROVIDER NOTES
Per Robert Boyle NP  12/17/2020, 1:28 PM   History obtained from the patient       History of Present Illness: Carolee Ortega is a 60 y.o. female patient recently dx with covid 19 presents for cough and sob    1:29 PM: Pt was placed back in [facility]. I explained to pt that due to lack of available rooms pt was seen by myself to begin the workup. Pt understands they will be seen and dispositioned by the next available physician. Pt voices understanding and agrees with plan. Pt also understands the longer than normal wait time. I am removing myself from the care of pt and pt will now be assigned to the next available physician.         SCRIBE #1 NOTE: I, Aleta Hernandez, am scribing for, and in the presence of, Mildred Pike DO. I have scribed the HPI, ROS, and PEx.     SCRIBE #2 NOTE: I, Nikole Craig, am scribing for, and in the presence of,  Mildred Pike DO. I have scribed the remaining portions of the note not scribed by Scribe #1.      History     Chief Complaint   Patient presents with    COVID-19 Concerns     Dx w/ Covid on 12/11. Reports worsening cough, dyspnea & fever. Using nebulizers at home without relief.      Review of patient's allergies indicates:   Allergen Reactions    Flexeril [cyclobenzaprine] Palpitations         History of Present Illness     HPI    12/17/2020, 2:03 PM  History obtained from the patient      History of Present Illness: Carolee Ortega is a 60 y.o. female patient with a PMHx of asthma, JAGDISH,  HTN, obesity, and shingles who presents to the Emergency Department for evaluation of cough which onset gradually about 1 week ago. Pt was diagnosed with COVID on 12/11. Symptoms are constant and moderate in severity. No mitigating or exacerbating factors reported. Associated sxs include generalized weakness, myalgias, diarrhea, and fever. Patient denies any chills, HA, SOB, CP, palpitations, loss of taste/smell, rhinorrhea, sore throat, n/v, abdominal pain, and all other  sxs at this time. Prior Tx includes nebulizer used at home with no relief. No further complaints or concerns at this time.       Arrival mode: Personal vehicle    PCP: Genie Walter MD        Past Medical History:  Past Medical History:   Diagnosis Date    Allergic rhinitis     Anxiety     Asthma     Carpal tunnel syndrome     JAGDISH (generalized anxiety disorder)     Glaucoma (increased eye pressure)     Herpes labialis     Hypertension     Obesity     Seborrheic dermatitis     Shingles 2016       Past Surgical History:  Past Surgical History:   Procedure Laterality Date    BREAST BIOPSY      right    CARPAL TUNNEL RELEASE Right 10/4/2018    Procedure: RELEASE, CARPAL TUNNEL;  Surgeon: Shaq Fairbanks MD;  Location: [facility] OR;  Service: Orthopedics;  Laterality: Right;    CARPAL TUNNEL RELEASE Left 2018    Procedure: RELEASE, CARPAL TUNNEL;  Surgeon: Shaq Fairbanks MD;  Location: [facility] OR;  Service: Orthopedics;  Laterality: Left;     SECTION      x1    COLONOSCOPY N/A 2020    Procedure: COLONOSCOPY;  Surgeon: Jessica Camacho MD;  Location: [facility] ENDO;  Service: Endoscopy;  Laterality: N/A;    HYSTERECTOMY      MARYANN and USO      Secondary to fibroids    TONSILLECTOMY  2011    TRIGGER FINGER RELEASE Right 10/4/2018    Procedure: RELEASE, TRIGGER FINGER;  Surgeon: Shaq Fairbanks MD;  Location: [facility] OR;  Service: Orthopedics;  Laterality: Right;  right middle finger          Family History:  Family History   Problem Relation Age of Onset    Heart attack Father     Coronary artery disease Father     Glaucoma Father     Diabetes Father     Heart disease Mother 32    Breast cancer Maternal Grandmother     Hypertension Sister     Kidney disease Sister     Diabetes Other         Paternal great-grandmother    Breast cancer Maternal Cousin     Breast cancer Maternal Cousin        Social History:  Social History     Tobacco Use    Smoking  status: Never Smoker    Smokeless tobacco: Never Used   Substance and Sexual Activity    Alcohol use: No     Alcohol/week: 0.0 standard drinks     Frequency: Never     Drinks per session: Patient refused     Binge frequency: Never    Drug use: No    Sexual activity: Unknown      Review of Systems     Review of Systems   Constitutional: Positive for fever. Negative for chills.   HENT: Negative for rhinorrhea and sore throat.    Respiratory: Positive for cough. Negative for shortness of breath.    Cardiovascular: Negative for chest pain and palpitations.   Gastrointestinal: Positive for diarrhea. Negative for abdominal pain, nausea and vomiting.   Genitourinary: Negative for dysuria.   Musculoskeletal: Positive for myalgias. Negative for back pain.   Skin: Negative for rash.   Neurological: Positive for weakness (generalized). Negative for headaches.   Hematological: Does not bruise/bleed easily.   All other systems reviewed and are negative.     Physical Exam     Initial Vitals [12/17/20 1324]   BP Pulse Resp Temp SpO2   116/66 84 16 98.5 °F (36.9 °C) (!) 92 %      MAP       --          Physical Exam  Nursing Notes and Vital Signs Reviewed.  Constitutional: Patient is in no acute distress. Well-developed and well-nourished.  Head: Atraumatic. Normocephalic.  Eyes: PERRL. EOM intact. Conjunctivae are not pale. No scleral icterus.  ENT: Mucous membranes are moist. Oropharynx is clear and symmetric.    Neck: Supple. Full ROM. No lymphadenopathy.  Cardiovascular: Regular rate. Regular rhythm. No murmurs, rubs, or gallops. Distal pulses are 2+ and symmetric.  Pulmonary/Chest: No respiratory distress. Crackles. No wheezing or rales.  Abdominal: Soft and non-distended.  There is no tenderness.  No rebound, guarding, or rigidity. Good bowel sounds.  Genitourinary: No CVA tenderness  Musculoskeletal: Moves all extremities. No obvious deformities. No edema. No calf tenderness.  Skin: Warm and dry.  Neurological:  Alert,  "awake, and appropriate.  Normal speech.  No acute focal neurological deficits are appreciated.  Psychiatric: Normal affect. Good eye contact. Appropriate in content.     ED Course   Procedures  ED Vital Signs:  Vitals:    12/17/20 1324 12/17/20 1432 12/17/20 1435 12/17/20 1448   BP: 116/66  116/70 114/73   Pulse: 84 62 64 65   Resp: 16  (!) 24 (!) 24   Temp: 98.5 °F (36.9 °C)      TempSrc: Oral      SpO2: (!) 92%  97% 97%   Weight: 99.9 kg (220 lb 3.8 oz)      Height: 5' 2" (1.575 m)       12/17/20 1530 12/17/20 1600 12/17/20 1700 12/17/20 1723   BP: 121/68 125/69 123/71 120/66   Pulse: 62 67 63 69   Resp: (!) 24 (!) 25 (!) 24 (!) 24   Temp:       TempSrc:       SpO2: 97% 97% 97% 98%   Weight:       Height:           Abnormal Lab Results:  Labs Reviewed   CBC W/ AUTO DIFFERENTIAL - Abnormal; Notable for the following components:       Result Value    WBC 3.75 (*)     Lymph # 0.9 (*)     Mono # 0.2 (*)     All other components within normal limits   COMPREHENSIVE METABOLIC PANEL - Abnormal; Notable for the following components:    Glucose 118 (*)     Calcium 8.5 (*)     Alkaline Phosphatase 47 (*)     AST 44 (*)     All other components within normal limits   D DIMER, QUANTITATIVE - Abnormal; Notable for the following components:    D-Dimer 4.24 (*)     All other components within normal limits   LACTATE DEHYDROGENASE - Abnormal; Notable for the following components:     (*)     All other components within normal limits   TROPONIN I   FERRITIN        All Lab Results:  Results for orders placed or performed during the hospital encounter of 12/17/20   CBC auto differential   Result Value Ref Range    WBC 3.75 (L) 3.90 - 12.70 K/uL    RBC 5.08 4.00 - 5.40 M/uL    Hemoglobin 13.8 12.0 - 16.0 g/dL    Hematocrit 42.6 37.0 - 48.5 %    MCV 84 82 - 98 fL    MCH 27.2 27.0 - 31.0 pg    MCHC 32.4 32.0 - 36.0 g/dL    RDW 12.5 11.5 - 14.5 %    Platelets 199 150 - 350 K/uL    MPV 10.5 9.2 - 12.9 fL    Immature Granulocytes " 0.3 0.0 - 0.5 %    Gran # (ANC) 2.7 1.8 - 7.7 K/uL    Immature Grans (Abs) 0.01 0.00 - 0.04 K/uL    Lymph # 0.9 (L) 1.0 - 4.8 K/uL    Mono # 0.2 (L) 0.3 - 1.0 K/uL    Eos # 0.0 0.0 - 0.5 K/uL    Baso # 0.02 0.00 - 0.20 K/uL    nRBC 0 0 /100 WBC    Gran % 70.9 38.0 - 73.0 %    Lymph % 23.2 18.0 - 48.0 %    Mono % 5.1 4.0 - 15.0 %    Eosinophil % 0.0 0.0 - 8.0 %    Basophil % 0.5 0.0 - 1.9 %    Differential Method Automated    Comprehensive metabolic panel   Result Value Ref Range    Sodium 137 136 - 145 mmol/L    Potassium 3.7 3.5 - 5.1 mmol/L    Chloride 102 95 - 110 mmol/L    CO2 26 23 - 29 mmol/L    Glucose 118 (H) 70 - 110 mg/dL    BUN 8 8 - 23 mg/dL    Creatinine 0.9 0.5 - 1.4 mg/dL    Calcium 8.5 (L) 8.7 - 10.5 mg/dL    Total Protein 7.7 6.0 - 8.4 g/dL    Albumin 3.5 3.5 - 5.2 g/dL    Total Bilirubin 0.5 0.1 - 1.0 mg/dL    Alkaline Phosphatase 47 (L) 55 - 135 U/L    AST 44 (H) 10 - 40 U/L    ALT 36 10 - 44 U/L    Anion Gap 9 8 - 16 mmol/L    eGFR if African American >60 >60 mL/min/1.73 m^2    eGFR if non African American >60 >60 mL/min/1.73 m^2   Troponin I   Result Value Ref Range    Troponin I <0.006 0.000 - 0.026 ng/mL   D dimer, quantitative   Result Value Ref Range    D-Dimer 4.24 (H) <0.50 mg/L FEU   Lactate dehydrogenase   Result Value Ref Range     (H) 110 - 260 U/L       Imaging Results:  Imaging Results          CTA Chest Non-Coronary (PE Study) (Final result)  Result time 12/17/20 17:27:28    Final result by Phil Arroyo MD (12/17/20 17:27:28)                 Impression:      No pulmonary thromboembolism.    Peripheral subsolid opacities consistent with COVID-19 pneumonia.    All CT scans at this facility are performed  using dose modulation techniques as appropriate to performed exam including the following:  automated exposure control; adjustment of mA and/or kV according to the patients size (this includes techniques or standardized protocols for targeted exams where dose is matched to  indication/reason for exam: i.e. extremities or head);  iterative reconstruction technique.      Electronically signed by: Phil Arroyo  Date:    12/17/2020  Time:    17:27             Narrative:    EXAMINATION:  CTA CHEST NON CORONARY    CLINICAL HISTORY:  Elevated d-dimer;    TECHNIQUE:  Low dose axial images, sagittal and coronal reformations were obtained from the thoracic inlet to the lung bases following the IV administration of 100 mL of Omnipaque 350.  Contrast timing was optimized to evaluate the pulmonary arteries.  MIP images were performed.    COMPARISON:  Chest radiograph 12/17/2020.    FINDINGS:  Base of Neck: No significant abnormality.    Thoracic soft tissues: Unremarkable.    Aorta: Left-sided aortic arch.  No aneurysm and no significant atherosclerosis    Heart: Normal size. No effusion.    Pulmonary vasculature: Pulmonary arteries are fairly well opacified.  There is no pulmonary thromboembolism.    Kristie/Mediastinum: No pathologic farzad enlargement.    Airways: Patent.    Lungs/Pleura: Peripheral subsolid opacities consistent with COVID-19 pneumonia.  No pleural fluid.  No solid consolidative opacities.  No pulmonary mass.    Esophagus: Unremarkable.    Upper Abdomen: No abnormality of the partially imaged upper abdomen.    Bones: No acute fracture. No suspicious lytic or sclerotic lesions.                               X-Ray Chest AP Portable (Final result)  Result time 12/17/20 14:02:42    Final result by Vijay Glass MD (12/17/20 14:02:42)                 Impression:      1.  Interval development of some reticular interstitial changes and ground-glass opacities in the left greater than right lung bases consistent with the history given of COVID 19 infection.    2.  Stable findings as noted above.      Electronically signed by: Vijay Glass MD  Date:    12/17/2020  Time:    14:02             Narrative:    EXAMINATION:  XR CHEST AP PORTABLE    CLINICAL HISTORY:  Shortness of breath.   Positive COVID-19 infection.    COMPARISON:  Studies dating back to April 19, 2016    FINDINGS:  There are some mild basilar reticular interstitial changes and ground-glass opacities in the left greater than right lung bases.  The upper lungs are clear. The cardiac silhouette size is normal. The trachea is midline and the mediastinal width is normal. Negative for effusion or pneumothorax.  Pulmonary vasculature is normal. Negative for osseous abnormalities. Mild convex-right curvature of the thoracic spine with marginal spondylosis.  Ectatic and tortuous aorta.                                 The EKG was ordered, reviewed, and independently interpreted by the ED provider.  Interpretation time: 1348  Rate: 73 BPM  Rhythm: normal sinus rhythm  Interpretation: Possible left atrial enlargement. No STEMI.           The Emergency Provider reviewed the vital signs and test results, which are outlined above.     ED Discussion     4:20 PM: Was notified by RN that pt's ambulatory pulse ox was 93%.    5:47 PM: Discussed case with ALICIA Crowell ([facility]). Dr. Garcias agrees with current care and management of pt and accepts admission.   Admitting Service: [facility]  Admit to: obs     Re-evaluated pt. I have discussed test results, shared treatment plan, and the need for admission with patient and family at bedside. Pt and family express understanding at this time and agree with all information. All questions answered. Pt and family have no further questions or concerns at this time. Pt is ready for admit.      ED Course as of Dec 17 1753   Thu Dec 17, 2020   1619 Ambulatory pulse ox was 93%      [LB]   1749 Per Respiratory:  )         Patient's Home O2 Sat on room air, while at rest: 97%                               If O2 sats on room air at rest are 88% or below, patient qualifies. No additional testing needed. Document N/A in steps 2 and 3. If 89% or above, complete steps 2.        2)          Patient's O2 Sat on room air while exercisin%                               If O2 sats on room air while exercising remain 89% or above patient does not qualify, no further testing needed Document N/A in step 3. If O2 sats on room air while exercising are 88% or below, continue to step 3.    [LB]      ED Course User Index  [LB] Mildred Pike DO     Medical Decision Making:   Clinical Tests:   Lab Tests: Ordered and Reviewed  Radiological Study: Ordered and Reviewed  Medical Tests: Ordered and Reviewed           ED Medication(s):  Medications   benzonatate capsule 200 mg (200 mg Oral Given 20 1610)   dexamethasone injection 6 mg (6 mg Intravenous Given 20 1720)   iohexoL (OMNIPAQUE 350) injection 100 mL (100 mLs Intravenous Given 20 172)       New Prescriptions    No medications on file               Scribe Attestation:   Scribe #1: I performed the above scribed service and the documentation accurately describes the services I performed. I attest to the accuracy of the note.     Attending:   Physician Attestation Statement for Scribe #1: I, Mildred Pike DO, personally performed the services described in this documentation, as scribed by Aleta Hernandez, in my presence, and it is both accurate and complete.       Scribe Attestation:   Scribe #2: I performed the above scribed service and the documentation accurately describes the services I performed. I attest to the accuracy of the note.    Attending Attestation:           Physician Attestation for Scribe:    Physician Attestation Statement for Scribe #2: IMildred DO, reviewed documentation, as scribed by Nikole Craig in my presence, and it is both accurate and complete. I also acknowledge and confirm the content of the note done by Sarahibimani #1.           Clinical Impression       ICD-10-CM ICD-9-CM   1. Pneumonia due to COVID-19 virus  U07.1 480.8    J12.89    2. SOB (shortness of breath)  R06.02 786.05        Disposition:   Disposition: Placed in Observation  Condition: Janes Pike, DO  12/21/20 2779

## 2020-12-18 PROBLEM — E83.39 HYPOPHOSPHATEMIA: Status: ACTIVE | Noted: 2020-12-18

## 2020-12-18 LAB
ALBUMIN SERPL BCP-MCNC: 3.1 G/DL (ref 3.5–5.2)
ALP SERPL-CCNC: 43 U/L (ref 55–135)
ALT SERPL W/O P-5'-P-CCNC: 43 U/L (ref 10–44)
ANION GAP SERPL CALC-SCNC: 10 MMOL/L (ref 8–16)
ANISOCYTOSIS BLD QL SMEAR: SLIGHT
AST SERPL-CCNC: 48 U/L (ref 10–40)
BASOPHILS # BLD AUTO: 0 K/UL (ref 0–0.2)
BASOPHILS NFR BLD: 0 % (ref 0–1.9)
BILIRUB SERPL-MCNC: 0.4 MG/DL (ref 0.1–1)
BUN SERPL-MCNC: 9 MG/DL (ref 8–23)
CALCIUM SERPL-MCNC: 8.5 MG/DL (ref 8.7–10.5)
CHLORIDE SERPL-SCNC: 103 MMOL/L (ref 95–110)
CO2 SERPL-SCNC: 23 MMOL/L (ref 23–29)
CREAT SERPL-MCNC: 0.8 MG/DL (ref 0.5–1.4)
DIFFERENTIAL METHOD: ABNORMAL
EOSINOPHIL # BLD AUTO: 0 K/UL (ref 0–0.5)
EOSINOPHIL NFR BLD: 0 % (ref 0–8)
ERYTHROCYTE [DISTWIDTH] IN BLOOD BY AUTOMATED COUNT: 12.7 % (ref 11.5–14.5)
EST. GFR  (AFRICAN AMERICAN): >60 ML/MIN/1.73 M^2
EST. GFR  (NON AFRICAN AMERICAN): >60 ML/MIN/1.73 M^2
GLUCOSE SERPL-MCNC: 129 MG/DL (ref 70–110)
HCT VFR BLD AUTO: 40.4 % (ref 37–48.5)
HGB BLD-MCNC: 12.8 G/DL (ref 12–16)
HYPOCHROMIA BLD QL SMEAR: ABNORMAL
IMM GRANULOCYTES # BLD AUTO: 0.01 K/UL (ref 0–0.04)
IMM GRANULOCYTES NFR BLD AUTO: 0.4 % (ref 0–0.5)
LYMPHOCYTES # BLD AUTO: 1 K/UL (ref 1–4.8)
LYMPHOCYTES NFR BLD: 36.1 % (ref 18–48)
MAGNESIUM SERPL-MCNC: 2.5 MG/DL (ref 1.6–2.6)
MCH RBC QN AUTO: 26.7 PG (ref 27–31)
MCHC RBC AUTO-ENTMCNC: 31.7 G/DL (ref 32–36)
MCV RBC AUTO: 84 FL (ref 82–98)
MONOCYTES # BLD AUTO: 0.2 K/UL (ref 0.3–1)
MONOCYTES NFR BLD: 6.8 % (ref 4–15)
NEUTROPHILS # BLD AUTO: 1.5 K/UL (ref 1.8–7.7)
NEUTROPHILS NFR BLD: 56.7 % (ref 38–73)
NRBC BLD-RTO: 0 /100 WBC
OVALOCYTES BLD QL SMEAR: ABNORMAL
PHOSPHATE SERPL-MCNC: 2.5 MG/DL (ref 2.7–4.5)
PLATELET # BLD AUTO: 183 K/UL (ref 150–350)
PLATELET BLD QL SMEAR: ABNORMAL
PMV BLD AUTO: 10.5 FL (ref 9.2–12.9)
POIKILOCYTOSIS BLD QL SMEAR: SLIGHT
POTASSIUM SERPL-SCNC: 4 MMOL/L (ref 3.5–5.1)
PROT SERPL-MCNC: 7.4 G/DL (ref 6–8.4)
RBC # BLD AUTO: 4.79 M/UL (ref 4–5.4)
SODIUM SERPL-SCNC: 136 MMOL/L (ref 136–145)
WBC # BLD AUTO: 2.66 K/UL (ref 3.9–12.7)

## 2020-12-18 PROCEDURE — 63600175 PHARM REV CODE 636 W HCPCS: Performed by: HOSPITALIST

## 2020-12-18 PROCEDURE — 27000221 HC OXYGEN, UP TO 24 HOURS

## 2020-12-18 PROCEDURE — 84100 ASSAY OF PHOSPHORUS: CPT

## 2020-12-18 PROCEDURE — 25000242 PHARM REV CODE 250 ALT 637 W/ HCPCS: Performed by: EMERGENCY MEDICINE

## 2020-12-18 PROCEDURE — 99900035 HC TECH TIME PER 15 MIN (STAT)

## 2020-12-18 PROCEDURE — 80053 COMPREHEN METABOLIC PANEL: CPT

## 2020-12-18 PROCEDURE — 85025 COMPLETE CBC W/AUTO DIFF WBC: CPT

## 2020-12-18 PROCEDURE — 25000003 PHARM REV CODE 250: Performed by: NURSE PRACTITIONER

## 2020-12-18 PROCEDURE — 94640 AIRWAY INHALATION TREATMENT: CPT

## 2020-12-18 PROCEDURE — 63700000 PHARM REV CODE 250 ALT 637 W/O HCPCS: Performed by: HOSPITALIST

## 2020-12-18 PROCEDURE — 21400001 HC TELEMETRY ROOM

## 2020-12-18 PROCEDURE — 94761 N-INVAS EAR/PLS OXIMETRY MLT: CPT

## 2020-12-18 PROCEDURE — 25000003 PHARM REV CODE 250: Performed by: EMERGENCY MEDICINE

## 2020-12-18 PROCEDURE — 25000003 PHARM REV CODE 250: Performed by: HOSPITALIST

## 2020-12-18 PROCEDURE — 36415 COLL VENOUS BLD VENIPUNCTURE: CPT

## 2020-12-18 PROCEDURE — 83735 ASSAY OF MAGNESIUM: CPT

## 2020-12-18 PROCEDURE — 96372 THER/PROPH/DIAG INJ SC/IM: CPT

## 2020-12-18 RX ORDER — PROMETHAZINE HYDROCHLORIDE AND CODEINE PHOSPHATE 6.25; 1 MG/5ML; MG/5ML
5 SOLUTION ORAL EVERY 6 HOURS PRN
Status: DISCONTINUED | OUTPATIENT
Start: 2020-12-18 | End: 2020-12-19 | Stop reason: HOSPADM

## 2020-12-18 RX ORDER — SODIUM,POTASSIUM PHOSPHATES 280-250MG
2 POWDER IN PACKET (EA) ORAL
Status: DISCONTINUED | OUTPATIENT
Start: 2020-12-18 | End: 2020-12-19 | Stop reason: HOSPADM

## 2020-12-18 RX ADMIN — GUAIFENESIN 200 MG: 200 SOLUTION ORAL at 05:12

## 2020-12-18 RX ADMIN — LATANOPROST 1 DROP: 50 SOLUTION OPHTHALMIC at 10:12

## 2020-12-18 RX ADMIN — HEPARIN SODIUM 5000 UNITS: 5000 INJECTION INTRAVENOUS; SUBCUTANEOUS at 03:12

## 2020-12-18 RX ADMIN — GUAIFENESIN 200 MG: 200 SOLUTION ORAL at 09:12

## 2020-12-18 RX ADMIN — ACETAMINOPHEN 650 MG: 325 TABLET ORAL at 08:12

## 2020-12-18 RX ADMIN — AMLODIPINE BESYLATE 10 MG: 10 TABLET ORAL at 08:12

## 2020-12-18 RX ADMIN — SPIRONOLACTONE 25 MG: 25 TABLET ORAL at 08:12

## 2020-12-18 RX ADMIN — DORZOLAMIDE HYDROCHLORIDE AND TIMOLOL MALEATE 1 DROP: 20; 5 SOLUTION/ DROPS OPHTHALMIC at 08:12

## 2020-12-18 RX ADMIN — GUAIFENESIN 200 MG: 200 SOLUTION ORAL at 03:12

## 2020-12-18 RX ADMIN — PROMETHAZINE HYDROCHLORIDE AND CODEINE PHOSPHATE 5 ML: 10; 6.25 SOLUTION ORAL at 04:12

## 2020-12-18 RX ADMIN — POTASSIUM & SODIUM PHOSPHATES POWDER PACK 280-160-250 MG 2 PACKET: 280-160-250 PACK at 10:12

## 2020-12-18 RX ADMIN — PROMETHAZINE HYDROCHLORIDE AND CODEINE PHOSPHATE 5 ML: 10; 6.25 SOLUTION ORAL at 11:12

## 2020-12-18 RX ADMIN — REMDESIVIR 100 MG: 100 INJECTION, POWDER, LYOPHILIZED, FOR SOLUTION INTRAVENOUS at 04:12

## 2020-12-18 RX ADMIN — POTASSIUM & SODIUM PHOSPHATES POWDER PACK 280-160-250 MG 2 PACKET: 280-160-250 PACK at 05:12

## 2020-12-18 RX ADMIN — DORZOLAMIDE HYDROCHLORIDE AND TIMOLOL MALEATE 1 DROP: 20; 5 SOLUTION/ DROPS OPHTHALMIC at 10:12

## 2020-12-18 RX ADMIN — HEPARIN SODIUM 5000 UNITS: 5000 INJECTION INTRAVENOUS; SUBCUTANEOUS at 05:12

## 2020-12-18 RX ADMIN — HEPARIN SODIUM 5000 UNITS: 5000 INJECTION INTRAVENOUS; SUBCUTANEOUS at 10:12

## 2020-12-18 RX ADMIN — ALBUTEROL SULFATE 2 PUFF: 90 AEROSOL, METERED RESPIRATORY (INHALATION) at 03:12

## 2020-12-18 RX ADMIN — METOPROLOL TARTRATE 25 MG: 25 TABLET, FILM COATED ORAL at 08:12

## 2020-12-18 RX ADMIN — ALBUTEROL SULFATE 2 PUFF: 90 AEROSOL, METERED RESPIRATORY (INHALATION) at 12:12

## 2020-12-18 RX ADMIN — CEFTRIAXONE 1 G: 1 INJECTION, SOLUTION INTRAVENOUS at 08:12

## 2020-12-18 RX ADMIN — AZITHROMYCIN MONOHYDRATE 500 MG: 250 TABLET ORAL at 10:12

## 2020-12-18 RX ADMIN — DEXAMETHASONE 6 MG: 4 TABLET ORAL at 08:12

## 2020-12-18 RX ADMIN — ALBUTEROL SULFATE 2 PUFF: 90 AEROSOL, METERED RESPIRATORY (INHALATION) at 07:12

## 2020-12-18 NOTE — CHAPLAIN
Initial visit with patient.  Provided support through listening, presence, and prayer.  Pt was currently doing well.  Pt asked for prayer and I took time to do this before leaving.  Spiritual care remains available as needed.    Chaplain Cortez Obrien M.Div., [facility]

## 2020-12-18 NOTE — PLAN OF CARE
No acute/significant events this shift. Patient verbalizes understanding of plan of care. No questions as of now. VSS, NADN, will continue to monitor.

## 2020-12-18 NOTE — H&P
[facility] Hospital Medicine  History & Physical    Patient Name: Carolee Ortega  MRN: [id number]  Patient Class: IP- Inpatient  Admission Date: 2020  Attending Physician: Surjit Garcias MD  Primary Care Provider: Genie Walter MD         Patient information was obtained from patient and ER records.     Subjective:     Principal Problem:<principal problem not specified>    Chief Complaint:   Chief Complaint   Patient presents with    COVID-19 Concerns     Dx w/ Covid on . Reports worsening cough, dyspnea & fever. Using nebulizers at home without relief.         HPI: 59 yo female with PMH of Asthma and HTN presented with Fevers, cough and sob. She also has  associated with Diarrhea.  Patient was diagnosed with COVID on . She has elevated Inflammatory markers and CT chest with patchy infiltrates. Denies nausea, vomiting, Chest pain, abd pain.     Past Medical History:   Diagnosis Date    Allergic rhinitis     Anxiety     Asthma     Carpal tunnel syndrome     JAGDISH (generalized anxiety disorder)     Glaucoma (increased eye pressure)     Herpes labialis     Hypertension     Obesity     Seborrheic dermatitis     Shingles 2016       Past Surgical History:   Procedure Laterality Date    BREAST BIOPSY      right    CARPAL TUNNEL RELEASE Right 10/4/2018    Procedure: RELEASE, CARPAL TUNNEL;  Surgeon: Shaq Fairbanks MD;  Location: [facility] OR;  Service: Orthopedics;  Laterality: Right;    CARPAL TUNNEL RELEASE Left 2018    Procedure: RELEASE, CARPAL TUNNEL;  Surgeon: Shaq Fairbanks MD;  Location: [facility] OR;  Service: Orthopedics;  Laterality: Left;     SECTION      x1    COLONOSCOPY N/A 2020    Procedure: COLONOSCOPY;  Surgeon: Jessica Camacho MD;  Location: [facility];  Service: Endoscopy;  Laterality: N/A;    HYSTERECTOMY      MARYANN and USO      Secondary to fibroids    TONSILLECTOMY  2011    TRIGGER FINGER RELEASE Right  10/4/2018    Procedure: RELEASE, TRIGGER FINGER;  Surgeon: Shaq Fairbanks MD;  Location: [facility];  Service: Orthopedics;  Laterality: Right;  right middle finger        Review of patient's allergies indicates:   Allergen Reactions    Flexeril [cyclobenzaprine] Palpitations       No current facility-administered medications on file prior to encounter.      Current Outpatient Medications on File Prior to Encounter   Medication Sig    albuterol (ACCUNEB) 1.25 mg/3 mL Nebu Take 3 mLs (1.25 mg total) by nebulization every 4 to 6 hours as needed.    albuterol (PROVENTIL/VENTOLIN HFA) 90 mcg/actuation inhaler Inhale 2 puffs into the lungs every 4 to 6 hours as needed for Wheezing.    ALPRAZolam (XANAX) 0.5 MG tablet Take 1 tablet (0.5 mg total) by mouth 3 (three) times daily as needed.    amlodipine-valsartan (EXFORGE)  mg per tablet Take 1 tablet by mouth once daily.    dorzolamide-timolol 2-0.5% (COSOPT) 22.3-6.8 mg/mL ophthalmic solution PLACE 1 DROP INTO BOTH EYES EVERY 12 (TWELVE) HOURS.    hydrOXYzine HCL (ATARAX) 25 MG tablet TAKE 1 TABLET (25 MG TOTAL) BY MOUTH EVERY 4 (FOUR) HOURS AS NEEDED FOR ITCHING.    latanoprost 0.005 % ophthalmic solution PLACE 1 DROP INTO BOTH EYES EVERY EVENING.    metoprolol tartrate (LOPRESSOR) 25 MG tablet TAKE 1 TABLET BY MOUTH EVERY DAY    montelukast (SINGULAIR) 10 mg tablet TAKE 1 TABLET BY MOUTH EVERY DAY IN THE EVENING    penciclovir (DENAVIR) 1 % cream Apply topically every 2 (two) hours. (Patient taking differently: Apply topically every 2 (two) hours as needed. )    promethazine-dextromethorphan (PROMETHAZINE-DM) 6.25-15 mg/5 mL Syrp Take 5 mLs by mouth every 4 to 6 hours as needed.    spironolactone (ALDACTONE) 25 MG tablet TAKE 1 TABLET BY MOUTH EVERY DAY    valACYclovir (VALTREX) 1000 MG tablet Take 2 tablets (2,000 mg total) by mouth every 12 (twelve) hours. (Patient taking differently: Take 2,000 mg by mouth every 12 (twelve) hours as needed. )     ketorolac 0.5% (ACULAR) 0.5 % Drop Put one eyedrop in right eye four times a day. Start first drop morning of laser and stop after five days.    [DISCONTINUED] mometasone (NASONEX) 50 mcg/actuation nasal spray 2 sprays by Nasal route once daily. (Patient taking differently: 2 sprays by Nasal route daily as needed. )     Family History     Problem Relation (Age of Onset)    Breast cancer Maternal Grandmother, Maternal Cousin, Maternal Cousin    Coronary artery disease Father    Diabetes Father, Other    Glaucoma Father    Heart attack Father    Heart disease Mother (32)    Hypertension Sister    Kidney disease Sister        Tobacco Use    Smoking status: Never Smoker    Smokeless tobacco: Never Used   Substance and Sexual Activity    Alcohol use: No     Alcohol/week: 0.0 standard drinks     Frequency: Never     Drinks per session: Patient refused     Binge frequency: Never    Drug use: No    Sexual activity: Not on file     Review of Systems   Constitutional: Positive for fatigue and fever.   HENT: Negative.    Eyes: Negative.    Respiratory: Positive for cough and shortness of breath.    Cardiovascular: Negative.    Gastrointestinal: Negative.    Endocrine: Negative.    Genitourinary: Negative.    Musculoskeletal: Negative.    Allergic/Immunologic: Negative.    Neurological: Negative.    Hematological: Negative.    Psychiatric/Behavioral: Negative.      Objective:     Vital Signs (Most Recent):  Temp: 98.5 °F (36.9 °C) (12/17/20 1324)  Pulse: 69 (12/17/20 1723)  Resp: (!) 24 (12/17/20 1723)  BP: 120/66 (12/17/20 1723)  SpO2: 98 % (12/17/20 1723) Vital Signs (24h Range):  Temp:  [98.5 °F (36.9 °C)] 98.5 °F (36.9 °C)  Pulse:  [62-84] 69  Resp:  [16-25] 24  SpO2:  [92 %-98 %] 98 %  BP: (114-125)/(66-73) 120/66     Weight: 99.9 kg (220 lb 3.8 oz)  Body mass index is 40.28 kg/m².    Physical Exam  Constitutional:       Appearance: Normal appearance.   HENT:      Head: Normocephalic and atraumatic.      Nose:  Nose normal.   Eyes:      Extraocular Movements: Extraocular movements intact.      Pupils: Pupils are equal, round, and reactive to light.   Neck:      Musculoskeletal: Normal range of motion.   Cardiovascular:      Rate and Rhythm: Normal rate and regular rhythm.      Pulses: Normal pulses.      Heart sounds: Normal heart sounds.   Pulmonary:      Effort: Pulmonary effort is normal.      Breath sounds:  Wheezes present   Abdominal:      General: Abdomen is flat.      Palpations: Abdomen is soft.   Musculoskeletal: Normal range of motion.   Skin:     General: Skin is warm.      Capillary Refill: Capillary refill takes less than 2 seconds.   Neurological:      General: No focal deficit present.      Mental Status: She is alert and oriented to person, place, and time.           CRANIAL NERVES     CN III, IV, VI   Pupils are equal, round, and reactive to light.       Significant Labs: All pertinent labs within the past 24 hours have been reviewed.    Significant Imaging: I have reviewed and interpreted all pertinent imaging results/findings within the past 24 hours.    Assessment/Plan:     Pneumonia due to COVID-19 virus    Admit for COVID pneumonia  Airborne and contact precautions  Will start her on Decadron, remdesivir, azithromycin, ceftriaxone, vitamin-C, zinc sulfate  Check blood cultures sputum cultures  Keep oxygen sats present on 92%     Asthma    Has occasional wheezing.  Will keep her on Albuterol Nebs around the clock    Essential hypertension    Continue amlodipine, metoprolol, spironolactone      VTE Risk Mitigation (From admission, onward)         Ordered     heparin (porcine) injection 5,000 Units  Every 8 hours      12/17/20 1816     IP VTE HIGH RISK PATIENT  Once      12/17/20 1816     Place sequential compression device  Until discontinued      12/17/20 1816                   Surjit Garcias MD  Department of Hospital Medicine   [facility]

## 2020-12-18 NOTE — ASSESSMENT & PLAN NOTE
Admit for COVID pneumonia  Airborne and contact precautions  Will start her on Decadron, remdesivir, azithromycin, ceftriaxone, vitamin-C, zinc sulfate  Check blood cultures sputum cultures  Keep oxygen sats present on 92%

## 2020-12-18 NOTE — PLAN OF CARE
SW provide telephone call to pt's spouse to complete discharge assessment. Spouse report that pt is independent with ADLs/IADLs and does not require help at home. Pt does not use any assistive devices and spouse reports that pt does use a nebulizer PRN at home. Spouse does not currently identify any post hospital needs or services at this time and not needs have been identified by SW. SW updated spouse with 's name and number. Transitional Care Folder, Discharge Planning Begins on Admission pamphlet, [facility] Delivery pamphlet, Advance Directive information given to patient along with the contact information left with pt's chart at nurses station. SW instructed family to call with any questions or concerns.     [id number] - [place], [place] - [address]  [address]  [place] [address]  Phone: [phone] Fax: [phone]    D/C Plan: Home with family  PCP: Genie Walter MD   Discharge transportation: Family will provide  [facility]: Active  Pharmacy Bedside Delivery: Yes     12/18/20 1328   Discharge Assessment   Assessment Type Discharge Planning Assessment   Confirmed/corrected address and phone number on facesheet? Yes   Assessment information obtained from? Caregiver   Expected Length of Stay (days)   (TBD)   Communicated expected length of stay with patient/caregiver yes   Prior to hospitilization cognitive status: Alert/Oriented   Prior to hospitalization functional status: Independent   Current cognitive status: Alert/Oriented   Current Functional Status: Independent   Lives With spouse   Able to Return to Prior Arrangements yes   Is patient able to care for self after discharge? Yes   Who are your caregiver(s) and their phone number(s)? Sacha Ortega (spouse) [phone]; Loreta Subramanian (daughter) [phone]   Patient's perception of discharge disposition home or selfcare   Readmission Within the Last 30 Days no previous admission in last 30 days    Patient currently being followed by outpatient case management? No   Patient currently receives any other outside agency services? No   Equipment Currently Used at Home none   Do you have any problems affording any of your prescribed medications? No   Is the patient taking medications as prescribed? yes   Does the patient have transportation home? Yes   Transportation Anticipated family or friend will provide   Does the patient receive services at the [facility]? No   Discharge Plan A Home with family   Discharge Plan B Home with family   DME Needed Upon Discharge  none   Patient/Family in Agreement with Plan yes   Does the patient have transportation to healthcare appointments? Yes

## 2020-12-18 NOTE — SUBJECTIVE & OBJECTIVE
Past Medical History:   Diagnosis Date    Allergic rhinitis     Anxiety     Asthma     Carpal tunnel syndrome     JAGDISH (generalized anxiety disorder)     Glaucoma (increased eye pressure)     Herpes labialis     Hypertension     Obesity     Seborrheic dermatitis     Shingles 2016       Past Surgical History:   Procedure Laterality Date    BREAST BIOPSY      right    CARPAL TUNNEL RELEASE Right 10/4/2018    Procedure: RELEASE, CARPAL TUNNEL;  Surgeon: Shaq Fairbanks MD;  Location: [facility] OR;  Service: Orthopedics;  Laterality: Right;    CARPAL TUNNEL RELEASE Left 2018    Procedure: RELEASE, CARPAL TUNNEL;  Surgeon: Shaq Fairbanks MD;  Location: [facility] OR;  Service: Orthopedics;  Laterality: Left;     SECTION      x1    COLONOSCOPY N/A 2020    Procedure: COLONOSCOPY;  Surgeon: Jessica Camacho MD;  [facility];  Service: Endoscopy;  Laterality: N/A;    HYSTERECTOMY      MARYANN and USO      Secondary to fibroids    TONSILLECTOMY  2011    TRIGGER FINGER RELEASE Right 10/4/2018    Procedure: RELEASE, TRIGGER FINGER;  Surgeon: Shaq Fairbanks MD;  Location: [facility] OR;  Service: Orthopedics;  Laterality: Right;  right middle finger        Review of patient's allergies indicates:   Allergen Reactions    Flexeril [cyclobenzaprine] Palpitations       No current facility-administered medications on file prior to encounter.      Current Outpatient Medications on File Prior to Encounter   Medication Sig    albuterol (ACCUNEB) 1.25 mg/3 mL Nebu Take 3 mLs (1.25 mg total) by nebulization every 4 to 6 hours as needed.    albuterol (PROVENTIL/VENTOLIN HFA) 90 mcg/actuation inhaler Inhale 2 puffs into the lungs every 4 to 6 hours as needed for Wheezing.    ALPRAZolam (XANAX) 0.5 MG tablet Take 1 tablet (0.5 mg total) by mouth 3 (three) times daily as needed.    amlodipine-valsartan (EXFORGE)  mg per tablet Take 1 tablet by mouth once daily.     dorzolamide-timolol 2-0.5% (COSOPT) 22.3-6.8 mg/mL ophthalmic solution PLACE 1 DROP INTO BOTH EYES EVERY 12 (TWELVE) HOURS.    hydrOXYzine HCL (ATARAX) 25 MG tablet TAKE 1 TABLET (25 MG TOTAL) BY MOUTH EVERY 4 (FOUR) HOURS AS NEEDED FOR ITCHING.    latanoprost 0.005 % ophthalmic solution PLACE 1 DROP INTO BOTH EYES EVERY EVENING.    metoprolol tartrate (LOPRESSOR) 25 MG tablet TAKE 1 TABLET BY MOUTH EVERY DAY    montelukast (SINGULAIR) 10 mg tablet TAKE 1 TABLET BY MOUTH EVERY DAY IN THE EVENING    penciclovir (DENAVIR) 1 % cream Apply topically every 2 (two) hours. (Patient taking differently: Apply topically every 2 (two) hours as needed. )    promethazine-dextromethorphan (PROMETHAZINE-DM) 6.25-15 mg/5 mL Syrp Take 5 mLs by mouth every 4 to 6 hours as needed.    spironolactone (ALDACTONE) 25 MG tablet TAKE 1 TABLET BY MOUTH EVERY DAY    valACYclovir (VALTREX) 1000 MG tablet Take 2 tablets (2,000 mg total) by mouth every 12 (twelve) hours. (Patient taking differently: Take 2,000 mg by mouth every 12 (twelve) hours as needed. )    ketorolac 0.5% (ACULAR) 0.5 % Drop Put one eyedrop in right eye four times a day. Start first drop morning of laser and stop after five days.    [DISCONTINUED] mometasone (NASONEX) 50 mcg/actuation nasal spray 2 sprays by Nasal route once daily. (Patient taking differently: 2 sprays by Nasal route daily as needed. )     Family History     Problem Relation (Age of Onset)    Breast cancer Maternal Grandmother, Maternal Cousin, Maternal Cousin    Coronary artery disease Father    Diabetes Father, Other    Glaucoma Father    Heart attack Father    Heart disease Mother (32)    Hypertension Sister    Kidney disease Sister        Tobacco Use    Smoking status: Never Smoker    Smokeless tobacco: Never Used   Substance and Sexual Activity    Alcohol use: No     Alcohol/week: 0.0 standard drinks     Frequency: Never     Drinks per session: Patient refused     Binge frequency: Never     Drug use: No    Sexual activity: Not on file     Review of Systems   Constitutional: Positive for fatigue and fever.   HENT: Negative.    Eyes: Negative.    Respiratory: Positive for cough and shortness of breath.    Cardiovascular: Negative.    Gastrointestinal: Negative.    Endocrine: Negative.    Genitourinary: Negative.    Musculoskeletal: Negative.    Allergic/Immunologic: Negative.    Neurological: Negative.    Hematological: Negative.    Psychiatric/Behavioral: Negative.      Objective:     Vital Signs (Most Recent):  Temp: 98.5 °F (36.9 °C) (12/17/20 1324)  Pulse: 69 (12/17/20 1723)  Resp: (!) 24 (12/17/20 1723)  BP: 120/66 (12/17/20 1723)  SpO2: 98 % (12/17/20 1723) Vital Signs (24h Range):  Temp:  [98.5 °F (36.9 °C)] 98.5 °F (36.9 °C)  Pulse:  [62-84] 69  Resp:  [16-25] 24  SpO2:  [92 %-98 %] 98 %  BP: (114-125)/(66-73) 120/66     Weight: 99.9 kg (220 lb 3.8 oz)  Body mass index is 40.28 kg/m².    Physical Exam  Constitutional:       Appearance: Normal appearance.   HENT:      Head: Normocephalic and atraumatic.      Nose: Nose normal.   Eyes:      Extraocular Movements: Extraocular movements intact.      Pupils: Pupils are equal, round, and reactive to light.   Neck:      Musculoskeletal: Normal range of motion.   Cardiovascular:      Rate and Rhythm: Normal rate and regular rhythm.      Pulses: Normal pulses.      Heart sounds: Normal heart sounds.   Pulmonary:      Effort: Pulmonary effort is normal.      Breath sounds: Normal breath sounds.   Abdominal:      General: Abdomen is flat.      Palpations: Abdomen is soft.   Musculoskeletal: Normal range of motion.   Skin:     General: Skin is warm.      Capillary Refill: Capillary refill takes less than 2 seconds.   Neurological:      General: No focal deficit present.      Mental Status: She is alert and oriented to person, place, and time.           CRANIAL NERVES     CN III, IV, VI   Pupils are equal, round, and reactive to light.       Significant  Labs: All pertinent labs within the past 24 hours have been reviewed.    Significant Imaging: I have reviewed and interpreted all pertinent imaging results/findings within the past 24 hours.

## 2020-12-18 NOTE — HPI
59 yo female with PMH of Asthma and HTN presented with Fevers, cough and sob. She also has  associated with Diarrhea.  Patient was diagnosed with COVID on 12/11. She has elevated Inflammatory markers and CT chest with patchy infiltrates. Denies nausea, vomiting, Chest pain, abd pain.

## 2020-12-19 ENCOUNTER — PATIENT MESSAGE (OUTPATIENT)
Dept: ADMINISTRATIVE | Facility: OTHER | Age: 60
End: 2020-12-19

## 2020-12-19 VITALS
WEIGHT: 219.56 LBS | HEART RATE: 64 BPM | HEIGHT: 62 IN | SYSTOLIC BLOOD PRESSURE: 119 MMHG | BODY MASS INDEX: 40.4 KG/M2 | OXYGEN SATURATION: 94 % | TEMPERATURE: 98 F | RESPIRATION RATE: 20 BRPM | DIASTOLIC BLOOD PRESSURE: 74 MMHG

## 2020-12-19 PROBLEM — U07.1 PNEUMONIA DUE TO COVID-19 VIRUS: Status: RESOLVED | Noted: 2020-12-17 | Resolved: 2020-12-19

## 2020-12-19 PROBLEM — E83.39 HYPOPHOSPHATEMIA: Status: RESOLVED | Noted: 2020-12-18 | Resolved: 2020-12-19

## 2020-12-19 PROBLEM — J12.82 PNEUMONIA DUE TO COVID-19 VIRUS: Status: RESOLVED | Noted: 2020-12-17 | Resolved: 2020-12-19

## 2020-12-19 LAB
ALBUMIN SERPL BCP-MCNC: 3.2 G/DL (ref 3.5–5.2)
ALP SERPL-CCNC: 48 U/L (ref 55–135)
ALT SERPL W/O P-5'-P-CCNC: 53 U/L (ref 10–44)
ANION GAP SERPL CALC-SCNC: 11 MMOL/L (ref 8–16)
ANISOCYTOSIS BLD QL SMEAR: SLIGHT
AST SERPL-CCNC: 47 U/L (ref 10–40)
BASOPHILS # BLD AUTO: 0.01 K/UL (ref 0–0.2)
BASOPHILS NFR BLD: 0.2 % (ref 0–1.9)
BILIRUB SERPL-MCNC: 0.3 MG/DL (ref 0.1–1)
BUN SERPL-MCNC: 12 MG/DL (ref 8–23)
CALCIUM SERPL-MCNC: 9 MG/DL (ref 8.7–10.5)
CHLORIDE SERPL-SCNC: 105 MMOL/L (ref 95–110)
CO2 SERPL-SCNC: 25 MMOL/L (ref 23–29)
CREAT SERPL-MCNC: 0.7 MG/DL (ref 0.5–1.4)
DIFFERENTIAL METHOD: ABNORMAL
EOSINOPHIL # BLD AUTO: 0 K/UL (ref 0–0.5)
EOSINOPHIL NFR BLD: 0 % (ref 0–8)
ERYTHROCYTE [DISTWIDTH] IN BLOOD BY AUTOMATED COUNT: 12.8 % (ref 11.5–14.5)
EST. GFR  (AFRICAN AMERICAN): >60 ML/MIN/1.73 M^2
EST. GFR  (NON AFRICAN AMERICAN): >60 ML/MIN/1.73 M^2
GLUCOSE SERPL-MCNC: 125 MG/DL (ref 70–110)
HCT VFR BLD AUTO: 41.9 % (ref 37–48.5)
HGB BLD-MCNC: 13.5 G/DL (ref 12–16)
HYPOCHROMIA BLD QL SMEAR: ABNORMAL
IMM GRANULOCYTES # BLD AUTO: 0.03 K/UL (ref 0–0.04)
IMM GRANULOCYTES NFR BLD AUTO: 0.6 % (ref 0–0.5)
LYMPHOCYTES # BLD AUTO: 1.3 K/UL (ref 1–4.8)
LYMPHOCYTES NFR BLD: 25.5 % (ref 18–48)
MAGNESIUM SERPL-MCNC: 2.5 MG/DL (ref 1.6–2.6)
MCH RBC QN AUTO: 26.8 PG (ref 27–31)
MCHC RBC AUTO-ENTMCNC: 32.2 G/DL (ref 32–36)
MCV RBC AUTO: 83 FL (ref 82–98)
MONOCYTES # BLD AUTO: 0.3 K/UL (ref 0.3–1)
MONOCYTES NFR BLD: 5 % (ref 4–15)
NEUTROPHILS # BLD AUTO: 3.5 K/UL (ref 1.8–7.7)
NEUTROPHILS NFR BLD: 68.7 % (ref 38–73)
NRBC BLD-RTO: 0 /100 WBC
OVALOCYTES BLD QL SMEAR: ABNORMAL
PHOSPHATE SERPL-MCNC: 3.4 MG/DL (ref 2.7–4.5)
PLATELET # BLD AUTO: 252 K/UL (ref 150–350)
PLATELET BLD QL SMEAR: ABNORMAL
PMV BLD AUTO: 10.7 FL (ref 9.2–12.9)
POIKILOCYTOSIS BLD QL SMEAR: SLIGHT
POTASSIUM SERPL-SCNC: 4.4 MMOL/L (ref 3.5–5.1)
PROT SERPL-MCNC: 7.5 G/DL (ref 6–8.4)
RBC # BLD AUTO: 5.04 M/UL (ref 4–5.4)
SODIUM SERPL-SCNC: 141 MMOL/L (ref 136–145)
WBC # BLD AUTO: 5.05 K/UL (ref 3.9–12.7)

## 2020-12-19 PROCEDURE — 63600175 PHARM REV CODE 636 W HCPCS: Performed by: HOSPITALIST

## 2020-12-19 PROCEDURE — 85025 COMPLETE CBC W/AUTO DIFF WBC: CPT

## 2020-12-19 PROCEDURE — 25000003 PHARM REV CODE 250: Performed by: EMERGENCY MEDICINE

## 2020-12-19 PROCEDURE — 84100 ASSAY OF PHOSPHORUS: CPT

## 2020-12-19 PROCEDURE — 25000003 PHARM REV CODE 250: Performed by: HOSPITALIST

## 2020-12-19 PROCEDURE — 36415 COLL VENOUS BLD VENIPUNCTURE: CPT

## 2020-12-19 PROCEDURE — 94761 N-INVAS EAR/PLS OXIMETRY MLT: CPT

## 2020-12-19 PROCEDURE — 99900035 HC TECH TIME PER 15 MIN (STAT)

## 2020-12-19 PROCEDURE — 94799 UNLISTED PULMONARY SVC/PX: CPT

## 2020-12-19 PROCEDURE — 80053 COMPREHEN METABOLIC PANEL: CPT

## 2020-12-19 PROCEDURE — 94640 AIRWAY INHALATION TREATMENT: CPT

## 2020-12-19 PROCEDURE — 83735 ASSAY OF MAGNESIUM: CPT

## 2020-12-19 PROCEDURE — 27000221 HC OXYGEN, UP TO 24 HOURS

## 2020-12-19 RX ORDER — DEXAMETHASONE 4 MG/1
4 TABLET ORAL DAILY
Qty: 15 TABLET | Refills: 0 | Status: SHIPPED | OUTPATIENT
Start: 2020-12-20 | End: 2020-12-30

## 2020-12-19 RX ORDER — DEXAMETHASONE 6 MG/1
6 TABLET ORAL DAILY
Qty: 10 TABLET | Refills: 0 | Status: SHIPPED | OUTPATIENT
Start: 2020-12-20 | End: 2020-12-19

## 2020-12-19 RX ORDER — ASPIRIN 81 MG/1
81 TABLET ORAL DAILY
Qty: 90 TABLET | Refills: 3 | Status: SHIPPED | OUTPATIENT
Start: 2020-12-20 | End: 2021-03-11

## 2020-12-19 RX ORDER — ASPIRIN 81 MG/1
81 TABLET ORAL DAILY
Status: DISCONTINUED | OUTPATIENT
Start: 2020-12-20 | End: 2020-12-19 | Stop reason: HOSPADM

## 2020-12-19 RX ORDER — ASPIRIN 325 MG
325 TABLET ORAL ONCE
Status: COMPLETED | OUTPATIENT
Start: 2020-12-19 | End: 2020-12-19

## 2020-12-19 RX ADMIN — AMLODIPINE BESYLATE 10 MG: 10 TABLET ORAL at 10:12

## 2020-12-19 RX ADMIN — PROMETHAZINE HYDROCHLORIDE AND CODEINE PHOSPHATE 5 ML: 10; 6.25 SOLUTION ORAL at 12:12

## 2020-12-19 RX ADMIN — PROMETHAZINE HYDROCHLORIDE AND CODEINE PHOSPHATE 5 ML: 10; 6.25 SOLUTION ORAL at 05:12

## 2020-12-19 RX ADMIN — CEFTRIAXONE 1 G: 1 INJECTION, SOLUTION INTRAVENOUS at 10:12

## 2020-12-19 RX ADMIN — SPIRONOLACTONE 25 MG: 25 TABLET ORAL at 10:12

## 2020-12-19 RX ADMIN — POTASSIUM & SODIUM PHOSPHATES POWDER PACK 280-160-250 MG 2 PACKET: 280-160-250 PACK at 05:12

## 2020-12-19 RX ADMIN — ALBUTEROL SULFATE 2 PUFF: 90 AEROSOL, METERED RESPIRATORY (INHALATION) at 07:12

## 2020-12-19 RX ADMIN — ASPIRIN 325 MG ORAL TABLET 325 MG: 325 PILL ORAL at 12:12

## 2020-12-19 RX ADMIN — ALBUTEROL SULFATE 2 PUFF: 90 AEROSOL, METERED RESPIRATORY (INHALATION) at 12:12

## 2020-12-19 RX ADMIN — METOPROLOL TARTRATE 25 MG: 25 TABLET, FILM COATED ORAL at 10:12

## 2020-12-19 RX ADMIN — DORZOLAMIDE HYDROCHLORIDE AND TIMOLOL MALEATE 1 DROP: 20; 5 SOLUTION/ DROPS OPHTHALMIC at 10:12

## 2020-12-19 RX ADMIN — POTASSIUM & SODIUM PHOSPHATES POWDER PACK 280-160-250 MG 2 PACKET: 280-160-250 PACK at 12:12

## 2020-12-19 RX ADMIN — DEXAMETHASONE 6 MG: 4 TABLET ORAL at 10:12

## 2020-12-19 RX ADMIN — HEPARIN SODIUM 5000 UNITS: 5000 INJECTION INTRAVENOUS; SUBCUTANEOUS at 05:12

## 2020-12-19 RX ADMIN — REMDESIVIR 100 MG: 100 INJECTION, POWDER, LYOPHILIZED, FOR SOLUTION INTRAVENOUS at 12:12

## 2020-12-19 NOTE — PLAN OF CARE
Patient continues to be monitored and treated. Patient remains normal sinus on cardiac monitor. Patient remains on room air. Patient denies shortness of breath. PRN cough syrup administered per patient request. Patient is independently ambulatory and turns self well in bed. Plan of care reviewed with patient. Will continue to monitor and treat.

## 2020-12-19 NOTE — HOSPITAL COURSE
61 yo severely obese female with Asthma and HTN presented with fevers, cough and sob, associated with Diarrhea. She is COVID Positive since 12/11. She has elevated Inflammatory markers and CT chest with patchy infiltrates. Admitted and started on Remdesivir and Dexa. She feels better, stronger. No fever or SOB, sitting up without any SOB, on RA- sats 95% RA. PO4 low at 2.5- getting oral Neutra phos. Eating drinking well, diarrhea improved.   12/19- looks and feels much better, comfortable on RA, did not qualify for any Home O2. Eating drinking well, walking around well and all her Sx have resolved or significantly improved. She was seen and examined and deemed stable for discharge home today.

## 2020-12-19 NOTE — ASSESSMENT & PLAN NOTE
Mild, sec to diarrhea, poor oral intake    Replaced with Neutraphos, increase oral intake, diarrhea resolved

## 2020-12-19 NOTE — ASSESSMENT & PLAN NOTE
Admit for COVID pneumonia  Airborne and contact precautions  Will start her on Decadron, remdesivir, azithromycin, ceftriaxone, vitamin-C, zinc sulfate  Check blood cultures sputum cultures  Keep oxygen sats present on 92%     On Remdesivir plus Dexa, fortunately no resp failure  Continue same

## 2020-12-19 NOTE — RESPIRATORY THERAPY
Oxygen Evaluation    1) Patient's O2 Sat on room air while at rest: 95%        If at rest Sat is 89% or above, continue.    2) Patient's O2 Sat on room air while exercisin%          If exercise Sat is 88% or below, continue.    3) Patient's O2 Sat while exercising on O2:  at  LPM         (Must show improvement from #2 for patients to qualify)    If exercise Sat on O2 shows improvement from #2, this patient qualifies for portable Oxygen.  If not, the patient does not qualify.

## 2020-12-19 NOTE — NURSING
Reviewed AVS with patient. Reviewed new medications and future appointments. Resent prescriptions to new pharmacy. Removed peripheral IV and heart monitor. Patient's spouse to provide transportation home.

## 2020-12-19 NOTE — PLAN OF CARE
A&O x4  Full Code  Tele NSR  O2 RA  Diet REG  Activity UP AD KHADIJAH  IV 20 LAC  Pain 0/10 currently. C/o headache today, tylenol received with relief of symptoms  Voids to toilet  POC discussed, questions asked and answered.  Call light within reach.

## 2020-12-20 ENCOUNTER — NURSE TRIAGE (OUTPATIENT)
Dept: ADMINISTRATIVE | Facility: CLINIC | Age: 60
End: 2020-12-20

## 2020-12-20 ENCOUNTER — PATIENT MESSAGE (OUTPATIENT)
Dept: ADMINISTRATIVE | Facility: OTHER | Age: 60
End: 2020-12-20

## 2020-12-20 NOTE — TELEPHONE ENCOUNTER
Patient escalated for O2 1% HR 1, pt does not have pulse ox until tomorrow. Patient denies any SOB, CP, or fever.     Reason for Disposition   Information only question and nurse able to answer    Additional Information   Negative: Nursing judgment   Negative: Nursing judgment   Negative: Nursing judgment   Negative: Nursing judgment    Protocols used: INFORMATION ONLY CALL - NO TRIAGE-A-[place]

## 2020-12-20 NOTE — TELEPHONE ENCOUNTER
Patient escalated for no response. Pt will get pulse ox tomorrow. Denies any SOB, CP, or fever.    Reason for Disposition   Information only question and nurse able to answer    Additional Information   Negative: Nursing judgment   Negative: Nursing judgment   Negative: Nursing judgment   Negative: Nursing judgment    Protocols used: INFORMATION ONLY CALL - NO TRIAGE-A-[place]

## 2020-12-20 NOTE — DISCHARGE SUMMARY
[facility] Hospital Medicine  Discharge Summary      Patient Name: Carolee Ortega  MRN: [id number]  Patient Class: IP- Inpatient  Admission Date: 12/17/2020  Hospital Length of Stay: 2 days  Discharge Date and Time:  12/20/2020 12:14 PM  Attending Physician: No att. providers found   Discharging Provider: Juanito Saucedo MD  Primary Care Provider: Genie Walter MD      HPI:   59 yo female with PMH of Asthma and HTN presented with Fevers, cough and sob. She also has  associated with Diarrhea.  Patient was diagnosed with COVID on 12/11. She has elevated Inflammatory markers and CT chest with patchy infiltrates. Denies nausea, vomiting, Chest pain, abd pain.     * No surgery found *      Hospital Course:   59 yo severely obese female with Asthma and HTN presented with fevers, cough and sob, associated with Diarrhea. She is COVID Positive since 12/11. She has elevated Inflammatory markers and CT chest with patchy infiltrates. Admitted and started on Remdesivir and Dexa. She feels better, stronger. No fever or SOB, sitting up without any SOB, on RA- sats 95% RA. PO4 low at 2.5- getting oral Neutra phos. Eating drinking well, diarrhea improved.   12/19- looks and feels much better, comfortable on RA, did not qualify for any Home O2. Eating drinking well, walking around well and all her Sx have resolved or significantly improved. She was seen and examined and deemed stable for discharge home today.     Consults:     No new Assessment & Plan notes have been filed under this hospital service since the last note was generated.  Service: Hospital Medicine    Final Active Diagnoses:    Diagnosis Date Noted POA    PRINCIPAL PROBLEM:  Pneumonia due to COVID-19 virus [U07.1, J12.89] 12/17/2020 Yes    Asthma [J45.909] 12/02/2013 Yes    Essential hypertension [I10]  Yes     Chronic      Problems Resolved During this Admission:    Diagnosis Date Noted Date Resolved POA    Hypophosphatemia [E83.39] 12/18/2020  12/19/2020 Yes       Discharged Condition: stable    Disposition: Home or Self Care    Follow Up:  Follow-up Information     Genie Walter MD. Schedule an appointment as soon as possible for a visit in 1 week.    Specialty: Family Medicine  Why: Hospital follow up, As needed  Contact information:  [address] [id number] [phone]                 Patient Instructions:      COVID-19 Surveillance Program     Order Specific Question Answer Comments   Does patient have a smartphone? Yes    Does patient have the [facility] on their smartphone? Yes    While in surveillance program, will patient be using home oxygen? Yes      Activity as tolerated       Significant Diagnostic Studies: Labs:   BMP:   Recent Labs   Lab 12/19/20  0710   *      K 4.4      CO2 25   BUN 12   CREATININE 0.7   CALCIUM 9.0   MG 2.5   , CMP   Recent Labs   Lab 12/19/20  0710      K 4.4      CO2 25   *   BUN 12   CREATININE 0.7   CALCIUM 9.0   PROT 7.5   ALBUMIN 3.2*   BILITOT 0.3   ALKPHOS 48*   AST 47*   ALT 53*   ANIONGAP 11   ESTGFRAFRICA >60   EGFRNONAA >60   , CBC   Recent Labs   Lab 12/19/20  0710   WBC 5.05   HGB 13.5   HCT 41.9       and All labs within the past 24 hours have been reviewed  Microbiology:   Blood Culture   Lab Results   Component Value Date    LABBLOO No Growth to date 12/17/2020    LABBLOO No Growth to date 12/17/2020    LABBLOO No Growth to date 12/17/2020    and Sputum Culture No results found for: GSRESP, RESPIRATORYC  Radiology: X-Ray:   Imaging Results          CTA Chest Non-Coronary (PE Study) (Final result)  Result time 12/17/20 17:27:28    Final result by Phil Arroyo MD (12/17/20 17:27:28)                 Impression:      No pulmonary thromboembolism.    Peripheral subsolid opacities consistent with COVID-19 pneumonia.    All CT scans at this facility are performed  using dose modulation techniques as appropriate to performed exam  including the following:  automated exposure control; adjustment of mA and/or kV according to the patients size (this includes techniques or standardized protocols for targeted exams where dose is matched to indication/reason for exam: i.e. extremities or head);  iterative reconstruction technique.      Electronically signed by: Phil Arroyo  Date:    12/17/2020  Time:    17:27             Narrative:    EXAMINATION:  CTA CHEST NON CORONARY    CLINICAL HISTORY:  Elevated d-dimer;    TECHNIQUE:  Low dose axial images, sagittal and coronal reformations were obtained from the thoracic inlet to the lung bases following the IV administration of 100 mL of Omnipaque 350.  Contrast timing was optimized to evaluate the pulmonary arteries.  MIP images were performed.    COMPARISON:  Chest radiograph 12/17/2020.    FINDINGS:  Base of Neck: No significant abnormality.    Thoracic soft tissues: Unremarkable.    Aorta: Left-sided aortic arch.  No aneurysm and no significant atherosclerosis    Heart: Normal size. No effusion.    Pulmonary vasculature: Pulmonary arteries are fairly well opacified.  There is no pulmonary thromboembolism.    Kristie/Mediastinum: No pathologic farzad enlargement.    Airways: Patent.    Lungs/Pleura: Peripheral subsolid opacities consistent with COVID-19 pneumonia.  No pleural fluid.  No solid consolidative opacities.  No pulmonary mass.    Esophagus: Unremarkable.    Upper Abdomen: No abnormality of the partially imaged upper abdomen.    Bones: No acute fracture. No suspicious lytic or sclerotic lesions.                               X-Ray Chest AP Portable (Final result)  Result time 12/17/20 14:02:42    Final result by Vijay Glass MD (12/17/20 14:02:42)                 Impression:      1.  Interval development of some reticular interstitial changes and ground-glass opacities in the left greater than right lung bases consistent with the history given of COVID 19 infection.    2.  Stable findings as  noted above.      Electronically signed by: Vijay Glass MD  Date:    12/17/2020  Time:    14:02             Narrative:    EXAMINATION:  XR CHEST AP PORTABLE    CLINICAL HISTORY:  Shortness of breath.  Positive COVID-19 infection.    COMPARISON:  Studies dating back to April 19, 2016    FINDINGS:  There are some mild basilar reticular interstitial changes and ground-glass opacities in the left greater than right lung bases.  The upper lungs are clear. The cardiac silhouette size is normal. The trachea is midline and the mediastinal width is normal. Negative for effusion or pneumothorax.  Pulmonary vasculature is normal. Negative for osseous abnormalities. Mild convex-right curvature of the thoracic spine with marginal spondylosis.  Ectatic and tortuous aorta.                                Pending Diagnostic Studies:     Procedure Component Value Units Date/Time    Brain natriuretic peptide [[id number]] Collected: 12/17/20 1906    Order Status: Sent Lab Status: In process Updated: 12/17/20 1920    Specimen: Blood     Ferritin [[id number]] Collected: 12/17/20 1356    Order Status: Sent Lab Status: In process Updated: 12/17/20 1413    Specimen: Blood          Medications:   Reconciled Home Medications:      Medication List      START taking these medications    aspirin 81 MG EC tablet  Commonly known as: ECOTRIN  Take 1 tablet (81 mg total) by mouth once daily.     dexAMETHasone 4 MG Tab  Commonly known as: DECADRON  Take 1 tablet (4 mg total) by mouth once daily. for 10 days     pulse oximeter device  Commonly known as: pulse oximeter  by Apply Externally route 2 (two) times a day. Use twice daily at 8 AM and 3 PM and record the value in [email or web address] as directed.        CHANGE how you take these medications    penciclovir 1 % cream  Commonly known as: DENAVIR  Apply topically every 2 (two) hours.  What changed:   · when to take this  · reasons to take this     valACYclovir 1000 MG tablet  Commonly known as: VALTREX  Take 2  tablets (2,000 mg total) by mouth every 12 (twelve) hours.  What changed:   · when to take this  · reasons to take this        CONTINUE taking these medications    * albuterol 1.25 mg/3 mL Nebu  Commonly known as: ACCUNEB  Take 3 mLs (1.25 mg total) by nebulization every 4 to 6 hours as needed.     * albuterol 90 mcg/actuation inhaler  Commonly known as: PROVENTIL/VENTOLIN HFA  Inhale 2 puffs into the lungs every 4 to 6 hours as needed for Wheezing.     ALPRAZolam 0.5 MG tablet  Commonly known as: XANAX  Take 1 tablet (0.5 mg total) by mouth 3 (three) times daily as needed.     amlodipine-valsartan  mg per tablet  Commonly known as: EXFORGE  Take 1 tablet by mouth once daily.     dorzolamide-timolol 2-0.5% 22.3-6.8 mg/mL ophthalmic solution  Commonly known as: COSOPT  PLACE 1 DROP INTO BOTH EYES EVERY 12 (TWELVE) HOURS.     hydrOXYzine HCL 25 MG tablet  Commonly known as: ATARAX  TAKE 1 TABLET (25 MG TOTAL) BY MOUTH EVERY 4 (FOUR) HOURS AS NEEDED FOR ITCHING.     ketorolac 0.5% 0.5 % Drop  Commonly known as: ACULAR  Put one eyedrop in right eye four times a day. Start first drop morning of laser and stop after five days.     latanoprost 0.005 % ophthalmic solution  PLACE 1 DROP INTO BOTH EYES EVERY EVENING.     metoprolol tartrate 25 MG tablet  Commonly known as: LOPRESSOR  TAKE 1 TABLET BY MOUTH EVERY DAY     montelukast 10 mg tablet  Commonly known as: SINGULAIR  TAKE 1 TABLET BY MOUTH EVERY DAY IN THE EVENING     promethazine-dextromethorphan 6.25-15 mg/5 mL Syrp  Commonly known as: PROMETHAZINE-DM  Take 5 mLs by mouth every 4 to 6 hours as needed.     spironolactone 25 MG tablet  Commonly known as: ALDACTONE  TAKE 1 TABLET BY MOUTH EVERY DAY         * This list has 2 medication(s) that are the same as other medications prescribed for you. Read the directions carefully, and ask your doctor or other care provider to review them with you.                Indwelling Lines/Drains at time of discharge:    Lines/Drains/Airways     None                 Time spent on the discharge of patient: 47 minutes  Patient was seen and examined on the date of discharge and determined to be suitable for discharge.         Juanito Saucedo MD  Department of Hospital Medicine  [facility]

## 2020-12-21 ENCOUNTER — PATIENT MESSAGE (OUTPATIENT)
Dept: ADMINISTRATIVE | Facility: OTHER | Age: 60
End: 2020-12-21

## 2020-12-21 ENCOUNTER — NURSE TRIAGE (OUTPATIENT)
Dept: ADMINISTRATIVE | Facility: CLINIC | Age: 60
End: 2020-12-21

## 2020-12-21 NOTE — TELEPHONE ENCOUNTER
Pt called still no O2 sat machine will get it today  Will continue to monitor    Reason for Disposition   Information only question and nurse able to answer    Protocols used: INFORMATION ONLY CALL - NO TRIAGE-A-OH

## 2020-12-22 ENCOUNTER — PATIENT MESSAGE (OUTPATIENT)
Dept: FAMILY MEDICINE | Facility: CLINIC | Age: 60
End: 2020-12-22

## 2020-12-22 ENCOUNTER — PATIENT MESSAGE (OUTPATIENT)
Dept: ADMINISTRATIVE | Facility: OTHER | Age: 60
End: 2020-12-22

## 2020-12-22 ENCOUNTER — PATIENT OUTREACH (OUTPATIENT)
Dept: ADMINISTRATIVE | Facility: CLINIC | Age: 60
End: 2020-12-22

## 2020-12-22 NOTE — PATIENT INSTRUCTIONS
Instructions for Patients with Confirmed or Suspected COVID-19    If you are awaiting your test result, you will either be called or it will be released to the patient portal.  If you have any questions about your test, please visit [email or web address] or call our COVID-19 information line at [phone].      Instructions for non-hospitalized or discharged patients with confirmed or suspected COVID-19:       Stay home except to get medical care.    Separate yourself from other people and animals in your home.    Call ahead before visiting your doctor.    Wear a face mask.    Cover your coughs and sneezes.    Clean your hands often.    Avoid sharing personal household items.    Clean all high-touch surfaces every day.    Monitor your symptoms. Seek prompt medical attention if your illness is worsening (e.g., difficulty breathing). Before seeking care, call your healthcare provider.    If you have a medical emergency and must call 911, notify the dispatcher that you have or are being evaluated for COVID-19. If possible, put on a face mask before emergency medical services arrive.    Use the following symptom-based strategy to return to normal activity following a suspected or confirmed case of COVID-19. Continue isolation until:   o At least 3 days (72 hours) have passed since recovery defined as resolution of fever without the use of fever-reducing medications and improvement in respiratory symptoms (e.g. cough, shortness of breath), and   o At least 10 days have passed since the first positive test.       As one of the next steps, you will receive a call or text from the [place] Department of Health ([facility]) COVID-19 Tracing Team. See the contact information below so you know not to ignore the health departments call. It is important that you contact them back immediately so they can help.     Contact Tracer Number:  [phone]  Caller ID for most carriers: [place] [facility]    What is  contact tracing?   Contact tracing is a process that helps identify everyone who has been in close contact with an infected person. Contact tracers let those people know they may have been exposed and guide them on next steps. Confidentiality is important for everyone; no one will be told who may have exposed them to the virus.   Your involvement is important. The more we know about where and how this virus is spreading, the better chance we have at stopping it from spreading further.  What does exposure mean?   Exposure means you have been within 6 feet for more than 15 minutes with a person who has or had COVID-19.  What kind of questions do the contact tracers ask?   A contact tracer will confirm your basic contact information including name, address, phone number, and next of kin, as well as asking about any symptoms you may have had. Theyll also ask you how you think you may have gotten sick, such as places where you may have been exposed to the virus, and people you were with. Those names will never be shared with anyone outside of that call, and will only be used to help trace and stop the spread of the virus.   I have privacy concerns. How will the state use my information?   Your privacy about your health is important. All calls are completed using call centers that use the appropriate health privacy protection measures (HIPAA compliance), meaning that your patient information is safe. No one will ever ask you any questions related to immigration status. Your health comes first.   Do I have to participate?   You do not have to participate, but we strongly encourage you to. Contact tracing can help us catch and control new outbreaks as theyre developing to keep your friends and family safe.   What if I dont hear from anyone?   If you dont receive a call within 24 hours, you can call the number above right away to inquire about your status. That line is open from 8:00 am - 8:00 p.m., 7 days a  week.  Contact tracing saves lives! Together, we have the power to beat this virus and keep our loved ones and neighbors safe.       Instructions for household members, intimate partners and caregivers in a non-healthcare setting of a patient with confirmed or suspected COVID-19:         Close contacts should monitor their health and call their healthcare provider right away if they develop symptoms suggestive of COVID-19 (e.g., fever, cough, shortness of breath).    Stay home except to get medical care. Separate yourself from other people and animals in the home.   Monitor the patients symptoms. If the patient is getting sicker, call his or her healthcare provider. If the patient has a medical emergency and you need to call 911, notify the dispatch personnel that the patient has or is being evaluated for COVID-19.    Wear a facemask when around other people such as sharing a room or vehicle and before entering a healthcare provider's office.   Cover coughs and sneezes with a tissue. Throw used tissues in a lined trash can immediately and wash hands.   Clean hands often with soap and water for at least 20 seconds or with an alcohol-based hand , rubbing hands together until they feel dry. Avoid touching your eyes, nose, and mouth with unwashed hands.   Clean all high-touch; surfaces every day, including counters, tabletops, doorknobs, bathroom fixtures, toilets, phones, keyboards, tablets, bedside tables, etc. Use a household cleaning spray or wipe according to label instructions.   Avoid sharing personal household items such as dishes, drinking glasses, cups, towels, bedding, etc. After these items are used, they should be washed thoroughly with soap and water.   Continue isolation until:   At least 3 days (72 hours) have passed since recovery defined as resolution of fever without the use of fever-reducing medications and improvement in respiratory symptoms (e.g. cough, shortness of breath),  and    At least 10 days have passed since the patients first positive test.    https://www.cdc.gov/coronavirus/2019-ncov/your-health/index.htm

## 2020-12-23 ENCOUNTER — PATIENT MESSAGE (OUTPATIENT)
Dept: ADMINISTRATIVE | Facility: OTHER | Age: 60
End: 2020-12-23

## 2020-12-23 LAB
BACTERIA BLD CULT: NORMAL
BACTERIA BLD CULT: NORMAL

## 2020-12-24 ENCOUNTER — NURSE TRIAGE (OUTPATIENT)
Dept: ADMINISTRATIVE | Facility: CLINIC | Age: 60
End: 2020-12-24

## 2020-12-24 ENCOUNTER — PATIENT MESSAGE (OUTPATIENT)
Dept: ADMINISTRATIVE | Facility: OTHER | Age: 60
End: 2020-12-24

## 2020-12-25 ENCOUNTER — PATIENT MESSAGE (OUTPATIENT)
Dept: ADMINISTRATIVE | Facility: OTHER | Age: 60
End: 2020-12-25

## 2020-12-25 ENCOUNTER — NURSE TRIAGE (OUTPATIENT)
Dept: ADMINISTRATIVE | Facility: CLINIC | Age: 60
End: 2020-12-25

## 2020-12-26 ENCOUNTER — PATIENT MESSAGE (OUTPATIENT)
Dept: ADMINISTRATIVE | Facility: OTHER | Age: 60
End: 2020-12-26

## 2020-12-27 ENCOUNTER — PATIENT MESSAGE (OUTPATIENT)
Dept: ADMINISTRATIVE | Facility: OTHER | Age: 60
End: 2020-12-27

## 2020-12-28 ENCOUNTER — PATIENT MESSAGE (OUTPATIENT)
Dept: ADMINISTRATIVE | Facility: OTHER | Age: 60
End: 2020-12-28

## 2020-12-29 ENCOUNTER — OFFICE VISIT (OUTPATIENT)
Dept: FAMILY MEDICINE | Facility: CLINIC | Age: 60
End: 2020-12-29
Payer: COMMERCIAL

## 2020-12-29 ENCOUNTER — HOSPITAL ENCOUNTER (OUTPATIENT)
Dept: RADIOLOGY | Facility: HOSPITAL | Age: 60
Discharge: HOME | End: 2020-12-29
Attending: REGISTERED NURSE
Payer: COMMERCIAL

## 2020-12-29 ENCOUNTER — PATIENT MESSAGE (OUTPATIENT)
Dept: ADMINISTRATIVE | Facility: OTHER | Age: 60
End: 2020-12-29

## 2020-12-29 VITALS
OXYGEN SATURATION: 98 % | SYSTOLIC BLOOD PRESSURE: 100 MMHG | HEIGHT: 62 IN | HEART RATE: 81 BPM | TEMPERATURE: 98 F | WEIGHT: 223.56 LBS | BODY MASS INDEX: 41.14 KG/M2 | DIASTOLIC BLOOD PRESSURE: 68 MMHG

## 2020-12-29 DIAGNOSIS — U07.1 PNEUMONIA DUE TO COVID-19 VIRUS: ICD-10-CM

## 2020-12-29 DIAGNOSIS — J12.82 PNEUMONIA DUE TO COVID-19 VIRUS: ICD-10-CM

## 2020-12-29 DIAGNOSIS — Z09 HOSPITAL DISCHARGE FOLLOW-UP: Primary | ICD-10-CM

## 2020-12-29 DIAGNOSIS — I10 ESSENTIAL HYPERTENSION: Chronic | ICD-10-CM

## 2020-12-29 DIAGNOSIS — Z09 HOSPITAL DISCHARGE FOLLOW-UP: ICD-10-CM

## 2020-12-29 PROCEDURE — 71046 X-RAY EXAM CHEST 2 VIEWS: CPT | Mod: TC,FY,PO

## 2020-12-29 PROCEDURE — 99999 PR PBB SHADOW E&M-EST. PATIENT-LVL IV: ICD-10-PCS | Mod: PBBFAC,,, | Performed by: REGISTERED NURSE

## 2020-12-29 PROCEDURE — 3078F PR MOST RECENT DIASTOLIC BLOOD PRESSURE < 80 MM HG: ICD-10-PCS | Mod: CPTII,S$GLB,, | Performed by: REGISTERED NURSE

## 2020-12-29 PROCEDURE — 99214 OFFICE O/P EST MOD 30 MIN: CPT | Mod: S$GLB,,, | Performed by: REGISTERED NURSE

## 2020-12-29 PROCEDURE — 99214 PR OFFICE/OUTPT VISIT, EST, LEVL IV, 30-39 MIN: ICD-10-PCS | Mod: S$GLB,,, | Performed by: REGISTERED NURSE

## 2020-12-29 PROCEDURE — 3008F BODY MASS INDEX DOCD: CPT | Mod: CPTII,S$GLB,, | Performed by: REGISTERED NURSE

## 2020-12-29 PROCEDURE — 3074F SYST BP LT 130 MM HG: CPT | Mod: CPTII,S$GLB,, | Performed by: REGISTERED NURSE

## 2020-12-29 PROCEDURE — 71046 XR CHEST PA AND LATERAL: ICD-10-PCS | Mod: 26,,, | Performed by: RADIOLOGY

## 2020-12-29 PROCEDURE — 3078F DIAST BP <80 MM HG: CPT | Mod: CPTII,S$GLB,, | Performed by: REGISTERED NURSE

## 2020-12-29 PROCEDURE — 1126F AMNT PAIN NOTED NONE PRSNT: CPT | Mod: S$GLB,,, | Performed by: REGISTERED NURSE

## 2020-12-29 PROCEDURE — 99999 PR PBB SHADOW E&M-EST. PATIENT-LVL IV: CPT | Mod: PBBFAC,,, | Performed by: REGISTERED NURSE

## 2020-12-29 PROCEDURE — 3074F PR MOST RECENT SYSTOLIC BLOOD PRESSURE < 130 MM HG: ICD-10-PCS | Mod: CPTII,S$GLB,, | Performed by: REGISTERED NURSE

## 2020-12-29 PROCEDURE — 3008F PR BODY MASS INDEX (BMI) DOCUMENTED: ICD-10-PCS | Mod: CPTII,S$GLB,, | Performed by: REGISTERED NURSE

## 2020-12-29 PROCEDURE — 71046 X-RAY EXAM CHEST 2 VIEWS: CPT | Mod: 26,,, | Performed by: RADIOLOGY

## 2020-12-29 PROCEDURE — 1126F PR PAIN SEVERITY QUANTIFIED, NO PAIN PRESENT: ICD-10-PCS | Mod: S$GLB,,, | Performed by: REGISTERED NURSE

## 2020-12-30 ENCOUNTER — PATIENT MESSAGE (OUTPATIENT)
Dept: ADMINISTRATIVE | Facility: OTHER | Age: 60
End: 2020-12-30

## 2020-12-30 ENCOUNTER — PATIENT MESSAGE (OUTPATIENT)
Dept: FAMILY MEDICINE | Facility: CLINIC | Age: 60
End: 2020-12-30

## 2020-12-31 ENCOUNTER — PATIENT MESSAGE (OUTPATIENT)
Dept: ADMINISTRATIVE | Facility: OTHER | Age: 60
End: 2020-12-31

## 2020-12-31 ENCOUNTER — PATIENT MESSAGE (OUTPATIENT)
Dept: FAMILY MEDICINE | Facility: CLINIC | Age: 60
End: 2020-12-31

## 2020-12-31 DIAGNOSIS — J12.82 PNEUMONIA DUE TO COVID-19 VIRUS: Primary | ICD-10-CM

## 2020-12-31 DIAGNOSIS — U07.1 PNEUMONIA DUE TO COVID-19 VIRUS: Primary | ICD-10-CM

## 2021-01-01 ENCOUNTER — NURSE TRIAGE (OUTPATIENT)
Dept: ADMINISTRATIVE | Facility: CLINIC | Age: 61
End: 2021-01-01

## 2021-01-01 ENCOUNTER — PATIENT MESSAGE (OUTPATIENT)
Dept: ADMINISTRATIVE | Facility: OTHER | Age: 61
End: 2021-01-01

## 2021-01-02 ENCOUNTER — PATIENT MESSAGE (OUTPATIENT)
Dept: ADMINISTRATIVE | Facility: OTHER | Age: 61
End: 2021-01-02

## 2021-01-07 ENCOUNTER — PATIENT MESSAGE (OUTPATIENT)
Dept: FAMILY MEDICINE | Facility: CLINIC | Age: 61
End: 2021-01-07

## 2021-01-07 ENCOUNTER — HOSPITAL ENCOUNTER (OUTPATIENT)
Dept: RADIOLOGY | Facility: HOSPITAL | Age: 61
Discharge: HOME | End: 2021-01-07
Attending: REGISTERED NURSE
Payer: COMMERCIAL

## 2021-01-07 DIAGNOSIS — U07.1 PNEUMONIA DUE TO COVID-19 VIRUS: ICD-10-CM

## 2021-01-07 DIAGNOSIS — J12.82 PNEUMONIA DUE TO COVID-19 VIRUS: ICD-10-CM

## 2021-01-07 PROCEDURE — 71046 X-RAY EXAM CHEST 2 VIEWS: CPT | Mod: 26,,, | Performed by: RADIOLOGY

## 2021-01-07 PROCEDURE — 71046 X-RAY EXAM CHEST 2 VIEWS: CPT | Mod: TC,FY,PO

## 2021-01-07 PROCEDURE — 71046 XR CHEST PA AND LATERAL: ICD-10-PCS | Mod: 26,,, | Performed by: RADIOLOGY

## 2021-01-12 ENCOUNTER — TELEPHONE (OUTPATIENT)
Dept: PREADMISSION TESTING | Facility: HOSPITAL | Age: 61
End: 2021-01-12

## 2021-01-19 ENCOUNTER — ANESTHESIA EVENT (OUTPATIENT)
Dept: SURGERY | Facility: HOSPITAL | Age: 61
End: 2021-01-19
Payer: COMMERCIAL

## 2021-01-20 ENCOUNTER — TELEPHONE (OUTPATIENT)
Dept: PREADMISSION TESTING | Facility: HOSPITAL | Age: 61
End: 2021-01-20

## 2021-01-21 ENCOUNTER — HOSPITAL ENCOUNTER (OUTPATIENT)
Facility: HOSPITAL | Age: 61
Discharge: HOME | End: 2021-01-21
Attending: ORTHOPAEDIC SURGERY | Admitting: ORTHOPAEDIC SURGERY
Payer: COMMERCIAL

## 2021-01-21 ENCOUNTER — ANESTHESIA (OUTPATIENT)
Dept: SURGERY | Facility: HOSPITAL | Age: 61
End: 2021-01-21
Payer: COMMERCIAL

## 2021-01-21 VITALS
RESPIRATION RATE: 19 BRPM | HEART RATE: 56 BPM | SYSTOLIC BLOOD PRESSURE: 132 MMHG | BODY MASS INDEX: 41.93 KG/M2 | DIASTOLIC BLOOD PRESSURE: 88 MMHG | HEIGHT: 62 IN | OXYGEN SATURATION: 100 % | WEIGHT: 227.88 LBS | TEMPERATURE: 98 F

## 2021-01-21 DIAGNOSIS — M25.50 ARTHRALGIA, UNSPECIFIED JOINT: Primary | ICD-10-CM

## 2021-01-21 DIAGNOSIS — M65.9 SYNOVITIS OF HAND: Primary | ICD-10-CM

## 2021-01-21 DIAGNOSIS — M65.341 TRIGGER RING FINGER OF RIGHT HAND: ICD-10-CM

## 2021-01-21 DIAGNOSIS — M67.40 GANGLION CYST: ICD-10-CM

## 2021-01-21 PROCEDURE — D9220A PRA ANESTHESIA: ICD-10-PCS | Mod: CRNA,,, | Performed by: NURSE ANESTHETIST, CERTIFIED REGISTERED

## 2021-01-21 PROCEDURE — 36000707: Performed by: ORTHOPAEDIC SURGERY

## 2021-01-21 PROCEDURE — 87116 MYCOBACTERIA CULTURE: CPT

## 2021-01-21 PROCEDURE — 63600175 PHARM REV CODE 636 W HCPCS: Performed by: PHYSICIAN ASSISTANT

## 2021-01-21 PROCEDURE — D9220A PRA ANESTHESIA: Mod: ANES,,, | Performed by: ANESTHESIOLOGY

## 2021-01-21 PROCEDURE — D9220A PRA ANESTHESIA: Mod: CRNA,,, | Performed by: NURSE ANESTHETIST, CERTIFIED REGISTERED

## 2021-01-21 PROCEDURE — 37000009 HC ANESTHESIA EA ADD 15 MINS: Performed by: ORTHOPAEDIC SURGERY

## 2021-01-21 PROCEDURE — 25000003 PHARM REV CODE 250: Performed by: ORTHOPAEDIC SURGERY

## 2021-01-21 PROCEDURE — 26055 PR INCISE FINGER TENDON SHEATH: ICD-10-PCS | Mod: 51,F8,, | Performed by: ORTHOPAEDIC SURGERY

## 2021-01-21 PROCEDURE — 36000706: Performed by: ORTHOPAEDIC SURGERY

## 2021-01-21 PROCEDURE — 37000008 HC ANESTHESIA 1ST 15 MINUTES: Performed by: ORTHOPAEDIC SURGERY

## 2021-01-21 PROCEDURE — 71000033 HC RECOVERY, INTIAL HOUR: Performed by: ORTHOPAEDIC SURGERY

## 2021-01-21 PROCEDURE — 25118 EXCISE WRIST TENDON SHEATH: CPT | Mod: RT,,, | Performed by: ORTHOPAEDIC SURGERY

## 2021-01-21 PROCEDURE — 87075 CULTR BACTERIA EXCEPT BLOOD: CPT

## 2021-01-21 PROCEDURE — D9220A PRA ANESTHESIA: ICD-10-PCS | Mod: ANES,,, | Performed by: ANESTHESIOLOGY

## 2021-01-21 PROCEDURE — 63600175 PHARM REV CODE 636 W HCPCS: Performed by: ANESTHESIOLOGY

## 2021-01-21 PROCEDURE — 87102 FUNGUS ISOLATION CULTURE: CPT

## 2021-01-21 PROCEDURE — 63600175 PHARM REV CODE 636 W HCPCS: Performed by: NURSE ANESTHETIST, CERTIFIED REGISTERED

## 2021-01-21 PROCEDURE — 25000003 PHARM REV CODE 250: Performed by: ANESTHESIOLOGY

## 2021-01-21 PROCEDURE — 71000015 HC POSTOP RECOV 1ST HR: Performed by: ORTHOPAEDIC SURGERY

## 2021-01-21 PROCEDURE — 87206 SMEAR FLUORESCENT/ACID STAI: CPT

## 2021-01-21 PROCEDURE — 25118 PR EXCIS SYNOV WRIST,EXTENS TENDON: ICD-10-PCS | Mod: RT,,, | Performed by: ORTHOPAEDIC SURGERY

## 2021-01-21 PROCEDURE — 26055 INCISE FINGER TENDON SHEATH: CPT | Mod: 51,F8,, | Performed by: ORTHOPAEDIC SURGERY

## 2021-01-21 RX ORDER — MIDAZOLAM HYDROCHLORIDE 1 MG/ML
INJECTION INTRAMUSCULAR; INTRAVENOUS
Status: DISCONTINUED | OUTPATIENT
Start: 2021-01-21 | End: 2021-01-21

## 2021-01-21 RX ORDER — DEXAMETHASONE SODIUM PHOSPHATE 4 MG/ML
INJECTION, SOLUTION INTRA-ARTICULAR; INTRALESIONAL; INTRAMUSCULAR; INTRAVENOUS; SOFT TISSUE
Status: DISCONTINUED | OUTPATIENT
Start: 2021-01-21 | End: 2021-01-21

## 2021-01-21 RX ORDER — CHLORHEXIDINE GLUCONATE ORAL RINSE 1.2 MG/ML
10 SOLUTION DENTAL 2 TIMES DAILY
Status: DISCONTINUED | OUTPATIENT
Start: 2021-01-21 | End: 2021-01-21 | Stop reason: HOSPADM

## 2021-01-21 RX ORDER — HYDROCODONE BITARTRATE AND ACETAMINOPHEN 5; 325 MG/1; MG/1
1 TABLET ORAL EVERY 6 HOURS PRN
Qty: 15 TABLET | Refills: 0 | Status: SHIPPED | OUTPATIENT
Start: 2021-01-21 | End: 2021-03-11

## 2021-01-21 RX ORDER — LIDOCAINE HYDROCHLORIDE 20 MG/ML
INJECTION, SOLUTION EPIDURAL; INFILTRATION; INTRACAUDAL; PERINEURAL
Status: DISCONTINUED | OUTPATIENT
Start: 2021-01-21 | End: 2021-01-21 | Stop reason: HOSPADM

## 2021-01-21 RX ORDER — ONDANSETRON 2 MG/ML
INJECTION INTRAMUSCULAR; INTRAVENOUS
Status: DISCONTINUED | OUTPATIENT
Start: 2021-01-21 | End: 2021-01-21

## 2021-01-21 RX ORDER — SODIUM CHLORIDE, SODIUM LACTATE, POTASSIUM CHLORIDE, CALCIUM CHLORIDE 600; 310; 30; 20 MG/100ML; MG/100ML; MG/100ML; MG/100ML
INJECTION, SOLUTION INTRAVENOUS
Status: DISCONTINUED | OUTPATIENT
Start: 2021-01-21 | End: 2021-03-11

## 2021-01-21 RX ORDER — HYDROCODONE BITARTRATE AND ACETAMINOPHEN 5; 325 MG/1; MG/1
1 TABLET ORAL EVERY 4 HOURS PRN
Status: DISCONTINUED | OUTPATIENT
Start: 2021-01-21 | End: 2021-01-21 | Stop reason: HOSPADM

## 2021-01-21 RX ORDER — MEPERIDINE HYDROCHLORIDE 25 MG/ML
12.5 INJECTION INTRAMUSCULAR; INTRAVENOUS; SUBCUTANEOUS ONCE
Status: DISCONTINUED | OUTPATIENT
Start: 2021-01-21 | End: 2021-01-21 | Stop reason: HOSPADM

## 2021-01-21 RX ORDER — ALBUTEROL SULFATE 0.83 MG/ML
2.5 SOLUTION RESPIRATORY (INHALATION) EVERY 4 HOURS PRN
Status: DISCONTINUED | OUTPATIENT
Start: 2021-01-21 | End: 2021-01-21 | Stop reason: HOSPADM

## 2021-01-21 RX ORDER — FENTANYL CITRATE 50 UG/ML
25 INJECTION, SOLUTION INTRAMUSCULAR; INTRAVENOUS EVERY 5 MIN PRN
Status: DISCONTINUED | OUTPATIENT
Start: 2021-01-21 | End: 2021-01-21 | Stop reason: HOSPADM

## 2021-01-21 RX ORDER — CEFAZOLIN SODIUM 2 G/50ML
2 SOLUTION INTRAVENOUS
Status: COMPLETED | OUTPATIENT
Start: 2021-01-21 | End: 2021-01-21

## 2021-01-21 RX ORDER — SODIUM CHLORIDE, SODIUM LACTATE, POTASSIUM CHLORIDE, CALCIUM CHLORIDE 600; 310; 30; 20 MG/100ML; MG/100ML; MG/100ML; MG/100ML
INJECTION, SOLUTION INTRAVENOUS CONTINUOUS
Status: DISCONTINUED | OUTPATIENT
Start: 2021-01-21 | End: 2021-03-11

## 2021-01-21 RX ORDER — PROPOFOL 10 MG/ML
INJECTION, EMULSION INTRAVENOUS
Status: DISCONTINUED | OUTPATIENT
Start: 2021-01-21 | End: 2021-01-21

## 2021-01-21 RX ORDER — HYDROCODONE BITARTRATE AND ACETAMINOPHEN 5; 325 MG/1; MG/1
1 TABLET ORAL
Status: DISCONTINUED | OUTPATIENT
Start: 2021-01-21 | End: 2021-01-21 | Stop reason: HOSPADM

## 2021-01-21 RX ORDER — DIPHENHYDRAMINE HYDROCHLORIDE 50 MG/ML
25 INJECTION INTRAMUSCULAR; INTRAVENOUS EVERY 6 HOURS PRN
Status: DISCONTINUED | OUTPATIENT
Start: 2021-01-21 | End: 2021-01-21 | Stop reason: HOSPADM

## 2021-01-21 RX ORDER — FENTANYL CITRATE 50 UG/ML
INJECTION, SOLUTION INTRAMUSCULAR; INTRAVENOUS
Status: DISCONTINUED | OUTPATIENT
Start: 2021-01-21 | End: 2021-01-21

## 2021-01-21 RX ORDER — PROPOFOL 10 MG/ML
INJECTION, EMULSION INTRAVENOUS CONTINUOUS PRN
Status: DISCONTINUED | OUTPATIENT
Start: 2021-01-21 | End: 2021-01-21

## 2021-01-21 RX ORDER — LIDOCAINE HYDROCHLORIDE 20 MG/ML
INJECTION, SOLUTION INFILTRATION; PERINEURAL
Status: DISCONTINUED
Start: 2021-01-21 | End: 2021-01-21 | Stop reason: HOSPADM

## 2021-01-21 RX ORDER — ONDANSETRON 2 MG/ML
4 INJECTION INTRAMUSCULAR; INTRAVENOUS ONCE AS NEEDED
Status: DISCONTINUED | OUTPATIENT
Start: 2021-01-21 | End: 2021-01-21 | Stop reason: HOSPADM

## 2021-01-21 RX ADMIN — DEXAMETHASONE SODIUM PHOSPHATE 8 MG: 4 INJECTION, SOLUTION INTRA-ARTICULAR; INTRALESIONAL; INTRAMUSCULAR; INTRAVENOUS; SOFT TISSUE at 07:01

## 2021-01-21 RX ADMIN — ONDANSETRON 4 MG: 2 INJECTION, SOLUTION INTRAMUSCULAR; INTRAVENOUS at 07:01

## 2021-01-21 RX ADMIN — PROPOFOL 50 MG: 10 INJECTION, EMULSION INTRAVENOUS at 06:01

## 2021-01-21 RX ADMIN — SODIUM CHLORIDE, SODIUM LACTATE, POTASSIUM CHLORIDE, AND CALCIUM CHLORIDE: .6; .31; .03; .02 INJECTION, SOLUTION INTRAVENOUS at 06:01

## 2021-01-21 RX ADMIN — HYDROCODONE BITARTRATE AND ACETAMINOPHEN 1 TABLET: 5; 325 TABLET ORAL at 08:01

## 2021-01-21 RX ADMIN — MIDAZOLAM HYDROCHLORIDE 2 MG: 1 INJECTION INTRAMUSCULAR; INTRAVENOUS at 06:01

## 2021-01-21 RX ADMIN — CEFAZOLIN SODIUM 2 G: 2 SOLUTION INTRAVENOUS at 07:01

## 2021-01-21 RX ADMIN — PROPOFOL 50 MCG/KG/MIN: 10 INJECTION, EMULSION INTRAVENOUS at 07:01

## 2021-01-21 RX ADMIN — FENTANYL CITRATE 50 MCG: 50 INJECTION, SOLUTION INTRAMUSCULAR; INTRAVENOUS at 07:01

## 2021-01-21 RX ADMIN — FENTANYL CITRATE 50 MCG: 50 INJECTION, SOLUTION INTRAMUSCULAR; INTRAVENOUS at 06:01

## 2021-01-22 ENCOUNTER — TELEPHONE (OUTPATIENT)
Dept: ORTHOPEDICS | Facility: CLINIC | Age: 61
End: 2021-01-22

## 2021-01-22 ENCOUNTER — LAB VISIT (OUTPATIENT)
Dept: LAB | Facility: HOSPITAL | Age: 61
End: 2021-01-22
Attending: ORTHOPAEDIC SURGERY
Payer: COMMERCIAL

## 2021-01-22 ENCOUNTER — PATIENT MESSAGE (OUTPATIENT)
Dept: ORTHOPEDICS | Facility: CLINIC | Age: 61
End: 2021-01-22

## 2021-01-22 DIAGNOSIS — M25.50 ARTHRALGIA, UNSPECIFIED JOINT: ICD-10-CM

## 2021-01-22 LAB — ERYTHROCYTE [SEDIMENTATION RATE] IN BLOOD BY WESTERGREN METHOD: 17 MM/HR (ref 0–20)

## 2021-01-22 PROCEDURE — 85651 RBC SED RATE NONAUTOMATED: CPT

## 2021-01-22 PROCEDURE — 86235 NUCLEAR ANTIGEN ANTIBODY: CPT | Mod: 59

## 2021-01-22 PROCEDURE — 86431 RHEUMATOID FACTOR QUANT: CPT

## 2021-01-22 PROCEDURE — 86200 CCP ANTIBODY: CPT

## 2021-01-22 PROCEDURE — 36415 COLL VENOUS BLD VENIPUNCTURE: CPT | Mod: PO

## 2021-01-22 PROCEDURE — 80053 COMPREHEN METABOLIC PANEL: CPT

## 2021-01-22 PROCEDURE — 86140 C-REACTIVE PROTEIN: CPT

## 2021-01-22 PROCEDURE — 85025 COMPLETE CBC W/AUTO DIFF WBC: CPT

## 2021-01-22 PROCEDURE — 86235 NUCLEAR ANTIGEN ANTIBODY: CPT

## 2021-01-22 PROCEDURE — 86038 ANTINUCLEAR ANTIBODIES: CPT

## 2021-01-22 PROCEDURE — 86039 ANTINUCLEAR ANTIBODIES (ANA): CPT

## 2021-01-22 PROCEDURE — 86803 HEPATITIS C AB TEST: CPT

## 2021-01-23 LAB
ALBUMIN SERPL BCP-MCNC: 3.5 G/DL (ref 3.5–5.2)
ALP SERPL-CCNC: 77 U/L (ref 55–135)
ALT SERPL W/O P-5'-P-CCNC: 19 U/L (ref 10–44)
ANA PATTERN 1: NORMAL
ANA SER QL IF: POSITIVE
ANA TITR SER IF: NORMAL {TITER}
ANION GAP SERPL CALC-SCNC: 11 MMOL/L (ref 8–16)
AST SERPL-CCNC: 16 U/L (ref 10–40)
BASOPHILS # BLD AUTO: 0.02 K/UL (ref 0–0.2)
BASOPHILS NFR BLD: 0.2 % (ref 0–1.9)
BILIRUB SERPL-MCNC: 0.3 MG/DL (ref 0.1–1)
BUN SERPL-MCNC: 13 MG/DL (ref 8–23)
CALCIUM SERPL-MCNC: 9.8 MG/DL (ref 8.7–10.5)
CCP AB SER IA-ACNC: <0.5 U/ML
CHLORIDE SERPL-SCNC: 107 MMOL/L (ref 95–110)
CO2 SERPL-SCNC: 26 MMOL/L (ref 23–29)
CREAT SERPL-MCNC: 0.8 MG/DL (ref 0.5–1.4)
CRP SERPL-MCNC: 3.6 MG/L (ref 0–8.2)
DIFFERENTIAL METHOD: ABNORMAL
EOSINOPHIL # BLD AUTO: 0 K/UL (ref 0–0.5)
EOSINOPHIL NFR BLD: 0.4 % (ref 0–8)
ERYTHROCYTE [DISTWIDTH] IN BLOOD BY AUTOMATED COUNT: 15.4 % (ref 11.5–14.5)
EST. GFR  (AFRICAN AMERICAN): >60 ML/MIN/1.73 M^2
EST. GFR  (NON AFRICAN AMERICAN): >60 ML/MIN/1.73 M^2
GLUCOSE SERPL-MCNC: 125 MG/DL (ref 70–110)
HCT VFR BLD AUTO: 42.1 % (ref 37–48.5)
HGB BLD-MCNC: 12.6 G/DL (ref 12–16)
IMM GRANULOCYTES # BLD AUTO: 0.03 K/UL (ref 0–0.04)
IMM GRANULOCYTES NFR BLD AUTO: 0.3 % (ref 0–0.5)
LYMPHOCYTES # BLD AUTO: 2 K/UL (ref 1–4.8)
LYMPHOCYTES NFR BLD: 21 % (ref 18–48)
MCH RBC QN AUTO: 27.2 PG (ref 27–31)
MCHC RBC AUTO-ENTMCNC: 29.9 G/DL (ref 32–36)
MCV RBC AUTO: 91 FL (ref 82–98)
MONOCYTES # BLD AUTO: 0.5 K/UL (ref 0.3–1)
MONOCYTES NFR BLD: 5.1 % (ref 4–15)
NEUTROPHILS # BLD AUTO: 7 K/UL (ref 1.8–7.7)
NEUTROPHILS NFR BLD: 73 % (ref 38–73)
NRBC BLD-RTO: 0 /100 WBC
PLATELET # BLD AUTO: 255 K/UL (ref 150–350)
PMV BLD AUTO: 11.1 FL (ref 9.2–12.9)
POTASSIUM SERPL-SCNC: 3.8 MMOL/L (ref 3.5–5.1)
PROT SERPL-MCNC: 7.1 G/DL (ref 6–8.4)
RBC # BLD AUTO: 4.63 M/UL (ref 4–5.4)
SODIUM SERPL-SCNC: 144 MMOL/L (ref 136–145)
WBC # BLD AUTO: 9.61 K/UL (ref 3.9–12.7)

## 2021-01-25 ENCOUNTER — OFFICE VISIT (OUTPATIENT)
Dept: ORTHOPEDICS | Facility: CLINIC | Age: 61
End: 2021-01-25
Payer: COMMERCIAL

## 2021-01-25 ENCOUNTER — PATIENT MESSAGE (OUTPATIENT)
Dept: ORTHOPEDICS | Facility: CLINIC | Age: 61
End: 2021-01-25

## 2021-01-25 VITALS
SYSTOLIC BLOOD PRESSURE: 124 MMHG | WEIGHT: 227 LBS | HEART RATE: 67 BPM | BODY MASS INDEX: 41.77 KG/M2 | HEIGHT: 62 IN | DIASTOLIC BLOOD PRESSURE: 79 MMHG

## 2021-01-25 DIAGNOSIS — R76.8 ELEVATED ANTINUCLEAR ANTIBODY (ANA) LEVEL: ICD-10-CM

## 2021-01-25 DIAGNOSIS — M67.431 GANGLION OF RIGHT WRIST: ICD-10-CM

## 2021-01-25 DIAGNOSIS — M65.30 TRIGGER FINGER, ACQUIRED: Primary | ICD-10-CM

## 2021-01-25 LAB
ANTI SM ANTIBODY: 0.05 RATIO (ref 0–0.99)
ANTI SM/RNP ANTIBODY: 0.05 RATIO (ref 0–0.99)
ANTI-SM INTERPRETATION: NEGATIVE
ANTI-SM/RNP INTERPRETATION: NEGATIVE
ANTI-SSA ANTIBODY: 0.05 RATIO (ref 0–0.99)
ANTI-SSA ANTIBODY: 0.05 RATIO (ref 0–0.99)
ANTI-SSA INTERPRETATION: NEGATIVE
ANTI-SSA INTERPRETATION: NEGATIVE
ANTI-SSB ANTIBODY: 0.05 RATIO (ref 0–0.99)
ANTI-SSB INTERPRETATION: NEGATIVE
DSDNA AB SER-ACNC: NORMAL [IU]/ML
HCV AB SERPL QL IA: NEGATIVE
RHEUMATOID FACT SERPL-ACNC: <10 IU/ML (ref 0–15)

## 2021-01-25 PROCEDURE — 99999 PR PBB SHADOW E&M-EST. PATIENT-LVL V: ICD-10-PCS | Mod: PBBFAC,,, | Performed by: PHYSICIAN ASSISTANT

## 2021-01-25 PROCEDURE — 3008F PR BODY MASS INDEX (BMI) DOCUMENTED: ICD-10-PCS | Mod: CPTII,S$GLB,, | Performed by: PHYSICIAN ASSISTANT

## 2021-01-25 PROCEDURE — 99999 PR PBB SHADOW E&M-EST. PATIENT-LVL V: CPT | Mod: PBBFAC,,, | Performed by: PHYSICIAN ASSISTANT

## 2021-01-25 PROCEDURE — 1126F PR PAIN SEVERITY QUANTIFIED, NO PAIN PRESENT: ICD-10-PCS | Mod: S$GLB,,, | Performed by: PHYSICIAN ASSISTANT

## 2021-01-25 PROCEDURE — 99024 POSTOP FOLLOW-UP VISIT: CPT | Mod: S$GLB,,, | Performed by: PHYSICIAN ASSISTANT

## 2021-01-25 PROCEDURE — 3008F BODY MASS INDEX DOCD: CPT | Mod: CPTII,S$GLB,, | Performed by: PHYSICIAN ASSISTANT

## 2021-01-25 PROCEDURE — 1126F AMNT PAIN NOTED NONE PRSNT: CPT | Mod: S$GLB,,, | Performed by: PHYSICIAN ASSISTANT

## 2021-01-25 PROCEDURE — 99024 PR POST-OP FOLLOW-UP VISIT: ICD-10-PCS | Mod: S$GLB,,, | Performed by: PHYSICIAN ASSISTANT

## 2021-01-26 ENCOUNTER — PATIENT MESSAGE (OUTPATIENT)
Dept: FAMILY MEDICINE | Facility: CLINIC | Age: 61
End: 2021-01-26

## 2021-01-29 ENCOUNTER — OFFICE VISIT (OUTPATIENT)
Dept: OPHTHALMOLOGY | Facility: CLINIC | Age: 61
End: 2021-01-29
Payer: COMMERCIAL

## 2021-01-29 DIAGNOSIS — H52.7 REFRACTIVE ERROR: ICD-10-CM

## 2021-01-29 DIAGNOSIS — H40.1132 PRIMARY OPEN ANGLE GLAUCOMA (POAG) OF BOTH EYES, MODERATE STAGE: Primary | ICD-10-CM

## 2021-01-29 LAB — BACTERIA SPEC ANAEROBE CULT: NORMAL

## 2021-01-29 PROCEDURE — 99999 PR PBB SHADOW E&M-EST. PATIENT-LVL III: ICD-10-PCS | Mod: PBBFAC,,, | Performed by: OPHTHALMOLOGY

## 2021-01-29 PROCEDURE — 92015 DETERMINE REFRACTIVE STATE: CPT | Mod: S$GLB,,, | Performed by: OPHTHALMOLOGY

## 2021-01-29 PROCEDURE — 92015 PR REFRACTION: ICD-10-PCS | Mod: S$GLB,,, | Performed by: OPHTHALMOLOGY

## 2021-01-29 PROCEDURE — 99999 PR PBB SHADOW E&M-EST. PATIENT-LVL III: CPT | Mod: PBBFAC,,, | Performed by: OPHTHALMOLOGY

## 2021-01-29 PROCEDURE — 92012 PR EYE EXAM, EST PATIENT,INTERMED: ICD-10-PCS | Mod: S$GLB,,, | Performed by: OPHTHALMOLOGY

## 2021-01-29 PROCEDURE — 92012 INTRM OPH EXAM EST PATIENT: CPT | Mod: S$GLB,,, | Performed by: OPHTHALMOLOGY

## 2021-02-04 ENCOUNTER — OFFICE VISIT (OUTPATIENT)
Dept: ORTHOPEDICS | Facility: CLINIC | Age: 61
End: 2021-02-04
Payer: COMMERCIAL

## 2021-02-04 VITALS
HEART RATE: 77 BPM | BODY MASS INDEX: 41.78 KG/M2 | WEIGHT: 227.06 LBS | SYSTOLIC BLOOD PRESSURE: 127 MMHG | HEIGHT: 62 IN | TEMPERATURE: 99 F | DIASTOLIC BLOOD PRESSURE: 79 MMHG

## 2021-02-04 DIAGNOSIS — I10 ESSENTIAL HYPERTENSION: ICD-10-CM

## 2021-02-04 DIAGNOSIS — M67.431 GANGLION OF RIGHT WRIST: ICD-10-CM

## 2021-02-04 DIAGNOSIS — M65.30 TRIGGER FINGER, ACQUIRED: Primary | ICD-10-CM

## 2021-02-04 DIAGNOSIS — Z09 POSTOP CHECK: ICD-10-CM

## 2021-02-04 PROCEDURE — 99999 PR PBB SHADOW E&M-EST. PATIENT-LVL IV: ICD-10-PCS | Mod: PBBFAC,,, | Performed by: PHYSICIAN ASSISTANT

## 2021-02-04 PROCEDURE — 1126F AMNT PAIN NOTED NONE PRSNT: CPT | Mod: S$GLB,,, | Performed by: PHYSICIAN ASSISTANT

## 2021-02-04 PROCEDURE — 99024 POSTOP FOLLOW-UP VISIT: CPT | Mod: S$GLB,,, | Performed by: PHYSICIAN ASSISTANT

## 2021-02-04 PROCEDURE — 99999 PR PBB SHADOW E&M-EST. PATIENT-LVL IV: CPT | Mod: PBBFAC,,, | Performed by: PHYSICIAN ASSISTANT

## 2021-02-04 PROCEDURE — 99024 PR POST-OP FOLLOW-UP VISIT: ICD-10-PCS | Mod: S$GLB,,, | Performed by: PHYSICIAN ASSISTANT

## 2021-02-04 PROCEDURE — 1126F PR PAIN SEVERITY QUANTIFIED, NO PAIN PRESENT: ICD-10-PCS | Mod: S$GLB,,, | Performed by: PHYSICIAN ASSISTANT

## 2021-02-04 PROCEDURE — 3008F PR BODY MASS INDEX (BMI) DOCUMENTED: ICD-10-PCS | Mod: CPTII,S$GLB,, | Performed by: PHYSICIAN ASSISTANT

## 2021-02-04 PROCEDURE — 3008F BODY MASS INDEX DOCD: CPT | Mod: CPTII,S$GLB,, | Performed by: PHYSICIAN ASSISTANT

## 2021-02-04 RX ORDER — SPIRONOLACTONE 25 MG/1
TABLET ORAL
Qty: 30 TABLET | Refills: 0 | Status: SHIPPED | OUTPATIENT
Start: 2021-02-04 | End: 2021-03-03

## 2021-02-11 ENCOUNTER — TELEPHONE (OUTPATIENT)
Dept: RHEUMATOLOGY | Facility: CLINIC | Age: 61
End: 2021-02-11

## 2021-02-12 ENCOUNTER — OFFICE VISIT (OUTPATIENT)
Dept: RHEUMATOLOGY | Facility: CLINIC | Age: 61
End: 2021-02-12
Payer: COMMERCIAL

## 2021-02-12 VITALS
HEART RATE: 62 BPM | DIASTOLIC BLOOD PRESSURE: 85 MMHG | WEIGHT: 233.94 LBS | SYSTOLIC BLOOD PRESSURE: 119 MMHG | BODY MASS INDEX: 42.78 KG/M2

## 2021-02-12 DIAGNOSIS — M15.9 PRIMARY OSTEOARTHRITIS INVOLVING MULTIPLE JOINTS: ICD-10-CM

## 2021-02-12 DIAGNOSIS — R76.8 ELEVATED ANTINUCLEAR ANTIBODY (ANA) LEVEL: Primary | ICD-10-CM

## 2021-02-12 DIAGNOSIS — Z71.89 COUNSELING ON HEALTH PROMOTION AND DISEASE PREVENTION: ICD-10-CM

## 2021-02-12 PROCEDURE — 99244 OFF/OP CNSLTJ NEW/EST MOD 40: CPT | Mod: S$GLB,,, | Performed by: INTERNAL MEDICINE

## 2021-02-12 PROCEDURE — 3008F PR BODY MASS INDEX (BMI) DOCUMENTED: ICD-10-PCS | Mod: CPTII,S$GLB,, | Performed by: INTERNAL MEDICINE

## 2021-02-12 PROCEDURE — 99999 PR PBB SHADOW E&M-EST. PATIENT-LVL III: ICD-10-PCS | Mod: PBBFAC,,, | Performed by: INTERNAL MEDICINE

## 2021-02-12 PROCEDURE — 1126F AMNT PAIN NOTED NONE PRSNT: CPT | Mod: S$GLB,,, | Performed by: INTERNAL MEDICINE

## 2021-02-12 PROCEDURE — 1126F PR PAIN SEVERITY QUANTIFIED, NO PAIN PRESENT: ICD-10-PCS | Mod: S$GLB,,, | Performed by: INTERNAL MEDICINE

## 2021-02-12 PROCEDURE — 99999 PR PBB SHADOW E&M-EST. PATIENT-LVL III: CPT | Mod: PBBFAC,,, | Performed by: INTERNAL MEDICINE

## 2021-02-12 PROCEDURE — 99244 PR OFFICE CONSULTATION,LEVEL IV: ICD-10-PCS | Mod: S$GLB,,, | Performed by: INTERNAL MEDICINE

## 2021-02-12 PROCEDURE — 3008F BODY MASS INDEX DOCD: CPT | Mod: CPTII,S$GLB,, | Performed by: INTERNAL MEDICINE

## 2021-02-24 LAB — FUNGUS SPEC CULT: NORMAL

## 2021-03-03 DIAGNOSIS — I10 ESSENTIAL HYPERTENSION: ICD-10-CM

## 2021-03-03 RX ORDER — SPIRONOLACTONE 25 MG/1
TABLET ORAL
Qty: 30 TABLET | Refills: 0 | Status: SHIPPED | OUTPATIENT
Start: 2021-03-03 | End: 2021-03-29

## 2021-03-11 ENCOUNTER — LAB VISIT (OUTPATIENT)
Dept: LAB | Facility: HOSPITAL | Age: 61
End: 2021-03-11
Attending: FAMILY MEDICINE
Payer: COMMERCIAL

## 2021-03-11 ENCOUNTER — OFFICE VISIT (OUTPATIENT)
Dept: FAMILY MEDICINE | Facility: CLINIC | Age: 61
End: 2021-03-11
Payer: COMMERCIAL

## 2021-03-11 ENCOUNTER — PATIENT MESSAGE (OUTPATIENT)
Dept: FAMILY MEDICINE | Facility: CLINIC | Age: 61
End: 2021-03-11

## 2021-03-11 ENCOUNTER — PATIENT MESSAGE (OUTPATIENT)
Dept: ORTHOPEDICS | Facility: CLINIC | Age: 61
End: 2021-03-11

## 2021-03-11 VITALS
HEART RATE: 66 BPM | OXYGEN SATURATION: 97 % | SYSTOLIC BLOOD PRESSURE: 127 MMHG | HEIGHT: 62 IN | BODY MASS INDEX: 42.55 KG/M2 | TEMPERATURE: 98 F | WEIGHT: 231.25 LBS | RESPIRATION RATE: 15 BRPM | DIASTOLIC BLOOD PRESSURE: 84 MMHG

## 2021-03-11 DIAGNOSIS — R73.9 ELEVATED BLOOD SUGAR: ICD-10-CM

## 2021-03-11 DIAGNOSIS — I10 ESSENTIAL HYPERTENSION: Chronic | ICD-10-CM

## 2021-03-11 DIAGNOSIS — J45.20 MILD INTERMITTENT ASTHMA WITHOUT COMPLICATION: ICD-10-CM

## 2021-03-11 DIAGNOSIS — Z00.00 PREVENTATIVE HEALTH CARE: ICD-10-CM

## 2021-03-11 DIAGNOSIS — Z00.00 PREVENTATIVE HEALTH CARE: Primary | ICD-10-CM

## 2021-03-11 DIAGNOSIS — Z12.31 ENCOUNTER FOR SCREENING MAMMOGRAM FOR MALIGNANT NEOPLASM OF BREAST: ICD-10-CM

## 2021-03-11 LAB
ESTIMATED AVG GLUCOSE: 123 MG/DL (ref 68–131)
HBA1C MFR BLD: 5.9 % (ref 4–5.6)
TSH SERPL DL<=0.005 MIU/L-ACNC: 1.8 UIU/ML (ref 0.4–4)

## 2021-03-11 PROCEDURE — 83036 HEMOGLOBIN GLYCOSYLATED A1C: CPT | Performed by: FAMILY MEDICINE

## 2021-03-11 PROCEDURE — 99396 PR PREVENTIVE VISIT,EST,40-64: ICD-10-PCS | Mod: S$GLB,,, | Performed by: FAMILY MEDICINE

## 2021-03-11 PROCEDURE — 3079F PR MOST RECENT DIASTOLIC BLOOD PRESSURE 80-89 MM HG: ICD-10-PCS | Mod: CPTII,S$GLB,, | Performed by: FAMILY MEDICINE

## 2021-03-11 PROCEDURE — 99999 PR PBB SHADOW E&M-EST. PATIENT-LVL IV: CPT | Mod: PBBFAC,,, | Performed by: FAMILY MEDICINE

## 2021-03-11 PROCEDURE — 84443 ASSAY THYROID STIM HORMONE: CPT | Performed by: FAMILY MEDICINE

## 2021-03-11 PROCEDURE — 1126F AMNT PAIN NOTED NONE PRSNT: CPT | Mod: S$GLB,,, | Performed by: FAMILY MEDICINE

## 2021-03-11 PROCEDURE — 3008F PR BODY MASS INDEX (BMI) DOCUMENTED: ICD-10-PCS | Mod: CPTII,S$GLB,, | Performed by: FAMILY MEDICINE

## 2021-03-11 PROCEDURE — 99396 PREV VISIT EST AGE 40-64: CPT | Mod: S$GLB,,, | Performed by: FAMILY MEDICINE

## 2021-03-11 PROCEDURE — 3074F PR MOST RECENT SYSTOLIC BLOOD PRESSURE < 130 MM HG: ICD-10-PCS | Mod: CPTII,S$GLB,, | Performed by: FAMILY MEDICINE

## 2021-03-11 PROCEDURE — 99999 PR PBB SHADOW E&M-EST. PATIENT-LVL IV: ICD-10-PCS | Mod: PBBFAC,,, | Performed by: FAMILY MEDICINE

## 2021-03-11 PROCEDURE — 3074F SYST BP LT 130 MM HG: CPT | Mod: CPTII,S$GLB,, | Performed by: FAMILY MEDICINE

## 2021-03-11 PROCEDURE — 1126F PR PAIN SEVERITY QUANTIFIED, NO PAIN PRESENT: ICD-10-PCS | Mod: S$GLB,,, | Performed by: FAMILY MEDICINE

## 2021-03-11 PROCEDURE — 3079F DIAST BP 80-89 MM HG: CPT | Mod: CPTII,S$GLB,, | Performed by: FAMILY MEDICINE

## 2021-03-11 PROCEDURE — 3008F BODY MASS INDEX DOCD: CPT | Mod: CPTII,S$GLB,, | Performed by: FAMILY MEDICINE

## 2021-03-11 PROCEDURE — 36415 COLL VENOUS BLD VENIPUNCTURE: CPT | Mod: PO | Performed by: FAMILY MEDICINE

## 2021-03-11 RX ORDER — BUPROPION HYDROCHLORIDE 100 MG/1
100 TABLET ORAL DAILY
Qty: 30 TABLET | Refills: 5 | Status: SHIPPED | OUTPATIENT
Start: 2021-03-11 | End: 2021-09-10

## 2021-03-12 ENCOUNTER — HOSPITAL ENCOUNTER (OUTPATIENT)
Dept: RADIOLOGY | Facility: HOSPITAL | Age: 61
Discharge: HOME | End: 2021-03-12
Attending: FAMILY MEDICINE
Payer: COMMERCIAL

## 2021-03-12 DIAGNOSIS — Z12.31 ENCOUNTER FOR SCREENING MAMMOGRAM FOR MALIGNANT NEOPLASM OF BREAST: ICD-10-CM

## 2021-03-12 PROCEDURE — 77067 MAMMO DIGITAL SCREENING BILAT WITH TOMO: ICD-10-PCS | Mod: 26,,, | Performed by: RADIOLOGY

## 2021-03-12 PROCEDURE — 77067 SCR MAMMO BI INCL CAD: CPT | Mod: TC

## 2021-03-12 PROCEDURE — 77063 BREAST TOMOSYNTHESIS BI: CPT | Mod: 26,,, | Performed by: RADIOLOGY

## 2021-03-12 PROCEDURE — 77063 MAMMO DIGITAL SCREENING BILAT WITH TOMO: ICD-10-PCS | Mod: 26,,, | Performed by: RADIOLOGY

## 2021-03-12 PROCEDURE — 77067 SCR MAMMO BI INCL CAD: CPT | Mod: 26,,, | Performed by: RADIOLOGY

## 2021-03-24 ENCOUNTER — OFFICE VISIT (OUTPATIENT)
Dept: DERMATOLOGY | Facility: CLINIC | Age: 61
End: 2021-03-24
Payer: COMMERCIAL

## 2021-03-24 DIAGNOSIS — L70.0 ACNE VULGARIS: Primary | ICD-10-CM

## 2021-03-24 PROCEDURE — 99999 PR PBB SHADOW E&M-EST. PATIENT-LVL III: ICD-10-PCS | Mod: PBBFAC,,, | Performed by: STUDENT IN AN ORGANIZED HEALTH CARE EDUCATION/TRAINING PROGRAM

## 2021-03-24 PROCEDURE — 1126F PR PAIN SEVERITY QUANTIFIED, NO PAIN PRESENT: ICD-10-PCS | Mod: S$GLB,,, | Performed by: STUDENT IN AN ORGANIZED HEALTH CARE EDUCATION/TRAINING PROGRAM

## 2021-03-24 PROCEDURE — 99202 OFFICE O/P NEW SF 15 MIN: CPT | Mod: S$GLB,,, | Performed by: STUDENT IN AN ORGANIZED HEALTH CARE EDUCATION/TRAINING PROGRAM

## 2021-03-24 PROCEDURE — 99999 PR PBB SHADOW E&M-EST. PATIENT-LVL III: CPT | Mod: PBBFAC,,, | Performed by: STUDENT IN AN ORGANIZED HEALTH CARE EDUCATION/TRAINING PROGRAM

## 2021-03-24 PROCEDURE — 1126F AMNT PAIN NOTED NONE PRSNT: CPT | Mod: S$GLB,,, | Performed by: STUDENT IN AN ORGANIZED HEALTH CARE EDUCATION/TRAINING PROGRAM

## 2021-03-24 PROCEDURE — 99202 PR OFFICE/OUTPT VISIT, NEW, LEVL II, 15-29 MIN: ICD-10-PCS | Mod: S$GLB,,, | Performed by: STUDENT IN AN ORGANIZED HEALTH CARE EDUCATION/TRAINING PROGRAM

## 2021-03-24 RX ORDER — CLINDAMYCIN PHOSPHATE 10 MG/ML
SOLUTION TOPICAL 2 TIMES DAILY
Qty: 60 EACH | Refills: 1 | Status: SHIPPED | OUTPATIENT
Start: 2021-03-24 | End: 2021-05-05 | Stop reason: SDUPTHER

## 2021-03-24 RX ORDER — DOXYCYCLINE HYCLATE 100 MG
100 TABLET ORAL DAILY
Qty: 30 TABLET | Refills: 0 | Status: SHIPPED | OUTPATIENT
Start: 2021-03-24 | End: 2021-05-05 | Stop reason: SDUPTHER

## 2021-03-25 LAB
ACID FAST MOD KINY STN SPEC: NORMAL
MYCOBACTERIUM SPEC QL CULT: NORMAL

## 2021-03-29 DIAGNOSIS — I10 ESSENTIAL HYPERTENSION: ICD-10-CM

## 2021-03-29 RX ORDER — SPIRONOLACTONE 25 MG/1
TABLET ORAL
Qty: 30 TABLET | Refills: 11 | Status: SHIPPED | OUTPATIENT
Start: 2021-03-29 | End: 2022-01-20

## 2021-05-03 ENCOUNTER — OFFICE VISIT (OUTPATIENT)
Dept: OPHTHALMOLOGY | Facility: CLINIC | Age: 61
End: 2021-05-03
Payer: COMMERCIAL

## 2021-05-03 DIAGNOSIS — H35.81 MACULAR EDEMA: ICD-10-CM

## 2021-05-03 DIAGNOSIS — H40.1132 PRIMARY OPEN ANGLE GLAUCOMA (POAG) OF BOTH EYES, MODERATE STAGE: Primary | ICD-10-CM

## 2021-05-03 DIAGNOSIS — H25.043 POSTERIOR SUBCAPSULAR AGE-RELATED CATARACT OF BOTH EYES: ICD-10-CM

## 2021-05-03 PROCEDURE — 99214 OFFICE O/P EST MOD 30 MIN: CPT | Mod: S$GLB,,, | Performed by: OPHTHALMOLOGY

## 2021-05-03 PROCEDURE — 99214 PR OFFICE/OUTPT VISIT, EST, LEVL IV, 30-39 MIN: ICD-10-PCS | Mod: S$GLB,,, | Performed by: OPHTHALMOLOGY

## 2021-05-03 PROCEDURE — 92133 CPTRZD OPH DX IMG PST SGM ON: CPT | Mod: S$GLB,,, | Performed by: OPHTHALMOLOGY

## 2021-05-03 PROCEDURE — 99999 PR PBB SHADOW E&M-EST. PATIENT-LVL III: CPT | Mod: PBBFAC,,, | Performed by: OPHTHALMOLOGY

## 2021-05-03 PROCEDURE — 92136 OPHTHALMIC BIOMETRY: CPT | Mod: RT,S$GLB,, | Performed by: OPHTHALMOLOGY

## 2021-05-03 PROCEDURE — 99999 PR PBB SHADOW E&M-EST. PATIENT-LVL III: ICD-10-PCS | Mod: PBBFAC,,, | Performed by: OPHTHALMOLOGY

## 2021-05-03 PROCEDURE — 92136 IOL MASTER - OD - RIGHT EYE: ICD-10-PCS | Mod: RT,S$GLB,, | Performed by: OPHTHALMOLOGY

## 2021-05-03 PROCEDURE — 92133 POSTERIOR SEGMENT OCT OPTIC NERVE(OCULAR COHERENCE TOMOGRAPHY) - OU - BOTH EYES: ICD-10-PCS | Mod: S$GLB,,, | Performed by: OPHTHALMOLOGY

## 2021-05-03 RX ORDER — GATIFLOXACIN 5 MG/ML
1 SOLUTION/ DROPS OPHTHALMIC 2 TIMES DAILY
Qty: 1 BOTTLE | Refills: 2 | Status: SHIPPED | OUTPATIENT
Start: 2021-05-03 | End: 2022-03-21 | Stop reason: SDUPTHER

## 2021-05-03 RX ORDER — KETOROLAC TROMETHAMINE 5 MG/ML
1 SOLUTION OPHTHALMIC 4 TIMES DAILY
Qty: 1 BOTTLE | Refills: 2 | Status: SHIPPED | OUTPATIENT
Start: 2021-05-03 | End: 2021-12-08 | Stop reason: SDUPTHER

## 2021-05-03 RX ORDER — PREDNISOLONE ACETATE 10 MG/ML
1 SUSPENSION/ DROPS OPHTHALMIC 4 TIMES DAILY
Qty: 1 BOTTLE | Refills: 2 | Status: SHIPPED | OUTPATIENT
Start: 2021-05-03 | End: 2021-12-08 | Stop reason: SDUPTHER

## 2021-05-04 ENCOUNTER — PATIENT OUTREACH (OUTPATIENT)
Dept: ADMINISTRATIVE | Facility: HOSPITAL | Age: 61
End: 2021-05-04

## 2021-05-05 ENCOUNTER — OFFICE VISIT (OUTPATIENT)
Dept: DERMATOLOGY | Facility: CLINIC | Age: 61
End: 2021-05-05
Payer: COMMERCIAL

## 2021-05-05 DIAGNOSIS — L70.0 ACNE VULGARIS: ICD-10-CM

## 2021-05-05 PROCEDURE — 99214 PR OFFICE/OUTPT VISIT, EST, LEVL IV, 30-39 MIN: ICD-10-PCS | Mod: S$GLB,,, | Performed by: STUDENT IN AN ORGANIZED HEALTH CARE EDUCATION/TRAINING PROGRAM

## 2021-05-05 PROCEDURE — 99214 OFFICE O/P EST MOD 30 MIN: CPT | Mod: S$GLB,,, | Performed by: STUDENT IN AN ORGANIZED HEALTH CARE EDUCATION/TRAINING PROGRAM

## 2021-05-05 PROCEDURE — 1126F PR PAIN SEVERITY QUANTIFIED, NO PAIN PRESENT: ICD-10-PCS | Mod: S$GLB,,, | Performed by: STUDENT IN AN ORGANIZED HEALTH CARE EDUCATION/TRAINING PROGRAM

## 2021-05-05 PROCEDURE — 99999 PR PBB SHADOW E&M-EST. PATIENT-LVL III: CPT | Mod: PBBFAC,,, | Performed by: STUDENT IN AN ORGANIZED HEALTH CARE EDUCATION/TRAINING PROGRAM

## 2021-05-05 PROCEDURE — 1126F AMNT PAIN NOTED NONE PRSNT: CPT | Mod: S$GLB,,, | Performed by: STUDENT IN AN ORGANIZED HEALTH CARE EDUCATION/TRAINING PROGRAM

## 2021-05-05 PROCEDURE — 99999 PR PBB SHADOW E&M-EST. PATIENT-LVL III: ICD-10-PCS | Mod: PBBFAC,,, | Performed by: STUDENT IN AN ORGANIZED HEALTH CARE EDUCATION/TRAINING PROGRAM

## 2021-05-05 RX ORDER — DOXYCYCLINE HYCLATE 100 MG
100 TABLET ORAL DAILY
Qty: 30 TABLET | Refills: 0 | Status: SHIPPED | OUTPATIENT
Start: 2021-05-05 | End: 2022-02-24

## 2021-05-05 RX ORDER — TRETINOIN 0.5 MG/G
CREAM TOPICAL NIGHTLY
Qty: 45 G | Refills: 3 | Status: SHIPPED | OUTPATIENT
Start: 2021-05-05

## 2021-05-05 RX ORDER — CLINDAMYCIN PHOSPHATE 10 MG/ML
SOLUTION TOPICAL 2 TIMES DAILY
Qty: 60 EACH | Refills: 1 | Status: SHIPPED | OUTPATIENT
Start: 2021-05-05 | End: 2021-08-09 | Stop reason: SDUPTHER

## 2021-05-08 ENCOUNTER — PATIENT MESSAGE (OUTPATIENT)
Dept: ADMINISTRATIVE | Facility: OTHER | Age: 61
End: 2021-05-08

## 2021-05-10 ENCOUNTER — OFFICE VISIT (OUTPATIENT)
Dept: OPHTHALMOLOGY | Facility: CLINIC | Age: 61
End: 2021-05-10
Payer: COMMERCIAL

## 2021-05-10 DIAGNOSIS — H40.1132 PRIMARY OPEN ANGLE GLAUCOMA (POAG) OF BOTH EYES, MODERATE STAGE: ICD-10-CM

## 2021-05-10 DIAGNOSIS — H35.81 MACULAR EDEMA: Primary | ICD-10-CM

## 2021-05-10 DIAGNOSIS — H25.043 POSTERIOR SUBCAPSULAR AGE-RELATED CATARACT OF BOTH EYES: ICD-10-CM

## 2021-05-10 PROCEDURE — 99999 PR PBB SHADOW E&M-EST. PATIENT-LVL III: CPT | Mod: PBBFAC,,, | Performed by: OPHTHALMOLOGY

## 2021-05-10 PROCEDURE — 99212 PR OFFICE/OUTPT VISIT, EST, LEVL II, 10-19 MIN: ICD-10-PCS | Mod: S$GLB,,, | Performed by: OPHTHALMOLOGY

## 2021-05-10 PROCEDURE — 99212 OFFICE O/P EST SF 10 MIN: CPT | Mod: S$GLB,,, | Performed by: OPHTHALMOLOGY

## 2021-05-10 PROCEDURE — 99999 PR PBB SHADOW E&M-EST. PATIENT-LVL III: ICD-10-PCS | Mod: PBBFAC,,, | Performed by: OPHTHALMOLOGY

## 2021-05-10 PROCEDURE — 1126F AMNT PAIN NOTED NONE PRSNT: CPT | Mod: S$GLB,,, | Performed by: OPHTHALMOLOGY

## 2021-05-10 PROCEDURE — 1126F PR PAIN SEVERITY QUANTIFIED, NO PAIN PRESENT: ICD-10-PCS | Mod: S$GLB,,, | Performed by: OPHTHALMOLOGY

## 2021-05-12 ENCOUNTER — TELEPHONE (OUTPATIENT)
Dept: PHARMACY | Facility: CLINIC | Age: 61
End: 2021-05-12

## 2021-05-12 ENCOUNTER — PATIENT MESSAGE (OUTPATIENT)
Dept: PHARMACY | Facility: CLINIC | Age: 61
End: 2021-05-12

## 2021-05-17 ENCOUNTER — PATIENT MESSAGE (OUTPATIENT)
Dept: ORTHOPEDICS | Facility: CLINIC | Age: 61
End: 2021-05-17

## 2021-05-26 ENCOUNTER — PROCEDURE VISIT (OUTPATIENT)
Dept: OPHTHALMOLOGY | Facility: CLINIC | Age: 61
End: 2021-05-26
Payer: COMMERCIAL

## 2021-05-26 DIAGNOSIS — H35.81 MACULAR EDEMA: Primary | ICD-10-CM

## 2021-05-26 PROCEDURE — 99499 UNLISTED E&M SERVICE: CPT | Mod: S$GLB,,, | Performed by: OPHTHALMOLOGY

## 2021-05-26 PROCEDURE — 92134 POSTERIOR SEGMENT OCT RETINA (OCULAR COHERENCE TOMOGRAPHY)-BOTH EYES: ICD-10-PCS | Mod: S$GLB,,, | Performed by: OPHTHALMOLOGY

## 2021-05-26 PROCEDURE — 67028 INJECTION EYE DRUG: CPT | Mod: RT,S$GLB,, | Performed by: OPHTHALMOLOGY

## 2021-05-26 PROCEDURE — 92134 CPTRZ OPH DX IMG PST SGM RTA: CPT | Mod: S$GLB,,, | Performed by: OPHTHALMOLOGY

## 2021-05-26 PROCEDURE — 67028 PR INJECT INTRAVITREAL PHARMCOLOGIC: ICD-10-PCS | Mod: RT,S$GLB,, | Performed by: OPHTHALMOLOGY

## 2021-05-26 PROCEDURE — 99499 NO LOS: ICD-10-PCS | Mod: S$GLB,,, | Performed by: OPHTHALMOLOGY

## 2021-05-26 RX ORDER — VALACYCLOVIR HYDROCHLORIDE 1 G/1
2000 TABLET, FILM COATED ORAL EVERY 12 HOURS
Qty: 4 TABLET | Refills: 9 | Status: SHIPPED | OUTPATIENT
Start: 2021-05-26 | End: 2023-01-13 | Stop reason: SDUPTHER

## 2021-05-26 RX ADMIN — Medication 1.25 MG: at 08:05

## 2021-06-24 ENCOUNTER — PROCEDURE VISIT (OUTPATIENT)
Dept: OPHTHALMOLOGY | Facility: CLINIC | Age: 61
End: 2021-06-24
Payer: COMMERCIAL

## 2021-06-24 DIAGNOSIS — H35.81 MACULAR EDEMA: Primary | ICD-10-CM

## 2021-06-24 PROCEDURE — 99213 OFFICE O/P EST LOW 20 MIN: CPT | Mod: S$GLB,,, | Performed by: OPHTHALMOLOGY

## 2021-06-24 PROCEDURE — 99213 PR OFFICE/OUTPT VISIT, EST, LEVL III, 20-29 MIN: ICD-10-PCS | Mod: S$GLB,,, | Performed by: OPHTHALMOLOGY

## 2021-06-24 PROCEDURE — 92134 CPTRZ OPH DX IMG PST SGM RTA: CPT | Mod: S$GLB,,, | Performed by: OPHTHALMOLOGY

## 2021-06-24 PROCEDURE — 92134 POSTERIOR SEGMENT OCT RETINA (OCULAR COHERENCE TOMOGRAPHY)-BOTH EYES: ICD-10-PCS | Mod: S$GLB,,, | Performed by: OPHTHALMOLOGY

## 2021-06-29 DIAGNOSIS — H40.1131 PRIMARY OPEN ANGLE GLAUCOMA OF BOTH EYES, MILD STAGE: ICD-10-CM

## 2021-06-29 RX ORDER — DORZOLAMIDE HYDROCHLORIDE AND TIMOLOL MALEATE 20; 5 MG/ML; MG/ML
1 SOLUTION/ DROPS OPHTHALMIC EVERY 12 HOURS
Qty: 10 ML | Refills: 12 | Status: SHIPPED | OUTPATIENT
Start: 2021-06-29 | End: 2022-03-07

## 2021-07-12 ENCOUNTER — PATIENT MESSAGE (OUTPATIENT)
Dept: OPHTHALMOLOGY | Facility: CLINIC | Age: 61
End: 2021-07-12

## 2021-07-21 ENCOUNTER — PROCEDURE VISIT (OUTPATIENT)
Dept: OPHTHALMOLOGY | Facility: CLINIC | Age: 61
End: 2021-07-21
Payer: COMMERCIAL

## 2021-07-21 DIAGNOSIS — H35.81 MACULAR EDEMA: Primary | ICD-10-CM

## 2021-07-21 PROCEDURE — 67028 INJECTION EYE DRUG: CPT | Mod: RT,S$GLB,, | Performed by: OPHTHALMOLOGY

## 2021-07-21 PROCEDURE — 99499 UNLISTED E&M SERVICE: CPT | Mod: S$GLB,,, | Performed by: OPHTHALMOLOGY

## 2021-07-21 PROCEDURE — 92134 POSTERIOR SEGMENT OCT RETINA (OCULAR COHERENCE TOMOGRAPHY)-BOTH EYES: ICD-10-PCS | Mod: S$GLB,,, | Performed by: OPHTHALMOLOGY

## 2021-07-21 PROCEDURE — 92134 CPTRZ OPH DX IMG PST SGM RTA: CPT | Mod: S$GLB,,, | Performed by: OPHTHALMOLOGY

## 2021-07-21 PROCEDURE — 99499 NO LOS: ICD-10-PCS | Mod: S$GLB,,, | Performed by: OPHTHALMOLOGY

## 2021-07-21 PROCEDURE — 67028 PR INJECT INTRAVITREAL PHARMCOLOGIC: ICD-10-PCS | Mod: RT,S$GLB,, | Performed by: OPHTHALMOLOGY

## 2021-08-09 ENCOUNTER — HOSPITAL ENCOUNTER (OUTPATIENT)
Dept: RADIOLOGY | Facility: HOSPITAL | Age: 61
Discharge: HOME | End: 2021-08-09
Attending: FAMILY MEDICINE
Payer: COMMERCIAL

## 2021-08-09 ENCOUNTER — OFFICE VISIT (OUTPATIENT)
Dept: FAMILY MEDICINE | Facility: CLINIC | Age: 61
End: 2021-08-09
Payer: COMMERCIAL

## 2021-08-09 VITALS
OXYGEN SATURATION: 99 % | TEMPERATURE: 98 F | WEIGHT: 219.13 LBS | DIASTOLIC BLOOD PRESSURE: 70 MMHG | HEART RATE: 86 BPM | BODY MASS INDEX: 40.33 KG/M2 | HEIGHT: 62 IN | SYSTOLIC BLOOD PRESSURE: 98 MMHG

## 2021-08-09 DIAGNOSIS — M25.561 ACUTE PAIN OF RIGHT KNEE: ICD-10-CM

## 2021-08-09 DIAGNOSIS — R10.9 ABDOMINAL PAIN, UNSPECIFIED ABDOMINAL LOCATION: ICD-10-CM

## 2021-08-09 DIAGNOSIS — M25.511 ACUTE PAIN OF RIGHT SHOULDER: ICD-10-CM

## 2021-08-09 DIAGNOSIS — R10.9 ABDOMINAL PAIN, UNSPECIFIED ABDOMINAL LOCATION: Primary | ICD-10-CM

## 2021-08-09 DIAGNOSIS — M79.661 PAIN OF RIGHT CALF: ICD-10-CM

## 2021-08-09 DIAGNOSIS — M62.838 MUSCLE SPASM: ICD-10-CM

## 2021-08-09 DIAGNOSIS — L70.0 ACNE VULGARIS: ICD-10-CM

## 2021-08-09 PROCEDURE — 1125F PR PAIN SEVERITY QUANTIFIED, PAIN PRESENT: ICD-10-PCS | Mod: CPTII,S$GLB,, | Performed by: FAMILY MEDICINE

## 2021-08-09 PROCEDURE — 3044F PR MOST RECENT HEMOGLOBIN A1C LEVEL <7.0%: ICD-10-PCS | Mod: CPTII,S$GLB,, | Performed by: FAMILY MEDICINE

## 2021-08-09 PROCEDURE — 1159F PR MEDICATION LIST DOCUMENTED IN MEDICAL RECORD: ICD-10-PCS | Mod: CPTII,S$GLB,, | Performed by: FAMILY MEDICINE

## 2021-08-09 PROCEDURE — 1125F AMNT PAIN NOTED PAIN PRSNT: CPT | Mod: CPTII,S$GLB,, | Performed by: FAMILY MEDICINE

## 2021-08-09 PROCEDURE — 99214 OFFICE O/P EST MOD 30 MIN: CPT | Mod: S$GLB,,, | Performed by: FAMILY MEDICINE

## 2021-08-09 PROCEDURE — 3008F PR BODY MASS INDEX (BMI) DOCUMENTED: ICD-10-PCS | Mod: CPTII,S$GLB,, | Performed by: FAMILY MEDICINE

## 2021-08-09 PROCEDURE — 73562 XR KNEE ORTHO RIGHT: ICD-10-PCS | Mod: 26,RT,, | Performed by: RADIOLOGY

## 2021-08-09 PROCEDURE — 3074F SYST BP LT 130 MM HG: CPT | Mod: CPTII,S$GLB,, | Performed by: FAMILY MEDICINE

## 2021-08-09 PROCEDURE — 3078F PR MOST RECENT DIASTOLIC BLOOD PRESSURE < 80 MM HG: ICD-10-PCS | Mod: CPTII,S$GLB,, | Performed by: FAMILY MEDICINE

## 2021-08-09 PROCEDURE — 1159F MED LIST DOCD IN RCRD: CPT | Mod: CPTII,S$GLB,, | Performed by: FAMILY MEDICINE

## 2021-08-09 PROCEDURE — 3044F HG A1C LEVEL LT 7.0%: CPT | Mod: CPTII,S$GLB,, | Performed by: FAMILY MEDICINE

## 2021-08-09 PROCEDURE — 73030 X-RAY EXAM OF SHOULDER: CPT | Mod: 26,RT,, | Performed by: RADIOLOGY

## 2021-08-09 PROCEDURE — 73560 X-RAY EXAM OF KNEE 1 OR 2: CPT | Mod: 59,TC,FY,PO,LT

## 2021-08-09 PROCEDURE — 73030 XR SHOULDER COMPLETE 2 OR MORE VIEWS RIGHT: ICD-10-PCS | Mod: 26,RT,, | Performed by: RADIOLOGY

## 2021-08-09 PROCEDURE — 99999 PR PBB SHADOW E&M-EST. PATIENT-LVL V: CPT | Mod: PBBFAC,,, | Performed by: FAMILY MEDICINE

## 2021-08-09 PROCEDURE — 73562 X-RAY EXAM OF KNEE 3: CPT | Mod: 26,RT,, | Performed by: RADIOLOGY

## 2021-08-09 PROCEDURE — 3074F PR MOST RECENT SYSTOLIC BLOOD PRESSURE < 130 MM HG: ICD-10-PCS | Mod: CPTII,S$GLB,, | Performed by: FAMILY MEDICINE

## 2021-08-09 PROCEDURE — 1160F PR REVIEW ALL MEDS BY PRESCRIBER/CLIN PHARMACIST DOCUMENTED: ICD-10-PCS | Mod: CPTII,S$GLB,, | Performed by: FAMILY MEDICINE

## 2021-08-09 PROCEDURE — 73030 X-RAY EXAM OF SHOULDER: CPT | Mod: TC,FY,PO,RT

## 2021-08-09 PROCEDURE — 99999 PR PBB SHADOW E&M-EST. PATIENT-LVL V: ICD-10-PCS | Mod: PBBFAC,,, | Performed by: FAMILY MEDICINE

## 2021-08-09 PROCEDURE — 3078F DIAST BP <80 MM HG: CPT | Mod: CPTII,S$GLB,, | Performed by: FAMILY MEDICINE

## 2021-08-09 PROCEDURE — 73560 X-RAY EXAM OF KNEE 1 OR 2: CPT | Mod: 26,LT,, | Performed by: RADIOLOGY

## 2021-08-09 PROCEDURE — 99214 PR OFFICE/OUTPT VISIT, EST, LEVL IV, 30-39 MIN: ICD-10-PCS | Mod: S$GLB,,, | Performed by: FAMILY MEDICINE

## 2021-08-09 PROCEDURE — 3008F BODY MASS INDEX DOCD: CPT | Mod: CPTII,S$GLB,, | Performed by: FAMILY MEDICINE

## 2021-08-09 PROCEDURE — 1160F RVW MEDS BY RX/DR IN RCRD: CPT | Mod: CPTII,S$GLB,, | Performed by: FAMILY MEDICINE

## 2021-08-09 PROCEDURE — 73560 XR KNEE ORTHO RIGHT: ICD-10-PCS | Mod: 26,LT,, | Performed by: RADIOLOGY

## 2021-08-09 RX ORDER — AMOXICILLIN AND CLAVULANATE POTASSIUM 562.5; 437.5; 62.5 MG/1; MG/1; MG/1
1 TABLET, FILM COATED, EXTENDED RELEASE ORAL EVERY 12 HOURS
Qty: 20 TABLET | Refills: 0 | Status: SHIPPED | OUTPATIENT
Start: 2021-08-09 | End: 2021-08-10 | Stop reason: SDUPTHER

## 2021-08-09 RX ORDER — TIZANIDINE 4 MG/1
4 TABLET ORAL EVERY 8 HOURS PRN
Qty: 30 TABLET | Refills: 0 | Status: SHIPPED | OUTPATIENT
Start: 2021-08-09 | End: 2021-08-10 | Stop reason: SDUPTHER

## 2021-08-09 RX ORDER — IBUPROFEN 600 MG/1
600 TABLET ORAL 3 TIMES DAILY
Qty: 60 TABLET | Refills: 0 | Status: SHIPPED | OUTPATIENT
Start: 2021-08-09 | End: 2021-08-10 | Stop reason: SDUPTHER

## 2021-08-10 ENCOUNTER — PATIENT MESSAGE (OUTPATIENT)
Dept: FAMILY MEDICINE | Facility: CLINIC | Age: 61
End: 2021-08-10

## 2021-08-10 ENCOUNTER — HOSPITAL ENCOUNTER (OUTPATIENT)
Dept: RADIOLOGY | Facility: HOSPITAL | Age: 61
Discharge: HOME | End: 2021-08-10
Attending: FAMILY MEDICINE
Payer: COMMERCIAL

## 2021-08-10 DIAGNOSIS — M79.661 PAIN OF RIGHT CALF: ICD-10-CM

## 2021-08-10 PROCEDURE — 93971 EXTREMITY STUDY: CPT | Mod: 26,RT,, | Performed by: RADIOLOGY

## 2021-08-10 PROCEDURE — 93971 US LOWER EXTREMITY VEINS RIGHT: ICD-10-PCS | Mod: 26,RT,, | Performed by: RADIOLOGY

## 2021-08-10 PROCEDURE — 93971 EXTREMITY STUDY: CPT | Mod: TC,RT

## 2021-08-10 RX ORDER — IBUPROFEN 600 MG/1
600 TABLET ORAL 3 TIMES DAILY
Qty: 60 TABLET | Refills: 0 | OUTPATIENT
Start: 2021-08-10

## 2021-08-10 RX ORDER — AMOXICILLIN AND CLAVULANATE POTASSIUM 562.5; 437.5; 62.5 MG/1; MG/1; MG/1
1 TABLET, FILM COATED, EXTENDED RELEASE ORAL EVERY 12 HOURS
Qty: 20 TABLET | Refills: 0 | Status: SHIPPED | OUTPATIENT
Start: 2021-08-10 | End: 2021-08-20

## 2021-08-10 RX ORDER — TIZANIDINE 4 MG/1
4 TABLET ORAL EVERY 8 HOURS PRN
Qty: 30 TABLET | Refills: 0 | OUTPATIENT
Start: 2021-08-10 | End: 2021-08-20

## 2021-08-10 RX ORDER — CLINDAMYCIN PHOSPHATE 10 MG/ML
SOLUTION TOPICAL 2 TIMES DAILY
Qty: 60 EACH | Refills: 1 | Status: SHIPPED | OUTPATIENT
Start: 2021-08-10 | End: 2021-12-09 | Stop reason: SDUPTHER

## 2021-08-10 RX ORDER — IBUPROFEN 600 MG/1
600 TABLET ORAL 3 TIMES DAILY
Qty: 60 TABLET | Refills: 0 | Status: SHIPPED | OUTPATIENT
Start: 2021-08-10 | End: 2023-03-08

## 2021-08-10 RX ORDER — TIZANIDINE 4 MG/1
4 TABLET ORAL EVERY 8 HOURS PRN
Qty: 30 TABLET | Refills: 0 | Status: SHIPPED | OUTPATIENT
Start: 2021-08-10 | End: 2021-08-20

## 2021-08-18 ENCOUNTER — PROCEDURE VISIT (OUTPATIENT)
Dept: OPHTHALMOLOGY | Facility: CLINIC | Age: 61
End: 2021-08-18
Payer: COMMERCIAL

## 2021-08-18 DIAGNOSIS — H35.371 EPIRETINAL MEMBRANE (ERM) OF RIGHT EYE: ICD-10-CM

## 2021-08-18 DIAGNOSIS — H35.81 MACULAR EDEMA: Primary | ICD-10-CM

## 2021-08-18 DIAGNOSIS — H34.8310 BRANCH RETINAL VEIN OCCLUSION WITH MACULAR EDEMA OF RIGHT EYE: ICD-10-CM

## 2021-08-18 PROCEDURE — 67028 INJECTION EYE DRUG: CPT | Mod: RT,S$GLB,, | Performed by: OPHTHALMOLOGY

## 2021-08-18 PROCEDURE — 92134 POSTERIOR SEGMENT OCT RETINA (OCULAR COHERENCE TOMOGRAPHY)-BOTH EYES: ICD-10-PCS | Mod: S$GLB,,, | Performed by: OPHTHALMOLOGY

## 2021-08-18 PROCEDURE — 92134 CPTRZ OPH DX IMG PST SGM RTA: CPT | Mod: S$GLB,,, | Performed by: OPHTHALMOLOGY

## 2021-08-18 PROCEDURE — 99499 UNLISTED E&M SERVICE: CPT | Mod: S$GLB,,, | Performed by: OPHTHALMOLOGY

## 2021-08-18 PROCEDURE — 99499 NO LOS: ICD-10-PCS | Mod: S$GLB,,, | Performed by: OPHTHALMOLOGY

## 2021-08-18 PROCEDURE — 67028 PR INJECT INTRAVITREAL PHARMCOLOGIC: ICD-10-PCS | Mod: RT,S$GLB,, | Performed by: OPHTHALMOLOGY

## 2021-08-25 ENCOUNTER — PATIENT MESSAGE (OUTPATIENT)
Dept: FAMILY MEDICINE | Facility: CLINIC | Age: 61
End: 2021-08-25

## 2021-08-26 ENCOUNTER — PATIENT MESSAGE (OUTPATIENT)
Dept: FAMILY MEDICINE | Facility: CLINIC | Age: 61
End: 2021-08-26

## 2021-08-27 ENCOUNTER — OFFICE VISIT (OUTPATIENT)
Dept: FAMILY MEDICINE | Facility: CLINIC | Age: 61
End: 2021-08-27
Payer: COMMERCIAL

## 2021-08-27 ENCOUNTER — HOSPITAL ENCOUNTER (OUTPATIENT)
Dept: RADIOLOGY | Facility: HOSPITAL | Age: 61
Discharge: HOME | End: 2021-08-27
Attending: FAMILY MEDICINE
Payer: COMMERCIAL

## 2021-08-27 VITALS
TEMPERATURE: 98 F | HEIGHT: 62 IN | DIASTOLIC BLOOD PRESSURE: 74 MMHG | BODY MASS INDEX: 39.76 KG/M2 | HEART RATE: 68 BPM | SYSTOLIC BLOOD PRESSURE: 124 MMHG | WEIGHT: 216.06 LBS | OXYGEN SATURATION: 95 % | RESPIRATION RATE: 18 BRPM

## 2021-08-27 DIAGNOSIS — R10.32 LLQ PAIN: Primary | ICD-10-CM

## 2021-08-27 DIAGNOSIS — M54.50 ACUTE LEFT-SIDED LOW BACK PAIN WITHOUT SCIATICA: ICD-10-CM

## 2021-08-27 DIAGNOSIS — R10.32 LLQ PAIN: ICD-10-CM

## 2021-08-27 PROCEDURE — 3074F PR MOST RECENT SYSTOLIC BLOOD PRESSURE < 130 MM HG: ICD-10-PCS | Mod: CPTII,S$GLB,, | Performed by: FAMILY MEDICINE

## 2021-08-27 PROCEDURE — 3078F PR MOST RECENT DIASTOLIC BLOOD PRESSURE < 80 MM HG: ICD-10-PCS | Mod: CPTII,S$GLB,, | Performed by: FAMILY MEDICINE

## 2021-08-27 PROCEDURE — 99214 OFFICE O/P EST MOD 30 MIN: CPT | Mod: S$GLB,,, | Performed by: FAMILY MEDICINE

## 2021-08-27 PROCEDURE — 3044F HG A1C LEVEL LT 7.0%: CPT | Mod: CPTII,S$GLB,, | Performed by: FAMILY MEDICINE

## 2021-08-27 PROCEDURE — 99999 PR PBB SHADOW E&M-EST. PATIENT-LVL V: CPT | Mod: PBBFAC,,, | Performed by: FAMILY MEDICINE

## 2021-08-27 PROCEDURE — 3008F BODY MASS INDEX DOCD: CPT | Mod: CPTII,S$GLB,, | Performed by: FAMILY MEDICINE

## 2021-08-27 PROCEDURE — 99999 PR PBB SHADOW E&M-EST. PATIENT-LVL V: ICD-10-PCS | Mod: PBBFAC,,, | Performed by: FAMILY MEDICINE

## 2021-08-27 PROCEDURE — 1159F PR MEDICATION LIST DOCUMENTED IN MEDICAL RECORD: ICD-10-PCS | Mod: CPTII,S$GLB,, | Performed by: FAMILY MEDICINE

## 2021-08-27 PROCEDURE — 1160F RVW MEDS BY RX/DR IN RCRD: CPT | Mod: CPTII,S$GLB,, | Performed by: FAMILY MEDICINE

## 2021-08-27 PROCEDURE — 99214 PR OFFICE/OUTPT VISIT, EST, LEVL IV, 30-39 MIN: ICD-10-PCS | Mod: S$GLB,,, | Performed by: FAMILY MEDICINE

## 2021-08-27 PROCEDURE — 1160F PR REVIEW ALL MEDS BY PRESCRIBER/CLIN PHARMACIST DOCUMENTED: ICD-10-PCS | Mod: CPTII,S$GLB,, | Performed by: FAMILY MEDICINE

## 2021-08-27 PROCEDURE — 1159F MED LIST DOCD IN RCRD: CPT | Mod: CPTII,S$GLB,, | Performed by: FAMILY MEDICINE

## 2021-08-27 PROCEDURE — 3044F PR MOST RECENT HEMOGLOBIN A1C LEVEL <7.0%: ICD-10-PCS | Mod: CPTII,S$GLB,, | Performed by: FAMILY MEDICINE

## 2021-08-27 PROCEDURE — 3074F SYST BP LT 130 MM HG: CPT | Mod: CPTII,S$GLB,, | Performed by: FAMILY MEDICINE

## 2021-08-27 PROCEDURE — 3078F DIAST BP <80 MM HG: CPT | Mod: CPTII,S$GLB,, | Performed by: FAMILY MEDICINE

## 2021-08-27 PROCEDURE — 3008F PR BODY MASS INDEX (BMI) DOCUMENTED: ICD-10-PCS | Mod: CPTII,S$GLB,, | Performed by: FAMILY MEDICINE

## 2021-08-27 PROCEDURE — 74019 RADEX ABDOMEN 2 VIEWS: CPT | Mod: TC,FY,PO

## 2021-08-27 PROCEDURE — 74019 XR ABDOMEN FLAT AND ERECT: ICD-10-PCS | Mod: 26,,, | Performed by: RADIOLOGY

## 2021-08-27 PROCEDURE — 74019 RADEX ABDOMEN 2 VIEWS: CPT | Mod: 26,,, | Performed by: RADIOLOGY

## 2021-08-31 NOTE — PROGRESS NOTES
Last 5 Patient Entered Readings                                      Current 30 Day Average: 123/82     Recent Readings 1/16/2019 1/8/2019 1/7/2019 1/4/2019 1/3/2019    SBP (mmHg) 125 111 120 115 129    DBP (mmHg) 88 82 79 79 86    Pulse 54 63 59 59 63          Digital Medicine: Health  Follow Up    Lifestyle Modifications:    1.Dietary Modifications (Sodium intake <2,000mg/day, food labels, dining out): Patients states she has been eating healthy with her .  States they are trying to lose weight.  Reports watching calories and buying fat-free low sodium options.  States she started eating odilon bread.  Drinking 3 20ox bottle espino every day.    2.Physical Activity: States she and her  are trying to wakl 2-4 miles every day.  Coming out of senior care, so feels it may be hard to commit to a gym membership right now.  Encouraged patient to continue walking with .  Patient's goal is to lose weight.    3.Medication Therapy: Patient has been compliant with the medication regimen.    4.Patient has the following medication side effects/concerns: none  (Frequency/Alleviating factors/Precipitating factors, etc.)     Follow up with Mrs. Carolee PARISH Shannon completed. No further questions or concerns. Will continue to follow up to achieve health goals.     Patient called checking status-told her we did not have any forms. Patient states that she gave the form to H[employer] BANUELOS on Thursday 8-26-21 for completion and told him the form was due on 9-1-21. She will be faxing me a coy

## 2021-09-02 ENCOUNTER — TELEPHONE (OUTPATIENT)
Dept: RADIOLOGY | Facility: HOSPITAL | Age: 61
End: 2021-09-02

## 2021-09-03 ENCOUNTER — HOSPITAL ENCOUNTER (OUTPATIENT)
Dept: RADIOLOGY | Facility: HOSPITAL | Age: 61
Discharge: HOME | End: 2021-09-03
Attending: FAMILY MEDICINE
Payer: COMMERCIAL

## 2021-09-03 ENCOUNTER — PATIENT MESSAGE (OUTPATIENT)
Dept: FAMILY MEDICINE | Facility: CLINIC | Age: 61
End: 2021-09-03

## 2021-09-03 DIAGNOSIS — R10.32 LLQ PAIN: ICD-10-CM

## 2021-09-03 DIAGNOSIS — M54.50 ACUTE LEFT-SIDED LOW BACK PAIN WITHOUT SCIATICA: ICD-10-CM

## 2021-09-03 PROCEDURE — A9698 NON-RAD CONTRAST MATERIALNOC: HCPCS | Performed by: FAMILY MEDICINE

## 2021-09-03 PROCEDURE — 25500020 PHARM REV CODE 255: Performed by: FAMILY MEDICINE

## 2021-09-03 PROCEDURE — 74178 CT ABD&PLV WO CNTR FLWD CNTR: CPT | Mod: 26,,, | Performed by: RADIOLOGY

## 2021-09-03 PROCEDURE — 74178 CT ABDOMEN PELVIS W WO CONTRAST: ICD-10-PCS | Mod: 26,,, | Performed by: RADIOLOGY

## 2021-09-03 PROCEDURE — 74178 CT ABD&PLV WO CNTR FLWD CNTR: CPT | Mod: TC

## 2021-09-03 RX ADMIN — IOHEXOL 1000 ML: 12 SOLUTION ORAL at 09:09

## 2021-09-03 RX ADMIN — IOHEXOL 100 ML: 350 INJECTION, SOLUTION INTRAVENOUS at 10:09

## 2021-09-07 ENCOUNTER — TELEPHONE (OUTPATIENT)
Dept: OPHTHALMOLOGY | Facility: CLINIC | Age: 61
End: 2021-09-07

## 2021-09-07 NOTE — PROGRESS NOTES
I was unable to contact patient after the first attempt to discuss abnormal results.  Please call patient to inform them of abnormal FLP- cholesterol.  Please schedule patient to return to clinic within the next 2 weeks for re-evaluation.    Last 5 Patient Entered Readings                                      Current 30 Day Average: 128/84     Recent Readings 8/16/2019 8/6/2019 7/31/2019 7/25/2019 7/20/2019    SBP (mmHg) 129 133 113 137 128    DBP (mmHg) 80 84 79 89 86    Pulse 56 58 57 57 70            Digital Medicine: Health  Follow Up    Left voicemail to follow up with Mrs. Carolee Ortega.  Current BP average 128/84 mmHg is not at goal,130/80 mmHg    Unable to LVM.  Will call in 1 week.  Per pharmacist message to patient, patient's  is interested in joining program.  Patient's  is not active yet.  Need to fill out consent questionnaire.

## 2021-09-30 ENCOUNTER — PROCEDURE VISIT (OUTPATIENT)
Dept: OPHTHALMOLOGY | Facility: CLINIC | Age: 61
End: 2021-09-30
Payer: COMMERCIAL

## 2021-09-30 DIAGNOSIS — H35.371 EPIRETINAL MEMBRANE (ERM) OF RIGHT EYE: ICD-10-CM

## 2021-09-30 DIAGNOSIS — H34.8310 BRANCH RETINAL VEIN OCCLUSION WITH MACULAR EDEMA OF RIGHT EYE: Primary | ICD-10-CM

## 2021-09-30 PROCEDURE — 67028 INJECTION EYE DRUG: CPT | Mod: RT,S$GLB,, | Performed by: OPHTHALMOLOGY

## 2021-09-30 PROCEDURE — 67028 PR INJECT INTRAVITREAL PHARMCOLOGIC: ICD-10-PCS | Mod: RT,S$GLB,, | Performed by: OPHTHALMOLOGY

## 2021-09-30 PROCEDURE — 92134 POSTERIOR SEGMENT OCT RETINA (OCULAR COHERENCE TOMOGRAPHY)-BOTH EYES: ICD-10-PCS | Mod: S$GLB,,, | Performed by: OPHTHALMOLOGY

## 2021-09-30 PROCEDURE — 99499 UNLISTED E&M SERVICE: CPT | Mod: S$GLB,,, | Performed by: OPHTHALMOLOGY

## 2021-09-30 PROCEDURE — 99499 NO LOS: ICD-10-PCS | Mod: S$GLB,,, | Performed by: OPHTHALMOLOGY

## 2021-09-30 PROCEDURE — 92134 CPTRZ OPH DX IMG PST SGM RTA: CPT | Mod: S$GLB,,, | Performed by: OPHTHALMOLOGY

## 2021-10-28 ENCOUNTER — PATIENT MESSAGE (OUTPATIENT)
Dept: OPHTHALMOLOGY | Facility: CLINIC | Age: 61
End: 2021-10-28
Payer: COMMERCIAL

## 2021-11-08 ENCOUNTER — PATIENT MESSAGE (OUTPATIENT)
Dept: ADMINISTRATIVE | Facility: OTHER | Age: 61
End: 2021-11-08
Payer: COMMERCIAL

## 2021-11-08 ENCOUNTER — PROCEDURE VISIT (OUTPATIENT)
Dept: OPHTHALMOLOGY | Facility: CLINIC | Age: 61
End: 2021-11-08
Payer: COMMERCIAL

## 2021-11-08 DIAGNOSIS — H34.8310 BRANCH RETINAL VEIN OCCLUSION WITH MACULAR EDEMA OF RIGHT EYE: Primary | ICD-10-CM

## 2021-11-08 PROCEDURE — 92134 CPTRZ OPH DX IMG PST SGM RTA: CPT | Mod: S$GLB,,, | Performed by: OPHTHALMOLOGY

## 2021-11-08 PROCEDURE — 67028 INJECTION EYE DRUG: CPT | Mod: RT,S$GLB,, | Performed by: OPHTHALMOLOGY

## 2021-11-08 PROCEDURE — 99499 NO LOS: ICD-10-PCS | Mod: S$GLB,,, | Performed by: OPHTHALMOLOGY

## 2021-11-08 PROCEDURE — 67028 PR INJECT INTRAVITREAL PHARMCOLOGIC: ICD-10-PCS | Mod: RT,S$GLB,, | Performed by: OPHTHALMOLOGY

## 2021-11-08 PROCEDURE — 92134 POSTERIOR SEGMENT OCT RETINA (OCULAR COHERENCE TOMOGRAPHY)-BOTH EYES: ICD-10-PCS | Mod: S$GLB,,, | Performed by: OPHTHALMOLOGY

## 2021-11-08 PROCEDURE — 99499 UNLISTED E&M SERVICE: CPT | Mod: S$GLB,,, | Performed by: OPHTHALMOLOGY

## 2021-11-16 ENCOUNTER — IMMUNIZATION (OUTPATIENT)
Dept: FAMILY MEDICINE | Facility: CLINIC | Age: 61
End: 2021-11-16
Payer: COMMERCIAL

## 2021-11-16 PROCEDURE — 90686 IIV4 VACC NO PRSV 0.5 ML IM: CPT | Mod: S$GLB,,, | Performed by: FAMILY MEDICINE

## 2021-11-16 PROCEDURE — 90686 FLU VACCINE (QUAD) GREATER THAN OR EQUAL TO 3YO PRESERVATIVE FREE IM: ICD-10-PCS | Mod: S$GLB,,, | Performed by: FAMILY MEDICINE

## 2021-11-16 PROCEDURE — 90471 IMMUNIZATION ADMIN: CPT | Mod: S$GLB,,, | Performed by: FAMILY MEDICINE

## 2021-11-16 PROCEDURE — 90471 FLU VACCINE (QUAD) GREATER THAN OR EQUAL TO 3YO PRESERVATIVE FREE IM: ICD-10-PCS | Mod: S$GLB,,, | Performed by: FAMILY MEDICINE

## 2021-11-30 ENCOUNTER — IMMUNIZATION (OUTPATIENT)
Dept: PRIMARY CARE CLINIC | Facility: CLINIC | Age: 61
End: 2021-11-30
Payer: COMMERCIAL

## 2021-11-30 DIAGNOSIS — Z23 NEED FOR VACCINATION: Primary | ICD-10-CM

## 2021-11-30 PROCEDURE — 0064A COVID-19, MRNA, LNP-S, PF, 100 MCG/0.25 ML DOSE VACCINE (MODERNA BOOSTER): CPT | Mod: CV19,PBBFAC | Performed by: FAMILY MEDICINE

## 2021-12-08 ENCOUNTER — PROCEDURE VISIT (OUTPATIENT)
Dept: OPHTHALMOLOGY | Facility: CLINIC | Age: 61
End: 2021-12-08
Payer: COMMERCIAL

## 2021-12-08 DIAGNOSIS — H34.8310 BRANCH RETINAL VEIN OCCLUSION WITH MACULAR EDEMA OF RIGHT EYE: Primary | ICD-10-CM

## 2021-12-08 DIAGNOSIS — H25.043 POSTERIOR SUBCAPSULAR AGE-RELATED CATARACT OF BOTH EYES: ICD-10-CM

## 2021-12-08 DIAGNOSIS — H35.371 EPIRETINAL MEMBRANE (ERM) OF RIGHT EYE: ICD-10-CM

## 2021-12-08 PROCEDURE — 99499 UNLISTED E&M SERVICE: CPT | Mod: S$GLB,,, | Performed by: OPHTHALMOLOGY

## 2021-12-08 PROCEDURE — 67028 INJECTION EYE DRUG: CPT | Mod: RT,S$GLB,, | Performed by: OPHTHALMOLOGY

## 2021-12-08 PROCEDURE — 67028 PR INJECT INTRAVITREAL PHARMCOLOGIC: ICD-10-PCS | Mod: RT,S$GLB,, | Performed by: OPHTHALMOLOGY

## 2021-12-08 PROCEDURE — 92134 POSTERIOR SEGMENT OCT RETINA (OCULAR COHERENCE TOMOGRAPHY)-BOTH EYES: ICD-10-PCS | Mod: S$GLB,,, | Performed by: OPHTHALMOLOGY

## 2021-12-08 PROCEDURE — 99499 NO LOS: ICD-10-PCS | Mod: S$GLB,,, | Performed by: OPHTHALMOLOGY

## 2021-12-08 PROCEDURE — 92134 CPTRZ OPH DX IMG PST SGM RTA: CPT | Mod: S$GLB,,, | Performed by: OPHTHALMOLOGY

## 2021-12-08 RX ORDER — KETOROLAC TROMETHAMINE 5 MG/ML
1 SOLUTION OPHTHALMIC 4 TIMES DAILY
Qty: 5 ML | Refills: 1 | Status: SHIPPED | OUTPATIENT
Start: 2021-12-08 | End: 2022-01-18

## 2021-12-08 RX ORDER — PREDNISOLONE ACETATE 10 MG/ML
1 SUSPENSION/ DROPS OPHTHALMIC 4 TIMES DAILY
Qty: 10 ML | Refills: 1 | Status: SHIPPED | OUTPATIENT
Start: 2021-12-08 | End: 2022-02-02

## 2021-12-09 ENCOUNTER — PATIENT MESSAGE (OUTPATIENT)
Dept: DERMATOLOGY | Facility: CLINIC | Age: 61
End: 2021-12-09

## 2021-12-09 ENCOUNTER — OFFICE VISIT (OUTPATIENT)
Dept: DERMATOLOGY | Facility: CLINIC | Age: 61
End: 2021-12-09
Payer: COMMERCIAL

## 2021-12-09 DIAGNOSIS — L70.0 ACNE VULGARIS: ICD-10-CM

## 2021-12-09 PROCEDURE — 4010F ACE/ARB THERAPY RXD/TAKEN: CPT | Mod: CPTII,S$GLB,, | Performed by: STUDENT IN AN ORGANIZED HEALTH CARE EDUCATION/TRAINING PROGRAM

## 2021-12-09 PROCEDURE — 99213 PR OFFICE/OUTPT VISIT, EST, LEVL III, 20-29 MIN: ICD-10-PCS | Mod: S$GLB,,, | Performed by: STUDENT IN AN ORGANIZED HEALTH CARE EDUCATION/TRAINING PROGRAM

## 2021-12-09 PROCEDURE — 99213 OFFICE O/P EST LOW 20 MIN: CPT | Mod: S$GLB,,, | Performed by: STUDENT IN AN ORGANIZED HEALTH CARE EDUCATION/TRAINING PROGRAM

## 2021-12-09 PROCEDURE — 99999 PR PBB SHADOW E&M-EST. PATIENT-LVL III: ICD-10-PCS | Mod: PBBFAC,,, | Performed by: STUDENT IN AN ORGANIZED HEALTH CARE EDUCATION/TRAINING PROGRAM

## 2021-12-09 PROCEDURE — 4010F PR ACE/ARB THEARPY RXD/TAKEN: ICD-10-PCS | Mod: CPTII,S$GLB,, | Performed by: STUDENT IN AN ORGANIZED HEALTH CARE EDUCATION/TRAINING PROGRAM

## 2021-12-09 PROCEDURE — 99999 PR PBB SHADOW E&M-EST. PATIENT-LVL III: CPT | Mod: PBBFAC,,, | Performed by: STUDENT IN AN ORGANIZED HEALTH CARE EDUCATION/TRAINING PROGRAM

## 2021-12-09 RX ORDER — CLINDAMYCIN PHOSPHATE 11.9 MG/ML
SOLUTION TOPICAL DAILY
Qty: 60 ML | Refills: 6 | Status: SHIPPED | OUTPATIENT
Start: 2021-12-09

## 2021-12-09 RX ORDER — CLINDAMYCIN PHOSPHATE 10 MG/ML
SOLUTION TOPICAL DAILY
Qty: 60 EACH | Refills: 6 | Status: SHIPPED | OUTPATIENT
Start: 2021-12-09 | End: 2022-12-09

## 2021-12-17 ENCOUNTER — PATIENT MESSAGE (OUTPATIENT)
Dept: ADMINISTRATIVE | Facility: OTHER | Age: 61
End: 2021-12-17
Payer: COMMERCIAL

## 2022-01-01 NOTE — DISCHARGE INSTRUCTIONS
General Information:    1. Do not drink alcoholic beverages including beer for 24 hours or as long as you are on pain medication..  2. Do not drive a motor vehicle, operate machinery or power tools, or signs legal papers for 24 hours or as long as you are on pain medication.   3. You may experience light-headedness, dizziness, and sleepiness following surgery. Please do not stay alone. A responsible adult should be with you for this 24 hour period.  4. Go home and rest.  5. Progress slowly to a normal diet unless instructed.  Otherwise, begin with liquids such as soft drinks, then soup and crackers working up to solid foods. Drink plenty of nonalcoholic fluids.  6. Certain anesthetics and pain medications produce nausea and vomiting in certain individuals. If nausea becomes a problem at home, call you doctor.  7. A nurse will be calling you sometime after surgery. Do not be alarmed. This is our way of finding out how you are doing.  8. Several times every hour while you are awake, take 2-3 deep breaths and cough. If you had stomach surgery hold a pillow or rolled towel firmly against your stomach before you cough. This will help with any pain the cough might cause.  9. Several times every hour while you are awake, pump and flex your feet 5-6 times and do foot circles. This will help prevent blood clots.  10. Call your doctor for severe pain, bleeding, fever, or signs or symptoms of infection (pain, swelling, redness, foul odor, drainage).  11. You can contact your doctor anytime by [phone] for the [facility] (at [facility]) or [phone] for the [facility] on [facility].   [email or web address] is another way to contact your doctor if you are an active participant online with [facility].  12. Continue Nozin provided at discharge twice daily for 7 days or until the incision is healed.  See pamphlet or box for instructions.      Parents made CM aware of concerns they have regarding insurance coverage. Parents requesting letter to document medical team's recommendation for delay in return home after last discharge. Letter was drafted and is pending medical team signature. Number for financial counselors provided to Dad per his request. CM will continue to follow closely. Parents expressed understanding and agreement to dc plan.

## 2022-01-04 ENCOUNTER — PATIENT MESSAGE (OUTPATIENT)
Dept: OPHTHALMOLOGY | Facility: CLINIC | Age: 62
End: 2022-01-04
Payer: COMMERCIAL

## 2022-01-17 DIAGNOSIS — I10 ESSENTIAL HYPERTENSION: ICD-10-CM

## 2022-01-17 NOTE — TELEPHONE ENCOUNTER
No new care gaps identified.  Powered by [employer]. Reference number: [id number].   1/17/2022 5:44:49 AM CST

## 2022-01-20 RX ORDER — SPIRONOLACTONE 25 MG/1
TABLET ORAL
Qty: 90 TABLET | Refills: 2 | Status: SHIPPED | OUTPATIENT
Start: 2022-01-20 | End: 2022-02-24 | Stop reason: SDUPTHER

## 2022-01-20 NOTE — TELEPHONE ENCOUNTER
Refill Authorization Note   Carolee Ortega  is requesting a refill authorization.  Brief Assessment and Rationale for Refill:  Approve     Medication Therapy Plan:       Medication Reconciliation Completed: No   Comments:   --->Care Gap information included below if applicable.   Orders Placed This Encounter    spironolactone (ALDACTONE) 25 MG tablet      Requested Prescriptions   Signed Prescriptions Disp Refills    spironolactone (ALDACTONE) 25 MG tablet 90 tablet 2     Sig: TAKE ONE TABLET BY MOUTH EVERY DAY       Cardiovascular: Diuretics - Aldosterone Antagonist Passed - 1/17/2022  5:44 AM        Passed - Patient is at least 18 years old        Passed - Last BP in normal range within 360 days     BP Readings from Last 1 Encounters:   08/27/21 124/74               Passed - Valid encounter within last 15 months     Recent Visits  Date Type Provider Dept   08/27/21 Office Visit Genie Walter MD [facility]   03/11/21 Office Visit Genie Walter MD [facility]   02/24/20 Office Visit Genie Walter MD [facility]   Showing recent visits within past 720 days and meeting all other requirements  Future Appointments  No visits were found meeting these conditions.  Showing future appointments within next 150 days and meeting all other requirements      Future Appointments              In 1 week YVETTE Mackay MD [facility], [facility]    In 1 month Genie Walter MD [facility]                Passed - Cr is 1.4 or below and within 360 days     Lab Results   Component Value Date    CREATININE 0.8 08/09/2021    CREATININE 0.8 01/22/2021    CREATININE 0.9 12/29/2020              Passed - K in normal range and within 360 days     Potassium   Date Value Ref Range Status   08/09/2021 4.4 3.5 - 5.1 mmol/L Final   01/22/2021 3.8 3.5 - 5.1 mmol/L Final   12/29/2020 4.0 3.5 - 5.1 mmol/L Final              Passed - Na is  between 130 and 148 and within 360 days     Sodium   Date Value Ref Range Status   08/09/2021 140 136 - 145 mmol/L Final   01/22/2021 144 136 - 145 mmol/L Final   12/29/2020 139 136 - 145 mmol/L Final              Passed - eGFR within 360 days     Lab Results   Component Value Date    EGFRNONAA >60.0 08/09/2021    EGFRNONAA >60.0 01/22/2021    EGFRNONAA >60.0 12/29/2020                    Appointments  past 12m or future 3m with PCP    Date Provider   Last Visit   8/27/2021 Genie Walter MD   Next Visit   2/24/2022 Genie Walter MD   ED visits in past 90 days: 0     Note composed:8:40 AM 01/20/2022

## 2022-02-02 ENCOUNTER — PROCEDURE VISIT (OUTPATIENT)
Dept: OPHTHALMOLOGY | Facility: CLINIC | Age: 62
End: 2022-02-02
Payer: COMMERCIAL

## 2022-02-02 DIAGNOSIS — H34.8310 BRANCH RETINAL VEIN OCCLUSION WITH MACULAR EDEMA OF RIGHT EYE: Primary | ICD-10-CM

## 2022-02-02 PROCEDURE — 67028 INJECTION EYE DRUG: CPT | Mod: RT,S$GLB,, | Performed by: OPHTHALMOLOGY

## 2022-02-02 PROCEDURE — 99499 NO LOS: ICD-10-PCS | Mod: S$GLB,,, | Performed by: OPHTHALMOLOGY

## 2022-02-02 PROCEDURE — 92134 CPTRZ OPH DX IMG PST SGM RTA: CPT | Mod: S$GLB,,, | Performed by: OPHTHALMOLOGY

## 2022-02-02 PROCEDURE — 99499 UNLISTED E&M SERVICE: CPT | Mod: S$GLB,,, | Performed by: OPHTHALMOLOGY

## 2022-02-02 PROCEDURE — 92134 POSTERIOR SEGMENT OCT RETINA (OCULAR COHERENCE TOMOGRAPHY)-BOTH EYES: ICD-10-PCS | Mod: S$GLB,,, | Performed by: OPHTHALMOLOGY

## 2022-02-02 PROCEDURE — 67028 PR INJECT INTRAVITREAL PHARMCOLOGIC: ICD-10-PCS | Mod: RT,S$GLB,, | Performed by: OPHTHALMOLOGY

## 2022-02-02 NOTE — PROGRESS NOTES
===============================  Date today is 2/2/2022  Carolee Ortega is a 61 y.o. female  Last visit [facility]: :12/8/2021   Last visit eye dept. 12/8/2021    Corrected distance visual acuity was 20/60 in the right eye and 20/30 in the left eye.  Tonometry     Tonometry (Applanation, 3:03 PM)       Right Left    Pressure 16 15          Target and Max Pressure       Right Left    Target 18 18    Max 26 (9/12) 24 (9/12)              Not recorded       Not recorded       Not recorded       Chief Complaint   Patient presents with    brvo     Injection od       Problem List Items Addressed This Visit    None     Visit Diagnoses     Branch retinal vein occlusion with macular edema of right eye    -  Primary    Relevant Medications    dexAMETHasone (OZURDEX) intravitreal implant (Completed) (Start on 2/2/2022  3:45 PM)    Other Relevant Orders    Posterior Segment OCT Retina-Both eyes (Completed)    Prior authorization Order          ________________  2/2/2022 today    t 14  On pf ac  Ok to stop drops today   oct od stable   Rec ozurdex today eventual os CE  2/2/2022  Diagnosis :  od dme  Today:   Ozurdex 0.7 mg , OD   Follow up: eval in  4    weeks   Instructed to call 24/7 for any worsening of vision. Check Both eyes daily. Gave patient my home phone number.  Risks, benefits, and alternatives to treatment discussed in detail with the patient.  The patient voiced understanding and wished to proceed with the procedure    Ozurdex intravitreal implant Procedure Note:     Patient Identified and Time Out complete  Subconjunctival bleb - xylocaine with epi 2%   and Betadine.  Inject OD at 1mm parallel to limbus  Post Operative Dx: Same  Complications: None  Follow up as above.      .      ==============================='

## 2022-02-18 ENCOUNTER — PATIENT MESSAGE (OUTPATIENT)
Dept: FAMILY MEDICINE | Facility: CLINIC | Age: 62
End: 2022-02-18
Payer: COMMERCIAL

## 2022-02-18 DIAGNOSIS — I10 ESSENTIAL HYPERTENSION: ICD-10-CM

## 2022-02-18 RX ORDER — SPIRONOLACTONE 25 MG/1
TABLET ORAL
Refills: 0 | OUTPATIENT
Start: 2022-02-18

## 2022-02-18 NOTE — TELEPHONE ENCOUNTER
[facility] Note  Quick DC. Inappropriate Request   Refill request requires further review by MD: NO   Medication Therapy Plan: Pharmacy is requesting new script(s) for the following medications without required information, (sig/ frequency/qty/etc)     [facility] action(s):  Quick Discontinue      Duplicate Pended Encounter(s)/ Last Prescribed Details:    Pharmacies have been requesting medications for patients without required information, (sig, frequency, qty, etc.). In addition, requests are sent for medication(s) pt. are currently not taking, and medications patients have never taken.    We have spoken to the pharmacies about these request types and advised their teams previously that we are unable to assess these New Script requests and require all details for these requests. This is a known issue and has been reported.        Medication related problems are not assessed for QDC.   Medication Reconciliation Completed? NO Were there pending details that required adjustment? NO     Automatic Epic Generated Protocol Data Below:   Requested Prescriptions     Pending Prescriptions Disp Refills    spironolactone (ALDACTONE) 25 MG tablet [Pharmacy Med Name: SPIRONOLACTONE TABS 25MG]  0              Appointments      Date Provider   Last Visit   8/27/2021 Genie Walter MD   Next Visit   2/24/2022 Genie Walter MD        Note composed:10:31 AM 02/18/2022

## 2022-02-18 NOTE — TELEPHONE ENCOUNTER
No new care gaps identified.  Powered by [employer]. Reference number: [id number].   2/18/2022 10:31:28 AM CST

## 2022-02-18 NOTE — TELEPHONE ENCOUNTER
No new care gaps identified.  Powered by [employer]. Reference number: [id number].   2/18/2022 10:32:31 AM CST

## 2022-02-18 NOTE — TELEPHONE ENCOUNTER
No new care gaps identified.  Powered by [employer]. Reference number: [id number].   2/18/2022 10:30:22 AM CST

## 2022-02-19 RX ORDER — MONTELUKAST SODIUM 10 MG/1
TABLET ORAL
Refills: 0 | OUTPATIENT
Start: 2022-02-19

## 2022-02-19 RX ORDER — AMLODIPINE AND VALSARTAN 10; 320 MG/1; MG/1
TABLET ORAL
Refills: 0 | OUTPATIENT
Start: 2022-02-19

## 2022-02-19 NOTE — TELEPHONE ENCOUNTER
[facility] Note  Quick DC. Inappropriate Request   Refill request requires further review by MD: NO   Medication Therapy Plan: Pharmacy is requesting new script(s) for the following medications without required information, (sig/ frequency/qty/etc)     [facility] action(s):  Quick Discontinue      Duplicate Pended Encounter(s)/ Last Prescribed Details:    Pharmacies have been requesting medications for patients without required information, (sig, frequency, qty, etc.). In addition, requests are sent for medication(s) pt. are currently not taking, and medications patients have never taken.    We have spoken to the pharmacies about these request types and advised their teams previously that we are unable to assess these New Script requests and require all details for these requests. This is a known issue and has been reported.        Medication related problems are not assessed for QDC.   Medication Reconciliation Completed? NO Were there pending details that required adjustment? NO     Automatic Epic Generated Protocol Data Below:   Requested Prescriptions   Pending Prescriptions Disp Refills    amlodipine-valsartan (EXFORGE)  mg per tablet [Pharmacy Med Name: AMLODIPINE/VALSARTAN TABS 10/320MG]  0              Appointments      Date Provider   Last Visit   8/27/2021 Genie Walter MD   Next Visit   2/18/2022 Genie Walter MD        Note composed:5:10 AM 02/19/2022

## 2022-02-19 NOTE — TELEPHONE ENCOUNTER
[facility] Note  Quick DC. Inappropriate Request   Refill request requires further review by MD: NO   Medication Therapy Plan: Pharmacy is requesting new script(s) for the following medications without required information, (sig/ frequency/qty/etc)     [facility] action(s):  Quick Discontinue      Duplicate Pended Encounter(s)/ Last Prescribed Details:    Pharmacies have been requesting medications for patients without required information, (sig, frequency, qty, etc.). In addition, requests are sent for medication(s) pt. are currently not taking, and medications patients have never taken.    We have spoken to the pharmacies about these request types and advised their teams previously that we are unable to assess these New Script requests and require all details for these requests. This is a known issue and has been reported.        Medication related problems are not assessed for QDC.   Medication Reconciliation Completed? NO Were there pending details that required adjustment? NO     Automatic Epic Generated Protocol Data Below:   Requested Prescriptions   Pending Prescriptions Disp Refills    montelukast (SINGULAIR) 10 mg tablet [Pharmacy Med Name: MONTELUKAST SODIUM TABS 10MG]  0              Appointments      Date Provider   Last Visit   8/27/2021 Genie Walter MD   Next Visit   2/18/2022 Genie Walter MD        Note composed:5:10 AM 02/19/2022

## 2022-02-24 ENCOUNTER — LAB VISIT (OUTPATIENT)
Dept: LAB | Facility: HOSPITAL | Age: 62
End: 2022-02-24
Payer: COMMERCIAL

## 2022-02-24 ENCOUNTER — OFFICE VISIT (OUTPATIENT)
Dept: FAMILY MEDICINE | Facility: CLINIC | Age: 62
End: 2022-02-24
Payer: COMMERCIAL

## 2022-02-24 VITALS
RESPIRATION RATE: 20 BRPM | WEIGHT: 218.25 LBS | DIASTOLIC BLOOD PRESSURE: 80 MMHG | TEMPERATURE: 98 F | HEART RATE: 64 BPM | HEIGHT: 62 IN | OXYGEN SATURATION: 97 % | SYSTOLIC BLOOD PRESSURE: 124 MMHG | BODY MASS INDEX: 40.16 KG/M2

## 2022-02-24 DIAGNOSIS — Z00.00 PREVENTATIVE HEALTH CARE: Primary | ICD-10-CM

## 2022-02-24 DIAGNOSIS — Z12.31 ENCOUNTER FOR SCREENING MAMMOGRAM FOR MALIGNANT NEOPLASM OF BREAST: ICD-10-CM

## 2022-02-24 DIAGNOSIS — Z00.00 PREVENTATIVE HEALTH CARE: ICD-10-CM

## 2022-02-24 DIAGNOSIS — J30.9 ALLERGIC RHINITIS, UNSPECIFIED SEASONALITY, UNSPECIFIED TRIGGER: ICD-10-CM

## 2022-02-24 DIAGNOSIS — I10 ESSENTIAL HYPERTENSION: Chronic | ICD-10-CM

## 2022-02-24 PROBLEM — U07.1 PNEUMONIA DUE TO COVID-19 VIRUS: Status: RESOLVED | Noted: 2020-12-17 | Resolved: 2022-02-24

## 2022-02-24 PROBLEM — J12.82 PNEUMONIA DUE TO COVID-19 VIRUS: Status: RESOLVED | Noted: 2020-12-17 | Resolved: 2022-02-24

## 2022-02-24 LAB
ALBUMIN SERPL BCP-MCNC: 3.8 G/DL (ref 3.5–5.2)
ALP SERPL-CCNC: 64 U/L (ref 55–135)
ALT SERPL W/O P-5'-P-CCNC: 20 U/L (ref 10–44)
ANION GAP SERPL CALC-SCNC: 7 MMOL/L (ref 8–16)
AST SERPL-CCNC: 19 U/L (ref 10–40)
BASOPHILS # BLD AUTO: 0.04 K/UL (ref 0–0.2)
BASOPHILS NFR BLD: 0.9 % (ref 0–1.9)
BILIRUB SERPL-MCNC: 0.6 MG/DL (ref 0.1–1)
BUN SERPL-MCNC: 11 MG/DL (ref 8–23)
CALCIUM SERPL-MCNC: 9.9 MG/DL (ref 8.7–10.5)
CHLORIDE SERPL-SCNC: 104 MMOL/L (ref 95–110)
CHOLEST SERPL-MCNC: 211 MG/DL (ref 120–199)
CHOLEST/HDLC SERPL: 3.5 {RATIO} (ref 2–5)
CO2 SERPL-SCNC: 30 MMOL/L (ref 23–29)
CREAT SERPL-MCNC: 0.8 MG/DL (ref 0.5–1.4)
DIFFERENTIAL METHOD: ABNORMAL
EOSINOPHIL # BLD AUTO: 0.1 K/UL (ref 0–0.5)
EOSINOPHIL NFR BLD: 1.4 % (ref 0–8)
ERYTHROCYTE [DISTWIDTH] IN BLOOD BY AUTOMATED COUNT: 12.9 % (ref 11.5–14.5)
EST. GFR  (AFRICAN AMERICAN): >60 ML/MIN/1.73 M^2
EST. GFR  (NON AFRICAN AMERICAN): >60 ML/MIN/1.73 M^2
GLUCOSE SERPL-MCNC: 99 MG/DL (ref 70–110)
HCT VFR BLD AUTO: 42.6 % (ref 37–48.5)
HDLC SERPL-MCNC: 60 MG/DL (ref 40–75)
HDLC SERPL: 28.4 % (ref 20–50)
HGB BLD-MCNC: 13.4 G/DL (ref 12–16)
IMM GRANULOCYTES # BLD AUTO: 0.01 K/UL (ref 0–0.04)
IMM GRANULOCYTES NFR BLD AUTO: 0.2 % (ref 0–0.5)
LDLC SERPL CALC-MCNC: 136.6 MG/DL (ref 63–159)
LYMPHOCYTES # BLD AUTO: 2 K/UL (ref 1–4.8)
LYMPHOCYTES NFR BLD: 44.8 % (ref 18–48)
MCH RBC QN AUTO: 27.1 PG (ref 27–31)
MCHC RBC AUTO-ENTMCNC: 31.5 G/DL (ref 32–36)
MCV RBC AUTO: 86 FL (ref 82–98)
MONOCYTES # BLD AUTO: 0.3 K/UL (ref 0.3–1)
MONOCYTES NFR BLD: 6.2 % (ref 4–15)
NEUTROPHILS # BLD AUTO: 2 K/UL (ref 1.8–7.7)
NEUTROPHILS NFR BLD: 46.5 % (ref 38–73)
NONHDLC SERPL-MCNC: 151 MG/DL
NRBC BLD-RTO: 0 /100 WBC
PLATELET # BLD AUTO: 216 K/UL (ref 150–450)
PMV BLD AUTO: 11.7 FL (ref 9.2–12.9)
POTASSIUM SERPL-SCNC: 4.4 MMOL/L (ref 3.5–5.1)
PROT SERPL-MCNC: 7.3 G/DL (ref 6–8.4)
RBC # BLD AUTO: 4.94 M/UL (ref 4–5.4)
SODIUM SERPL-SCNC: 141 MMOL/L (ref 136–145)
TRIGL SERPL-MCNC: 72 MG/DL (ref 30–150)
TSH SERPL DL<=0.005 MIU/L-ACNC: 2.31 UIU/ML (ref 0.4–4)
WBC # BLD AUTO: 4.35 K/UL (ref 3.9–12.7)

## 2022-02-24 PROCEDURE — 1160F RVW MEDS BY RX/DR IN RCRD: CPT | Mod: CPTII,S$GLB,, | Performed by: FAMILY MEDICINE

## 2022-02-24 PROCEDURE — 36415 COLL VENOUS BLD VENIPUNCTURE: CPT | Mod: PO | Performed by: FAMILY MEDICINE

## 2022-02-24 PROCEDURE — 99396 PR PREVENTIVE VISIT,EST,40-64: ICD-10-PCS | Mod: S$GLB,,, | Performed by: FAMILY MEDICINE

## 2022-02-24 PROCEDURE — 3079F PR MOST RECENT DIASTOLIC BLOOD PRESSURE 80-89 MM HG: ICD-10-PCS | Mod: CPTII,S$GLB,, | Performed by: FAMILY MEDICINE

## 2022-02-24 PROCEDURE — 3074F SYST BP LT 130 MM HG: CPT | Mod: CPTII,S$GLB,, | Performed by: FAMILY MEDICINE

## 2022-02-24 PROCEDURE — 4010F PR ACE/ARB THEARPY RXD/TAKEN: ICD-10-PCS | Mod: CPTII,S$GLB,, | Performed by: FAMILY MEDICINE

## 2022-02-24 PROCEDURE — 1159F MED LIST DOCD IN RCRD: CPT | Mod: CPTII,S$GLB,, | Performed by: FAMILY MEDICINE

## 2022-02-24 PROCEDURE — 3008F PR BODY MASS INDEX (BMI) DOCUMENTED: ICD-10-PCS | Mod: CPTII,S$GLB,, | Performed by: FAMILY MEDICINE

## 2022-02-24 PROCEDURE — 80061 LIPID PANEL: CPT | Performed by: FAMILY MEDICINE

## 2022-02-24 PROCEDURE — 3079F DIAST BP 80-89 MM HG: CPT | Mod: CPTII,S$GLB,, | Performed by: FAMILY MEDICINE

## 2022-02-24 PROCEDURE — 3008F BODY MASS INDEX DOCD: CPT | Mod: CPTII,S$GLB,, | Performed by: FAMILY MEDICINE

## 2022-02-24 PROCEDURE — 85025 COMPLETE CBC W/AUTO DIFF WBC: CPT | Performed by: FAMILY MEDICINE

## 2022-02-24 PROCEDURE — 99999 PR PBB SHADOW E&M-EST. PATIENT-LVL IV: CPT | Mod: PBBFAC,,, | Performed by: FAMILY MEDICINE

## 2022-02-24 PROCEDURE — 1160F PR REVIEW ALL MEDS BY PRESCRIBER/CLIN PHARMACIST DOCUMENTED: ICD-10-PCS | Mod: CPTII,S$GLB,, | Performed by: FAMILY MEDICINE

## 2022-02-24 PROCEDURE — 99999 PR PBB SHADOW E&M-EST. PATIENT-LVL IV: ICD-10-PCS | Mod: PBBFAC,,, | Performed by: FAMILY MEDICINE

## 2022-02-24 PROCEDURE — 3074F PR MOST RECENT SYSTOLIC BLOOD PRESSURE < 130 MM HG: ICD-10-PCS | Mod: CPTII,S$GLB,, | Performed by: FAMILY MEDICINE

## 2022-02-24 PROCEDURE — 99396 PREV VISIT EST AGE 40-64: CPT | Mod: S$GLB,,, | Performed by: FAMILY MEDICINE

## 2022-02-24 PROCEDURE — 84443 ASSAY THYROID STIM HORMONE: CPT | Performed by: FAMILY MEDICINE

## 2022-02-24 PROCEDURE — 80053 COMPREHEN METABOLIC PANEL: CPT | Performed by: FAMILY MEDICINE

## 2022-02-24 PROCEDURE — 4010F ACE/ARB THERAPY RXD/TAKEN: CPT | Mod: CPTII,S$GLB,, | Performed by: FAMILY MEDICINE

## 2022-02-24 PROCEDURE — 1159F PR MEDICATION LIST DOCUMENTED IN MEDICAL RECORD: ICD-10-PCS | Mod: CPTII,S$GLB,, | Performed by: FAMILY MEDICINE

## 2022-02-24 RX ORDER — HYDROXYZINE HYDROCHLORIDE 25 MG/1
25 TABLET, FILM COATED ORAL EVERY 4 HOURS PRN
Qty: 90 TABLET | Refills: 3 | Status: SHIPPED | OUTPATIENT
Start: 2022-02-24 | End: 2022-06-09 | Stop reason: SDUPTHER

## 2022-02-24 RX ORDER — METOPROLOL TARTRATE 25 MG/1
25 TABLET, FILM COATED ORAL DAILY
Qty: 90 TABLET | Refills: 3 | Status: SHIPPED | OUTPATIENT
Start: 2022-02-24 | End: 2022-06-01 | Stop reason: ALTCHOICE

## 2022-02-24 RX ORDER — MONTELUKAST SODIUM 10 MG/1
10 TABLET ORAL NIGHTLY
Qty: 90 TABLET | Refills: 3 | Status: SHIPPED | OUTPATIENT
Start: 2022-02-24 | End: 2023-04-06 | Stop reason: SDUPTHER

## 2022-02-24 RX ORDER — SPIRONOLACTONE 25 MG/1
25 TABLET ORAL DAILY
Qty: 90 TABLET | Refills: 3 | Status: SHIPPED | OUTPATIENT
Start: 2022-02-24 | End: 2023-01-13 | Stop reason: SDUPTHER

## 2022-02-24 RX ORDER — AMLODIPINE AND VALSARTAN 10; 320 MG/1; MG/1
1 TABLET ORAL DAILY
Qty: 90 TABLET | Refills: 3 | Status: SHIPPED | OUTPATIENT
Start: 2022-02-24 | End: 2023-01-13 | Stop reason: SDUPTHER

## 2022-02-24 RX ORDER — BUPROPION HYDROCHLORIDE 100 MG/1
100 TABLET ORAL DAILY
Qty: 90 TABLET | Refills: 3 | Status: SHIPPED | OUTPATIENT
Start: 2022-02-24 | End: 2023-01-13 | Stop reason: SDUPTHER

## 2022-02-24 NOTE — PROGRESS NOTES
CHIEF COMPLAINT:  This is a 61-year-old female here for preventive health exam.     SUBJECTIVE:  Patient is doing well without complaints. Her blood pressure is controlled on amlodipine-valsartan, spironolactone and metoprolol with today's reading of 124/80.  The patient has mild intermittent asthma for which she uses albuterol.  Patient takes hydroxyzine 1 tablet daily for itching.  Patient uses Nasonex and Singulair for allergic rhinitis.  She is followed by ophthalmology for glaucoma.      Eye exam May 2021.  Mammogram March 2021.   Colonoscopy August 2020 due again in August 2025.  Tdap February 2017. Pneumovax February 2014. Flu vaccine November 2021. COVID 19 vaccine March, November 2021.      ROS:  GENERAL: Patient denies fever, chills, night sweats. Patient denies weight gain. Patient denies anorexia, fatigue, weakness or swollen glands.  SKIN: Patient denies rash or hair loss.  HEENT: Patient denies sore throat, ear pain, hearing loss, nasal congestion, or runny nose. Patient denies visual disturbance, eye irritation or discharge.  LUNGS: Patient denies cough, wheeze or hemoptysis.  CARDIOVASCULAR: Patient denies chest pain, shortness of breath, palpitations, syncope or lower extremity edema.  GI: Patient denies abdominal pain, nausea, vomiting, diarrhea, constipation, blood in stool or melena.  GENITOURINARY: Patient denies pelvic pain, vaginal discharge, itch or odor. Patient denies irregular vaginal bleeding. Patient denies dysuria, frequency, hematuria, nocturia, urgency or incontinence.  BREASTS: Patient denies breast pain, mass or nipple discharge.  MUSCULOSKELETAL: Patient denies joint pain, swelling, redness or warmth.  NEUROLOGIC: Patient denies headache, vertigo, paresthesias, weakness in limb, dysarthria, dysphagia or abnormality of gait.  PSYCHIATRIC: Patient denies anxiety, depression, or memory loss.     OBJECTIVE:   GENERAL: Well-developed well-nourished, obese, black female alert and  oriented x3, in no acute distress. Memory, judgment and cognition without deficit.   SKIN: Clear without rash. Normal color and tone.    HEENT: Eyes: Clear conjunctivae. No scleral icterus. Pupils equal reactive to light and accommodation. Ears: Clear canals. Clear TMs. Nose: Without congestion. Pharynx: Without injection or exudates.  NECK: Supple, normal range of motion. No masses, lymphadenopathy or enlarged thyroid. No JVD. Carotids 2+ and equal. No bruits.  LUNGS: Clear to auscultation. Normal respiratory effort.  CARDIOVASCULAR: Regular rhythm, normal S1, S2 without murmur, gallop or rub.  BACK: No CVA or spinal tenderness.  BREASTS: No masses, tenderness or nipple discharge.  ABDOMEN: Normal appearance. Active bowel sounds. Soft, nontender without mass or organomegaly. No rebound or guarding.  EXTREMITIES: Without cyanosis, clubbing or edema. Distal pulses 2+ and equal. Normal range of motion in all extremities. No joint effusion, erythema or warmth.  NEUROLOGIC: Cranial nerves II through XII without deficit. Motor strength equal bilaterally. Sensation normal to touch. Deep tendon reflexes 2+ and equal. Gait without abnormality. No tremor. Negative cerebellar signs.  PELVIC: External: Without lesions or inflammation.Vaginal: Clear, cuff intact. Bimanual: No masses, nontender.   RECTOVAGINAL: Confirms heme-negative stool x2.     ASSESSMENT:  1. Preventative health care    2. Essential hypertension    3. Allergic rhinitis, unspecified seasonality, unspecified trigger    4. Encounter for screening mammogram for malignant neoplasm of breast      PLAN:  1. Weight reduction. Exercise regularly.  2. Age-appropriate counseling.  3. Fasting lab.  4. Mammogram.  5. Refill medications.  6. Follow-up annually.      This note is generated with speech recognition software and is subject to transcription error and sound alike phrases that may be missed by proofreading.

## 2022-02-25 ENCOUNTER — PATIENT MESSAGE (OUTPATIENT)
Dept: RESEARCH | Facility: HOSPITAL | Age: 62
End: 2022-02-25
Payer: COMMERCIAL

## 2022-03-03 ENCOUNTER — PROCEDURE VISIT (OUTPATIENT)
Dept: OPHTHALMOLOGY | Facility: CLINIC | Age: 62
End: 2022-03-03
Payer: COMMERCIAL

## 2022-03-03 DIAGNOSIS — H35.371 EPIRETINAL MEMBRANE (ERM) OF RIGHT EYE: ICD-10-CM

## 2022-03-03 DIAGNOSIS — H34.8310 BRANCH RETINAL VEIN OCCLUSION WITH MACULAR EDEMA OF RIGHT EYE: Primary | ICD-10-CM

## 2022-03-03 PROCEDURE — 92134 CPTRZ OPH DX IMG PST SGM RTA: CPT | Mod: S$GLB,,, | Performed by: OPHTHALMOLOGY

## 2022-03-03 PROCEDURE — 99499 UNLISTED E&M SERVICE: CPT | Mod: S$GLB,,, | Performed by: OPHTHALMOLOGY

## 2022-03-03 PROCEDURE — 99499 NO LOS: ICD-10-PCS | Mod: S$GLB,,, | Performed by: OPHTHALMOLOGY

## 2022-03-03 PROCEDURE — 92134 POSTERIOR SEGMENT OCT RETINA (OCULAR COHERENCE TOMOGRAPHY)-BOTH EYES: ICD-10-PCS | Mod: S$GLB,,, | Performed by: OPHTHALMOLOGY

## 2022-03-03 NOTE — PROGRESS NOTES
===============================  Date today is 3/3/2022  Carolee Ortega is a 61 y.o. female  Last visit [facility]: :2/2/2022   Last visit eye dept. 2/2/2022    Corrected distance visual acuity was 20/70 -1 in the right eye and 20/40 -1 in the left eye.  Tonometry     Tonometry (Applanation, 7:57 AM)       Right Left    Pressure 19.5 14          Target and Max Pressure       Right Left    Target 18 18    Max 26 (9/12) 24 (9/12)              Not recorded       Not recorded       Not recorded       Chief Complaint   Patient presents with    Procedure     Patient reports for 1 month  ozurdex, eylea eval OD       Problem List Items Addressed This Visit    None     Visit Diagnoses     Branch retinal vein occlusion with macular edema of right eye    -  Primary    Relevant Orders    Posterior Segment OCT Retina-Both eyes (Completed)    Prior authorization Order    Epiretinal membrane (ERM) of right eye        Relevant Orders    Posterior Segment OCT Retina-Both eyes (Completed)          ________________  3/3/2022 today      BRVO w/ME OD  ERM OD  OCT better  IOP ok post Ozurdex OD 2/2/22  No injection today  Will follow in 8 weeks    RTC 8 weeks eval Ozurdex OD  Instructed to call 24/7 for any worsening of vision or symptoms. Check OU daily.   Gave my office and cell phone number.    .      ===============================

## 2022-03-21 ENCOUNTER — PATIENT MESSAGE (OUTPATIENT)
Dept: OPHTHALMOLOGY | Facility: CLINIC | Age: 62
End: 2022-03-21

## 2022-03-21 ENCOUNTER — OFFICE VISIT (OUTPATIENT)
Dept: OPHTHALMOLOGY | Facility: CLINIC | Age: 62
End: 2022-03-21
Payer: COMMERCIAL

## 2022-03-21 DIAGNOSIS — H25.043 POSTERIOR SUBCAPSULAR AGE-RELATED CATARACT OF BOTH EYES: ICD-10-CM

## 2022-03-21 DIAGNOSIS — H25.13 NUCLEAR SCLEROSIS OF BOTH EYES: ICD-10-CM

## 2022-03-21 DIAGNOSIS — H26.9 CORTICAL CATARACT OF LEFT EYE: ICD-10-CM

## 2022-03-21 DIAGNOSIS — H40.1132 PRIMARY OPEN ANGLE GLAUCOMA (POAG) OF BOTH EYES, MODERATE STAGE: Primary | ICD-10-CM

## 2022-03-21 PROCEDURE — 4010F PR ACE/ARB THEARPY RXD/TAKEN: ICD-10-PCS | Mod: CPTII,S$GLB,, | Performed by: OPHTHALMOLOGY

## 2022-03-21 PROCEDURE — 1160F RVW MEDS BY RX/DR IN RCRD: CPT | Mod: CPTII,S$GLB,, | Performed by: OPHTHALMOLOGY

## 2022-03-21 PROCEDURE — 1159F PR MEDICATION LIST DOCUMENTED IN MEDICAL RECORD: ICD-10-PCS | Mod: CPTII,S$GLB,, | Performed by: OPHTHALMOLOGY

## 2022-03-21 PROCEDURE — 92014 COMPRE OPH EXAM EST PT 1/>: CPT | Mod: S$GLB,,, | Performed by: OPHTHALMOLOGY

## 2022-03-21 PROCEDURE — 1159F MED LIST DOCD IN RCRD: CPT | Mod: CPTII,S$GLB,, | Performed by: OPHTHALMOLOGY

## 2022-03-21 PROCEDURE — 92014 PR EYE EXAM, EST PATIENT,COMPREHESV: ICD-10-PCS | Mod: S$GLB,,, | Performed by: OPHTHALMOLOGY

## 2022-03-21 PROCEDURE — 99999 PR PBB SHADOW E&M-EST. PATIENT-LVL III: ICD-10-PCS | Mod: PBBFAC,,, | Performed by: OPHTHALMOLOGY

## 2022-03-21 PROCEDURE — 4010F ACE/ARB THERAPY RXD/TAKEN: CPT | Mod: CPTII,S$GLB,, | Performed by: OPHTHALMOLOGY

## 2022-03-21 PROCEDURE — 99999 PR PBB SHADOW E&M-EST. PATIENT-LVL III: CPT | Mod: PBBFAC,,, | Performed by: OPHTHALMOLOGY

## 2022-03-21 PROCEDURE — 1160F PR REVIEW ALL MEDS BY PRESCRIBER/CLIN PHARMACIST DOCUMENTED: ICD-10-PCS | Mod: CPTII,S$GLB,, | Performed by: OPHTHALMOLOGY

## 2022-03-21 NOTE — PROGRESS NOTES
SUBJECTIVE  Carolee Ortega is 61 y.o. female  Corrected distance visual acuity was 20/200 in the right eye and 20/30 in the left eye.   Chief Complaint   Patient presents with    Cataract     Pt here for cataract eval. No pain or discomfort. Pt states no change to her vision since her last appointment. She says she still has trouble reading, watching tv, and driving.     Glaucoma          HPI     Cataract      Additional comments: Pt here for cataract eval. No pain or discomfort.   Pt states no change to her vision since her last appointment. She says she   still has trouble reading, watching tv, and driving.               Comments     V eye thomas   IG \ ERM \ ME OD  Mod COAG goal =18   SLT OD 11/4/20 (2-3 pt relative decrease)   Tmax 26/24   Minimal hemisphere asymmetry OD 30/26 OS 33/31   Thin corneas     AVASTIN #1 -- 5/26/2021    dorzolamide-timolol (COSOPT) 1 drop OU BID   Latanoprost 1 drop OU QHS            Last edited by Roberto Beltran MA on 3/21/2022  9:40 AM. (History)         Assessment /Plan :  1. Primary open angle glaucoma (POAG) of both eyes, moderate stage Doing well, IOP within acceptable range relative to target IOP and no evidence of progression. Continue current treatment. Reviewed importance of continued compliance with treatment and follow up.      Patient instructed to continue using the following glaucoma medication as follows:  Latanoprost one drop in each eye nightly and Dorzolamide/Timolol (Cosopt) one drop in each eye every 12 hours       2. Nuclear sclerosis of both eyes  Visually Significant Cataract OD > OS:   Patient reports decreased vision consistent with the clinical amount of lenticular opacity,  which reaches the level of visual significance and affects activities of daily living such as  read, drive, watch TV. Risks, benefits, and alternatives to cataract surgery were  discussed.  IOL options were discussed, including Premium IOL'S and the associated  side effects and  additional patient cost associated with them as well as patient's visual  goals. The pt expressed a desire to proceed with surgery with the potential for some  reasonable degree of visual improvement and was consented.  Risks of loss of vision and eye reviewed as well as possibility of need for spectacle correction after surgery even with premium implants. Verbal and written preop  instructions were provided to the patient.       Pt is interested in traditional monofocal IOL aiming for:    Distance OU and understands that glasses will be generally needed at all times for near vision and often for finer distance correction especially for higher degrees of astigmatism.       Final visual acuity may be limited by retina, astigmatism and glaucoma damage    Pt wishes to have Phaco/IOL  OD with goniotomy    Requests a Monofocal IOL.    Will aim for distance    Other considerations: patient needs an appointment with Dr. Mackay one week prior to surgery  Patient denies taking an blood thinners.  Medications sent:  Prednisolone   Ketorolac   Gatifloxacin/ moxifloxacin     3. Posterior subcapsular age-related cataract of both eyes    4. Cortical cataract of left eye

## 2022-03-22 RX ORDER — GATIFLOXACIN 5 MG/ML
1 SOLUTION/ DROPS OPHTHALMIC 2 TIMES DAILY
Qty: 1 EACH | Refills: 2 | Status: SHIPPED | OUTPATIENT
Start: 2022-03-22 | End: 2023-02-24

## 2022-03-23 ENCOUNTER — HOSPITAL ENCOUNTER (OUTPATIENT)
Dept: RADIOLOGY | Facility: HOSPITAL | Age: 62
Discharge: HOME | End: 2022-03-23
Attending: FAMILY MEDICINE
Payer: COMMERCIAL

## 2022-03-23 VITALS — WEIGHT: 218 LBS | BODY MASS INDEX: 40.12 KG/M2 | HEIGHT: 62 IN

## 2022-03-23 DIAGNOSIS — Z12.31 ENCOUNTER FOR SCREENING MAMMOGRAM FOR MALIGNANT NEOPLASM OF BREAST: ICD-10-CM

## 2022-03-23 PROCEDURE — 77067 MAMMO DIGITAL SCREENING BILAT WITH TOMO: ICD-10-PCS | Mod: 26,,, | Performed by: RADIOLOGY

## 2022-03-23 PROCEDURE — 77063 MAMMO DIGITAL SCREENING BILAT WITH TOMO: ICD-10-PCS | Mod: 26,,, | Performed by: RADIOLOGY

## 2022-03-23 PROCEDURE — 77067 SCR MAMMO BI INCL CAD: CPT | Mod: 26,,, | Performed by: RADIOLOGY

## 2022-03-23 PROCEDURE — 77063 BREAST TOMOSYNTHESIS BI: CPT | Mod: TC

## 2022-03-23 PROCEDURE — 77063 BREAST TOMOSYNTHESIS BI: CPT | Mod: 26,,, | Performed by: RADIOLOGY

## 2022-03-24 ENCOUNTER — PATIENT MESSAGE (OUTPATIENT)
Dept: FAMILY MEDICINE | Facility: CLINIC | Age: 62
End: 2022-03-24
Payer: COMMERCIAL

## 2022-04-13 ENCOUNTER — PATIENT MESSAGE (OUTPATIENT)
Dept: OTHER | Facility: OTHER | Age: 62
End: 2022-04-13
Payer: COMMERCIAL

## 2022-05-03 ENCOUNTER — PATIENT MESSAGE (OUTPATIENT)
Dept: ADMINISTRATIVE | Facility: OTHER | Age: 62
End: 2022-05-03
Payer: COMMERCIAL

## 2022-05-12 ENCOUNTER — PATIENT MESSAGE (OUTPATIENT)
Dept: FAMILY MEDICINE | Facility: CLINIC | Age: 62
End: 2022-05-12
Payer: COMMERCIAL

## 2022-05-13 ENCOUNTER — TELEPHONE (OUTPATIENT)
Dept: FAMILY MEDICINE | Facility: CLINIC | Age: 62
End: 2022-05-13

## 2022-05-31 ENCOUNTER — PATIENT MESSAGE (OUTPATIENT)
Dept: FAMILY MEDICINE | Facility: CLINIC | Age: 62
End: 2022-05-31
Payer: COMMERCIAL

## 2022-06-08 ENCOUNTER — PROCEDURE VISIT (OUTPATIENT)
Dept: OPHTHALMOLOGY | Facility: CLINIC | Age: 62
End: 2022-06-08
Payer: COMMERCIAL

## 2022-06-08 DIAGNOSIS — H34.8310 BRANCH RETINAL VEIN OCCLUSION WITH MACULAR EDEMA OF RIGHT EYE: Primary | ICD-10-CM

## 2022-06-08 PROCEDURE — 92134 CPTRZ OPH DX IMG PST SGM RTA: CPT | Mod: S$GLB,,, | Performed by: OPHTHALMOLOGY

## 2022-06-08 PROCEDURE — 99499 NO LOS: ICD-10-PCS | Mod: S$GLB,,, | Performed by: OPHTHALMOLOGY

## 2022-06-08 PROCEDURE — 99499 UNLISTED E&M SERVICE: CPT | Mod: S$GLB,,, | Performed by: OPHTHALMOLOGY

## 2022-06-08 PROCEDURE — 67028 PR INJECT INTRAVITREAL PHARMCOLOGIC: ICD-10-PCS | Mod: RT,S$GLB,, | Performed by: OPHTHALMOLOGY

## 2022-06-08 PROCEDURE — 67028 INJECTION EYE DRUG: CPT | Mod: RT,S$GLB,, | Performed by: OPHTHALMOLOGY

## 2022-06-08 PROCEDURE — 92134 POSTERIOR SEGMENT OCT RETINA (OCULAR COHERENCE TOMOGRAPHY)-BOTH EYES: ICD-10-PCS | Mod: S$GLB,,, | Performed by: OPHTHALMOLOGY

## 2022-06-08 NOTE — PROGRESS NOTES
===============================  Carolee Ortega,  6/8/2022 today   61 y.o. female   Last visit [facility]: :3/3/2022   Last visit eye dept. 3/21/2022  [facility]:  Corrected distance visual acuity was 20/400 in the right eye and 20/30 in the left eye.  Tonometry     Tonometry (Applanation, 9:05 AM)       Right Left    Pressure 21 15          Target and Max Pressure       Right Left    Target 18 18    Max 26 (9/12) 24 (9/12)              Not recorded       Not recorded       Not recorded       Chief Complaint   Patient presents with    brvo     Here for pre op eylea od     Ophthalmic Medications     Ophthalmic-Intraocular Pressure Reducing Agents, Prostaglandin Analogs Start End     latanoprost 0.005 % ophthalmic solution    3/7/2022     Sig: Place 1 drop into both eyes every evening.    Route: Both Eyes        ________________  6/8/2022 today  HPI     brvo      Additional comments: Here for pre op eylea od              Comments     V eye thomas   IG \ ERM \ ME OD  Mod COAG goal =18   SLT OD 11/4/20 (2-3 pt relative decrease)   Tmax 26/24   Minimal hemisphere asymmetry OD 30/26 OS 33/31   Thin corneas   Eylea OD lots  AVASTIN #1 -- 5/26/2021    dorzolamide-timolol (COSOPT) 1 drop OU BID   Latanoprost 1 drop OU QHS          Last edited by AMI Moreno on 6/8/2022  9:01 AM. (History)      Problem List Items Addressed This Visit    None     Visit Diagnoses     Branch retinal vein occlusion with macular edema of right eye    -  Primary    Relevant Medications    aflibercept Soln 2 mg (Completed)    Other Relevant Orders    Prior authorization Order    Posterior Segment OCT Retina-Both eyes (Completed)        CME/BVO  Ce next week current 550 cme   discussed need for dme tx on going         Injection Procedure Note:    6/8/2022  Diagnosis :  od cme/bvo   Today:   Eylea (afibercept) 2 mg/0.05 ml Intravitreal Injection , OD   Follow up: rtc week 5    Instructed to call 24/7 for any worsening of vision. Check Both eyes  daily. Gave patient my home phone number.  Risks, benefits, and alternatives to treatment discussed in detail with the patient.  The patient voiced understanding and wished to proceed with the procedure.     Patient Identified and Time Out complete  Subconjunctival bleb - xylocaine with epi 2%   and Betadine.  Inject at Eylea (afibercept) 2 mg/0.05 ml Intravitreal Injection , OD 6:00 @ 3.5-4mm posterior to limbus  1 stop: no   Post Operative Dx: Same  Complications: None  Follow up as above.          ===========================

## 2022-06-09 RX ORDER — HYDROXYZINE HYDROCHLORIDE 25 MG/1
25 TABLET, FILM COATED ORAL EVERY 4 HOURS PRN
Qty: 90 TABLET | Refills: 3 | Status: SHIPPED | OUTPATIENT
Start: 2022-06-09 | End: 2022-10-10

## 2022-06-15 ENCOUNTER — OUTSIDE PLACE OF SERVICE (OUTPATIENT)
Dept: OPHTHALMOLOGY | Facility: CLINIC | Age: 62
End: 2022-06-15
Payer: COMMERCIAL

## 2022-06-15 PROCEDURE — 66984 PR REMOVAL, CATARACT, W/INSRT INTRAOC LENS, W/O ENDO CYCLO: ICD-10-PCS | Mod: 51,RT,, | Performed by: OPHTHALMOLOGY

## 2022-06-15 PROCEDURE — 66984 XCAPSL CTRC RMVL W/O ECP: CPT | Mod: 51,RT,, | Performed by: OPHTHALMOLOGY

## 2022-06-15 PROCEDURE — 65820 PR RELIEVE INNER EYE PRESSURE: ICD-10-PCS | Mod: RT,,, | Performed by: OPHTHALMOLOGY

## 2022-06-15 PROCEDURE — 65820 GONIOTOMY: CPT | Mod: RT,,, | Performed by: OPHTHALMOLOGY

## 2022-06-16 ENCOUNTER — OFFICE VISIT (OUTPATIENT)
Dept: OPHTHALMOLOGY | Facility: CLINIC | Age: 62
End: 2022-06-16
Payer: COMMERCIAL

## 2022-06-16 DIAGNOSIS — Z98.890 POST-OPERATIVE STATE: Primary | ICD-10-CM

## 2022-06-16 PROCEDURE — 1160F RVW MEDS BY RX/DR IN RCRD: CPT | Mod: CPTII,S$GLB,, | Performed by: OPHTHALMOLOGY

## 2022-06-16 PROCEDURE — 99999 PR PBB SHADOW E&M-EST. PATIENT-LVL III: ICD-10-PCS | Mod: PBBFAC,,, | Performed by: OPHTHALMOLOGY

## 2022-06-16 PROCEDURE — 99024 PR POST-OP FOLLOW-UP VISIT: ICD-10-PCS | Mod: S$GLB,,, | Performed by: OPHTHALMOLOGY

## 2022-06-16 PROCEDURE — 4010F ACE/ARB THERAPY RXD/TAKEN: CPT | Mod: CPTII,S$GLB,, | Performed by: OPHTHALMOLOGY

## 2022-06-16 PROCEDURE — 1160F PR REVIEW ALL MEDS BY PRESCRIBER/CLIN PHARMACIST DOCUMENTED: ICD-10-PCS | Mod: CPTII,S$GLB,, | Performed by: OPHTHALMOLOGY

## 2022-06-16 PROCEDURE — 1159F PR MEDICATION LIST DOCUMENTED IN MEDICAL RECORD: ICD-10-PCS | Mod: CPTII,S$GLB,, | Performed by: OPHTHALMOLOGY

## 2022-06-16 PROCEDURE — 99999 PR PBB SHADOW E&M-EST. PATIENT-LVL III: CPT | Mod: PBBFAC,,, | Performed by: OPHTHALMOLOGY

## 2022-06-16 PROCEDURE — 99024 POSTOP FOLLOW-UP VISIT: CPT | Mod: S$GLB,,, | Performed by: OPHTHALMOLOGY

## 2022-06-16 PROCEDURE — 1159F MED LIST DOCD IN RCRD: CPT | Mod: CPTII,S$GLB,, | Performed by: OPHTHALMOLOGY

## 2022-06-16 PROCEDURE — 4010F PR ACE/ARB THEARPY RXD/TAKEN: ICD-10-PCS | Mod: CPTII,S$GLB,, | Performed by: OPHTHALMOLOGY

## 2022-06-16 NOTE — PROGRESS NOTES
SUBJECTIVE  Carolee Ortega is 61 y.o. female  Uncorrected distance visual acuity was 20/25 -1 in the right eye and not recorded in the left eye.   Chief Complaint   Patient presents with    Post-op Evaluation     Patient states OD doing well, no pain or irritation and extremely happy with surgical results.          HPI     Post-op Evaluation      Additional comments: Patient states OD doing well, no pain or irritation   and extremely happy with surgical results.              Comments     V eye thomas   IG \ ERM \ ME OD  Mod COAG goal =18   SLT OD 11/4/20 (2-3 pt relative decrease)   Tmax 26/24   Minimal hemisphere asymmetry OD 30/26 OS 33/31   Thin corneas   Eylea OD lots  AVASTIN #1 -- 5/26/2021  Preop Eylea OD 6/8/22    dorzolamide-timolol (COSOPT) 1 drop OU BID   Latanoprost 1 drop OU QHS  V eye thomas   IG \ ERM \ ME OD  Mod COAG goal =18   SLT OD 11/4/20 (2-3 pt relative decrease)   Tmax 26/24   Minimal hemisphere asymmetry OD 30/26 OS 33/31   Thin corneas   Eylea OD lots  AVASTIN #1 -- 5/26/2021  Preop Eylea OD 6/8/22    dorzolamide-timolol (COSOPT) 1 drop OU BID   Latanoprost 1 drop OU QHSOD:Gati BID, Pred QID & Keto QID          Last edited by Angeles Durham, Patient Care Assistant on 6/16/2022  1:05   PM. (History)         Assessment /Plan :  1. Post-operative state Exam:  SLE:  S/C: normal  K : trace k fold  AC: 1+ cell and flare  Iris: normal  Lens: PCIOL    IMP:  PO Day 1 S/P Phaco/IOL OD with goniotomy : Doing well.    Plan:  Continue gtts to operative eye:  Pred 1% QID  Ketorolac QID  Zymaxid/moxifloxacin BID  Reinstructed in importance of absolute compliance with Post-OP instructions including medications, shield at bedtime, and limitation of activities. Follow up appointments in approximately one and six weeks or call immediately for increased pain, redness or vision loss.

## 2022-06-22 RX ORDER — HYDROXYZINE HYDROCHLORIDE 25 MG/1
TABLET, FILM COATED ORAL
Qty: 90 TABLET | Refills: 23 | OUTPATIENT
Start: 2022-06-22

## 2022-06-23 ENCOUNTER — TELEPHONE (OUTPATIENT)
Dept: OPHTHALMOLOGY | Facility: CLINIC | Age: 62
End: 2022-06-23

## 2022-06-23 ENCOUNTER — OFFICE VISIT (OUTPATIENT)
Dept: OPHTHALMOLOGY | Facility: CLINIC | Age: 62
End: 2022-06-23
Payer: COMMERCIAL

## 2022-06-23 DIAGNOSIS — Z96.1 PSEUDOPHAKIA OF RIGHT EYE: ICD-10-CM

## 2022-06-23 DIAGNOSIS — Z98.890 POST-OPERATIVE STATE: Primary | ICD-10-CM

## 2022-06-23 PROCEDURE — 99024 POSTOP FOLLOW-UP VISIT: CPT | Mod: S$GLB,,, | Performed by: OPTOMETRIST

## 2022-06-23 PROCEDURE — 1160F RVW MEDS BY RX/DR IN RCRD: CPT | Mod: CPTII,S$GLB,, | Performed by: OPTOMETRIST

## 2022-06-23 PROCEDURE — 1159F MED LIST DOCD IN RCRD: CPT | Mod: CPTII,S$GLB,, | Performed by: OPTOMETRIST

## 2022-06-23 PROCEDURE — 4010F PR ACE/ARB THEARPY RXD/TAKEN: ICD-10-PCS | Mod: CPTII,S$GLB,, | Performed by: OPTOMETRIST

## 2022-06-23 PROCEDURE — 1160F PR REVIEW ALL MEDS BY PRESCRIBER/CLIN PHARMACIST DOCUMENTED: ICD-10-PCS | Mod: CPTII,S$GLB,, | Performed by: OPTOMETRIST

## 2022-06-23 PROCEDURE — 4010F ACE/ARB THERAPY RXD/TAKEN: CPT | Mod: CPTII,S$GLB,, | Performed by: OPTOMETRIST

## 2022-06-23 PROCEDURE — 1159F PR MEDICATION LIST DOCUMENTED IN MEDICAL RECORD: ICD-10-PCS | Mod: CPTII,S$GLB,, | Performed by: OPTOMETRIST

## 2022-06-23 PROCEDURE — 99024 PR POST-OP FOLLOW-UP VISIT: ICD-10-PCS | Mod: S$GLB,,, | Performed by: OPTOMETRIST

## 2022-06-23 PROCEDURE — 99999 PR PBB SHADOW E&M-EST. PATIENT-LVL II: CPT | Mod: PBBFAC,,, | Performed by: OPTOMETRIST

## 2022-06-23 PROCEDURE — 99999 PR PBB SHADOW E&M-EST. PATIENT-LVL II: ICD-10-PCS | Mod: PBBFAC,,, | Performed by: OPTOMETRIST

## 2022-06-23 NOTE — TELEPHONE ENCOUNTER
----- Message from Barbara Toro sent at 6/23/2022 10:51 AM CDT -----  Contact: [phone]  Pt called in regards to getting an paul scheduled please advise

## 2022-06-23 NOTE — PROGRESS NOTES
HPI     Post-op Evaluation     Comments: Pt states VA is excellent, no pain or occular issues. PT states   compliance with gtts.  1 week PO PCIOL OD          Last edited by Cricket Sharpe, OD on 6/23/2022  8:45 AM. (History)            Assessment /Plan     For exam results, see Encounter Report.    Post-operative state    Pseudophakia of right eye      Impression: 1 week post-op phaco with PCIOL OD : Doing well    Ok to d/c zymaxid/moxi  Continue Pred and ketorolac qid OD until next visit  Continue Cosopt bid OU and Latanoprost qhs OU  Ok to resume normal activities and discontinue eye shield     RTC 2 weeks with [facility] for next post-op visit or PRN if any pain, redness, vision changes or any other concerns.  Discussed above and answered questions.

## 2022-06-23 NOTE — TELEPHONE ENCOUNTER
----- Message from Barbara Toro sent at 6/23/2022 10:51 AM CDT -----  Contact: [phone]  Pt called in regards to getting an paul scheduled please advise

## 2022-07-07 ENCOUNTER — OFFICE VISIT (OUTPATIENT)
Dept: OPHTHALMOLOGY | Facility: CLINIC | Age: 62
End: 2022-07-07
Payer: COMMERCIAL

## 2022-07-07 DIAGNOSIS — H40.1132 PRIMARY OPEN ANGLE GLAUCOMA (POAG) OF BOTH EYES, MODERATE STAGE: ICD-10-CM

## 2022-07-07 DIAGNOSIS — H25.12 NUCLEAR SCLEROSIS OF LEFT EYE: ICD-10-CM

## 2022-07-07 DIAGNOSIS — H26.9 CORTICAL CATARACT OF LEFT EYE: ICD-10-CM

## 2022-07-07 DIAGNOSIS — Z98.890 POST-OPERATIVE STATE: ICD-10-CM

## 2022-07-07 DIAGNOSIS — H25.042 POSTERIOR SUBCAPSULAR AGE-RELATED CATARACT OF LEFT EYE: ICD-10-CM

## 2022-07-07 PROCEDURE — 1160F PR REVIEW ALL MEDS BY PRESCRIBER/CLIN PHARMACIST DOCUMENTED: ICD-10-PCS | Mod: CPTII,S$GLB,, | Performed by: OPHTHALMOLOGY

## 2022-07-07 PROCEDURE — 99024 POSTOP FOLLOW-UP VISIT: CPT | Mod: S$GLB,,, | Performed by: OPHTHALMOLOGY

## 2022-07-07 PROCEDURE — 99024 PR POST-OP FOLLOW-UP VISIT: ICD-10-PCS | Mod: S$GLB,,, | Performed by: OPHTHALMOLOGY

## 2022-07-07 PROCEDURE — 4010F PR ACE/ARB THEARPY RXD/TAKEN: ICD-10-PCS | Mod: CPTII,S$GLB,, | Performed by: OPHTHALMOLOGY

## 2022-07-07 PROCEDURE — 1159F MED LIST DOCD IN RCRD: CPT | Mod: CPTII,S$GLB,, | Performed by: OPHTHALMOLOGY

## 2022-07-07 PROCEDURE — 1160F RVW MEDS BY RX/DR IN RCRD: CPT | Mod: CPTII,S$GLB,, | Performed by: OPHTHALMOLOGY

## 2022-07-07 PROCEDURE — 1159F PR MEDICATION LIST DOCUMENTED IN MEDICAL RECORD: ICD-10-PCS | Mod: CPTII,S$GLB,, | Performed by: OPHTHALMOLOGY

## 2022-07-07 PROCEDURE — 4010F ACE/ARB THERAPY RXD/TAKEN: CPT | Mod: CPTII,S$GLB,, | Performed by: OPHTHALMOLOGY

## 2022-07-07 PROCEDURE — 99999 PR PBB SHADOW E&M-EST. PATIENT-LVL III: CPT | Mod: PBBFAC,,, | Performed by: OPHTHALMOLOGY

## 2022-07-07 PROCEDURE — 99999 PR PBB SHADOW E&M-EST. PATIENT-LVL III: ICD-10-PCS | Mod: PBBFAC,,, | Performed by: OPHTHALMOLOGY

## 2022-07-07 RX ORDER — KETOROLAC TROMETHAMINE 5 MG/ML
1 SOLUTION OPHTHALMIC 4 TIMES DAILY
Qty: 5 ML | Refills: 2 | Status: SHIPPED | OUTPATIENT
Start: 2022-07-07 | End: 2023-02-24

## 2022-07-07 RX ORDER — PREDNISOLONE ACETATE 10 MG/ML
1 SUSPENSION/ DROPS OPHTHALMIC 4 TIMES DAILY
Qty: 5 ML | Refills: 2 | Status: SHIPPED | OUTPATIENT
Start: 2022-07-07 | End: 2023-02-24

## 2022-07-07 RX ORDER — GATIFLOXACIN 5 MG/ML
1 SOLUTION/ DROPS OPHTHALMIC 2 TIMES DAILY
Qty: 5 ML | Refills: 2 | Status: SHIPPED | OUTPATIENT
Start: 2022-07-07 | End: 2023-02-24

## 2022-07-07 NOTE — PROGRESS NOTES
SUBJECTIVE  Carolee Ortega is 61 y.o. female  Uncorrected distance visual acuity was 20/40 in the right eye and 20/30 in the left eye.   Chief Complaint   Patient presents with    Post-op Evaluation     Pt reports for post op check for PCIOL OD w/gonio. Denies any pain or irritation. Va stable. 100% compliant with gtts.           HPI     Post-op Evaluation      Additional comments: Pt reports for post op check for PCIOL OD w/gonio.   Denies any pain or irritation. Va stable. 100% compliant with gtts.               Comments     1. Mod COAG goal =18   SLT OD 11/4/20 (2-3 pt relative decrease)   Tmax 26/24   Minimal hemisphere asymmetry OD 30/26 OS 33/31   Thin corneas   2. PCIOL OD 6/15/22 (+18.0 SN60WF)  3. IG \ ERM \ ME OD  Eylea OD lots  AVASTIN #1 -- 5/26/2021  Preop Eylea OD 6/8/22    dorzolamide-timolol (COSOPT) 1 drop OU BID   Latanoprost 1 drop OU QHS    OD: Pred & Keto BID          Last edited by Fritz Brown on 7/7/2022  1:04 PM. (History)         Assessment /Plan :  1. Post-operative state   Exam:    Slit lamp exam:  L/L: nl  S/C: quiet and uninflamed  K: clear, wound sealed  AC: no cell or flare  Iris/Lens: IOL centered and stable      Assessment:    PO 3 weeks S/P Phaco/IOL OD : Doing Well and completing healing process. Final visual correction options discussed and appropriate prescriptions and/or OTC reading glass recommendations offered to patient. Pt desires OTC Readers. Pt instructed to follow up with Dr. Zaragoza PRN any pain, redness, vision changes or other concerns.     2. Cortical cataract of left eye   Patient reports decreased vision in the fellow eye consistent with the clinical amount of lenticular opacity, which reaches the level of visual significance and affects activities of daily living including reading and glare. Risks, benefits, and alternatives to cataract surgery were discussed and pt desired to schedule cataract surgery. Pt was consented and the biometry and lens options were  reviewed.    Schedule cataract surgery in OS       Pt is interested in traditional monofocal IOL aiming for:    Distance OU and understands that glasses will be generally needed at all times for near vision and often for finer distance correction especially for higher degrees of astigmatism.       Final visual acuity may be limited by astigmatism    Pt wishes to have Phaco/IOL  OS with goniotomy    Requests a Monofocal IOL.    Will aim for distance    Other considerations: Schedule surgery one week after the patient's appointment with Dr. Mackay on 7/20/22     Medications sent:  Prednisolone  Ketorolac  Gatifloxacin/ Moxifloxacin        3. Nuclear sclerosis of left eye    4. Posterior subcapsular age-related cataract of left eye

## 2022-07-20 ENCOUNTER — PROCEDURE VISIT (OUTPATIENT)
Dept: OPHTHALMOLOGY | Facility: CLINIC | Age: 62
End: 2022-07-20
Payer: COMMERCIAL

## 2022-07-20 DIAGNOSIS — H35.81 MACULAR EDEMA: ICD-10-CM

## 2022-07-20 DIAGNOSIS — H34.8310 BRANCH RETINAL VEIN OCCLUSION WITH MACULAR EDEMA OF RIGHT EYE: Primary | ICD-10-CM

## 2022-07-20 DIAGNOSIS — H35.371 EPIRETINAL MEMBRANE (ERM) OF RIGHT EYE: ICD-10-CM

## 2022-07-20 PROCEDURE — 92134 POSTERIOR SEGMENT OCT RETINA (OCULAR COHERENCE TOMOGRAPHY)-BOTH EYES: ICD-10-PCS | Mod: S$GLB,,, | Performed by: OPHTHALMOLOGY

## 2022-07-20 PROCEDURE — 92134 CPTRZ OPH DX IMG PST SGM RTA: CPT | Mod: S$GLB,,, | Performed by: OPHTHALMOLOGY

## 2022-07-20 PROCEDURE — 99499 NO LOS: ICD-10-PCS | Mod: S$GLB,,, | Performed by: OPHTHALMOLOGY

## 2022-07-20 PROCEDURE — 99499 UNLISTED E&M SERVICE: CPT | Mod: S$GLB,,, | Performed by: OPHTHALMOLOGY

## 2022-07-20 PROCEDURE — 67028 PR INJECT INTRAVITREAL PHARMCOLOGIC: ICD-10-PCS | Mod: 50,S$GLB,, | Performed by: OPHTHALMOLOGY

## 2022-07-20 PROCEDURE — 67028 INJECTION EYE DRUG: CPT | Mod: 50,S$GLB,, | Performed by: OPHTHALMOLOGY

## 2022-07-20 NOTE — PROGRESS NOTES
===============================  Date today is 7/20/2022  Carolee Ortega is a 61 y.o. female  Last visit [facility]: :6/8/2022   Last visit eye dept. 7/7/2022    Corrected distance visual acuity was 20/30 in the right eye and 20/40 in the left eye.  Tonometry     Tonometry (Applanation, 7:52 AM)       Right Left    Pressure 15 16          Target and Max Pressure       Right Left    Target 18 18    Max 26 (9/12) 24 (9/12)              Not recorded       Not recorded       Not recorded       Chief Complaint   Patient presents with    BRVO     EYLEA OD     HPI     BRVO      Additional comments: EYLEA OD              Comments     1. Mod COAG goal =18   SLT OD 11/4/20 (2-3 pt relative decrease)   Tmax 26/24   Minimal hemisphere asymmetry OD 30/26 OS 33/31   Thin corneas   2. PCIOL OD 6/15/22 (+18.0 SN60WF)  3. IG \ ERM \ ME OD  Eylea OD lots  AVASTIN #1 -- 5/26/2021  Preop Eylea OD 6/8/22    dorzolamide-timolol (COSOPT) 1 drop OU BID   Latanoprost 1 drop OU QHS    OD: Pred & Keto BID          Last edited by Hedy Mayes on 7/20/2022  7:36 AM. (History)      Problem List Items Addressed This Visit    None     Visit Diagnoses     Branch retinal vein occlusion with macular edema of right eye    -  Primary    Relevant Medications    aflibercept Soln 2 mg (Completed)    aflibercept Soln 2 mg (Completed) (Start on 7/20/2022  9:30 AM)    Other Relevant Orders    Posterior Segment OCT Retina-Both eyes (Completed)    Prior authorization Order    Epiretinal membrane (ERM) of right eye        Relevant Medications    aflibercept Soln 2 mg (Completed) (Start on 7/20/2022  9:30 AM)    Macular edema        Relevant Medications    aflibercept Soln 2 mg (Completed) (Start on 7/20/2022  9:30 AM)          ________________  7/20/2022 today    OD IG/ERM/CME  Maximum benefit after Eylea tx and Ozurdex  Proceed with Eylea today, then Ozurdex in 1 month  Watch if pt ok subjectively after Ozurdex, may watch at that time  Today will also do Eylea  OS preoperatively      Injection Procedure Note:    7/20/2022  Diagnosis :  OD IG/ERM/CME, OS  preop  Today:   Eylea (afibercept) 2 mg/0.05 ml Intravitreal Injection , OU   Follow up: rtc 1 month Ozurdex OD then asses maximun benefit of tx ... follow therafte?    Instructed to call 24/7 for any worsening of vision. Check Both eyes daily. Gave patient my home phone number.  Risks, benefits, and alternatives to treatment discussed in detail with the patient.  The patient voiced understanding and wished to proceed with the procedure.     Patient Identified and Time Out complete  Subconjunctival bleb - xylocaine with epi 2%   and Betadine.  Inject at Eylea (afibercept) 2 mg/0.05 ml Intravitreal Injection , OU 6:00 @ 3.5-4mm posterior to limbus  1 stop: no   Post Operative Dx: Same  Complications: None  Follow up as above.    ===============================

## 2022-07-27 ENCOUNTER — OUTSIDE PLACE OF SERVICE (OUTPATIENT)
Dept: OPHTHALMOLOGY | Facility: CLINIC | Age: 62
End: 2022-07-27
Payer: COMMERCIAL

## 2022-07-27 ENCOUNTER — IMMUNIZATION (OUTPATIENT)
Dept: PHARMACY | Facility: CLINIC | Age: 62
End: 2022-07-27
Payer: COMMERCIAL

## 2022-07-27 DIAGNOSIS — Z23 NEED FOR VACCINATION: Primary | ICD-10-CM

## 2022-07-27 PROCEDURE — 65820 GONIOTOMY: CPT | Mod: 79,51,LT, | Performed by: OPHTHALMOLOGY

## 2022-07-27 PROCEDURE — 66982 XCAPSL CTRC RMVL CPLX WO ECP: CPT | Mod: 79,LT,, | Performed by: OPHTHALMOLOGY

## 2022-07-27 PROCEDURE — 66982 PR REMOVAL, CATARACT, W/INSRT INTRAOC LENS, W/O ENDO CYCLO, CMPLX: ICD-10-PCS | Mod: 79,LT,, | Performed by: OPHTHALMOLOGY

## 2022-07-27 PROCEDURE — 65820 PR RELIEVE INNER EYE PRESSURE: ICD-10-PCS | Mod: 79,51,LT, | Performed by: OPHTHALMOLOGY

## 2022-07-28 ENCOUNTER — OFFICE VISIT (OUTPATIENT)
Dept: OPHTHALMOLOGY | Facility: CLINIC | Age: 62
End: 2022-07-28
Payer: COMMERCIAL

## 2022-07-28 DIAGNOSIS — Z98.890 POST-OPERATIVE STATE: Primary | ICD-10-CM

## 2022-07-28 PROCEDURE — 1160F RVW MEDS BY RX/DR IN RCRD: CPT | Mod: CPTII,S$GLB,, | Performed by: OPHTHALMOLOGY

## 2022-07-28 PROCEDURE — 4010F PR ACE/ARB THEARPY RXD/TAKEN: ICD-10-PCS | Mod: CPTII,S$GLB,, | Performed by: OPHTHALMOLOGY

## 2022-07-28 PROCEDURE — 99024 POSTOP FOLLOW-UP VISIT: CPT | Mod: S$GLB,,, | Performed by: OPHTHALMOLOGY

## 2022-07-28 PROCEDURE — 99024 PR POST-OP FOLLOW-UP VISIT: ICD-10-PCS | Mod: S$GLB,,, | Performed by: OPHTHALMOLOGY

## 2022-07-28 PROCEDURE — 4010F ACE/ARB THERAPY RXD/TAKEN: CPT | Mod: CPTII,S$GLB,, | Performed by: OPHTHALMOLOGY

## 2022-07-28 PROCEDURE — 99999 PR PBB SHADOW E&M-EST. PATIENT-LVL III: ICD-10-PCS | Mod: PBBFAC,,, | Performed by: OPHTHALMOLOGY

## 2022-07-28 PROCEDURE — 1159F MED LIST DOCD IN RCRD: CPT | Mod: CPTII,S$GLB,, | Performed by: OPHTHALMOLOGY

## 2022-07-28 PROCEDURE — 1159F PR MEDICATION LIST DOCUMENTED IN MEDICAL RECORD: ICD-10-PCS | Mod: CPTII,S$GLB,, | Performed by: OPHTHALMOLOGY

## 2022-07-28 PROCEDURE — 99999 PR PBB SHADOW E&M-EST. PATIENT-LVL III: CPT | Mod: PBBFAC,,, | Performed by: OPHTHALMOLOGY

## 2022-07-28 PROCEDURE — 1160F PR REVIEW ALL MEDS BY PRESCRIBER/CLIN PHARMACIST DOCUMENTED: ICD-10-PCS | Mod: CPTII,S$GLB,, | Performed by: OPHTHALMOLOGY

## 2022-07-28 NOTE — PROGRESS NOTES
SUBJECTIVE  Carolee Ortega is 61 y.o. female  Uncorrected distance visual acuity was not recorded in the right eye and 20/30 -2 in the left eye.   Chief Complaint   Patient presents with    Post-op Evaluation     Pt reports for 1 day post op PCIOL OS w/gonio. Denies any pain or irritation. Va stable. 100% compliant with gtts.           HPI     Post-op Evaluation      Additional comments: Pt reports for 1 day post op PCIOL OS w/gonio.   Denies any pain or irritation. Va stable. 100% compliant with gtts.               Comments     1. Mod COAG goal =18   SLT OD 11/4/20 (2-3 pt relative decrease)   Tmax 26/24   Minimal hemisphere asymmetry OD 30/26 OS 33/31   Thin corneas   2. PCIOL OD with goniotomy 6/15/22 (+18.0 SN60WF)  3. IG \ ERM \ ME OD  Eylea OD lots  AVASTIN #1 -- 5/26/2021  Preop Eylea OD 6/8/22  Eylea OU 7/20/22 (OS preop)    dorzolamide-timolol (COSOPT) 1 drop OU BID   Latanoprost 1 drop OU QHS    OD: Pred & Keto BID    Post surgery gtts:  Pred QID OD  Keto QID OD  Mark BID OD            Last edited by Fritz Brown on 7/28/2022  7:42 AM. (History)         Assessment /Plan :  1. Post-operative state Exam:  SLE:  S/C: normal  K : trace k fold  AC: 2+ cell and flare  Iris: normal  Lens: PCIOL    IMP:  PO Day 1 S/P Phaco/IOL OS with goniotomy : Doing well.    Plan:  Continue gtts to operative eye:  Pred 1% QID  Ketorolac QID  Zymaxid/moxifloxacin BID  Reinstructed in importance of absolute compliance with Post-OP instructions including medications, shield at bedtime, and limitation of activities. Follow up appointments in approximately one and six weeks or call immediately for increased pain, redness or vision loss.

## 2022-08-01 ENCOUNTER — PATIENT MESSAGE (OUTPATIENT)
Dept: DERMATOLOGY | Facility: CLINIC | Age: 62
End: 2022-08-01
Payer: COMMERCIAL

## 2022-08-04 ENCOUNTER — OFFICE VISIT (OUTPATIENT)
Dept: OPHTHALMOLOGY | Facility: CLINIC | Age: 62
End: 2022-08-04
Payer: COMMERCIAL

## 2022-08-04 DIAGNOSIS — Z98.890 POST-OPERATIVE STATE: Primary | ICD-10-CM

## 2022-08-04 PROCEDURE — 1160F RVW MEDS BY RX/DR IN RCRD: CPT | Mod: CPTII,S$GLB,, | Performed by: OPHTHALMOLOGY

## 2022-08-04 PROCEDURE — 4010F ACE/ARB THERAPY RXD/TAKEN: CPT | Mod: CPTII,S$GLB,, | Performed by: OPHTHALMOLOGY

## 2022-08-04 PROCEDURE — 99024 POSTOP FOLLOW-UP VISIT: CPT | Mod: S$GLB,,, | Performed by: OPHTHALMOLOGY

## 2022-08-04 PROCEDURE — 4010F PR ACE/ARB THEARPY RXD/TAKEN: ICD-10-PCS | Mod: CPTII,S$GLB,, | Performed by: OPHTHALMOLOGY

## 2022-08-04 PROCEDURE — 99999 PR PBB SHADOW E&M-EST. PATIENT-LVL III: ICD-10-PCS | Mod: PBBFAC,,, | Performed by: OPHTHALMOLOGY

## 2022-08-04 PROCEDURE — 1159F MED LIST DOCD IN RCRD: CPT | Mod: CPTII,S$GLB,, | Performed by: OPHTHALMOLOGY

## 2022-08-04 PROCEDURE — 99024 PR POST-OP FOLLOW-UP VISIT: ICD-10-PCS | Mod: S$GLB,,, | Performed by: OPHTHALMOLOGY

## 2022-08-04 PROCEDURE — 99999 PR PBB SHADOW E&M-EST. PATIENT-LVL III: CPT | Mod: PBBFAC,,, | Performed by: OPHTHALMOLOGY

## 2022-08-04 PROCEDURE — 1159F PR MEDICATION LIST DOCUMENTED IN MEDICAL RECORD: ICD-10-PCS | Mod: CPTII,S$GLB,, | Performed by: OPHTHALMOLOGY

## 2022-08-04 PROCEDURE — 1160F PR REVIEW ALL MEDS BY PRESCRIBER/CLIN PHARMACIST DOCUMENTED: ICD-10-PCS | Mod: CPTII,S$GLB,, | Performed by: OPHTHALMOLOGY

## 2022-08-04 NOTE — PROGRESS NOTES
SUBJECTIVE  Carolee Ortega is 61 y.o. female  Uncorrected distance visual acuity was 20/40 in the right eye and 20/25 -1 in the left eye.   Chief Complaint   Patient presents with    Post-op Evaluation     Pt here for 1 wk PCIOL OS w/ goniotomy OS PO. No pain or discomfort. VA stable. 100% compliant with gtts.           HPI     Post-op Evaluation      Additional comments: Pt here for 1 wk PCIOL OS w/ goniotomy OS PO. No   pain or discomfort. VA stable. 100% compliant with gtts.               Comments     1. Mod COAG goal =18   SLT OD 11/4/20 (2-3 pt relative decrease)   Tmax 26/24   Minimal hemisphere asymmetry OD 30/26 OS 33/31   Thin corneas   2. PCIOL OD with goniotomy 6/15/22 (+18.0 SN60WF)  PCIOL OS w/ gonio 7/27/22 (+17.0 SN60WF)  3. IG \ ERM \ ME OD  Eylea OD lots  AVASTIN #1 -- 5/26/2021  Preop Eylea OD 6/8/22  Eylea OU 7/20/22 (OS preop)    dorzolamide-timolol (COSOPT) 1 drop OU BID   Latanoprost 1 drop OU QHS    OD: Pred & Keto BID    Post surgery gtts:  Pred QID OS  Keto QID OS  Mark BID OS            Last edited by Roberto Beltran MA on 8/4/2022 10:34 AM. (History)         Assessment /Plan :  1. Post-operative state Slit lamp exam:  L/L: nl  K: clear, wound sealed  AC: 0 cell and flare  Iris/Lens: IOL centered and stable      IMP:  PO Week 1 S/P Phaco/ IOL OS with goniotomy : Doing well with no evidence of infection or abnormal inflammation.     Plan:  Pt given and instructed in one week PO instructions. D/C zymaxid/moxifloxacin and start to taper off Ketorlac and Pred Forte 1% weekly. Can resume normal activitites and d/c eye shield. OTC reading glasses can be used until evaluated for final MR. Follow up in one month or PRN pain, redness, vision loss, or other concerns.

## 2022-08-23 ENCOUNTER — OFFICE VISIT (OUTPATIENT)
Dept: DERMATOLOGY | Facility: CLINIC | Age: 62
End: 2022-08-23
Payer: COMMERCIAL

## 2022-08-23 DIAGNOSIS — L98.9 SKIN LESION: Primary | ICD-10-CM

## 2022-08-23 PROCEDURE — 99212 OFFICE O/P EST SF 10 MIN: CPT | Mod: 95,,, | Performed by: STUDENT IN AN ORGANIZED HEALTH CARE EDUCATION/TRAINING PROGRAM

## 2022-08-23 PROCEDURE — 1159F MED LIST DOCD IN RCRD: CPT | Mod: CPTII,95,, | Performed by: STUDENT IN AN ORGANIZED HEALTH CARE EDUCATION/TRAINING PROGRAM

## 2022-08-23 PROCEDURE — 1160F RVW MEDS BY RX/DR IN RCRD: CPT | Mod: CPTII,95,, | Performed by: STUDENT IN AN ORGANIZED HEALTH CARE EDUCATION/TRAINING PROGRAM

## 2022-08-23 PROCEDURE — 1159F PR MEDICATION LIST DOCUMENTED IN MEDICAL RECORD: ICD-10-PCS | Mod: CPTII,95,, | Performed by: STUDENT IN AN ORGANIZED HEALTH CARE EDUCATION/TRAINING PROGRAM

## 2022-08-23 PROCEDURE — 4010F PR ACE/ARB THEARPY RXD/TAKEN: ICD-10-PCS | Mod: CPTII,95,, | Performed by: STUDENT IN AN ORGANIZED HEALTH CARE EDUCATION/TRAINING PROGRAM

## 2022-08-23 PROCEDURE — 4010F ACE/ARB THERAPY RXD/TAKEN: CPT | Mod: CPTII,95,, | Performed by: STUDENT IN AN ORGANIZED HEALTH CARE EDUCATION/TRAINING PROGRAM

## 2022-08-23 PROCEDURE — 1160F PR REVIEW ALL MEDS BY PRESCRIBER/CLIN PHARMACIST DOCUMENTED: ICD-10-PCS | Mod: CPTII,95,, | Performed by: STUDENT IN AN ORGANIZED HEALTH CARE EDUCATION/TRAINING PROGRAM

## 2022-08-23 PROCEDURE — 99212 PR OFFICE/OUTPT VISIT, EST, LEVL II, 10-19 MIN: ICD-10-PCS | Mod: 95,,, | Performed by: STUDENT IN AN ORGANIZED HEALTH CARE EDUCATION/TRAINING PROGRAM

## 2022-08-23 NOTE — PROGRESS NOTES
The patient location is: home  The chief complaint leading to consultation is: lesion on the left thigh    Visit type: audiovisual    Face to Face time with patient: 10mins  10 minutes of total time spent on the encounter, which includes face to face time and non-face to face time preparing to see the patient (eg, review of tests), Obtaining and/or reviewing separately obtained history, Documenting clinical information in the electronic or other health record, Independently interpreting results (not separately reported) and communicating results to the patient/family/caregiver, or Care coordination (not separately reported).         Each patient to whom he or she provides medical services by telemedicine is:  (1) informed of the relationship between the physician and patient and the respective role of any other health care provider with respect to management of the patient; and (2) notified that he or she may decline to receive medical services by telemedicine and may withdraw from such care at any time.    Notes: Patient presents for lesion on the thigh. Reports has been present for many months, but recently started to become painful and draining fluid from it, as well as changing textures. NO treatments.     ROS: Otherwise negative    PE: const/neuro - AAOX3, NAD          Skin -       A/P: 1) Skin lesion - appears consistent with inflamed seborrheic keratosis. Will schedule in office visit for closer evaluation, and likely shave removal.

## 2022-08-26 ENCOUNTER — TELEPHONE (OUTPATIENT)
Dept: DERMATOLOGY | Facility: CLINIC | Age: 62
End: 2022-08-26
Payer: COMMERCIAL

## 2022-08-26 NOTE — TELEPHONE ENCOUNTER
Spoke to pt about getting biopsy scheduled. I schdeuled her ----- Message from Roxana Clark sent at 8/26/2022  9:43 AM CDT -----  Regarding: procedure  Contact: patient  Patient states that she is suppose to have someone call her regarding the scheduling of a procedure, but she has not heard from anyone, please call her back at [phone]

## 2022-08-31 ENCOUNTER — PROCEDURE VISIT (OUTPATIENT)
Dept: OPHTHALMOLOGY | Facility: CLINIC | Age: 62
End: 2022-08-31
Payer: COMMERCIAL

## 2022-08-31 DIAGNOSIS — H34.8310 BRANCH RETINAL VEIN OCCLUSION WITH MACULAR EDEMA OF RIGHT EYE: Primary | ICD-10-CM

## 2022-08-31 PROCEDURE — 67028 INJECTION EYE DRUG: CPT | Mod: 79,RT,S$GLB, | Performed by: OPHTHALMOLOGY

## 2022-08-31 PROCEDURE — 92134 CPTRZ OPH DX IMG PST SGM RTA: CPT | Mod: S$GLB,,, | Performed by: OPHTHALMOLOGY

## 2022-08-31 PROCEDURE — 92134 POSTERIOR SEGMENT OCT RETINA (OCULAR COHERENCE TOMOGRAPHY)-BOTH EYES: ICD-10-PCS | Mod: S$GLB,,, | Performed by: OPHTHALMOLOGY

## 2022-08-31 PROCEDURE — 67028 PR INJECT INTRAVITREAL PHARMCOLOGIC: ICD-10-PCS | Mod: 79,RT,S$GLB, | Performed by: OPHTHALMOLOGY

## 2022-08-31 PROCEDURE — 99499 NO LOS: ICD-10-PCS | Mod: S$GLB,,, | Performed by: OPHTHALMOLOGY

## 2022-08-31 PROCEDURE — 99499 UNLISTED E&M SERVICE: CPT | Mod: S$GLB,,, | Performed by: OPHTHALMOLOGY

## 2022-08-31 NOTE — PROGRESS NOTES
===============================  Date today is 8/31/2022  Carolee Ortega is a 61 y.o. female  Last visit [facility]: :7/20/2022   Last visit eye dept. 8/4/2022    Uncorrected distance visual acuity was 20/30 in the right eye and 20/20 in the left eye.  Tonometry       Tonometry (Applanation, 8:01 AM)         Right Left    Pressure 14 15              Target and Max Pressure         Right Left    Target 18 18    Max 26 (9/12) 24 (9/12)                  Not recorded       Not recorded       Not recorded       Chief Complaint   Patient presents with    brvo/ig cme/erm/     Here for ozurdex od     HPI     brvo/ig cme/erm/     Additional comments: Here for ozurdex od           Comments    1. Mod COAG goal =18   SLT OD 11/4/20 (2-3 pt relative decrease)   Tmax 26/24   Minimal hemisphere asymmetry OD 30/26 OS 33/31   Thin corneas   2. PCIOL OD with goniotomy 6/15/22 (+18.0 SN60WF)  PCIOL OS w/ gonio 7/27/22 (+17.0 SN60WF)  3. IG \ ERM \ ME OD  Eylea OD lots  AVASTIN #1 -- 5/26/2021  Preop Eylea OD 6/8/22  Eylea OU 7/20/22 (OS preop)    dorzolamide-timolol (COSOPT) 1 drop OU BID   Latanoprost 1 drop OU QHS    OD: Pred & Keto BID    Post surgery gtts:  Pred QID OS  Keto QID OS  Mark BID OS          Last edited by AMI Moreno on 8/31/2022  7:59 AM.      Problem List Items Addressed This Visit    None  Visit Diagnoses       Branch retinal vein occlusion with macular edema of right eye    -  Primary    Relevant Medications    dexAMETHasone (OZURDEX) intravitreal implant (Completed) (Start on 8/31/2022  9:00 AM)    Other Relevant Orders    Posterior Segment OCT Retina-Both eyes (Completed)    Prior authorization Order          Instructed to call 24/7 for any worsening of vision, visual distortion or pain.  Check OU independently daily.    Gave my office and personal cell phone number.  ________________  8/31/2022 today  Carolee Ortega    Treating ou dme    Recent CE OU    20/30  20 /40    Os perfect no dme  Today  trial of ozurdex OD   rtc 6 weeks inb may be max tx benefit for now       Injection hurt!   More time     8/31/2022  Diagnosis :  od dme igcme  Today:   Ozurdex 0.7 mg , OD   Follow up: eval eylea OD in 6 weeks  Instructed to call 24/7 for any worsening of vision. Check Both eyes daily. Gave patient my home phone number.  Risks, benefits, and alternatives to treatment discussed in detail with the patient.  The patient voiced understanding and wished to proceed with the procedure    Ozurdex intravitreal implant Procedure Note:     Patient Identified and Time Out complete  Subconjunctival bleb - xylocaine with epi 2%   and Betadine.  Inject  OD at 1mm parallel to limbus  Post Operative Dx: Same  Complications: None  Follow up as above.    =============================

## 2022-09-08 ENCOUNTER — OFFICE VISIT (OUTPATIENT)
Dept: OPHTHALMOLOGY | Facility: CLINIC | Age: 62
End: 2022-09-08
Payer: COMMERCIAL

## 2022-09-08 DIAGNOSIS — Z98.890 POST-OPERATIVE STATE: Primary | ICD-10-CM

## 2022-09-08 PROCEDURE — 99024 PR POST-OP FOLLOW-UP VISIT: ICD-10-PCS | Mod: S$GLB,,, | Performed by: OPHTHALMOLOGY

## 2022-09-08 PROCEDURE — 99999 PR PBB SHADOW E&M-EST. PATIENT-LVL III: ICD-10-PCS | Mod: PBBFAC,,, | Performed by: OPHTHALMOLOGY

## 2022-09-08 PROCEDURE — 99999 PR PBB SHADOW E&M-EST. PATIENT-LVL III: CPT | Mod: PBBFAC,,, | Performed by: OPHTHALMOLOGY

## 2022-09-08 PROCEDURE — 1159F PR MEDICATION LIST DOCUMENTED IN MEDICAL RECORD: ICD-10-PCS | Mod: CPTII,S$GLB,, | Performed by: OPHTHALMOLOGY

## 2022-09-08 PROCEDURE — 1160F RVW MEDS BY RX/DR IN RCRD: CPT | Mod: CPTII,S$GLB,, | Performed by: OPHTHALMOLOGY

## 2022-09-08 PROCEDURE — 99024 POSTOP FOLLOW-UP VISIT: CPT | Mod: S$GLB,,, | Performed by: OPHTHALMOLOGY

## 2022-09-08 PROCEDURE — 4010F PR ACE/ARB THEARPY RXD/TAKEN: ICD-10-PCS | Mod: CPTII,S$GLB,, | Performed by: OPHTHALMOLOGY

## 2022-09-08 PROCEDURE — 1160F PR REVIEW ALL MEDS BY PRESCRIBER/CLIN PHARMACIST DOCUMENTED: ICD-10-PCS | Mod: CPTII,S$GLB,, | Performed by: OPHTHALMOLOGY

## 2022-09-08 PROCEDURE — 4010F ACE/ARB THERAPY RXD/TAKEN: CPT | Mod: CPTII,S$GLB,, | Performed by: OPHTHALMOLOGY

## 2022-09-08 PROCEDURE — 1159F MED LIST DOCD IN RCRD: CPT | Mod: CPTII,S$GLB,, | Performed by: OPHTHALMOLOGY

## 2022-09-08 NOTE — PROGRESS NOTES
SUBJECTIVE  Carolee Ortega is 61 y.o. female  Uncorrected distance visual acuity was not recorded in the right eye and 20/20 in the left eye.   Chief Complaint   Patient presents with    Post-op Evaluation     Pt here for 1m PCIOL OS PO. No pain or discomfort. Pt states she was never told to stop her drops, so she has been using her ketorolac and pred since her surgery.          HPI     Post-op Evaluation     Additional comments: Pt here for 1m PCIOL OS PO. No pain or discomfort.   Pt states she was never told to stop her drops, so she has been using her   ketorolac and pred since her surgery.           Comments      1. Mod COAG goal =18   SLT OD 11/4/20 (2-3 pt relative decrease)   Tmax 26/24   Minimal hemisphere asymmetry OD 30/26 OS 33/31   Thin corneas   2. PCIOL OD with goniotomy 6/15/22 (+18.0 SN60WF)  PCIOL OS w/ gonio 7/27/22 (+17.0 SN60WF)  3. IG \ ERM \ ME OD  Eylea OD lots  AVASTIN #1 -- 5/26/2021  Preop Eylea OD 6/8/22  Eylea OU 7/20/22 (OS preop)  Ozurdex OD 8/31/22    dorzolamide-timolol (COSOPT) 1 drop OU BID   Latanoprost 1 drop OU QHS      Pred BID OS  Ketorolac BID OS            Last edited by Roberto Beltran MA on 9/8/2022  7:45 AM.         Assessment /Plan :  1. Post-operative state   Exam:    Slit lamp exam:  L/L: nl  S/C: quiet and uninflamed  K: clear, wound sealed  AC: no cell or flare  Iris/Lens: IOL centered and stable      Assessment:    PO Month 1 S/P Phaco/IOL OS with goniotomy: Doing Well and completing healing process. Final visual correction options discussed and appropriate prescriptions and/or OTC reading glass recommendations offered to patient. Pt desires OTC Readers. Pt instructed to follow up with Dr. Zaragoza in 3-4 months for IOP check or PRN any pain, redness, vision changes or other concerns.

## 2022-09-21 ENCOUNTER — OFFICE VISIT (OUTPATIENT)
Dept: DERMATOLOGY | Facility: CLINIC | Age: 62
End: 2022-09-21
Payer: COMMERCIAL

## 2022-09-21 DIAGNOSIS — L82.0 INFLAMED SEBORRHEIC KERATOSIS: Primary | ICD-10-CM

## 2022-09-21 PROCEDURE — 1159F PR MEDICATION LIST DOCUMENTED IN MEDICAL RECORD: ICD-10-PCS | Mod: CPTII,S$GLB,, | Performed by: STUDENT IN AN ORGANIZED HEALTH CARE EDUCATION/TRAINING PROGRAM

## 2022-09-21 PROCEDURE — 99212 OFFICE O/P EST SF 10 MIN: CPT | Mod: 25,S$GLB,, | Performed by: STUDENT IN AN ORGANIZED HEALTH CARE EDUCATION/TRAINING PROGRAM

## 2022-09-21 PROCEDURE — 1159F MED LIST DOCD IN RCRD: CPT | Mod: CPTII,S$GLB,, | Performed by: STUDENT IN AN ORGANIZED HEALTH CARE EDUCATION/TRAINING PROGRAM

## 2022-09-21 PROCEDURE — 4010F PR ACE/ARB THEARPY RXD/TAKEN: ICD-10-PCS | Mod: CPTII,S$GLB,, | Performed by: STUDENT IN AN ORGANIZED HEALTH CARE EDUCATION/TRAINING PROGRAM

## 2022-09-21 PROCEDURE — 99999 PR PBB SHADOW E&M-EST. PATIENT-LVL III: ICD-10-PCS | Mod: PBBFAC,,, | Performed by: STUDENT IN AN ORGANIZED HEALTH CARE EDUCATION/TRAINING PROGRAM

## 2022-09-21 PROCEDURE — 99999 PR PBB SHADOW E&M-EST. PATIENT-LVL III: CPT | Mod: PBBFAC,,, | Performed by: STUDENT IN AN ORGANIZED HEALTH CARE EDUCATION/TRAINING PROGRAM

## 2022-09-21 PROCEDURE — 1160F PR REVIEW ALL MEDS BY PRESCRIBER/CLIN PHARMACIST DOCUMENTED: ICD-10-PCS | Mod: CPTII,S$GLB,, | Performed by: STUDENT IN AN ORGANIZED HEALTH CARE EDUCATION/TRAINING PROGRAM

## 2022-09-21 PROCEDURE — 4010F ACE/ARB THERAPY RXD/TAKEN: CPT | Mod: CPTII,S$GLB,, | Performed by: STUDENT IN AN ORGANIZED HEALTH CARE EDUCATION/TRAINING PROGRAM

## 2022-09-21 PROCEDURE — 1160F RVW MEDS BY RX/DR IN RCRD: CPT | Mod: CPTII,S$GLB,, | Performed by: STUDENT IN AN ORGANIZED HEALTH CARE EDUCATION/TRAINING PROGRAM

## 2022-09-21 PROCEDURE — 99212 PR OFFICE/OUTPT VISIT, EST, LEVL II, 10-19 MIN: ICD-10-PCS | Mod: 25,S$GLB,, | Performed by: STUDENT IN AN ORGANIZED HEALTH CARE EDUCATION/TRAINING PROGRAM

## 2022-09-21 NOTE — PROGRESS NOTES
Subjective:       Patient ID:  Carolee Ortega is a 61 y.o. female who presents for   Chief Complaint   Patient presents with    Spot     Left lower leg. Been present for years. Spot is dark and pt reports it is now bothersome. Pt presents today to have it removed.      History of Present Illness: The patient presents for follow up of a skin lesion on the lower leg. Last seen on a virtual on 8/23/22, here for in office evaluation of the area. Reports that the lesion continues to be irritated and growing. No treatments. No other concerning rash or skin lesions.       Spot      Review of Systems   Constitutional:  Negative for fever and chills.   Skin:  Negative for itching, rash and dry skin.      Objective:    Physical Exam   Constitutional: She appears well-developed and well-nourished. No distress.   Neurological: She is alert and oriented to person, place, and time. She is not disoriented.   Psychiatric: She has a normal mood and affect.   Skin:   Areas Examined (abnormalities noted in diagram):   RLE Inspected  LLE Inspection Performed            Diagram Legend     Erythematous scaling macule/papule c/w actinic keratosis       Vascular papule c/w angioma      Pigmented verrucoid papule/plaque c/w seborrheic keratosis      Yellow umbilicated papule c/w sebaceous hyperplasia      Irregularly shaped tan macule c/w lentigo     1-2 mm smooth white papules consistent with Milia      Movable subcutaneous cyst with punctum c/w epidermal inclusion cyst      Subcutaneous movable cyst c/w pilar cyst      Firm pink to brown papule c/w dermatofibroma      Pedunculated fleshy papule(s) c/w skin tag(s)      Evenly pigmented macule c/w junctional nevus     Mildly variegated pigmented, slightly irregular-bordered macule c/w mildly atypical nevus      Flesh colored to evenly pigmented papule c/w intradermal nevus       Pink pearly papule/plaque c/w basal cell carcinoma      Erythematous hyperkeratotic cursted plaque c/w SCC       Surgical scar with no sign of skin cancer recurrence      Open and closed comedones      Inflammatory papules and pustules      Verrucoid papule consistent consistent with wart     Erythematous eczematous patches and plaques     Dystrophic onycholytic nail with subungual debris c/w onychomycosis     Umbilicated papule    Erythematous-base heme-crusted tan verrucoid plaque consistent with inflamed seborrheic keratosis     Erythematous Silvery Scaling Plaque c/w Psoriasis     See annotation      Assessment / Plan:        Inflamed seborrheic keratosis  Cryosurgery procedure note:    Verbal consent from the patient is obtained including, but not limited to, risk of hypopigmentation/hyperpigmentation, scar, recurrence of lesion. Liquid nitrogen cryosurgery is applied to 1 lesions to produce a freeze injury. The patient is aware that blisters may form and is instructed on wound care with gentle cleansing and use of vaseline ointment to keep moist until healed. The patient is supplied a handout on cryosurgery and is instructed to call if lesions do not completely resolve.             Follow up if symptoms worsen or fail to improve.

## 2022-09-22 ENCOUNTER — TELEPHONE (OUTPATIENT)
Dept: FAMILY MEDICINE | Facility: CLINIC | Age: 62
End: 2022-09-22

## 2022-09-22 NOTE — TELEPHONE ENCOUNTER
Spoke with pt and pt would like to know if you are accepting new pts. This is pt's mother-in-law. I will send the message in the pt's chart who is requesting to be a new pt.

## 2022-09-27 ENCOUNTER — PATIENT MESSAGE (OUTPATIENT)
Dept: FAMILY MEDICINE | Facility: CLINIC | Age: 62
End: 2022-09-27
Payer: COMMERCIAL

## 2022-10-06 ENCOUNTER — CLINICAL SUPPORT (OUTPATIENT)
Dept: FAMILY MEDICINE | Facility: CLINIC | Age: 62
End: 2022-10-06
Payer: COMMERCIAL

## 2022-10-06 PROCEDURE — 90686 IIV4 VACC NO PRSV 0.5 ML IM: CPT | Mod: S$GLB,,, | Performed by: FAMILY MEDICINE

## 2022-10-06 PROCEDURE — 90471 IMMUNIZATION ADMIN: CPT | Mod: S$GLB,,, | Performed by: FAMILY MEDICINE

## 2022-10-06 PROCEDURE — 90471 FLU VACCINE (QUAD) GREATER THAN OR EQUAL TO 3YO PRESERVATIVE FREE IM: ICD-10-PCS | Mod: S$GLB,,, | Performed by: FAMILY MEDICINE

## 2022-10-06 PROCEDURE — 90686 FLU VACCINE (QUAD) GREATER THAN OR EQUAL TO 3YO PRESERVATIVE FREE IM: ICD-10-PCS | Mod: S$GLB,,, | Performed by: FAMILY MEDICINE

## 2022-10-11 ENCOUNTER — PATIENT MESSAGE (OUTPATIENT)
Dept: OPHTHALMOLOGY | Facility: CLINIC | Age: 62
End: 2022-10-11
Payer: COMMERCIAL

## 2022-10-12 ENCOUNTER — PROCEDURE VISIT (OUTPATIENT)
Dept: OPHTHALMOLOGY | Facility: CLINIC | Age: 62
End: 2022-10-12
Payer: COMMERCIAL

## 2022-10-12 DIAGNOSIS — H34.8310 BRANCH RETINAL VEIN OCCLUSION WITH MACULAR EDEMA OF RIGHT EYE: Primary | ICD-10-CM

## 2022-10-12 PROCEDURE — 92134 POSTERIOR SEGMENT OCT RETINA (OCULAR COHERENCE TOMOGRAPHY)-BOTH EYES: ICD-10-PCS | Mod: S$GLB,,, | Performed by: OPHTHALMOLOGY

## 2022-10-12 PROCEDURE — 99499 NO LOS: ICD-10-PCS | Mod: S$GLB,,, | Performed by: OPHTHALMOLOGY

## 2022-10-12 PROCEDURE — 99499 UNLISTED E&M SERVICE: CPT | Mod: S$GLB,,, | Performed by: OPHTHALMOLOGY

## 2022-10-12 PROCEDURE — 92134 CPTRZ OPH DX IMG PST SGM RTA: CPT | Mod: S$GLB,,, | Performed by: OPHTHALMOLOGY

## 2022-10-12 NOTE — PROGRESS NOTES
===============================  Date today is 10/12/2022  Carolee Ortega is a 61 y.o. female  Last visit [facility]: :8/31/2022   Last visit eye dept. 9/8/2022    Uncorrected distance visual acuity was 20/25 in the right eye and 20/20 in the left eye.  Tonometry       Tonometry (Applanation, 8:02 AM)         Right Left    Pressure 14 14              Target and Max Pressure         Right Left    Target 18 18    Max 26 (9/12) 24 (9/12)                  Not recorded       Not recorded       Not recorded       Chief Complaint   Patient presents with    brvo / me     S/p ozurdex last visit / pt states va is good     HPI     brvo / me     Additional comments: S/p ozurdex last visit / pt states va is good           Comments    1. Mod COAG goal =18   SLT OD 11/4/20 (2-3 pt relative decrease)   Tmax 26/24   Minimal hemisphere asymmetry OD 30/26 OS 33/31   Thin corneas   2. PCIOL OD with goniotomy 6/15/22 (+18.0 SN60WF)  PCIOL OS w/ gonio 7/27/22 (+17.0 SN60WF)  3. IG \ ERM \ ME OD  Eylea OD lots  AVASTIN #1 -- 5/26/2021  Preop Eylea OD 6/8/22  Eylea OU 7/20/22 (OS preop)  Ozurdex OD 8/31/22    dorzolamide-timolol (COSOPT) 1 drop OU BID   Latanoprost 1 drop OU QHS    Pred BID OS  Ketorolac BID OS          Last edited by AMI Moreno on 10/12/2022  7:55 AM.      Problem List Items Addressed This Visit    None  Visit Diagnoses       Branch retinal vein occlusion with macular edema of right eye    -  Primary    Relevant Orders    Posterior Segment OCT Retina-Both eyes (Completed)    Prior authorization Order          Instructed to call 24/7 for any worsening of vision, visual distortion or pain.  Check OU independently daily.    Gave my office and personal cell phone number.  ________________  10/12/2022 today  Carolee Ortega    Billing problem-will notify curtis  BRVO OD with ME   Oct looks great  vision  better    No injection today  OS ok    Rtc 6 weeks likely repeat ozurdex  Instructed to call 24/7 for any  worsening of vision or symptoms. Check OU daily.   Gave my office and cell phone number.      =============================

## 2022-10-31 ENCOUNTER — PATIENT MESSAGE (OUTPATIENT)
Dept: ADMINISTRATIVE | Facility: OTHER | Age: 62
End: 2022-10-31
Payer: COMMERCIAL

## 2022-11-23 ENCOUNTER — PROCEDURE VISIT (OUTPATIENT)
Dept: OPHTHALMOLOGY | Facility: CLINIC | Age: 62
End: 2022-11-23
Payer: COMMERCIAL

## 2022-11-23 DIAGNOSIS — H34.8310 BRANCH RETINAL VEIN OCCLUSION WITH MACULAR EDEMA OF RIGHT EYE: Primary | ICD-10-CM

## 2022-11-23 PROCEDURE — 92134 CPTRZ OPH DX IMG PST SGM RTA: CPT | Mod: S$GLB,,, | Performed by: OPHTHALMOLOGY

## 2022-11-23 PROCEDURE — 67028 INJECTION EYE DRUG: CPT | Mod: RT,S$GLB,, | Performed by: OPHTHALMOLOGY

## 2022-11-23 PROCEDURE — 99499 UNLISTED E&M SERVICE: CPT | Mod: S$GLB,,, | Performed by: OPHTHALMOLOGY

## 2022-11-23 PROCEDURE — 99499 NO LOS: ICD-10-PCS | Mod: S$GLB,,, | Performed by: OPHTHALMOLOGY

## 2022-11-23 PROCEDURE — 67028 PR INJECT INTRAVITREAL PHARMCOLOGIC: ICD-10-PCS | Mod: RT,S$GLB,, | Performed by: OPHTHALMOLOGY

## 2022-11-23 PROCEDURE — 92134 POSTERIOR SEGMENT OCT RETINA (OCULAR COHERENCE TOMOGRAPHY)-BOTH EYES: ICD-10-PCS | Mod: S$GLB,,, | Performed by: OPHTHALMOLOGY

## 2022-11-23 NOTE — PROGRESS NOTES
===============================  Date today is 11/23/2022  Carolee Ortega is a 61 y.o. female  Last visit [facility]: :10/12/2022   Last visit eye dept. 10/12/2022    Uncorrected distance visual acuity was 20/30 in the right eye and 20/20 in the left eye.  Tonometry       Tonometry (Applanation, 7:52 AM)         Right Left    Pressure 14 13              Target and Max Pressure         Right Left    Target 18 18    Max 26 (9/12) 24 (9/12)                  Not recorded       Not recorded       Not recorded       Chief Complaint   Patient presents with    IGCME     HPI    Ozurdex od  V eye thomas     1. Mod COAG goal =18   SLT OD 11/4/20 (2-3 pt relative decrease)   Tmax 26/24   Minimal hemisphere asymmetry OD 30/26 OS 33/31   Thin corneas   2. PCIOL OD with goniotomy 6/15/22 (+18.0 SN60WF)  PCIOL OS w/ gonio 7/27/22 (+17.0 SN60WF)  3. IG \ ERM \ ME OD  Eylea OD lots  AVASTIN #1 -- 5/26/2021  Preop Eylea OD 6/8/22  Eylea OU 7/20/22 (OS preop)  Ozurdex OD 8/31/22    dorzolamide-timolol (COSOPT) 1 drop OU BID   Latanoprost 1 drop OU QHS    Pred BID OS  Ketorolac BID OS    Last edited by YVETTE Mackay MD on 11/23/2022  7:58 AM.      Problem List Items Addressed This Visit    None  Visit Diagnoses       Branch retinal vein occlusion with macular edema of right eye    -  Primary    Relevant Medications    dexAMETHasone (OZURDEX) intravitreal implant (Completed) (Start on 11/23/2022 10:15 AM)    Other Relevant Orders    Posterior Segment OCT Retina-Both eyes (Completed)    Prior authorization Order          Instructed to call 24/7 for any worsening of vision, visual distortion or pain.  Check OU independently daily.    Gave my office and personal cell phone number.  ________________  11/23/2022 today  Carolee Ortega    Od brvo   OCT Sl worse at 6-weeks  ..  11/23/2022  Diagnosis :  od brvo   Today:   Ozurdex 0.7 mg , OD   Follow up: eval eylea OD in  6  weeks   Instructed to call 24/7 for any worsening of vision. Check  Both eyes daily. Gave patient my home phone number.  Risks, benefits, and alternatives to treatment discussed in detail with the patient.  The patient voiced understanding and wished to proceed with the procedure    Ozurdex intravitreal implant Procedure Note:   yes capsule intact?  Patient Identified and Time Out complete  Subconjunctival bleb - xylocaine with epi 2%   and Betadine.  Inject OD at 1mm parallel to limbus  Post Operative Dx: Same  Complications: None  Follow up as above.      I=============================

## 2022-12-14 ENCOUNTER — IMMUNIZATION (OUTPATIENT)
Dept: PHARMACY | Facility: CLINIC | Age: 62
End: 2022-12-14
Payer: COMMERCIAL

## 2022-12-14 DIAGNOSIS — Z23 NEED FOR VACCINATION: Primary | ICD-10-CM

## 2022-12-20 ENCOUNTER — PATIENT MESSAGE (OUTPATIENT)
Dept: ADMINISTRATIVE | Facility: OTHER | Age: 62
End: 2022-12-20
Payer: COMMERCIAL

## 2023-01-04 ENCOUNTER — PROCEDURE VISIT (OUTPATIENT)
Dept: OPHTHALMOLOGY | Facility: CLINIC | Age: 63
End: 2023-01-04
Payer: COMMERCIAL

## 2023-01-04 DIAGNOSIS — H34.8310 BRANCH RETINAL VEIN OCCLUSION WITH MACULAR EDEMA OF RIGHT EYE: Primary | ICD-10-CM

## 2023-01-04 PROCEDURE — 67028 PR INJECT INTRAVITREAL PHARMCOLOGIC: ICD-10-PCS | Mod: RT,S$GLB,, | Performed by: OPHTHALMOLOGY

## 2023-01-04 PROCEDURE — 92134 CPTRZ OPH DX IMG PST SGM RTA: CPT | Mod: S$GLB,,, | Performed by: OPHTHALMOLOGY

## 2023-01-04 PROCEDURE — 92134 POSTERIOR SEGMENT OCT RETINA (OCULAR COHERENCE TOMOGRAPHY)-BOTH EYES: ICD-10-PCS | Mod: S$GLB,,, | Performed by: OPHTHALMOLOGY

## 2023-01-04 PROCEDURE — 67028 INJECTION EYE DRUG: CPT | Mod: RT,S$GLB,, | Performed by: OPHTHALMOLOGY

## 2023-01-04 PROCEDURE — 99499 NO LOS: ICD-10-PCS | Mod: S$GLB,,, | Performed by: OPHTHALMOLOGY

## 2023-01-04 PROCEDURE — 99499 UNLISTED E&M SERVICE: CPT | Mod: S$GLB,,, | Performed by: OPHTHALMOLOGY

## 2023-01-04 NOTE — PROGRESS NOTES
===============================  Date today is 1/4/2023  Carolee Ortega is a 62 y.o. female  Last visit [facility]: :11/23/2022   Last visit eye dept. 11/23/2022    Uncorrected distance visual acuity was 20/30 in the right eye and 20/25 in the left eye.  Tonometry       Tonometry (Applanation, 7:58 AM)         Right Left    Pressure 11 11              Target and Max Pressure         Right Left    Target 18 18    Max 26 (9/12) 24 (9/12)                  Not recorded       Not recorded       Not recorded       Chief Complaint   Patient presents with    brvo me     Eval for eylea od     HPI     brvo me     Additional comments: Eval for eylea od           Comments    1. Mod COAG goal =18   SLT OD 11/4/20 (2-3 pt relative decrease)   Tmax 26/24   Minimal hemisphere asymmetry OD 30/26 OS 33/31   Thin corneas   2. PCIOL OD with goniotomy 6/15/22 (+18.0 SN60WF)  PCIOL OS w/ gonio 7/27/22 (+17.0 SN60WF)  3. IG \ ERM \ ME OD  Eylea OD lots  AVASTIN #1 -- 5/26/2021  Preop Eylea OD 6/8/22  Eylea OU 7/20/22 (OS preop)  Ozurdex OD 8/31/22, 11/23/22    dorzolamide-timolol (COSOPT) 1 drop OU BID   Latanoprost 1 drop OU QHS    Pred BID OS  Ketorolac BID OS          Last edited by AMI Moreno on 1/4/2023  7:53 AM.      Problem List Items Addressed This Visit    None  Visit Diagnoses       Branch retinal vein occlusion with macular edema of right eye    -  Primary    Relevant Medications    aflibercept Soln 2 mg (Completed)    Other Relevant Orders    Posterior Segment OCT Retina-Both eyes (Completed)    Prior authorization Order          Instructed to call 24/7 for any worsening of vision, visual distortion or pain.  Check OU independently daily.    Gave my office and personal cell phone number.  ________________  1/4/2023 today  Carolee Ortega    Today trial of eylea + ozurdex OD  OCT stable  ..    Injection Procedure Note:    1/4/2023  Diagnosis :  od bvo  Today:   Eylea (afibercept) 2 mg/0.05 ml Intravitreal  Injection , OD   Follow up: rtc 4 weeks       Instructed to call 24/7 for any worsening of vision. Check Both eyes daily. Gave patient my home phone number.  Risks, benefits, and alternatives to treatment discussed in detail with the patient.  The patient voiced understanding and wished to proceed with the procedure.     Patient Identified and Time Out complete  Subconjunctival bleb - xylocaine with epi 2%   and Betadine.  Inject at Eylea (afibercept) 2 mg/0.05 ml Intravitreal Injection , OD 6:00 @ 3.5-4mm posterior to limbus  1 stop: no   Post Operative Dx: Same  Complications: None  Follow up as above.     =============================n

## 2023-01-12 ENCOUNTER — OFFICE VISIT (OUTPATIENT)
Dept: OPHTHALMOLOGY | Facility: CLINIC | Age: 63
End: 2023-01-12
Payer: COMMERCIAL

## 2023-01-12 DIAGNOSIS — Z96.1 PSEUDOPHAKIA OF BOTH EYES: ICD-10-CM

## 2023-01-12 DIAGNOSIS — H40.1132 PRIMARY OPEN ANGLE GLAUCOMA (POAG) OF BOTH EYES, MODERATE STAGE: Primary | ICD-10-CM

## 2023-01-12 PROCEDURE — 1160F RVW MEDS BY RX/DR IN RCRD: CPT | Mod: CPTII,S$GLB,, | Performed by: OPHTHALMOLOGY

## 2023-01-12 PROCEDURE — 1159F MED LIST DOCD IN RCRD: CPT | Mod: CPTII,S$GLB,, | Performed by: OPHTHALMOLOGY

## 2023-01-12 PROCEDURE — 99214 OFFICE O/P EST MOD 30 MIN: CPT | Mod: S$GLB,,, | Performed by: OPHTHALMOLOGY

## 2023-01-12 PROCEDURE — 99999 PR PBB SHADOW E&M-EST. PATIENT-LVL III: ICD-10-PCS | Mod: PBBFAC,,, | Performed by: OPHTHALMOLOGY

## 2023-01-12 PROCEDURE — 1159F PR MEDICATION LIST DOCUMENTED IN MEDICAL RECORD: ICD-10-PCS | Mod: CPTII,S$GLB,, | Performed by: OPHTHALMOLOGY

## 2023-01-12 PROCEDURE — 1160F PR REVIEW ALL MEDS BY PRESCRIBER/CLIN PHARMACIST DOCUMENTED: ICD-10-PCS | Mod: CPTII,S$GLB,, | Performed by: OPHTHALMOLOGY

## 2023-01-12 PROCEDURE — 99214 PR OFFICE/OUTPT VISIT, EST, LEVL IV, 30-39 MIN: ICD-10-PCS | Mod: S$GLB,,, | Performed by: OPHTHALMOLOGY

## 2023-01-12 PROCEDURE — 99999 PR PBB SHADOW E&M-EST. PATIENT-LVL III: CPT | Mod: PBBFAC,,, | Performed by: OPHTHALMOLOGY

## 2023-01-12 NOTE — PROGRESS NOTES
SUBJECTIVE  Carolee Ortega is 62 y.o. female  Uncorrected distance visual acuity was 20/30 in the right eye and 20/20 -1 in the left eye.   Chief Complaint   Patient presents with    Glaucoma     Patient reports for 4 month IOP check. Using gtts as advised. Denies pain or irritation at this time. Patient reports of visual stability since previous visit.             HPI     Glaucoma     Additional comments: Patient reports for 4 month IOP check. Using gtts as   advised. Denies pain or irritation at this time. Patient reports of visual   stability since previous visit.              Comments    V eye thomas     1. Mod COAG goal =18   SLT OD 11/4/20 (2-3 pt relative decrease)   Tmax 26/24   Minimal hemisphere asymmetry OD 30/26 OS 33/31   Thin corneas   2. PCIOL OD with goniotomy 6/15/22 (+18.0 SN60WF)  PCIOL OS w/ gonio 7/27/22 (+17.0 SN60WF)  3. IG \ ERM \ ME OD  Eylea OD lots  AVASTIN #1 -- 5/26/2021  Preop Eylea OD 6/8/22, 1/4/23  Eylea OU 7/20/22 (OS preop)  Ozurdex OD 8/31/22, 11/23/22      dorzolamide-timolol (COSOPT) 1 drop OU BID   Latanoprost 1 drop OU QHS            Last edited by Zohaib Parker on 1/12/2023  7:48 AM.         Assessment /Plan :  1. Primary open angle glaucoma (POAG) of both eyes, moderate stage Doing well, intraocular pressure (IOP) within acceptable range relative to target IOP and no evidence of progression. Continue current treatment. Reviewed importance of continued compliance with treatment and follow up.      Patient instructed to continue using the following glaucoma medication as follows:  Latanoprost one drop in each eye nightly and Dorzolamide/Timolol (Cosopt) one drop in each eye every 12 hours    Return to clinic in 3-4 months  or as needed.  With GOCT, Dilation, and HVF 24-2     2. Pseudophakia of both eyes  -- Condition stable, no therapeutic change required. Monitoring routinely.

## 2023-01-13 ENCOUNTER — PATIENT MESSAGE (OUTPATIENT)
Dept: FAMILY MEDICINE | Facility: CLINIC | Age: 63
End: 2023-01-13
Payer: COMMERCIAL

## 2023-01-13 ENCOUNTER — PATIENT MESSAGE (OUTPATIENT)
Dept: OPHTHALMOLOGY | Facility: CLINIC | Age: 63
End: 2023-01-13
Payer: COMMERCIAL

## 2023-01-13 DIAGNOSIS — I10 ESSENTIAL HYPERTENSION: Chronic | ICD-10-CM

## 2023-01-13 DIAGNOSIS — F41.9 ANXIETY: ICD-10-CM

## 2023-01-13 NOTE — TELEPHONE ENCOUNTER
Pt stated her insurance has changed preferred mail in pharmacy so all medications need to be sent into new pharmacy. Please review and advise.

## 2023-01-13 NOTE — TELEPHONE ENCOUNTER
Care Due:                  Date            Visit Type   Department     Provider  --------------------------------------------------------------------------------                                MYCHART                              ANNUAL                              CHECKUP/PHY  [facility]  Last Visit: 02-      S            MEDICINE       Genie Benavidezatorimani                              MYCHART                              ANNUAL                              CHECKUP/PHY  [facility]  Next Visit: 02-      [facility]       Genie GOLDEN  Muratore                                                            Last  Test          Frequency    Reason                     Performed    Due Date  --------------------------------------------------------------------------------    CBC.........  12 months..  valACYclovir.............  02- 02-    CMP.........  12 months..  amlodipine-valsartan,      02- 02-                             spironolactone,                             valACYclovir.............    [facility] Embedded Care Gaps. Reference number: [id number]. 1/13/2023   11:53:43 AM CST

## 2023-01-14 RX ORDER — VALACYCLOVIR HYDROCHLORIDE 1 G/1
2000 TABLET, FILM COATED ORAL EVERY 12 HOURS
Qty: 4 TABLET | Refills: 0 | Status: SHIPPED | OUTPATIENT
Start: 2023-01-14 | End: 2023-04-06

## 2023-01-14 RX ORDER — ALPRAZOLAM 0.5 MG/1
0.5 TABLET ORAL 3 TIMES DAILY PRN
Qty: 90 TABLET | Refills: 0 | Status: SHIPPED | OUTPATIENT
Start: 2023-01-14

## 2023-01-14 RX ORDER — AMLODIPINE AND VALSARTAN 10; 320 MG/1; MG/1
1 TABLET ORAL DAILY
Qty: 90 TABLET | Refills: 0 | Status: SHIPPED | OUTPATIENT
Start: 2023-01-14 | End: 2023-01-17

## 2023-01-14 RX ORDER — BUPROPION HYDROCHLORIDE 100 MG/1
100 TABLET ORAL DAILY
Qty: 90 TABLET | Refills: 0 | Status: SHIPPED | OUTPATIENT
Start: 2023-01-14 | End: 2023-05-09

## 2023-01-14 RX ORDER — HYDROXYZINE HYDROCHLORIDE 25 MG/1
TABLET, FILM COATED ORAL
Qty: 90 TABLET | Refills: 0 | Status: SHIPPED | OUTPATIENT
Start: 2023-01-14 | End: 2023-02-23

## 2023-01-14 RX ORDER — SPIRONOLACTONE 25 MG/1
25 TABLET ORAL DAILY
Qty: 90 TABLET | Refills: 0 | Status: SHIPPED | OUTPATIENT
Start: 2023-01-14 | End: 2023-05-30 | Stop reason: SDUPTHER

## 2023-01-18 DIAGNOSIS — I10 ESSENTIAL HYPERTENSION: Chronic | ICD-10-CM

## 2023-01-18 RX ORDER — AMLODIPINE AND VALSARTAN 10; 320 MG/1; MG/1
TABLET ORAL
Qty: 90 TABLET | Refills: 0 | Status: SHIPPED | OUTPATIENT
Start: 2023-01-18 | End: 2023-05-30 | Stop reason: SDUPTHER

## 2023-01-18 NOTE — TELEPHONE ENCOUNTER
No new care gaps identified.  [facility] Embedded Care Gaps. Reference number: [id number]. 1/18/2023   10:55:12 AM CST

## 2023-01-30 NOTE — PROGRESS NOTES
===============================  Date today is 2/1/2023  Carolee Ortega is a 62 y.o. female  Last visit [facility]: :1/4/2023   Last visit eye dept. 1/12/2023    Uncorrected distance visual acuity was 20/40 in the right eye and 20/25 in the left eye.  Not recorded       Not recorded       Not recorded       Not recorded       Chief Complaint   Patient presents with    brvo     Here for ozurdex od       Problem List Items Addressed This Visit    None  Visit Diagnoses       Branch retinal vein occlusion with macular edema of right eye    -  Primary    Relevant Orders    Posterior Segment OCT Retina-Both eyes (Completed)    Prior authorization Order          Instructed to call 24/7 for any worsening of vision, visual distortion or pain.  Check OU independently daily.    Gave my office and personal cell phone number.  ________________  2/1/2023 today  Carolee Ortega    BRVO OD  OCT today a little better  Not much benefit with eylea on top of ozurdex  No shot today  Will eval again in 1 month for possible ozurdex    RTC 1 month eval Ozurdex OD  Instructed to call 24/7 for any worsening of vision or symptoms. Check OU daily.   Gave my office and cell phone number.    =============================

## 2023-02-01 ENCOUNTER — PROCEDURE VISIT (OUTPATIENT)
Dept: OPHTHALMOLOGY | Facility: CLINIC | Age: 63
End: 2023-02-01
Payer: COMMERCIAL

## 2023-02-01 DIAGNOSIS — H34.8310 BRANCH RETINAL VEIN OCCLUSION WITH MACULAR EDEMA OF RIGHT EYE: Primary | ICD-10-CM

## 2023-02-01 PROCEDURE — 99499 NO LOS: ICD-10-PCS | Mod: S$GLB,,, | Performed by: OPHTHALMOLOGY

## 2023-02-01 PROCEDURE — 92134 POSTERIOR SEGMENT OCT RETINA (OCULAR COHERENCE TOMOGRAPHY)-BOTH EYES: ICD-10-PCS | Mod: S$GLB,,, | Performed by: OPHTHALMOLOGY

## 2023-02-01 PROCEDURE — 92134 CPTRZ OPH DX IMG PST SGM RTA: CPT | Mod: S$GLB,,, | Performed by: OPHTHALMOLOGY

## 2023-02-01 PROCEDURE — 99499 UNLISTED E&M SERVICE: CPT | Mod: S$GLB,,, | Performed by: OPHTHALMOLOGY

## 2023-02-24 ENCOUNTER — PATIENT MESSAGE (OUTPATIENT)
Dept: FAMILY MEDICINE | Facility: CLINIC | Age: 63
End: 2023-02-24

## 2023-02-24 ENCOUNTER — OFFICE VISIT (OUTPATIENT)
Dept: FAMILY MEDICINE | Facility: CLINIC | Age: 63
End: 2023-02-24
Payer: COMMERCIAL

## 2023-02-24 ENCOUNTER — HOSPITAL ENCOUNTER (OUTPATIENT)
Dept: RADIOLOGY | Facility: HOSPITAL | Age: 63
Discharge: HOME | End: 2023-02-24
Attending: FAMILY MEDICINE
Payer: COMMERCIAL

## 2023-02-24 VITALS
SYSTOLIC BLOOD PRESSURE: 120 MMHG | DIASTOLIC BLOOD PRESSURE: 72 MMHG | HEIGHT: 62 IN | TEMPERATURE: 97 F | WEIGHT: 218.5 LBS | BODY MASS INDEX: 40.21 KG/M2

## 2023-02-24 DIAGNOSIS — M25.552 BILATERAL HIP PAIN: ICD-10-CM

## 2023-02-24 DIAGNOSIS — Z00.00 PREVENTATIVE HEALTH CARE: Primary | ICD-10-CM

## 2023-02-24 DIAGNOSIS — M25.551 BILATERAL HIP PAIN: ICD-10-CM

## 2023-02-24 DIAGNOSIS — R68.82 DECREASED LIBIDO: ICD-10-CM

## 2023-02-24 DIAGNOSIS — I10 ESSENTIAL HYPERTENSION: Chronic | ICD-10-CM

## 2023-02-24 DIAGNOSIS — E66.01 SEVERE OBESITY (BMI 35.0-39.9) WITH COMORBIDITY: ICD-10-CM

## 2023-02-24 DIAGNOSIS — Z12.31 ENCOUNTER FOR SCREENING MAMMOGRAM FOR MALIGNANT NEOPLASM OF BREAST: ICD-10-CM

## 2023-02-24 PROCEDURE — 1160F PR REVIEW ALL MEDS BY PRESCRIBER/CLIN PHARMACIST DOCUMENTED: ICD-10-PCS | Mod: CPTII,S$GLB,, | Performed by: FAMILY MEDICINE

## 2023-02-24 PROCEDURE — 99396 PR PREVENTIVE VISIT,EST,40-64: ICD-10-PCS | Mod: S$GLB,,, | Performed by: FAMILY MEDICINE

## 2023-02-24 PROCEDURE — 3008F BODY MASS INDEX DOCD: CPT | Mod: CPTII,S$GLB,, | Performed by: FAMILY MEDICINE

## 2023-02-24 PROCEDURE — 99999 PR PBB SHADOW E&M-EST. PATIENT-LVL IV: ICD-10-PCS | Mod: PBBFAC,,, | Performed by: FAMILY MEDICINE

## 2023-02-24 PROCEDURE — 3008F PR BODY MASS INDEX (BMI) DOCUMENTED: ICD-10-PCS | Mod: CPTII,S$GLB,, | Performed by: FAMILY MEDICINE

## 2023-02-24 PROCEDURE — 73521 X-RAY EXAM HIPS BI 2 VIEWS: CPT | Mod: 26,,, | Performed by: RADIOLOGY

## 2023-02-24 PROCEDURE — 73521 X-RAY EXAM HIPS BI 2 VIEWS: CPT | Mod: TC,FY,PO

## 2023-02-24 PROCEDURE — 73521 XR HIPS BILATERAL 2 VIEW INCL AP PELVIS: ICD-10-PCS | Mod: 26,,, | Performed by: RADIOLOGY

## 2023-02-24 PROCEDURE — 1159F MED LIST DOCD IN RCRD: CPT | Mod: CPTII,S$GLB,, | Performed by: FAMILY MEDICINE

## 2023-02-24 PROCEDURE — 3078F DIAST BP <80 MM HG: CPT | Mod: CPTII,S$GLB,, | Performed by: FAMILY MEDICINE

## 2023-02-24 PROCEDURE — 99999 PR PBB SHADOW E&M-EST. PATIENT-LVL IV: CPT | Mod: PBBFAC,,, | Performed by: FAMILY MEDICINE

## 2023-02-24 PROCEDURE — 3074F PR MOST RECENT SYSTOLIC BLOOD PRESSURE < 130 MM HG: ICD-10-PCS | Mod: CPTII,S$GLB,, | Performed by: FAMILY MEDICINE

## 2023-02-24 PROCEDURE — 4010F ACE/ARB THERAPY RXD/TAKEN: CPT | Mod: CPTII,S$GLB,, | Performed by: FAMILY MEDICINE

## 2023-02-24 PROCEDURE — 3078F PR MOST RECENT DIASTOLIC BLOOD PRESSURE < 80 MM HG: ICD-10-PCS | Mod: CPTII,S$GLB,, | Performed by: FAMILY MEDICINE

## 2023-02-24 PROCEDURE — 3074F SYST BP LT 130 MM HG: CPT | Mod: CPTII,S$GLB,, | Performed by: FAMILY MEDICINE

## 2023-02-24 PROCEDURE — 1159F PR MEDICATION LIST DOCUMENTED IN MEDICAL RECORD: ICD-10-PCS | Mod: CPTII,S$GLB,, | Performed by: FAMILY MEDICINE

## 2023-02-24 PROCEDURE — 99396 PREV VISIT EST AGE 40-64: CPT | Mod: S$GLB,,, | Performed by: FAMILY MEDICINE

## 2023-02-24 PROCEDURE — 4010F PR ACE/ARB THEARPY RXD/TAKEN: ICD-10-PCS | Mod: CPTII,S$GLB,, | Performed by: FAMILY MEDICINE

## 2023-02-24 PROCEDURE — 1160F RVW MEDS BY RX/DR IN RCRD: CPT | Mod: CPTII,S$GLB,, | Performed by: FAMILY MEDICINE

## 2023-02-24 RX ORDER — MOMETASONE FUROATE 50 UG/1
2 SPRAY, METERED NASAL DAILY
Qty: 51 G | Refills: 3 | Status: SHIPPED | OUTPATIENT
Start: 2023-02-24

## 2023-02-24 NOTE — PROGRESS NOTES
CHIEF COMPLAINT:  This is a 62-year-old female here for preventive health exam.     SUBJECTIVE:  Patient is doing well without complaints popping in right ear and bilateral hip pain right greater than left. Hips have been hurting since October 2022 and pain occurs after walking a short distance. Her blood pressure is controlled on amlodipine-valsartan, spironolactone and metoprolol with today's reading of 120/72.  The patient has mild intermittent asthma for which she uses albuterol.  Patient takes hydroxyzine 1 tablet daily for itching.  Patient uses Nasonex and Singulair for allergic rhinitis.  She is followed by ophthalmology for glaucoma.  She takes Wellbutrin 100 mg daily for decreased libido.     Eye exam January 2023.  Mammogram March 2022.   Colonoscopy August 2020 due again in August 2025.  Tdap February 2017. Pneumovax February 2014. Flu vaccine October 2022. COVID 19 vaccine March, November 2021, December 2022.      ROS:  GENERAL: Patient denies fever, chills, night sweats. Patient denies weight gain. Patient denies anorexia, fatigue, weakness or swollen glands.  SKIN: Patient denies rash or hair loss.  HEENT: Patient denies sore throat, ear pain, hearing loss, nasal congestion, or runny nose. Patient denies visual disturbance, eye irritation or discharge.  LUNGS: Patient denies cough, wheeze or hemoptysis.  CARDIOVASCULAR: Patient denies chest pain, shortness of breath, palpitations, syncope or lower extremity edema.  GI: Patient denies abdominal pain, nausea, vomiting, diarrhea, constipation, blood in stool or melena.  GENITOURINARY: Patient denies pelvic pain, vaginal discharge, itch or odor. Patient denies irregular vaginal bleeding. Patient denies dysuria, frequency, hematuria, nocturia, urgency or incontinence.  BREASTS: Patient denies breast pain, mass or nipple discharge.  MUSCULOSKELETAL: Patient denies joint pain, swelling, redness or warmth.  NEUROLOGIC: Patient denies headache, vertigo,  paresthesias, weakness in limb, dysarthria, dysphagia or abnormality of gait.  PSYCHIATRIC: Patient denies anxiety, depression, or memory loss.     OBJECTIVE:   GENERAL: Well-developed well-nourished, obese, black female alert and oriented x3, in no acute distress. Memory, judgment and cognition without deficit.   SKIN: Clear without rash. Normal color and tone.    HEENT: Eyes: Clear conjunctivae. No scleral icterus. Pupils equal reactive to light and accommodation. Ears: Clear canals. Clear TMs. Nose: Without congestion. Pharynx: Without injection or exudates.  NECK: Supple, normal range of motion. No masses, lymphadenopathy or enlarged thyroid. No JVD. Carotids 2+ and equal. No bruits.  LUNGS: Clear to auscultation. Normal respiratory effort.  CARDIOVASCULAR: Regular rhythm, normal S1, S2 without murmur, gallop or rub.  BACK: No CVA or spinal tenderness.  BREASTS: No masses, tenderness or nipple discharge.  ABDOMEN: Normal appearance. Active bowel sounds. Soft, nontender without mass or organomegaly. No rebound or guarding.  EXTREMITIES: Without cyanosis, clubbing or edema. Distal pulses 2+ and equal. Normal range of motion in all extremities. No joint effusion, erythema or warmth.  NEUROLOGIC: Cranial nerves II through XII without deficit. Motor strength equal bilaterally. Sensation normal to touch. Deep tendon reflexes 2+ and equal. Gait without abnormality. No tremor. Negative cerebellar signs.  PELVIC: External: Without lesions or inflammation.Vaginal: Clear, cuff intact. Bimanual: No masses, nontender. RECTOVAGINAL: Confirms heme-negative stool x2.     ASSESSMENT:  1. Preventative health care    2. Essential hypertension    3. Severe obesity (BMI 35.0-39.9) with comorbidity    4. Decreased libido    5. Bilateral hip pain    6. Encounter for screening mammogram for malignant neoplasm of breast      PLAN:  1. Weight reduction. Exercise regularly.  2. Age-appropriate counseling.  3. Fasting lab.  4.  Mammogram.  5. Refill medications as needed.  6. Follow-up annually.    This note is generated with speech recognition software and is subject to transcription error and sound alike phrases that may be missed by proofreading.

## 2023-02-27 ENCOUNTER — PATIENT MESSAGE (OUTPATIENT)
Dept: OPHTHALMOLOGY | Facility: CLINIC | Age: 63
End: 2023-02-27
Payer: COMMERCIAL

## 2023-02-27 DIAGNOSIS — H40.1131 PRIMARY OPEN ANGLE GLAUCOMA OF BOTH EYES, MILD STAGE: ICD-10-CM

## 2023-02-27 RX ORDER — DORZOLAMIDE HYDROCHLORIDE AND TIMOLOL MALEATE 20; 5 MG/ML; MG/ML
1 SOLUTION/ DROPS OPHTHALMIC 2 TIMES DAILY
Qty: 15 ML | Refills: 4 | Status: SHIPPED | OUTPATIENT
Start: 2023-02-27

## 2023-03-08 ENCOUNTER — PROCEDURE VISIT (OUTPATIENT)
Dept: OPHTHALMOLOGY | Facility: CLINIC | Age: 63
End: 2023-03-08
Payer: COMMERCIAL

## 2023-03-08 DIAGNOSIS — H34.8310 BRANCH RETINAL VEIN OCCLUSION WITH MACULAR EDEMA OF RIGHT EYE: Primary | ICD-10-CM

## 2023-03-08 PROCEDURE — 67028 PR INJECT INTRAVITREAL PHARMCOLOGIC: ICD-10-PCS | Mod: RT,S$GLB,, | Performed by: OPHTHALMOLOGY

## 2023-03-08 PROCEDURE — 99499 NO LOS: ICD-10-PCS | Mod: S$GLB,,, | Performed by: OPHTHALMOLOGY

## 2023-03-08 PROCEDURE — 92134 CPTRZ OPH DX IMG PST SGM RTA: CPT | Mod: S$GLB,,, | Performed by: OPHTHALMOLOGY

## 2023-03-08 PROCEDURE — 67028 INJECTION EYE DRUG: CPT | Mod: RT,S$GLB,, | Performed by: OPHTHALMOLOGY

## 2023-03-08 PROCEDURE — 92134 POSTERIOR SEGMENT OCT RETINA (OCULAR COHERENCE TOMOGRAPHY)-BOTH EYES: ICD-10-PCS | Mod: S$GLB,,, | Performed by: OPHTHALMOLOGY

## 2023-03-08 PROCEDURE — 99499 UNLISTED E&M SERVICE: CPT | Mod: S$GLB,,, | Performed by: OPHTHALMOLOGY

## 2023-03-08 NOTE — PROGRESS NOTES
===============================  Date today is 3/8/2023  Carolee Ortega is a 62 y.o. female  Last visit [facility]: :2/1/2023   Last visit eye dept. 2/1/2023    Uncorrected distance visual acuity was 20/40 -2 in the right eye and 20/20 -1 in the left eye.  Tonometry       Tonometry (Applanation, 8:34 AM)         Right Left    Pressure 9 9              Target and Max Pressure         Right Left    Target 18 18    Max 26 (9/12) 24 (9/12)                  Not recorded       Not recorded       Not recorded       Chief Complaint   Patient presents with    Branch retinal veil occlusion     Pt here for 1m Ozurdex eval OD. No pain or discomfort. VA stable.      HPI     Branch retinal veil occlusion     Additional comments: Pt here for 1m Ozurdex eval OD. No pain or   discomfort. VA stable.            Comments    1. Mod COAG goal =18   SLT OD 11/4/20 (2-3 pt relative decrease)   Tmax 26/24   Minimal hemisphere asymmetry OD 30/26 OS 33/31   Thin corneas   2. PCIOL OD with goniotomy 6/15/22 (+18.0 SN60WF)  PCIOL OS w/ gonio 7/27/22 (+17.0 SN60WF)  3. IG \ ERM \ ME OD  Eylea OD lots  AVASTIN #1 -- 5/26/2021  Preop Eylea OD 6/8/22, 1/4/23  Eylea OU 7/20/22 (OS preop)  Ozurdex OD 8/31/22, 11/23/22      dorzolamide-timolol (COSOPT) 1 drop OU BID   Latanoprost 1 drop OU QHS            Last edited by Roberto Beltran MA on 3/8/2023  8:30 AM.      Problem List Items Addressed This Visit    None  Visit Diagnoses       Branch retinal vein occlusion with macular edema of right eye    -  Primary    Relevant Orders    Posterior Segment OCT Retina-Both eyes (Completed)          Instructed to call 24/7 for any worsening of vision, visual distortion or pain.  Check OU independently daily.    Gave my office and personal cell phone number.  ________________  3/8/2023 today  Carolee L Vinning    =============================

## 2023-03-08 NOTE — PROGRESS NOTES
===============================  Date today is 3/8/2023  Carolee Ortega is a 62 y.o. female  Last visit [facility]: :2/1/2023   Last visit eye dept. 2/1/2023    Uncorrected distance visual acuity was 20/40 -2 in the right eye and 20/20 -1 in the left eye.  Tonometry       Tonometry (Applanation, 8:34 AM)         Right Left    Pressure 9 9              Target and Max Pressure         Right Left    Target 18 18    Max 26 (9/12) 24 (9/12)                  Not recorded       Not recorded       Not recorded       Chief Complaint   Patient presents with    Branch retinal veil occlusion     Pt here for 1m Ozurdex eval OD. No pain or discomfort. VA stable.      HPI     Branch retinal veil occlusion     Additional comments: Pt here for 1m Ozurdex eval OD. No pain or   discomfort. VA stable.            Comments    1. Mod COAG goal =18   SLT OD 11/4/20 (2-3 pt relative decrease)   Tmax 26/24   Minimal hemisphere asymmetry OD 30/26 OS 33/31   Thin corneas   2. PCIOL OD with goniotomy 6/15/22 (+18.0 SN60WF)  PCIOL OS w/ gonio 7/27/22 (+17.0 SN60WF)  3. IG \ ERM \ ME OD  Eylea OD lots  AVASTIN #1 -- 5/26/2021  Preop Eylea OD 6/8/22, 1/4/23  Eylea OU 7/20/22 (OS preop)  Ozurdex OD 8/31/22, 11/23/22      dorzolamide-timolol (COSOPT) 1 drop OU BID   Latanoprost 1 drop OU QHS            Last edited by Roberto Beltran MA on 3/8/2023  8:30 AM.      Problem List Items Addressed This Visit    None  Visit Diagnoses       Branch retinal vein occlusion with macular edema of right eye    -  Primary    Relevant Medications    dexAMETHasone (OZURDEX) intravitreal implant (Completed)    Other Relevant Orders    Posterior Segment OCT Retina-Both eyes (Completed)    Prior authorization Order          Instructed to call 24/7 for any worsening of vision, visual distortion or pain.  Check OU independently daily.    Gave my office and personal cell phone number.  ________________  3/8/2023 today  Carolee PASTRANA  OD worse today, last  Ozurdex 11/23/22  Proceed with Ozurdex OD today  May follow in 6 weeks, no significant benefit with eylea on top of ozurdex    3/8/2023  Diagnosis :  BRVO OD  Today:   Ozurdex 0.7 mg , OD   Follow up: recheck in 6 weeks  Instructed to call 24/7 for any worsening of vision. Check Both eyes daily. Gave patient my home phone number.  Risks, benefits, and alternatives to treatment discussed in detail with the patient.  The patient voiced understanding and wished to proceed with the procedure    Ozurdex intravitreal implant Procedure Note:   y capsule intact?  Patient Identified and Time Out complete  Subconjunctival bleb - xylocaine with epi 2%   and Betadine.  Inject OD at 1mm parallel to limbus  Post Operative Dx: Same  Complications: None  Follow up as above.    =============================

## 2023-03-09 ENCOUNTER — PATIENT MESSAGE (OUTPATIENT)
Dept: FAMILY MEDICINE | Facility: CLINIC | Age: 63
End: 2023-03-09
Payer: COMMERCIAL

## 2023-03-13 ENCOUNTER — TELEPHONE (OUTPATIENT)
Dept: FAMILY MEDICINE | Facility: CLINIC | Age: 63
End: 2023-03-13

## 2023-03-29 ENCOUNTER — HOSPITAL ENCOUNTER (OUTPATIENT)
Dept: RADIOLOGY | Facility: HOSPITAL | Age: 63
Discharge: HOME | End: 2023-03-29
Attending: FAMILY MEDICINE
Payer: COMMERCIAL

## 2023-03-29 DIAGNOSIS — Z12.31 ENCOUNTER FOR SCREENING MAMMOGRAM FOR MALIGNANT NEOPLASM OF BREAST: ICD-10-CM

## 2023-03-29 PROCEDURE — 77063 MAMMO DIGITAL SCREENING BILAT WITH TOMO: ICD-10-PCS | Mod: 26,,, | Performed by: RADIOLOGY

## 2023-03-29 PROCEDURE — 77067 SCR MAMMO BI INCL CAD: CPT | Mod: TC

## 2023-03-29 PROCEDURE — 77067 SCR MAMMO BI INCL CAD: CPT | Mod: 26,,, | Performed by: RADIOLOGY

## 2023-03-29 PROCEDURE — 77063 BREAST TOMOSYNTHESIS BI: CPT | Mod: 26,,, | Performed by: RADIOLOGY

## 2023-03-29 PROCEDURE — 77067 MAMMO DIGITAL SCREENING BILAT WITH TOMO: ICD-10-PCS | Mod: 26,,, | Performed by: RADIOLOGY

## 2023-04-06 RX ORDER — VALACYCLOVIR HYDROCHLORIDE 1 G/1
TABLET, FILM COATED ORAL
Qty: 4 TABLET | Refills: 3 | Status: SHIPPED | OUTPATIENT
Start: 2023-04-06

## 2023-04-06 RX ORDER — HYDROXYZINE HYDROCHLORIDE 25 MG/1
TABLET, FILM COATED ORAL
Qty: 90 TABLET | Refills: 0 | OUTPATIENT
Start: 2023-04-06

## 2023-04-06 NOTE — TELEPHONE ENCOUNTER
No new care gaps identified.  [facility] Embedded Care Gaps. Reference number: [id number]. 4/06/2023   4:23:21 AM CDT

## 2023-04-06 NOTE — TELEPHONE ENCOUNTER
Refill Routing Note   Medication(s) are not appropriate for processing by [facility] for the following reason(s):      Medication outside of protocol    [facility] action(s):  Route  Approve              Appointments  past 12m or future 3m with PCP    Date Provider   Last Visit   2/24/2023 Genie Walter MD   Next Visit   Visit date not found Genie Walter MD   ED visits in past 90 days: 0        Note composed:10:17 AM 04/06/2023

## 2023-05-04 ENCOUNTER — OFFICE VISIT (OUTPATIENT)
Dept: OPHTHALMOLOGY | Facility: CLINIC | Age: 63
End: 2023-05-04
Payer: COMMERCIAL

## 2023-05-04 DIAGNOSIS — Z96.1 PSEUDOPHAKIA OF BOTH EYES: ICD-10-CM

## 2023-05-04 DIAGNOSIS — R51.9 SINUS HEADACHE: ICD-10-CM

## 2023-05-04 DIAGNOSIS — H40.1132 PRIMARY OPEN ANGLE GLAUCOMA (POAG) OF BOTH EYES, MODERATE STAGE: Primary | ICD-10-CM

## 2023-05-04 DIAGNOSIS — H35.9 MACULOPATHY: ICD-10-CM

## 2023-05-04 PROCEDURE — 1159F PR MEDICATION LIST DOCUMENTED IN MEDICAL RECORD: ICD-10-PCS | Mod: CPTII,S$GLB,, | Performed by: OPHTHALMOLOGY

## 2023-05-04 PROCEDURE — 1160F RVW MEDS BY RX/DR IN RCRD: CPT | Mod: CPTII,S$GLB,, | Performed by: OPHTHALMOLOGY

## 2023-05-04 PROCEDURE — 92133 CPTRZD OPH DX IMG PST SGM ON: CPT | Mod: S$GLB,,, | Performed by: OPHTHALMOLOGY

## 2023-05-04 PROCEDURE — 92014 PR EYE EXAM, EST PATIENT,COMPREHESV: ICD-10-PCS | Mod: S$GLB,,, | Performed by: OPHTHALMOLOGY

## 2023-05-04 PROCEDURE — 1159F MED LIST DOCD IN RCRD: CPT | Mod: CPTII,S$GLB,, | Performed by: OPHTHALMOLOGY

## 2023-05-04 PROCEDURE — 92133 POSTERIOR SEGMENT OCT OPTIC NERVE(OCULAR COHERENCE TOMOGRAPHY) - OU - BOTH EYES: ICD-10-PCS | Mod: S$GLB,,, | Performed by: OPHTHALMOLOGY

## 2023-05-04 PROCEDURE — 92083 EXTENDED VISUAL FIELD XM: CPT | Mod: S$GLB,,, | Performed by: OPHTHALMOLOGY

## 2023-05-04 PROCEDURE — 92083 HUMPHREY VISUAL FIELD - OU - BOTH EYES: ICD-10-PCS | Mod: S$GLB,,, | Performed by: OPHTHALMOLOGY

## 2023-05-04 PROCEDURE — 99999 PR PBB SHADOW E&M-EST. PATIENT-LVL III: ICD-10-PCS | Mod: PBBFAC,,, | Performed by: OPHTHALMOLOGY

## 2023-05-04 PROCEDURE — 99999 PR PBB SHADOW E&M-EST. PATIENT-LVL III: CPT | Mod: PBBFAC,,, | Performed by: OPHTHALMOLOGY

## 2023-05-04 PROCEDURE — 92014 COMPRE OPH EXAM EST PT 1/>: CPT | Mod: S$GLB,,, | Performed by: OPHTHALMOLOGY

## 2023-05-04 PROCEDURE — 1160F PR REVIEW ALL MEDS BY PRESCRIBER/CLIN PHARMACIST DOCUMENTED: ICD-10-PCS | Mod: CPTII,S$GLB,, | Performed by: OPHTHALMOLOGY

## 2023-05-04 PROCEDURE — 4010F ACE/ARB THERAPY RXD/TAKEN: CPT | Mod: CPTII,S$GLB,, | Performed by: OPHTHALMOLOGY

## 2023-05-04 PROCEDURE — 4010F PR ACE/ARB THEARPY RXD/TAKEN: ICD-10-PCS | Mod: CPTII,S$GLB,, | Performed by: OPHTHALMOLOGY

## 2023-05-04 NOTE — PROGRESS NOTES
SUBJECTIVE  Carolee Ortega is 62 y.o. female  Uncorrected distance visual acuity was 20/40 in the right eye and 20/20- in the left eye.   Chief Complaint   Patient presents with    Glaucoma     HVF GOCT and dilation          HPI     Glaucoma     Additional comments: HVF GOCT and dilation           Comments    States that her vision is stable for her OS but feels like her OD is   getting worse. States that she is compliant with gtts as directed.   Complains of a stabbing feeling in her OD 1 to 2 times a week.  1. Mod COAG goal =18   SLT OD 11/4/20 (2-3 pt relative decrease)   Tmax 26/24   Minimal hemisphere asymmetry OD 30/26 OS 33/31   Thin corneas   2. PCIOL OD with goniotomy 6/15/22 (+18.0 SN60WF)  PCIOL OS w/ gonio 7/27/22 (+17.0 SN60WF)  3. IG \ ERM \ ME OD  Eylea OD lots  AVASTIN #1 -- 5/26/2021  Preop Eylea OD 6/8/22, 1/4/23  Eylea OU 7/20/22 (OS preop)  Ozurdex OD 8/31/22, 11/23/22, 3/8/23      dorzolamide-timolol (COSOPT) 1 drop OU BID   Latanoprost 1 drop OU QHS            Last edited by Eve Aviles on 5/4/2023  8:27 AM.         Assessment /Plan :  1. Primary open angle glaucoma (POAG) of both eyes, moderate stage Doing well, intraocular pressure (IOP) within acceptable range relative to target IOP and no evidence of progression. Continue current treatment. Reviewed importance of continued compliance with treatment and follow up.      Patient instructed to continue using the following glaucoma medication as follows:  Latanoprost one drop in each eye nightly and Dorzolamide/Timolol (Cosopt) one drop in each eye every 12 hours    Return to clinic in 4 months  or as needed.  With IOP Check     2. Pseudophakia of both eyes  -- Condition stable, no therapeutic change required. Monitoring routinely.     3. Maculopathy - followed by Dr. Mackay   4.     Sinus Headache - no ocular involvement

## 2023-05-08 NOTE — TELEPHONE ENCOUNTER
No care due was identified.  [facility] Embedded Care Due Messages. Reference number: [id number].   5/08/2023 12:36:10 PM CDT

## 2023-05-09 RX ORDER — BUPROPION HYDROCHLORIDE 100 MG/1
TABLET ORAL
Qty: 90 TABLET | Refills: 3 | Status: SHIPPED | OUTPATIENT
Start: 2023-05-09 | End: 2024-02-26 | Stop reason: SDUPTHER

## 2023-05-09 NOTE — PROGRESS NOTES
Paperwork signed and faxed.     Subjective:      Patient ID: Carolee Ortega is a 56 y.o. female.    Chief Complaint: Finger Injury of the Left Hand (Left Thumb)      HPI: Carolee Ortega  is a 56 y.o. female who c/o Finger Injury of the Left Hand (Left Thumb)   for duration of just over one week.  She lost her balance and hit her nail on the car.  This caused her to injure her left thumb.  Pain level today is 5 out of 10 in severity.  Quality is aching and throbbing.  She complains of associated swelling and ecchymosis.  Alleviating factors include immobilization.  Aggravating factors include pressure on the left thumb as well as motion.  No numbness or tingling.    Review of Systems   Constitution: Negative for fever.   Cardiovascular: Negative for chest pain.   Respiratory: Negative for cough and shortness of breath.    Skin: Positive for color change. Negative for rash.   Musculoskeletal: Positive for joint pain, joint swelling and stiffness.   Gastrointestinal: Negative for heartburn.   Neurological: Negative for headaches and numbness.         Objective:        General    Nursing note and vitals reviewed.  Constitutional: She is oriented to person, place, and time. She appears well-developed and well-nourished.   HENT:   Head: Normocephalic and atraumatic.   Eyes: EOM are normal.   Cardiovascular: Normal rate and regular rhythm.    Pulmonary/Chest: Effort normal.   Abdominal: Soft.   Neurological: She is alert and oriented to person, place, and time.   Psychiatric: She has a normal mood and affect. Her behavior is normal.         Left Hand/Wrist Exam     Comments:  2+ radial pulse.  Compartments are soft.  Capillary refill less than 2 seconds.  Sensation intact to light touch.  Tenderness palpation about the thumb distal phalanx.    She has slightly decreased flexion secondary to pain in the IP joint.  She does have full extension.  Flexion is intact.                  Xray:   Left thumb from 9/11/17 images and report were reviewed today.   I agree with the radiologist's interpretation.  There is a transversely oriented fracture involving the midshaft of the 1st distal phalanx.  There is minimal distraction at the fracture site dorsally.  No significant displacement noted    Assessment:       Encounter Diagnosis   Name Primary?    Closed nondisplaced fracture of distal phalanx of left thumb, initial encounter Yes          Plan:       Carolee was seen today for finger injury.    Diagnoses and all orders for this visit:    Closed nondisplaced fracture of distal phalanx of left thumb, initial encounter    Ms. Zarco him's in today for new problem of the left thumb.  She has a nondisplaced fracture of the left distal phalanx.  Asked nonoperative intervention including a Stax splint.  I have put her into that today.  She can discontinue the metal splint.  I do recommend she trim the nail to help the splint fit a little bit better.  Like to see her back in the office in about 4 weeks for another x-ray.  At that time I'll likely have her discontinue the splint and get started with some motion.  She understands she may get a little bit of stiffness in this joint, however, it should not as any functional limitations.  We briefly discussed surgical pinning of the fracture.  As it is not displaced, I do not feel this is necessary.  The patient certainly wants to avoid any surgical intervention if possible.  Thepain increases or she reinjures herself prior to follow-up.  She understands if the fracture displaces she may need to undergo surgical pinning.  Patient verbalizes understanding and agrees with the above plan.    Return in about 1 month (around 10/15/2017).          The patient understands, chooses and consents to this plan and accepts all   the risks which include but are not limited to the risks mentioned above.     Disclaimer: This note was prepared using a voice recognition system and is likely to have sound alike errors within the text.

## 2023-05-09 NOTE — TELEPHONE ENCOUNTER
Refill Decision Note      Refill Decision Note   Carolee Ortega  is requesting a refill authorization.  Brief Assessment and Rationale for Refill:  Approve     Medication Therapy Plan:         Comments:     Note composed:6:13 AM 05/09/2023             Appointments     Last Visit   2/24/2023 Genie Walter MD   Next Visit   Visit date not found Genie Walter MD

## 2023-05-10 DIAGNOSIS — H40.1131 PRIMARY OPEN ANGLE GLAUCOMA OF BOTH EYES, MILD STAGE: ICD-10-CM

## 2023-05-11 RX ORDER — LATANOPROST 50 UG/ML
1 SOLUTION/ DROPS OPHTHALMIC NIGHTLY
Qty: 7.5 ML | Refills: 4 | Status: SHIPPED | OUTPATIENT
Start: 2023-05-11

## 2023-05-25 ENCOUNTER — PATIENT MESSAGE (OUTPATIENT)
Dept: ADMINISTRATIVE | Facility: OTHER | Age: 63
End: 2023-05-25
Payer: COMMERCIAL

## 2023-05-28 ENCOUNTER — PATIENT MESSAGE (OUTPATIENT)
Dept: FAMILY MEDICINE | Facility: CLINIC | Age: 63
End: 2023-05-28
Payer: COMMERCIAL

## 2023-05-29 ENCOUNTER — OFFICE VISIT (OUTPATIENT)
Dept: FAMILY MEDICINE | Facility: CLINIC | Age: 63
End: 2023-05-29
Payer: COMMERCIAL

## 2023-05-29 VITALS
TEMPERATURE: 97 F | HEIGHT: 62 IN | DIASTOLIC BLOOD PRESSURE: 82 MMHG | WEIGHT: 214.81 LBS | BODY MASS INDEX: 39.53 KG/M2 | HEART RATE: 65 BPM | SYSTOLIC BLOOD PRESSURE: 126 MMHG | OXYGEN SATURATION: 99 % | RESPIRATION RATE: 18 BRPM

## 2023-05-29 DIAGNOSIS — S80.10XA CONTUSION OF MULTIPLE SITES OF LOWER EXTREMITY, UNSPECIFIED LATERALITY, INITIAL ENCOUNTER: Primary | ICD-10-CM

## 2023-05-29 PROCEDURE — 99999 PR PBB SHADOW E&M-EST. PATIENT-LVL III: ICD-10-PCS | Mod: PBBFAC,,, | Performed by: FAMILY MEDICINE

## 2023-05-29 PROCEDURE — 3074F PR MOST RECENT SYSTOLIC BLOOD PRESSURE < 130 MM HG: ICD-10-PCS | Mod: CPTII,S$GLB,, | Performed by: FAMILY MEDICINE

## 2023-05-29 PROCEDURE — 3079F DIAST BP 80-89 MM HG: CPT | Mod: CPTII,S$GLB,, | Performed by: FAMILY MEDICINE

## 2023-05-29 PROCEDURE — 3008F PR BODY MASS INDEX (BMI) DOCUMENTED: ICD-10-PCS | Mod: CPTII,S$GLB,, | Performed by: FAMILY MEDICINE

## 2023-05-29 PROCEDURE — 99213 PR OFFICE/OUTPT VISIT, EST, LEVL III, 20-29 MIN: ICD-10-PCS | Mod: S$GLB,,, | Performed by: FAMILY MEDICINE

## 2023-05-29 PROCEDURE — 1159F PR MEDICATION LIST DOCUMENTED IN MEDICAL RECORD: ICD-10-PCS | Mod: CPTII,S$GLB,, | Performed by: FAMILY MEDICINE

## 2023-05-29 PROCEDURE — 1159F MED LIST DOCD IN RCRD: CPT | Mod: CPTII,S$GLB,, | Performed by: FAMILY MEDICINE

## 2023-05-29 PROCEDURE — 99999 PR PBB SHADOW E&M-EST. PATIENT-LVL III: CPT | Mod: PBBFAC,,, | Performed by: FAMILY MEDICINE

## 2023-05-29 PROCEDURE — 1160F RVW MEDS BY RX/DR IN RCRD: CPT | Mod: CPTII,S$GLB,, | Performed by: FAMILY MEDICINE

## 2023-05-29 PROCEDURE — 4010F PR ACE/ARB THEARPY RXD/TAKEN: ICD-10-PCS | Mod: CPTII,S$GLB,, | Performed by: FAMILY MEDICINE

## 2023-05-29 PROCEDURE — 3008F BODY MASS INDEX DOCD: CPT | Mod: CPTII,S$GLB,, | Performed by: FAMILY MEDICINE

## 2023-05-29 PROCEDURE — 1160F PR REVIEW ALL MEDS BY PRESCRIBER/CLIN PHARMACIST DOCUMENTED: ICD-10-PCS | Mod: CPTII,S$GLB,, | Performed by: FAMILY MEDICINE

## 2023-05-29 PROCEDURE — 3079F PR MOST RECENT DIASTOLIC BLOOD PRESSURE 80-89 MM HG: ICD-10-PCS | Mod: CPTII,S$GLB,, | Performed by: FAMILY MEDICINE

## 2023-05-29 PROCEDURE — 99213 OFFICE O/P EST LOW 20 MIN: CPT | Mod: S$GLB,,, | Performed by: FAMILY MEDICINE

## 2023-05-29 PROCEDURE — 4010F ACE/ARB THERAPY RXD/TAKEN: CPT | Mod: CPTII,S$GLB,, | Performed by: FAMILY MEDICINE

## 2023-05-29 PROCEDURE — 3074F SYST BP LT 130 MM HG: CPT | Mod: CPTII,S$GLB,, | Performed by: FAMILY MEDICINE

## 2023-05-29 NOTE — PROGRESS NOTES
CHIEF COMPLAINT:  This is a 61-year-old female complaining of bruising behind knees.    SUBJECTIVE:  Patient was wearing shorts 48 hours ago and her  noticed symmetrical bruising just below popliteal crease.  Patient reports feeling tiny nodules.  She denies pain, soreness, or warmth.  She denies history of injury or prolonged pressure.  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  She went up and down a step ladder last week.  Patient was seen at urgent care clinic and told that she did not have DVT.    ROS:  GENERAL: Patient denies fever, chills, night sweats.  Patient denies weight gain or loss. Patient denies anorexia, fatigue, weakness or swollen glands.  SKIN:  Patient denies rash.  Positive for bruising.  HEENT: Patient denies sore throat, ear pain, hearing loss, nasal congestion, or runny nose. Patient denies visual disturbance, eye irritation or discharge.  LUNGS: Patient denies cough, wheeze or hemoptysis.  CARDIOVASCULAR: Patient denies chest pain, shortness of breath, palpitations, syncope or lower extremity edema.  GI: Patient denies abdominal pain, nausea, vomiting, diarrhea, constipation, blood in stool or melena.  GENITOURINARY: Patient denies dysuria, frequency, hematuria, nocturia, urgency or incontinence.    OBJECTIVE:   GENERAL: Well-developed well-nourished obese female alert and oriented x3 in no acute distress.  Memory, judgment and cognition without deficit.  SKIN:  Ecchymoses below popliteal crease bilateral knees.  Tiny minimally tender movable nodules palpated.  HEENT: Eyes: Clear conjunctivae.  No scleral icterus.    NECK: Supple, normal range of motion.    LUNGS:  Normal respiratory effort.  EXTREMITIES: Without cyanosis, clubbing or edema.  Distal pulses 2+ and equal.  Normal range of motion in all extremities.  No joint effusion, erythema or warmth.  NEUROLOGIC: Motor strength equal bilaterally.  Sensation normal to touch. Gait without abnormality.  No  tremor.     ASSESSMENT:  1. Contusion of multiple sites of lower extremity, unspecified laterality, initial encounter      PLAN:   1.  Reassurance.    2.  Avoid pressure on the back of knees.    3.  Follow-up if no improvement or worsening symptoms.    20 minutes of total time spent on the encounter, which includes face to face time and non-face to face time preparing to see the patient (eg, review of tests), Obtaining and/or reviewing separately obtained history, Documenting clinical information in the electronic or other health record, Independently interpreting results (not separately reported) and communicating results to the patient/family/caregiver, or Care coordination (not separately reported).     This note is generated with speech recognition software and is subject to transcription error and sound alike phrases that may be missed by proofreading.

## 2023-05-30 DIAGNOSIS — I10 ESSENTIAL HYPERTENSION: Chronic | ICD-10-CM

## 2023-05-30 RX ORDER — SPIRONOLACTONE 25 MG/1
25 TABLET ORAL DAILY
Qty: 90 TABLET | Refills: 2 | Status: SHIPPED | OUTPATIENT
Start: 2023-05-30 | End: 2024-02-26 | Stop reason: SDUPTHER

## 2023-05-30 RX ORDER — AMLODIPINE AND VALSARTAN 10; 320 MG/1; MG/1
TABLET ORAL
Qty: 90 TABLET | Refills: 2 | Status: SHIPPED | OUTPATIENT
Start: 2023-05-30 | End: 2024-02-26 | Stop reason: SDUPTHER

## 2023-05-30 NOTE — TELEPHONE ENCOUNTER
No care due was identified.  [facility] Embedded Care Due Messages. Reference number: [id number].   5/30/2023 7:47:58 AM CDT

## 2023-05-30 NOTE — TELEPHONE ENCOUNTER
MED-spironolactone (ALDACTONE) 25 MG tablet  amlodipine-valsartan (EXFORGE)  mg per tablet  LF-1/14/23, 1/18/23    LV-5/29/23    LAV-2/24/23    UA-N/A

## 2023-07-11 ENCOUNTER — PATIENT MESSAGE (OUTPATIENT)
Dept: OTHER | Facility: OTHER | Age: 63
End: 2023-07-11
Payer: COMMERCIAL

## 2023-09-25 ENCOUNTER — OFFICE VISIT (OUTPATIENT)
Dept: OPHTHALMOLOGY | Facility: CLINIC | Age: 63
End: 2023-09-25
Payer: COMMERCIAL

## 2023-09-25 DIAGNOSIS — Z96.1 PSEUDOPHAKIA OF BOTH EYES: ICD-10-CM

## 2023-09-25 DIAGNOSIS — H40.1132 PRIMARY OPEN ANGLE GLAUCOMA (POAG) OF BOTH EYES, MODERATE STAGE: Primary | ICD-10-CM

## 2023-09-25 PROCEDURE — 99999 PR PBB SHADOW E&M-EST. PATIENT-LVL III: CPT | Mod: PBBFAC,,, | Performed by: OPHTHALMOLOGY

## 2023-09-25 PROCEDURE — 99999 PR PBB SHADOW E&M-EST. PATIENT-LVL III: ICD-10-PCS | Mod: PBBFAC,,, | Performed by: OPHTHALMOLOGY

## 2023-09-25 PROCEDURE — 1160F RVW MEDS BY RX/DR IN RCRD: CPT | Mod: CPTII,S$GLB,, | Performed by: OPHTHALMOLOGY

## 2023-09-25 PROCEDURE — 4010F ACE/ARB THERAPY RXD/TAKEN: CPT | Mod: CPTII,S$GLB,, | Performed by: OPHTHALMOLOGY

## 2023-09-25 PROCEDURE — 1160F PR REVIEW ALL MEDS BY PRESCRIBER/CLIN PHARMACIST DOCUMENTED: ICD-10-PCS | Mod: CPTII,S$GLB,, | Performed by: OPHTHALMOLOGY

## 2023-09-25 PROCEDURE — 1159F PR MEDICATION LIST DOCUMENTED IN MEDICAL RECORD: ICD-10-PCS | Mod: CPTII,S$GLB,, | Performed by: OPHTHALMOLOGY

## 2023-09-25 PROCEDURE — 4010F PR ACE/ARB THEARPY RXD/TAKEN: ICD-10-PCS | Mod: CPTII,S$GLB,, | Performed by: OPHTHALMOLOGY

## 2023-09-25 PROCEDURE — 1159F MED LIST DOCD IN RCRD: CPT | Mod: CPTII,S$GLB,, | Performed by: OPHTHALMOLOGY

## 2023-09-25 PROCEDURE — 99214 PR OFFICE/OUTPT VISIT, EST, LEVL IV, 30-39 MIN: ICD-10-PCS | Mod: S$GLB,,, | Performed by: OPHTHALMOLOGY

## 2023-09-25 PROCEDURE — 99214 OFFICE O/P EST MOD 30 MIN: CPT | Mod: S$GLB,,, | Performed by: OPHTHALMOLOGY

## 2023-09-25 RX ORDER — KETOROLAC TROMETHAMINE 5 MG/ML
SOLUTION OPHTHALMIC
Qty: 5 ML | Refills: 0 | Status: SHIPPED | OUTPATIENT
Start: 2023-09-25 | End: 2024-02-26

## 2023-09-25 RX ORDER — HYDROXYZINE HYDROCHLORIDE 25 MG/1
TABLET, FILM COATED ORAL
Qty: 90 TABLET | Refills: 1 | Status: SHIPPED | OUTPATIENT
Start: 2023-09-25 | End: 2023-11-09

## 2023-09-25 NOTE — PROGRESS NOTES
SUBJECTIVE  Carolee Ortega is 62 y.o. female  Uncorrected distance visual acuity was 20/40 -1 in the right eye and 20/20 in the left eye.   Chief Complaint   Patient presents with    Glaucoma     4 month POAG IOP check. VA is a little more blurrier.Having some throbbing pain in right eye pain but no irritation. Complaint with gtts.          HPI     Glaucoma     Additional comments: 4 month POAG IOP check. VA is a little more   blurrier.Having some throbbing pain in right eye pain but no irritation.   Complaint with gtts.           Comments    V eye thomas     1. Mod COAG goal =18   SLT OD 11/4/20 (2-3 pt relative decrease)   Tmax 26/24   Minimal hemisphere asymmetry OD 30/26 OS 33/31   Thin corneas   2. PCIOL OD with goniotomy 6/15/22 (+18.0 SN60WF)  PCIOL OS w/ gonio 7/27/22 (+17.0 SN60WF)  3. IG \ ERM \ ME OD  Eylea OD lots  AVASTIN #1 -- 5/26/2021  Preop Eylea OD 6/8/22, 1/4/23  Eylea OU 7/20/22 (OS preop)  Ozurdex OD 8/31/22, 11/23/22, 3/8/23      dorzolamide-timolol (COSOPT) 1 drop OU BID   Latanoprost 1 drop OU QHS            Last edited by Ronald Vasquez on 9/25/2023  7:53 AM.         Assessment /Plan :  1. Primary open angle glaucoma (POAG) of both eyes, moderate stage   Intraocular pressure (IOP) OD not within acceptable range relative to target IOP with risk of irreversible visual loss. Better IOP control is recommended. Treatment options may include change or additional medications, Selective Laser Trabeculoplasty (SLT laser), and/or incisional glaucoma surgery. Reviewed importance of continued compliance with treatment and follow up.     Patient chooses schedule SLT  OD    Doing well, intraocular pressure (IOP) OS within acceptable range relative to target IOP and no evidence of progression. Continue current treatment. Reviewed importance of continued compliance with treatment and follow up.      Patient instructed to continue using the following glaucoma medication as follows:  Latanoprost one drop in each eye  nightly and Dorzolamide/Timolol (Cosopt) one drop in each eye every 12 hours         2. Pseudophakia of both eyes  -- Condition stable, no therapeutic change required. Monitoring routinely.     Return for the SLT OD

## 2023-10-25 ENCOUNTER — PROCEDURE VISIT (OUTPATIENT)
Dept: OPHTHALMOLOGY | Facility: CLINIC | Age: 63
End: 2023-10-25
Payer: COMMERCIAL

## 2023-10-25 DIAGNOSIS — H35.81 MACULAR EDEMA: ICD-10-CM

## 2023-10-25 DIAGNOSIS — H34.8310 BRANCH RETINAL VEIN OCCLUSION WITH MACULAR EDEMA OF RIGHT EYE: Primary | ICD-10-CM

## 2023-10-25 PROCEDURE — 99499 NO LOS: ICD-10-PCS | Mod: S$GLB,,, | Performed by: OPHTHALMOLOGY

## 2023-10-25 PROCEDURE — 92134 CPTRZ OPH DX IMG PST SGM RTA: CPT | Mod: S$GLB,,, | Performed by: OPHTHALMOLOGY

## 2023-10-25 PROCEDURE — 92134 POSTERIOR SEGMENT OCT RETINA (OCULAR COHERENCE TOMOGRAPHY)-BOTH EYES: ICD-10-PCS | Mod: S$GLB,,, | Performed by: OPHTHALMOLOGY

## 2023-10-25 PROCEDURE — 99499 UNLISTED E&M SERVICE: CPT | Mod: S$GLB,,, | Performed by: OPHTHALMOLOGY

## 2023-10-25 NOTE — PROGRESS NOTES
===============================  Date today is 10/25/2023  Carolee Ortega is a 62 y.o. female  Last visit [facility]: :3/8/2023   Last visit eye dept. 9/25/2023    Uncorrected distance visual acuity was 20/70- in the right eye and 20/20 in the left eye.  Tonometry       Tonometry (Applanation, 8:20 AM)         Right Left    Pressure 11 10              Target and Max Pressure         Right Left    Target 18 18    Max 26 (9/12) 24 (9/12)                  Not recorded       Not recorded       Not recorded       Chief Complaint   Patient presents with    Injections     Ozurdex eval      HPI     Injections     Additional comments: Ozurdex eval            Comments    States that her vision in her OD has gotten worse and that she has been   compliant with gtts as directed.     1. Mod COAG goal =18   SLT OD 11/4/20 (2-3 pt relative decrease)   Tmax 26/24   Minimal hemisphere asymmetry OD 30/26 OS 33/31   Thin corneas   2. PCIOL OD with goniotomy 6/15/22 (+18.0 SN60WF)  PCIOL OS w/ gonio 7/27/22 (+17.0 SN60WF)  3. IG \ ERM \ ME OD  Eylea OD lots  AVASTIN #1 -- 5/26/2021  Preop Eylea OD 6/8/22, 1/4/23  Eylea OU 7/20/22 (OS preop)  Ozurdex OD 8/31/22, 11/23/22, 3/8/23      dorzolamide-timolol (COSOPT) 1 drop OU BID   Latanoprost 1 drop OU QHS            Last edited by Eve Aviles on 10/25/2023  8:20 AM.      Problem List Items Addressed This Visit    None  Visit Diagnoses       Branch retinal vein occlusion with macular edema of right eye    -  Primary    Relevant Orders    Posterior Segment OCT Retina-Both eyes (Completed)    Macular edema        Relevant Orders    Posterior Segment OCT Retina-Both eyes (Completed)          Instructed to call 24/7 for any worsening of vision, visual distortion or pain.  Check OU independently daily.    Gave my office and personal cell phone number.  ________________  10/25/2023 today  Carolee Ortega    :  CME/IG/BRVO  OCT OD may be at plateau  7/2021 and today's scan shows end  result of eye injections and cataract surgery    May follow for now  No injection today    RTC 3-4 months  Instructed to call 24/7 for any worsening of vision or symptoms. Check OU daily.   Gave my office and cell phone number.    =============================

## 2023-11-06 ENCOUNTER — OFFICE VISIT (OUTPATIENT)
Dept: OPHTHALMOLOGY | Facility: CLINIC | Age: 63
End: 2023-11-06
Payer: COMMERCIAL

## 2023-11-06 DIAGNOSIS — H40.1132 PRIMARY OPEN ANGLE GLAUCOMA (POAG) OF BOTH EYES, MODERATE STAGE: Primary | ICD-10-CM

## 2023-11-06 DIAGNOSIS — Z96.1 PSEUDOPHAKIA OF BOTH EYES: ICD-10-CM

## 2023-11-06 PROCEDURE — 1160F PR REVIEW ALL MEDS BY PRESCRIBER/CLIN PHARMACIST DOCUMENTED: ICD-10-PCS | Mod: CPTII,S$GLB,, | Performed by: OPHTHALMOLOGY

## 2023-11-06 PROCEDURE — 1159F PR MEDICATION LIST DOCUMENTED IN MEDICAL RECORD: ICD-10-PCS | Mod: CPTII,S$GLB,, | Performed by: OPHTHALMOLOGY

## 2023-11-06 PROCEDURE — 99214 OFFICE O/P EST MOD 30 MIN: CPT | Mod: S$GLB,,, | Performed by: OPHTHALMOLOGY

## 2023-11-06 PROCEDURE — 1160F RVW MEDS BY RX/DR IN RCRD: CPT | Mod: CPTII,S$GLB,, | Performed by: OPHTHALMOLOGY

## 2023-11-06 PROCEDURE — 99214 PR OFFICE/OUTPT VISIT, EST, LEVL IV, 30-39 MIN: ICD-10-PCS | Mod: S$GLB,,, | Performed by: OPHTHALMOLOGY

## 2023-11-06 PROCEDURE — 4010F ACE/ARB THERAPY RXD/TAKEN: CPT | Mod: CPTII,S$GLB,, | Performed by: OPHTHALMOLOGY

## 2023-11-06 PROCEDURE — 1159F MED LIST DOCD IN RCRD: CPT | Mod: CPTII,S$GLB,, | Performed by: OPHTHALMOLOGY

## 2023-11-06 PROCEDURE — 99999 PR PBB SHADOW E&M-EST. PATIENT-LVL III: CPT | Mod: PBBFAC,,, | Performed by: OPHTHALMOLOGY

## 2023-11-06 PROCEDURE — 99999 PR PBB SHADOW E&M-EST. PATIENT-LVL III: ICD-10-PCS | Mod: PBBFAC,,, | Performed by: OPHTHALMOLOGY

## 2023-11-06 PROCEDURE — 4010F PR ACE/ARB THEARPY RXD/TAKEN: ICD-10-PCS | Mod: CPTII,S$GLB,, | Performed by: OPHTHALMOLOGY

## 2023-11-06 NOTE — PROGRESS NOTES
SUBJECTIVE  Carolee Ortega is 62 y.o. female  Uncorrected distance visual acuity was 20/40 in the right eye and 20/20 -1 in the left eye.   Chief Complaint   Patient presents with    Post-op Evaluation     Pt reports for SLT OD. Denies any pain or irritation. Va stable. 100% compliant with gtts.           HPI     Post-op Evaluation     Additional comments: Pt reports for SLT OD. Denies any pain or   irritation. Va stable. 100% compliant with gtts.            Comments    1. Mod COAG goal =18   SLT OD 11/4/20 (2-3 pt relative decrease)   Tmax 26/24   Minimal hemisphere asymmetry OD 30/26 OS 33/31   Thin corneas   2. PCIOL OD with goniotomy 6/15/22 (+18.0 SN60WF)  PCIOL OS w/ gonio 7/27/22 (+17.0 SN60WF)  3. IG \ ERM \ ME OD  Eylea OD lots  AVASTIN #1 -- 5/26/2021  Preop Eylea OD 6/8/22, 1/4/23  Eylea OU 7/20/22 (OS preop)  Ozurdex OD 8/31/22, 11/23/22, 3/8/23      dorzolamide-timolol (COSOPT) 1 drop OU BID   Latanoprost 1 drop OU QHS            Last edited by Fritz Brown on 11/6/2023  7:41 AM.         Assessment /Plan :  1. Primary open angle glaucoma (POAG) of both eyes, moderate stage - nice response to the SLT OD  Doing well, intraocular pressure (IOP) within acceptable range relative to target IOP and no evidence of progression. Continue current treatment. Reviewed importance of continued compliance with treatment and follow up.      Patient instructed to continue using the following glaucoma medication as follows:  Latanoprost one drop in each eye nightly and Dorzolamide/Timolol (Cosopt) one drop in each eye every 12 hours    Return to clinic in 4 months  or as needed.  With GOCT, Dilation, and HVF 24-2     2. Pseudophakia of both eyes  -- Condition stable, no therapeutic change required. Monitoring routinely.

## 2023-11-09 RX ORDER — HYDROXYZINE HYDROCHLORIDE 25 MG/1
TABLET, FILM COATED ORAL
Qty: 90 TABLET | Refills: 1 | Status: SHIPPED | OUTPATIENT
Start: 2023-11-09 | End: 2024-02-12

## 2023-12-25 ENCOUNTER — PATIENT MESSAGE (OUTPATIENT)
Dept: ADMINISTRATIVE | Facility: OTHER | Age: 63
End: 2023-12-25
Payer: COMMERCIAL

## 2024-02-12 RX ORDER — HYDROXYZINE HYDROCHLORIDE 25 MG/1
TABLET, FILM COATED ORAL
Qty: 90 TABLET | Refills: 0 | Status: SHIPPED | OUTPATIENT
Start: 2024-02-12 | End: 2024-02-26 | Stop reason: SDUPTHER

## 2024-02-22 NOTE — PROGRESS NOTES
===============================  Date today is 2/26/2024  Carolee Ortega is a 63 y.o. female  Last visit [facility]: :10/25/2023   Last visit eye dept. 11/6/2023    Uncorrected distance visual acuity was 20/50 in the right eye and 20/25 in the left eye.  Tonometry       Tonometry (Applanation, 9:07 AM)         Right Left    Pressure 18 18              Target and Max Pressure         Right Left    Target 18 18    Max 26 (9/12) 24 (9/12)                  Not recorded       Not recorded       Not recorded       Chief Complaint   Patient presents with    BRVO/ME     4 MONTHS BRVO/ME/    Pt also states that on 2/11 she had a os eye injury and she not sees wavy black lines and flashes.        Problem List Items Addressed This Visit          Eye/Vision problems    Glaucoma (increased eye pressure)     Other Visit Diagnoses       Branch retinal vein occlusion with macular edema of right eye    -  Primary    Relevant Orders    Posterior Segment OCT Retina-Both eyes (Completed)    Pseudophakia of both eyes              Instructed to call 24/7 for any worsening of vision, visual distortion or pain.  Check OU independently daily.    Gave my office and personal cell phone number.  ________________  2/26/2024 today  Carolee Ortega    OD BRVO  OCT stable  At treatment plateau OD for BVO, will follow for now  Recent OS trauma- photopsia  Periphery ok  COAG followed by [facility]- continue drops  Will have pt do MOCT at [facility] visits- if any worse, will resume injections    RTC with Dr. Zaragoza as scheduled  Instructed to call 24/7 for any worsening of vision or symptoms. Check OU daily.   Gave my office and cell phone number.      =============================

## 2024-02-26 ENCOUNTER — OFFICE VISIT (OUTPATIENT)
Dept: FAMILY MEDICINE | Facility: CLINIC | Age: 64
End: 2024-02-26
Payer: COMMERCIAL

## 2024-02-26 ENCOUNTER — OFFICE VISIT (OUTPATIENT)
Dept: OPHTHALMOLOGY | Facility: CLINIC | Age: 64
End: 2024-02-26
Payer: COMMERCIAL

## 2024-02-26 VITALS
DIASTOLIC BLOOD PRESSURE: 82 MMHG | HEIGHT: 62 IN | HEART RATE: 72 BPM | WEIGHT: 226.44 LBS | TEMPERATURE: 98 F | SYSTOLIC BLOOD PRESSURE: 120 MMHG | BODY MASS INDEX: 41.67 KG/M2 | OXYGEN SATURATION: 97 %

## 2024-02-26 DIAGNOSIS — I10 ESSENTIAL HYPERTENSION: Chronic | ICD-10-CM

## 2024-02-26 DIAGNOSIS — H34.8310 BRANCH RETINAL VEIN OCCLUSION WITH MACULAR EDEMA OF RIGHT EYE: Primary | ICD-10-CM

## 2024-02-26 DIAGNOSIS — Z00.00 PREVENTATIVE HEALTH CARE: Primary | ICD-10-CM

## 2024-02-26 DIAGNOSIS — Z96.1 PSEUDOPHAKIA OF BOTH EYES: ICD-10-CM

## 2024-02-26 DIAGNOSIS — E66.01 MORBID OBESITY WITH BMI OF 40.0-44.9, ADULT: ICD-10-CM

## 2024-02-26 DIAGNOSIS — H34.8310 BRANCH RETINAL VEIN OCCLUSION WITH MACULAR EDEMA OF RIGHT EYE: ICD-10-CM

## 2024-02-26 DIAGNOSIS — H40.1132 PRIMARY OPEN ANGLE GLAUCOMA (POAG) OF BOTH EYES, MODERATE STAGE: ICD-10-CM

## 2024-02-26 DIAGNOSIS — J45.20 MILD INTERMITTENT ASTHMA WITHOUT COMPLICATION: ICD-10-CM

## 2024-02-26 DIAGNOSIS — J30.9 ALLERGIC RHINITIS, UNSPECIFIED SEASONALITY, UNSPECIFIED TRIGGER: ICD-10-CM

## 2024-02-26 DIAGNOSIS — Z12.31 ENCOUNTER FOR SCREENING MAMMOGRAM FOR MALIGNANT NEOPLASM OF BREAST: ICD-10-CM

## 2024-02-26 PROCEDURE — 99214 OFFICE O/P EST MOD 30 MIN: CPT | Mod: S$GLB,,, | Performed by: OPHTHALMOLOGY

## 2024-02-26 PROCEDURE — 3008F BODY MASS INDEX DOCD: CPT | Mod: CPTII,S$GLB,, | Performed by: FAMILY MEDICINE

## 2024-02-26 PROCEDURE — 4010F ACE/ARB THERAPY RXD/TAKEN: CPT | Mod: CPTII,S$GLB,, | Performed by: OPHTHALMOLOGY

## 2024-02-26 PROCEDURE — 3079F DIAST BP 80-89 MM HG: CPT | Mod: CPTII,S$GLB,, | Performed by: FAMILY MEDICINE

## 2024-02-26 PROCEDURE — 1159F MED LIST DOCD IN RCRD: CPT | Mod: CPTII,S$GLB,, | Performed by: OPHTHALMOLOGY

## 2024-02-26 PROCEDURE — 99396 PREV VISIT EST AGE 40-64: CPT | Mod: S$GLB,,, | Performed by: FAMILY MEDICINE

## 2024-02-26 PROCEDURE — 99999 PR PBB SHADOW E&M-EST. PATIENT-LVL IV: CPT | Mod: PBBFAC,,, | Performed by: FAMILY MEDICINE

## 2024-02-26 PROCEDURE — 92134 CPTRZ OPH DX IMG PST SGM RTA: CPT | Mod: S$GLB,,, | Performed by: OPHTHALMOLOGY

## 2024-02-26 PROCEDURE — 1159F MED LIST DOCD IN RCRD: CPT | Mod: CPTII,S$GLB,, | Performed by: FAMILY MEDICINE

## 2024-02-26 PROCEDURE — 99999 PR PBB SHADOW E&M-EST. PATIENT-LVL III: CPT | Mod: PBBFAC,,, | Performed by: OPHTHALMOLOGY

## 2024-02-26 PROCEDURE — 3074F SYST BP LT 130 MM HG: CPT | Mod: CPTII,S$GLB,, | Performed by: FAMILY MEDICINE

## 2024-02-26 PROCEDURE — 1160F RVW MEDS BY RX/DR IN RCRD: CPT | Mod: CPTII,S$GLB,, | Performed by: OPHTHALMOLOGY

## 2024-02-26 RX ORDER — SPIRONOLACTONE 25 MG/1
25 TABLET ORAL DAILY
Qty: 90 TABLET | Refills: 3 | Status: SHIPPED | OUTPATIENT
Start: 2024-02-26

## 2024-02-26 RX ORDER — BUPROPION HYDROCHLORIDE 100 MG/1
100 TABLET ORAL DAILY
Qty: 90 TABLET | Refills: 3 | Status: SHIPPED | OUTPATIENT
Start: 2024-02-26

## 2024-02-26 RX ORDER — SPIRONOLACTONE 25 MG/1
25 TABLET ORAL DAILY
Qty: 90 TABLET | Refills: 3 | Status: SHIPPED | OUTPATIENT
Start: 2024-02-26 | End: 2024-02-26 | Stop reason: SDUPTHER

## 2024-02-26 RX ORDER — HYDROXYZINE HYDROCHLORIDE 25 MG/1
TABLET, FILM COATED ORAL
Qty: 90 TABLET | Refills: 3 | Status: SHIPPED | OUTPATIENT
Start: 2024-02-26

## 2024-02-26 RX ORDER — AMLODIPINE AND VALSARTAN 10; 320 MG/1; MG/1
TABLET ORAL
Qty: 45 TABLET | Refills: 3 | Status: SHIPPED | OUTPATIENT
Start: 2024-02-26

## 2024-02-26 RX ORDER — MONTELUKAST SODIUM 10 MG/1
10 TABLET ORAL NIGHTLY
Qty: 90 TABLET | Refills: 3 | Status: SHIPPED | OUTPATIENT
Start: 2024-02-26

## 2024-02-26 NOTE — TELEPHONE ENCOUNTER
Care Due:                  Date            Visit Type   Department     Provider  --------------------------------------------------------------------------------                                MYCHART                              ANNUAL                              CHECKUP/PHY  [facility]  Last Visit: 02-      [facility]       Genie Walter  Next Visit: None Scheduled  None         None Found                                                            Last  Test          Frequency    Reason                     Performed    Due Date  --------------------------------------------------------------------------------    CBC.........  12 months..  valACYclovir.............  02- 02-    CMP.........  12 months..  amlodipine-valsartan,      02- 02-                             spironolactone,                             valACYclovir.............    [employer] Embedded Care Due Messages. Reference number: [id number].   2/26/2024 1:03:41 PM CST

## 2024-02-26 NOTE — PROGRESS NOTES
CHIEF COMPLAINT:  This is a 63-year-old female here for preventive health exam.     SUBJECTIVE:  Patient is doing well without complaints. Her blood pressure is controlled on amlodipine-valsartan 1/2 tablet daily and  spironolactone 25 mg daily with today's reading of 120/82.  The patient has mild intermittent asthma for which she uses albuterol.  Patient takes hydroxyzine 1 tablet daily for itching.  Patient uses Nasonex and Singulair for allergic rhinitis.  She is followed by ophthalmology for glaucoma and branch retinal vein occlusion.  She takes Wellbutrin 100 mg daily for decreased libido.     Eye exam January 2023.  Mammogram March 2023.   Colonoscopy August 2020 due again in August 2025.  Tdap February 2017. Pneumovax February 2014. Flu vaccine December 2023. COVID 19 vaccine March, November 2021, December 2022, December 2023.      ROS:  GENERAL: Patient denies fever, chills, night sweats. Patient denies weight gain. Patient denies anorexia, fatigue, weakness or swollen glands.  SKIN: Patient denies rash or hair loss.  HEENT: Patient denies sore throat, ear pain, hearing loss, nasal congestion, or runny nose. Patient denies visual disturbance, eye irritation or discharge.  LUNGS: Patient denies cough, wheeze or hemoptysis.  CARDIOVASCULAR: Patient denies chest pain, shortness of breath, palpitations, syncope or lower extremity edema.  GI: Patient denies abdominal pain, nausea, vomiting, diarrhea, constipation, blood in stool or melena.  GENITOURINARY: Patient denies pelvic pain, vaginal discharge, itch or odor. Patient denies irregular vaginal bleeding. Patient denies dysuria, frequency, hematuria, nocturia, urgency or incontinence.  BREASTS: Patient denies breast pain, mass or nipple discharge.  MUSCULOSKELETAL: Patient denies joint pain, swelling, redness or warmth.  NEUROLOGIC: Patient denies headache, vertigo, paresthesias, weakness in limb, dysarthria, dysphagia or abnormality of gait.  PSYCHIATRIC:  Patient denies anxiety, depression, or memory loss.     OBJECTIVE:   GENERAL: Well-developed well-nourished, obese, black female alert and oriented x3, in no acute distress. Memory, judgment and cognition without deficit.   SKIN: Clear without rash. Normal color and tone.    HEENT: Eyes: Clear conjunctivae. No scleral icterus. Pupils equal reactive to light and accommodation. Ears: Clear canals. Clear TMs. Nose: Without congestion. Pharynx: Without injection or exudates.  NECK: Supple, normal range of motion. No masses, lymphadenopathy or enlarged thyroid. No JVD. Carotids 2+ and equal. No bruits.  LUNGS: Clear to auscultation. Normal respiratory effort.  CARDIOVASCULAR: Regular rhythm, normal S1, S2 without murmur, gallop or rub.  BACK: No CVA or spinal tenderness.  BREASTS: No masses, tenderness or nipple discharge.  ABDOMEN: Normal appearance. Active bowel sounds. Soft, nontender without mass or organomegaly. No rebound or guarding.  EXTREMITIES: Without cyanosis, clubbing or edema. Distal pulses 2+ and equal. Normal range of motion in all extremities. No joint effusion, erythema or warmth.  NEUROLOGIC: Cranial nerves II through XII without deficit. Motor strength equal bilaterally. Sensation normal to touch. Deep tendon reflexes 2+ and equal. Gait without abnormality. No tremor. Negative cerebellar signs.  PELVIC: External: Without lesions or inflammation.Vaginal: Clear, cuff intact. Bimanual: No masses, nontender. RECTOVAGINAL: Confirms heme-negative stool x2.    ASSESSMENT:  1. Preventative health care    2. Essential hypertension    3. Allergic rhinitis, unspecified seasonality, unspecified trigger    4. Mild intermittent asthma without complication    5. Morbid obesity with BMI of 40.0-44.9, adult    6. Branch retinal vein occlusion with macular edema of right eye    7. Encounter for screening mammogram for malignant neoplasm of breast      PLAN:  1. Weight reduction. Exercise regularly.  2. Age-appropriate  counseling.  3. Fasting lab.  4. Mammogram.  5. Refill spironolactone.  6. Follow-up annually.    This note is generated with speech recognition software and is subject to transcription error and sound alike phrases that may be missed by proofreading.

## 2024-02-27 ENCOUNTER — LAB VISIT (OUTPATIENT)
Dept: LAB | Facility: HOSPITAL | Age: 64
End: 2024-02-27
Attending: FAMILY MEDICINE
Payer: COMMERCIAL

## 2024-02-27 DIAGNOSIS — Z00.00 PREVENTATIVE HEALTH CARE: ICD-10-CM

## 2024-02-27 LAB
ALBUMIN SERPL BCP-MCNC: 3.7 G/DL (ref 3.5–5.2)
ALP SERPL-CCNC: 68 U/L (ref 55–135)
ALT SERPL W/O P-5'-P-CCNC: 19 U/L (ref 10–44)
ANION GAP SERPL CALC-SCNC: 10 MMOL/L (ref 8–16)
AST SERPL-CCNC: 18 U/L (ref 10–40)
BASOPHILS # BLD AUTO: 0.03 K/UL (ref 0–0.2)
BASOPHILS NFR BLD: 0.8 % (ref 0–1.9)
BILIRUB SERPL-MCNC: 0.4 MG/DL (ref 0.1–1)
BUN SERPL-MCNC: 13 MG/DL (ref 8–23)
CALCIUM SERPL-MCNC: 9.8 MG/DL (ref 8.7–10.5)
CHLORIDE SERPL-SCNC: 109 MMOL/L (ref 95–110)
CHOLEST SERPL-MCNC: 209 MG/DL (ref 120–199)
CHOLEST/HDLC SERPL: 3.3 {RATIO} (ref 2–5)
CO2 SERPL-SCNC: 24 MMOL/L (ref 23–29)
CREAT SERPL-MCNC: 0.8 MG/DL (ref 0.5–1.4)
DIFFERENTIAL METHOD BLD: ABNORMAL
EOSINOPHIL # BLD AUTO: 0.1 K/UL (ref 0–0.5)
EOSINOPHIL NFR BLD: 1.3 % (ref 0–8)
ERYTHROCYTE [DISTWIDTH] IN BLOOD BY AUTOMATED COUNT: 13.5 % (ref 11.5–14.5)
EST. GFR  (NO RACE VARIABLE): >60 ML/MIN/1.73 M^2
GLUCOSE SERPL-MCNC: 104 MG/DL (ref 70–110)
HCT VFR BLD AUTO: 43.8 % (ref 37–48.5)
HDLC SERPL-MCNC: 63 MG/DL (ref 40–75)
HDLC SERPL: 30.1 % (ref 20–50)
HGB BLD-MCNC: 13.9 G/DL (ref 12–16)
IMM GRANULOCYTES # BLD AUTO: 0.01 K/UL (ref 0–0.04)
IMM GRANULOCYTES NFR BLD AUTO: 0.3 % (ref 0–0.5)
LDLC SERPL CALC-MCNC: 128.4 MG/DL (ref 63–159)
LYMPHOCYTES # BLD AUTO: 1.7 K/UL (ref 1–4.8)
LYMPHOCYTES NFR BLD: 44.1 % (ref 18–48)
MCH RBC QN AUTO: 27 PG (ref 27–31)
MCHC RBC AUTO-ENTMCNC: 31.7 G/DL (ref 32–36)
MCV RBC AUTO: 85 FL (ref 82–98)
MONOCYTES # BLD AUTO: 0.3 K/UL (ref 0.3–1)
MONOCYTES NFR BLD: 7.2 % (ref 4–15)
NEUTROPHILS # BLD AUTO: 1.7 K/UL (ref 1.8–7.7)
NEUTROPHILS NFR BLD: 46.3 % (ref 38–73)
NONHDLC SERPL-MCNC: 146 MG/DL
NRBC BLD-RTO: 0 /100 WBC
PLATELET # BLD AUTO: 182 K/UL (ref 150–450)
PMV BLD AUTO: 12.4 FL (ref 9.2–12.9)
POTASSIUM SERPL-SCNC: 4.1 MMOL/L (ref 3.5–5.1)
PROT SERPL-MCNC: 7.1 G/DL (ref 6–8.4)
RBC # BLD AUTO: 5.14 M/UL (ref 4–5.4)
SODIUM SERPL-SCNC: 143 MMOL/L (ref 136–145)
TRIGL SERPL-MCNC: 88 MG/DL (ref 30–150)
TSH SERPL DL<=0.005 MIU/L-ACNC: 1.34 UIU/ML (ref 0.4–4)
WBC # BLD AUTO: 3.74 K/UL (ref 3.9–12.7)

## 2024-02-27 PROCEDURE — 85025 COMPLETE CBC W/AUTO DIFF WBC: CPT | Performed by: FAMILY MEDICINE

## 2024-02-27 PROCEDURE — 80053 COMPREHEN METABOLIC PANEL: CPT | Performed by: FAMILY MEDICINE

## 2024-02-27 PROCEDURE — 36415 COLL VENOUS BLD VENIPUNCTURE: CPT | Mod: PO | Performed by: FAMILY MEDICINE

## 2024-02-27 PROCEDURE — 84443 ASSAY THYROID STIM HORMONE: CPT | Performed by: FAMILY MEDICINE

## 2024-02-27 PROCEDURE — 80061 LIPID PANEL: CPT | Performed by: FAMILY MEDICINE

## 2024-03-07 ENCOUNTER — OFFICE VISIT (OUTPATIENT)
Dept: OPHTHALMOLOGY | Facility: CLINIC | Age: 64
End: 2024-03-07
Payer: COMMERCIAL

## 2024-03-07 DIAGNOSIS — H40.1132 PRIMARY OPEN ANGLE GLAUCOMA (POAG) OF BOTH EYES, MODERATE STAGE: Primary | ICD-10-CM

## 2024-03-07 DIAGNOSIS — Z96.1 PSEUDOPHAKIA OF BOTH EYES: ICD-10-CM

## 2024-03-07 DIAGNOSIS — H35.81 MACULAR EDEMA: ICD-10-CM

## 2024-03-07 DIAGNOSIS — H34.8310 BRANCH RETINAL VEIN OCCLUSION WITH MACULAR EDEMA OF RIGHT EYE: ICD-10-CM

## 2024-03-07 PROCEDURE — 4010F ACE/ARB THERAPY RXD/TAKEN: CPT | Mod: CPTII,S$GLB,, | Performed by: OPHTHALMOLOGY

## 2024-03-07 PROCEDURE — 92133 CPTRZD OPH DX IMG PST SGM ON: CPT | Mod: S$GLB,,, | Performed by: OPHTHALMOLOGY

## 2024-03-07 PROCEDURE — 1160F RVW MEDS BY RX/DR IN RCRD: CPT | Mod: CPTII,S$GLB,, | Performed by: OPHTHALMOLOGY

## 2024-03-07 PROCEDURE — 92250 FUNDUS PHOTOGRAPHY W/I&R: CPT | Mod: 59,S$GLB,, | Performed by: OPHTHALMOLOGY

## 2024-03-07 PROCEDURE — 99999 PR PBB SHADOW E&M-EST. PATIENT-LVL III: CPT | Mod: PBBFAC,,, | Performed by: OPHTHALMOLOGY

## 2024-03-07 PROCEDURE — 1159F MED LIST DOCD IN RCRD: CPT | Mod: CPTII,S$GLB,, | Performed by: OPHTHALMOLOGY

## 2024-03-07 PROCEDURE — 92083 EXTENDED VISUAL FIELD XM: CPT | Mod: S$GLB,,, | Performed by: OPHTHALMOLOGY

## 2024-03-07 PROCEDURE — 99214 OFFICE O/P EST MOD 30 MIN: CPT | Mod: S$GLB,,, | Performed by: OPHTHALMOLOGY
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
## 2024-03-25 ENCOUNTER — PATIENT MESSAGE (OUTPATIENT)
Dept: FAMILY MEDICINE | Facility: CLINIC | Age: 64
End: 2024-03-25
Payer: COMMERCIAL

## 2024-04-26 ENCOUNTER — PATIENT MESSAGE (OUTPATIENT)
Dept: ADMINISTRATIVE | Facility: OTHER | Age: 64
End: 2024-04-26
Payer: COMMERCIAL

## 2024-04-26 ENCOUNTER — NURSE TRIAGE (OUTPATIENT)
Dept: ADMINISTRATIVE | Facility: CLINIC | Age: 64
End: 2024-04-26
Payer: COMMERCIAL

## 2024-04-26 NOTE — TELEPHONE ENCOUNTER
Reason for Disposition   Postmenopausal vaginal bleeding    Additional Information   Negative: Shock suspected (e.g., cold/pale/clammy skin, too weak to stand, low BP, rapid pulse)   Negative: Difficult to awaken or acting confused (e.g., disoriented, slurred speech)   Negative: Passed out (i.e., lost consciousness, collapsed and was not responding)   Negative: Sounds like a life-threatening emergency to the triager   Negative: Followed a genital area injury (e.g., vagina, vulva)   Negative: [1] Vaginal discharge is main symptom AND [2] small amount of blood   Negative: SEVERE abdominal pain   Negative: SEVERE dizziness (e.g., unable to stand, requires support to walk, feels like passing out now)   Negative: Sexual assault or rape (sexual intercourse or activity occurs without freely given consent), known or suspected   Negative: SEVERE vaginal bleeding (e.g., soaking 2 pads or tampons per hour and present 2 or more hours; 1 menstrual cup every 2 hours)   Negative: Patient sounds very sick or weak to the triager   Negative: MODERATE vaginal bleeding (e.g., soaking 1 pad or tampon per hour and present > 6 hours; 1 menstrual cup every 6 hours)   Negative: Pale skin (pallor) of new-onset or worsening   Negative: [1] Constant abdominal pain AND [2] present > 2 hours   Negative: Taking Coumadin (warfarin) or other strong blood thinner, or known bleeding disorder (e.g., thrombocytopenia)   Negative: Bleeding with > 6 soaked pads or tampons per day   Negative: Bleeding lasts for > 7 days   Negative: [1] Bleeding/spotting after procedure (e.g., biopsy) or pelvic examination (e.g., pap smear) AND [2] lasts > 7 days    Protocols used: Vaginal Bleeding - [id number]

## 2024-04-26 NOTE — TELEPHONE ENCOUNTER
Patient c/o vaginal bleeding. She awaken to urinate and when wiping she saw bright red blood. She states she wiped a few times and then there was no blood. She is not wearing a pad at this time. Patient states there is some settlement in the bottom of the toilet. Patient states she will go to  first thing if she cannot get in to see Dr. Walter or someone in the clinic.

## 2024-04-30 ENCOUNTER — OFFICE VISIT (OUTPATIENT)
Dept: FAMILY MEDICINE | Facility: CLINIC | Age: 64
End: 2024-04-30
Payer: COMMERCIAL

## 2024-04-30 VITALS
WEIGHT: 227.31 LBS | RESPIRATION RATE: 18 BRPM | HEIGHT: 62 IN | SYSTOLIC BLOOD PRESSURE: 118 MMHG | DIASTOLIC BLOOD PRESSURE: 84 MMHG | TEMPERATURE: 98 F | BODY MASS INDEX: 41.83 KG/M2 | HEART RATE: 78 BPM

## 2024-04-30 DIAGNOSIS — N39.0 URINARY TRACT INFECTION WITH HEMATURIA, SITE UNSPECIFIED: Primary | ICD-10-CM

## 2024-04-30 DIAGNOSIS — R31.9 URINARY TRACT INFECTION WITH HEMATURIA, SITE UNSPECIFIED: Primary | ICD-10-CM

## 2024-04-30 PROCEDURE — 4010F ACE/ARB THERAPY RXD/TAKEN: CPT | Mod: CPTII,S$GLB,, | Performed by: REGISTERED NURSE

## 2024-04-30 PROCEDURE — 99999 PR PBB SHADOW E&M-EST. PATIENT-LVL III: CPT | Mod: PBBFAC,,, | Performed by: REGISTERED NURSE

## 2024-04-30 PROCEDURE — 3008F BODY MASS INDEX DOCD: CPT | Mod: CPTII,S$GLB,, | Performed by: REGISTERED NURSE

## 2024-04-30 PROCEDURE — 99213 OFFICE O/P EST LOW 20 MIN: CPT | Mod: S$GLB,,, | Performed by: REGISTERED NURSE

## 2024-04-30 PROCEDURE — 3079F DIAST BP 80-89 MM HG: CPT | Mod: CPTII,S$GLB,, | Performed by: REGISTERED NURSE

## 2024-04-30 PROCEDURE — 3074F SYST BP LT 130 MM HG: CPT | Mod: CPTII,S$GLB,, | Performed by: REGISTERED NURSE

## 2024-04-30 NOTE — PROGRESS NOTES
SUBJECTIVE:     Carolee Ortega  MRN:  [id number]  63 y.o. female    CHIEF COMPLAINT:     Follow-up UC    HPI:    Carolee Ortega reports being seen and tx recently at [facility] on Friday 4/26/24 for hematuria.  History of hysterectomy.  Per notation, her UC did show mild leukocytes and trace ketones.  Actually not done as a clean catch.  Told she had a UTI and started on Cipro 500 mg bid x 7 days which she is taking w/out any problems and feeling well.  Hematuria resolved.      Review of Systems   Constitutional:  Negative for chills and fever.   HENT:  Negative for hearing loss.    Eyes:  Negative for discharge.   Respiratory:  Negative for wheezing.    Cardiovascular:  Negative for chest pain and palpitations.   Gastrointestinal:  Negative for blood in stool, constipation, diarrhea and vomiting.   Genitourinary:  Positive for hematuria. Negative for dysuria, flank pain, frequency and urgency.   Musculoskeletal:  Negative for neck pain.   Neurological:  Negative for weakness and headaches.   Endo/Heme/Allergies:  Negative for polydipsia.       Review of patient's allergies indicates:   Allergen Reactions    Flexeril [cyclobenzaprine] Palpitations       Patient Active Problem List   Diagnosis    Essential hypertension    Herpes labialis    Glaucoma (increased eye pressure)    Morbid obesity with BMI of 40.0-44.9, adult    Asthma    Allergic rhinitis    Anxiety    Benign paroxysmal positional vertigo    Ganglion cyst    Branch retinal vein occlusion with macular edema of right eye       Current Outpatient Medications   Medication Instructions    albuterol (ACCUNEB) 1.25 mg, Nebulization, Every 4-6 hours PRN    ALPRAZolam (XANAX) 0.5 mg, Oral, 3 times daily PRN    amlodipine-valsartan (EXFORGE)  mg per tablet One half tablet daily    buPROPion (WELLBUTRIN) 100 mg, Oral, Daily    clindamycin (CLEOCIN T) 1 % external solution Topical (Top), Daily    dorzolamide-timolol 2-0.5% (COSOPT) 22.3-6.8 mg/mL  "ophthalmic solution 1 drop, Both Eyes, 2 times daily    hydrOXYzine HCL (ATARAX) 25 MG tablet TAKE 1 TABLET EVERY 4 HOURSAS NEEDED FOR ITCHING    latanoprost 0.005 % ophthalmic solution 1 drop, Both Eyes, Nightly    mometasone (NASONEX) 50 mcg/actuation nasal spray 2 sprays, Nasal, Daily    montelukast (SINGULAIR) 10 mg, Oral, Nightly    spironolactone (ALDACTONE) 25 mg, Oral, Daily    tretinoin (RETIN-A) 0.05 % cream Topical (Top), Nightly    valACYclovir (VALTREX) 1000 MG tablet TAKE 2 TABLETS (2000MG     TOTAL) EVERY 12 HOURS         Past medical, surgical, family and social histories have been reviewed today.      OBJECTIVE:     Vitals:    04/30/24 0903   BP: 118/84   Pulse: 78   Resp: 18   Temp: 97.5 °F (36.4 °C)   TempSrc: Tympanic   Weight: 103.1 kg (227 lb 4.7 oz)   Height: 5' 2" (1.575 m)       Physical Exam  Vitals reviewed.   Constitutional:       General: She is not in acute distress.  Abdominal:      Tenderness: There is no abdominal tenderness. There is no right CVA tenderness, left CVA tenderness or guarding.   Neurological:      Mental Status: She is alert and oriented to person, place, and time.         ASSESSMENT:     1. Urinary tract infection with hematuria, site unspecified ----- doing better although there is some question as to having a true infection.  On abx as ordered.    PLAN:     Finish out abx as ordered.  Infection triggers and prevention discussed.  Monitor.  Contact office back if s/s recur.      BRET Mays  [facility]       20 minutes of total time spent on the encounter, which includes face to face time and non-face to face time preparing to see the patient.  This includes obtaining and/or reviewing separately obtained history, performing a medically appropriate examination and/or evaluation, and counseling and educating the patient/family/caregiver.  Includes documenting clinical information in the electronic or other health record, " independently interpreting results (not separately reported) and communicating results to the patient/family/caregiver, with care coordination (not separately reported).  Medications, tests and/or procedures ordered as necessary along with referring and communicating with other health professionals (when not separately reported).

## 2024-05-03 ENCOUNTER — PATIENT MESSAGE (OUTPATIENT)
Dept: FAMILY MEDICINE | Facility: CLINIC | Age: 64
End: 2024-05-03
Payer: COMMERCIAL

## 2024-05-03 ENCOUNTER — PATIENT MESSAGE (OUTPATIENT)
Dept: OPHTHALMOLOGY | Facility: CLINIC | Age: 64
End: 2024-05-03
Payer: COMMERCIAL

## 2024-05-03 RX ORDER — FLUCONAZOLE 200 MG/1
200 TABLET ORAL
Qty: 3 TABLET | Refills: 0 | Status: SHIPPED | OUTPATIENT
Start: 2024-05-03 | End: 2024-05-10

## 2024-05-06 ENCOUNTER — HOSPITAL ENCOUNTER (OUTPATIENT)
Dept: RADIOLOGY | Facility: HOSPITAL | Age: 64
Discharge: HOME | End: 2024-05-06
Attending: FAMILY MEDICINE
Payer: COMMERCIAL

## 2024-05-06 DIAGNOSIS — Z12.31 ENCOUNTER FOR SCREENING MAMMOGRAM FOR MALIGNANT NEOPLASM OF BREAST: ICD-10-CM

## 2024-05-06 PROCEDURE — 77063 BREAST TOMOSYNTHESIS BI: CPT | Mod: 26,,, | Performed by: RADIOLOGY

## 2024-05-06 PROCEDURE — 77067 SCR MAMMO BI INCL CAD: CPT | Mod: 26,,, | Performed by: RADIOLOGY

## 2024-05-06 PROCEDURE — 77063 BREAST TOMOSYNTHESIS BI: CPT | Mod: TC

## 2024-05-09 ENCOUNTER — TELEPHONE (OUTPATIENT)
Dept: FAMILY MEDICINE | Facility: CLINIC | Age: 64
End: 2024-05-09
Payer: COMMERCIAL

## 2024-05-09 ENCOUNTER — ON-DEMAND VIRTUAL (OUTPATIENT)
Dept: URGENT CARE | Facility: CLINIC | Age: 64
End: 2024-05-09
Payer: COMMERCIAL

## 2024-05-09 DIAGNOSIS — N89.8 VAGINAL IRRITATION: Primary | ICD-10-CM

## 2024-05-09 PROCEDURE — 99202 OFFICE O/P NEW SF 15 MIN: CPT | Mod: 95,,, | Performed by: NURSE PRACTITIONER

## 2024-05-09 NOTE — TELEPHONE ENCOUNTER
Pt states itching and burning , pt states she came 4/30/24 to see you and paid 25$ she doesn't want to pay 25$ to see u again for the same thing pt stated she sent 2 messages to you through my chart as well concerning this

## 2024-05-09 NOTE — TELEPHONE ENCOUNTER
Needs appt for urine check.  I am full today and leaving early today.  She can see someone else here or other location if needs to be seen today.  Or can go to .

## 2024-05-09 NOTE — TELEPHONE ENCOUNTER
Pt called complaining of uti symptoms , stats burning and itching , would like something called in

## 2024-05-09 NOTE — TELEPHONE ENCOUNTER
----- Message from Kelly Hassan sent at 5/9/2024 10:00 AM CDT -----  Contact: Carolee Westfallvia is calling to speak to the nurse regarding questions / concerns about the lingering uti symptoms, she stated its urgent she get a call back asap and she need relief asap, please give her a call at  [phone]    Thanks  LJ

## 2024-05-09 NOTE — PROGRESS NOTES
Subjective:      Patient ID: Carolee Ortega is a 63 y.o. female.    Vitals:  vitals were not taken for this visit.     Chief Complaint: Urinary Tract Infection      Visit Type: TELE AUDIOVISUAL    Present with the patient at the time of consultation: TELEMED PRESENT WITH PATIENT: None, at home    Past Medical History:   Diagnosis Date    Allergic rhinitis     Anxiety     Asthma     Carpal tunnel syndrome     JAGDISH (generalized anxiety disorder)     Glaucoma (increased eye pressure)     Herpes labialis     Hypertension     Obesity     Pneumonia due to COVID-19 virus 2020    Seborrheic dermatitis     Shingles 2016     Past Surgical History:   Procedure Laterality Date    BREAST BIOPSY      right    CARPAL TUNNEL RELEASE Right 10/04/2018    Procedure: RELEASE, CARPAL TUNNEL;  Surgeon: Shaq Fairbanks MD;  Location: [facility] OR;  Service: Orthopedics;  Laterality: Right;    CARPAL TUNNEL RELEASE Left 2018    Procedure: RELEASE, CARPAL TUNNEL;  Surgeon: Shaq Fairbanks MD;  Location: [facility] OR;  Service: Orthopedics;  Laterality: Left;    CATARACT EXTRACTION W/  INTRAOCULAR LENS IMPLANT Right 06/15/2022    w/ goniotomy    CATARACT EXTRACTION W/  INTRAOCULAR LENS IMPLANT Left 2022    w/ goniotomy     SECTION      x1    COLONOSCOPY N/A 2020    Procedure: COLONOSCOPY;  Surgeon: Jessica Camacho MD;  Location: [facility];  Service: Endoscopy;  Laterality: N/A;    EXCISION OF GANGLION OF WRIST Right 2021    Procedure: EXCISION, GANGLION CYST, WRIST;  Surgeon: Merlin Mckay MD;  Location: [facility] OR;  Service: Orthopedics;  Laterality: Right;    SYNOVECTOMY OF FINGER Right 2021    Procedure: SYNOVECTOMY, FINGER;  Surgeon: Merlin Mckay MD;  Location: [facility] OR;  Service: Orthopedics;  Laterality: Right;  extensive    TONSILLECTOMY  2011    TOTAL ABDOMINAL HYSTERECTOMY      With USO, secondary to fibroids    TRIGGER FINGER RELEASE Right 10/04/2018     Procedure: RELEASE, TRIGGER FINGER;  Surgeon: Shaq Fairbanks MD;  Location: [facility] OR;  Service: Orthopedics;  Laterality: Right;  right middle finger     TRIGGER FINGER RELEASE Right 01/21/2021    Procedure: RELEASE, TRIGGER FINGER;  Surgeon: Merlin Mckay MD;  Location: [facility] OR;  Service: Orthopedics;  Laterality: Right;     Review of patient's allergies indicates:   Allergen Reactions    Flexeril [cyclobenzaprine] Palpitations     Current Outpatient Medications on File Prior to Visit   Medication Sig Dispense Refill    albuterol (ACCUNEB) 1.25 mg/3 mL Nebu Take 3 mLs (1.25 mg total) by nebulization every 4 to 6 hours as needed. 2 Box 1    ALPRAZolam (XANAX) 0.5 MG tablet Take 1 tablet (0.5 mg total) by mouth 3 (three) times daily as needed for Anxiety. 90 tablet 0    amlodipine-valsartan (EXFORGE)  mg per tablet One half tablet daily 45 tablet 3    buPROPion (WELLBUTRIN) 100 MG tablet Take 1 tablet (100 mg total) by mouth once daily. 90 tablet 3    clindamycin (CLEOCIN T) 1 % external solution Apply topically once daily. 60 mL 6    dorzolamide-timolol 2-0.5% (COSOPT) 22.3-6.8 mg/mL ophthalmic solution Place 1 drop into both eyes 2 (two) times daily. 15 mL 4    fluconazole (DIFLUCAN) 200 MG Tab Take 1 tablet (200 mg total) by mouth Every 3 (three) days. for 3 doses 3 tablet 0    hydrOXYzine HCL (ATARAX) 25 MG tablet TAKE 1 TABLET EVERY 4 HOURSAS NEEDED FOR ITCHING 90 tablet 3    latanoprost 0.005 % ophthalmic solution Place 1 drop into both eyes every evening. 7.5 mL 4    mometasone (NASONEX) 50 mcg/actuation nasal spray 2 sprays by Nasal route once daily. 51 g 3    montelukast (SINGULAIR) 10 mg tablet Take 1 tablet (10 mg total) by mouth every evening. 90 tablet 3    spironolactone (ALDACTONE) 25 MG tablet Take 1 tablet (25 mg total) by mouth once daily. 90 tablet 3    tretinoin (RETIN-A) 0.05 % cream Apply topically every evening. 45 g 3    valACYclovir (VALTREX) 1000 MG tablet TAKE 2 TABLETS  (2000MG     TOTAL) EVERY 12 HOURS 4 tablet 3     No current facility-administered medications on file prior to visit.     Family History   Problem Relation Name Age of Onset    Heart attack Father      Coronary artery disease Father      Glaucoma Father      Diabetes Father      Heart disease Mother  32    Breast cancer Maternal Grandmother      Hypertension Sister      Kidney disease Sister      Diabetes Other          Paternal great-grandmother    Breast cancer Maternal Cousin      Breast cancer Maternal Cousin             Ohs Peq Odvv Intake    5/9/2024 11:38 AM CDT - Filed by Patient   What is your current physical address in the event of a medical emergency? [address] [place] [address]   Are you able to take your vital signs? No   Please attach any relevant images or files          Seen in  for UTI. Cipro course of treatment completed. Continued to have symptoms. Was seen by PCP. Diflucan given. Itching and burning continues. Seeking further treatment options.    Urinary Tract Infection         Genitourinary:  Positive for dysuria and vaginal pain (itching and burning).   Allergic/Immunologic: Positive for itching (vaginal).        Objective:   The physical exam was conducted virtually.  Physical Exam   Constitutional: She is oriented to person, place, and time. She does not appear ill. No distress.   HENT:   Head: Normocephalic and atraumatic.   Nose: Nose normal.   Eyes: Extraocular movement intact   Pulmonary/Chest: Effort normal.   Abdominal: Normal appearance.   Musculoskeletal: Normal range of motion.         General: Normal range of motion.   Neurological: no focal deficit. She is alert and oriented to person, place, and time.   Psychiatric: Her behavior is normal. Mood normal.   Vitals reviewed.      Assessment:     1. Vaginal irritation        Plan:   Given history and symptoms, and failed antibiotic treatment, further in-person evaluation is needed for a diagnosis and treatment  plan.    Patient encouraged to monitor symptoms closely and instructed to follow-up for new or worsening symptoms. Further, in-person, evaluation is necessary for continued treatment. Please follow up in Urgent Care, ER, or  with your primary care doctor or specialist as needed. Verbally discussed plan. Patient confirms understanding(although not happy about having to f/u in-person) and is in agreement with treatment and plan.     You must understand that you've received a Virtual Care evaluation only and that you may be released before all your medical problems are known or treated. You, the patient, will arrange for follow-up care as instructed.      Vaginal irritation

## 2024-05-10 ENCOUNTER — OFFICE VISIT (OUTPATIENT)
Dept: URGENT CARE | Facility: CLINIC | Age: 64
End: 2024-05-10
Payer: COMMERCIAL

## 2024-05-10 VITALS
WEIGHT: 227 LBS | BODY MASS INDEX: 41.77 KG/M2 | RESPIRATION RATE: 20 BRPM | SYSTOLIC BLOOD PRESSURE: 158 MMHG | HEIGHT: 62 IN | OXYGEN SATURATION: 100 % | HEART RATE: 62 BPM | DIASTOLIC BLOOD PRESSURE: 74 MMHG | TEMPERATURE: 99 F

## 2024-05-10 DIAGNOSIS — R30.0 DYSURIA: ICD-10-CM

## 2024-05-10 DIAGNOSIS — N30.01 ACUTE CYSTITIS WITH HEMATURIA: Primary | ICD-10-CM

## 2024-05-10 LAB
BILIRUB UR QL STRIP: NEGATIVE
GLUCOSE UR QL STRIP: NEGATIVE
KETONES UR QL STRIP: NEGATIVE
LEUKOCYTE ESTERASE UR QL STRIP: NEGATIVE
PH, POC UA: 5
POC BLOOD, URINE: POSITIVE
POC NITRATES, URINE: NEGATIVE
PROT UR QL STRIP: NEGATIVE
SP GR UR STRIP: 1.02 (ref 1–1.03)
UROBILINOGEN UR STRIP-ACNC: NORMAL (ref 0.1–1.1)

## 2024-05-10 PROCEDURE — 99214 OFFICE O/P EST MOD 30 MIN: CPT | Mod: S$GLB,,, | Performed by: NURSE PRACTITIONER

## 2024-05-10 PROCEDURE — 81514 NFCT DS BV&VAGINITIS DNA ALG: CPT | Performed by: NURSE PRACTITIONER

## 2024-05-10 PROCEDURE — 87086 URINE CULTURE/COLONY COUNT: CPT | Performed by: NURSE PRACTITIONER

## 2024-05-10 PROCEDURE — 81003 URINALYSIS AUTO W/O SCOPE: CPT | Mod: QW,S$GLB,, | Performed by: NURSE PRACTITIONER

## 2024-05-10 RX ORDER — FLUCONAZOLE 150 MG/1
150 TABLET ORAL ONCE AS NEEDED
Qty: 3 TABLET | Refills: 1 | Status: SHIPPED | OUTPATIENT
Start: 2024-05-10

## 2024-05-10 RX ORDER — NITROFURANTOIN 25; 75 MG/1; MG/1
100 CAPSULE ORAL 2 TIMES DAILY
Qty: 14 CAPSULE | Refills: 0 | Status: SHIPPED | OUTPATIENT
Start: 2024-05-10 | End: 2024-05-17

## 2024-05-10 NOTE — PATIENT INSTRUCTIONS
Increase fluids   Void promptly   Urine culture pending 3-5 days   Wipe front to back   Any fever, chills, nausea/vomiting, > flank pain to ER     Patient Instructions   Avoid use of perfume soaps and wipes  Wipe front to back   Complete medication as prescribed   Abstain from sexual activity until meds completed and labs reviewed     Turnaround time 2-3 days  Check My Chart     Follow up with GYN per schedule     Any abdominal pain nausea vomiting fever /chills to local ER for immediate further evaluation and care

## 2024-05-10 NOTE — PROGRESS NOTES
"Subjective:      Patient ID: Carolee Ortega is a 63 y.o. female.    Vitals:  height is 5' 2" (1.575 m) and weight is 103 kg (227 lb). Her tympanic temperature is 98.7 °F (37.1 °C). Her blood pressure is 158/74 (abnormal) and her pulse is 62. Her respiration is 20 and oxygen saturation is 100%.     Chief Complaint: Urinary Tract Infection (Entered by patient)    Patient presents with symptoms of UTI again.  She was seen at  [facility].  She was given Cipro 500 which she thinks has given her an external rash below.  She also had diflucan which did not help.  She is experiencing burning itching and swelling. Still with burning upon urination as well    Urinary Tract Infection   This is a new problem. The current episode started acute onset. The problem occurs every urination. The problem has been unchanged. The quality of the pain is described as burning and aching. The pain is at a severity of 8/10. The pain is mild. There has been no fever. She is Sexually active. There is No history of pyelonephritis. Associated symptoms include frequency, urgency and rash. Pertinent negatives include no behavior changes, chills, discharge, flank pain, hematuria, hesitancy, nausea, possible pregnancy, sweats, vomiting, weight loss, bubble bath use, constipation or withholding. She has tried antibiotics for the symptoms. The treatment provided mild relief. Her past medical history is significant for hypertension. There is no history of catheterization, diabetes insipidus, diabetes mellitus, genitourinary reflux, kidney stones, recurrent UTIs, a single kidney, STD, urinary stasis or a urological procedure.       Constitution: Negative for chills.   Gastrointestinal:  Negative for nausea, vomiting and constipation.   Genitourinary:  Positive for frequency and urgency. Negative for flank pain and hematuria.   Skin:  Positive for rash.      Objective:     Vitals:    05/10/24 0851   BP: (!) 158/74   BP Location: Right arm   Patient " "Position: Sitting   BP Method: Large (Automatic)   Pulse: 62   Resp: 20   Temp: 98.7 °F (37.1 °C)   TempSrc: Tympanic   SpO2: 100%   Weight: 103 kg (227 lb)   Height: 5' 2" (1.575 m)       Physical Exam   Constitutional: She is oriented to person, place, and time. She appears well-developed.   HENT:   Head: Normocephalic and atraumatic.   Ears:   Right Ear: External ear normal.   Left Ear: External ear normal.   Nose: Nose normal. No nasal deformity. No epistaxis.   Mouth/Throat: Oropharynx is clear and moist and mucous membranes are normal.   Eyes: Lids are normal.   Neck: Trachea normal and phonation normal. Neck supple.   Cardiovascular: Normal pulses.   Pulmonary/Chest: Effort normal.   Abdominal: Normal appearance and bowel sounds are normal. She exhibits no distension. Soft. There is no abdominal tenderness.   Neurological: She is alert and oriented to person, place, and time.   Skin: Skin is warm, dry and intact.   Psychiatric: Her speech is normal and behavior is normal.   Nursing note and vitals reviewed.      Assessment:     1. Acute cystitis with hematuria    2. Dysuria      Results for orders placed or performed in visit on 05/10/24   POCT Urinalysis, Dipstick, Automated, W/O Scope   Result Value Ref Range    POC Blood, Urine Positive (A) Negative    POC Bilirubin, Urine Negative Negative    POC Urobilinogen, Urine Normal 0.1 - 1.1    POC Ketones, Urine Negative Negative    POC Protein, Urine Negative Negative    POC Nitrates, Urine Negative Negative    POC Glucose, Urine Negative Negative    pH, UA 5.0     POC Specific Gravity, Urine 1.025 1.003 - 1.029    POC Leukocytes, Urine Negative Negative     *Note: Due to a large number of results and/or encounters for the requested time period, some results have not been displayed. A complete set of results can be found in Results Review.       Plan:   Patient stable for discharge and home management of condition      Acute cystitis with hematuria  -     Urine " culture  -     nitrofurantoin, macrocrystal-monohydrate, (MACROBID) 100 MG capsule; Take 1 capsule (100 mg total) by mouth 2 (two) times daily. for 7 days  Dispense: 14 capsule; Refill: 0    Dysuria  -     POCT Urinalysis, Dipstick, Automated, W/O Scope  -     Vaginosis Screen by DNA Probe; Future; Expected date: 05/10/2024    Other orders  -     fluconazole (DIFLUCAN) 150 MG Tab; Take 1 tablet (150 mg total) by mouth 1 (one) time if needed (yeast vaginitis x 1 dose; may repeat in 3 or 7 days).  Dispense: 3 tablet; Refill: 1        Patient Instructions   Increase fluids   Void promptly   Urine culture pending 3-5 days   Wipe front to back   Any fever, chills, nausea/vomiting, > flank pain to ER     Patient Instructions   Avoid use of perfume soaps and wipes  Wipe front to back   Complete medication as prescribed   Abstain from sexual activity until meds completed and labs reviewed     Turnaround time 2-3 days  Check My Chart     Follow up with GYN per schedule     Any abdominal pain nausea vomiting fever /chills to local ER for immediate further evaluation and care        No follow-ups on file.

## 2024-05-11 LAB — BACTERIA UR CULT: NORMAL

## 2024-05-13 ENCOUNTER — PATIENT MESSAGE (OUTPATIENT)
Dept: FAMILY MEDICINE | Facility: CLINIC | Age: 64
End: 2024-05-13
Payer: COMMERCIAL

## 2024-05-13 ENCOUNTER — TELEPHONE (OUTPATIENT)
Dept: PRIMARY CARE CLINIC | Facility: CLINIC | Age: 64
End: 2024-05-13
Payer: COMMERCIAL

## 2024-05-13 LAB
BACTERIAL VAGINOSIS DNA: NEGATIVE
CANDIDA GLABRATA DNA: NEGATIVE
CANDIDA KRUSEI DNA: NEGATIVE
CANDIDA RRNA VAG QL PROBE: NEGATIVE
T VAGINALIS RRNA GENITAL QL PROBE: NEGATIVE

## 2024-05-13 NOTE — TELEPHONE ENCOUNTER
----- Message from Lea Nabeel sent at 2024  8:27 AM CDT -----  Contact: pt [phone]  Mrn & :[phone]    1960    Caller is requesting an earlier appointment then we can schedule.  Caller is requesting a message be sent to the provider.  If this is for urgent care symptoms, did you offer other providers at this location, providers at other locations, or [facility]? (yes, no, n/a):  no  If this is for the patients physical, did you offer to schedule next available and put on wait list, or to see NP or PA for their physical?  (yes, no, n/a):  no  When is the next available appointment with their provider:    Reason for the appointment:  new pt establish care  Patient preference of timeframe to be scheduled:  asap  Would the patient like a call back, or a response through their [facility] portal?:   call back

## 2024-05-13 NOTE — TELEPHONE ENCOUNTER
----- Message from Lea Nabeel sent at 2024  8:27 AM CDT -----  Contact: pt [phone]  Mrn & :[phone]    1960    Caller is requesting an earlier appointment then we can schedule.  Caller is requesting a message be sent to the provider.  If this is for urgent care symptoms, did you offer other providers at this location, providers at other locations, or [facility]? (yes, no, n/a):  no  If this is for the patients physical, did you offer to schedule next available and put on wait list, or to see NP or PA for their physical?  (yes, no, n/a):  no  When is the next available appointment with their provider:    Reason for the appointment:  new pt establish care  Patient preference of timeframe to be scheduled:  asap  Would the patient like a call back, or a response through their [facility] portal?:   call back

## 2024-05-14 ENCOUNTER — TELEPHONE (OUTPATIENT)
Dept: UROLOGY | Facility: CLINIC | Age: 64
End: 2024-05-14
Payer: COMMERCIAL

## 2024-05-14 ENCOUNTER — TELEPHONE (OUTPATIENT)
Dept: URGENT CARE | Facility: CLINIC | Age: 64
End: 2024-05-14
Payer: COMMERCIAL

## 2024-05-14 ENCOUNTER — OFFICE VISIT (OUTPATIENT)
Dept: OBSTETRICS AND GYNECOLOGY | Facility: CLINIC | Age: 64
End: 2024-05-14
Payer: COMMERCIAL

## 2024-05-14 VITALS — WEIGHT: 226.63 LBS | HEIGHT: 62 IN | BODY MASS INDEX: 41.71 KG/M2

## 2024-05-14 DIAGNOSIS — N89.8 ITCHING IN THE VAGINAL AREA: ICD-10-CM

## 2024-05-14 DIAGNOSIS — R30.0 DYSURIA: Primary | ICD-10-CM

## 2024-05-14 PROCEDURE — 1160F RVW MEDS BY RX/DR IN RCRD: CPT | Mod: CPTII,S$GLB,, | Performed by: NURSE PRACTITIONER

## 2024-05-14 PROCEDURE — 3008F BODY MASS INDEX DOCD: CPT | Mod: CPTII,S$GLB,, | Performed by: NURSE PRACTITIONER

## 2024-05-14 PROCEDURE — 99999 PR PBB SHADOW E&M-EST. PATIENT-LVL IV: CPT | Mod: PBBFAC,,, | Performed by: NURSE PRACTITIONER

## 2024-05-14 PROCEDURE — 99212 OFFICE O/P EST SF 10 MIN: CPT | Mod: S$GLB,,, | Performed by: NURSE PRACTITIONER

## 2024-05-14 PROCEDURE — 4010F ACE/ARB THERAPY RXD/TAKEN: CPT | Mod: CPTII,S$GLB,, | Performed by: NURSE PRACTITIONER

## 2024-05-14 PROCEDURE — 1159F MED LIST DOCD IN RCRD: CPT | Mod: CPTII,S$GLB,, | Performed by: NURSE PRACTITIONER

## 2024-05-14 NOTE — TELEPHONE ENCOUNTER
Per pt request message sent to pt on portal       ----- Message from Mikal Beatty sent at 5/14/2024  1:39 PM CDT -----  Contact: Carolee Zarco is requesting a message through her portal verifying that she does not need a referral from her primary care to see urology if she was referred by her obgyn. Please confirm that this is okay on her my chart.

## 2024-05-14 NOTE — PROGRESS NOTES
Subjective:       Patient ID: Carolee Ortega is a 63 y.o. female.    Chief Complaint:  No chief complaint on file.    No LMP recorded. Patient has had a hysterectomy.  History of Present Illness  Was treated for uti 5/10/2024 with Cipro however urine culture was negative and she is still   experiencing a little itch and burning whenever she urinates     OB History    Para Term  AB Living   4 4 0         SAB IAB Ectopic Multiple Live Births                  # Outcome Date GA Lbr Brendon/2nd Weight Sex Type Anes PTL Lv   4 Para      Vag-Spont      3 Para      Vag-Spont      2 Para      Vag-Spont      1 Para      CS-Unspec          Review of Systems  Review of Systems        Objective:    Physical Exam      Assessment:     1. Dysuria              Plan:   Diagnoses and all orders for this visit:    Dysuria  -     Ambulatory referral/consult to Urology; Future      Explained to client that GYN will treat a simple uti however if she has been treated and urine culture is negative she will need to see urology for further evaluation

## 2024-05-21 ENCOUNTER — PATIENT MESSAGE (OUTPATIENT)
Dept: UROLOGY | Facility: CLINIC | Age: 64
End: 2024-05-21

## 2024-05-21 ENCOUNTER — LAB VISIT (OUTPATIENT)
Dept: LAB | Facility: HOSPITAL | Age: 64
End: 2024-05-21
Attending: NURSE PRACTITIONER
Payer: COMMERCIAL

## 2024-05-21 ENCOUNTER — TELEPHONE (OUTPATIENT)
Dept: UROLOGY | Facility: CLINIC | Age: 64
End: 2024-05-21

## 2024-05-21 ENCOUNTER — OFFICE VISIT (OUTPATIENT)
Dept: UROLOGY | Facility: CLINIC | Age: 64
End: 2024-05-21
Payer: COMMERCIAL

## 2024-05-21 VITALS
DIASTOLIC BLOOD PRESSURE: 79 MMHG | WEIGHT: 225.5 LBS | HEIGHT: 62 IN | BODY MASS INDEX: 41.49 KG/M2 | RESPIRATION RATE: 16 BRPM | HEART RATE: 63 BPM | SYSTOLIC BLOOD PRESSURE: 123 MMHG

## 2024-05-21 DIAGNOSIS — R31.0 HEMATURIA, GROSS: ICD-10-CM

## 2024-05-21 DIAGNOSIS — R30.0 DYSURIA: Primary | ICD-10-CM

## 2024-05-21 PROBLEM — M67.40 GANGLION CYST: Status: RESOLVED | Noted: 2021-01-21 | Resolved: 2024-05-21

## 2024-05-21 LAB
BILIRUB UR QL STRIP: NEGATIVE
CREAT SERPL-MCNC: 0.9 MG/DL (ref 0.5–1.4)
EST. GFR  (NO RACE VARIABLE): >60 ML/MIN/1.73 M^2
GLUCOSE UR QL STRIP: NEGATIVE
KETONES UR QL STRIP: NEGATIVE
LEUKOCYTE ESTERASE UR QL STRIP: NEGATIVE
PH, POC UA: 5.5
POC BLOOD, URINE: NEGATIVE
POC NITRATES, URINE: NEGATIVE
PROT UR QL STRIP: NEGATIVE
SP GR UR STRIP: 1.03 (ref 1–1.03)
UROBILINOGEN UR STRIP-ACNC: 0.2 (ref 0.1–1.1)

## 2024-05-21 PROCEDURE — 1160F RVW MEDS BY RX/DR IN RCRD: CPT | Mod: CPTII,S$GLB,, | Performed by: NURSE PRACTITIONER

## 2024-05-21 PROCEDURE — 1159F MED LIST DOCD IN RCRD: CPT | Mod: CPTII,S$GLB,, | Performed by: NURSE PRACTITIONER

## 2024-05-21 PROCEDURE — 99204 OFFICE O/P NEW MOD 45 MIN: CPT | Mod: S$GLB,,, | Performed by: NURSE PRACTITIONER

## 2024-05-21 PROCEDURE — 3078F DIAST BP <80 MM HG: CPT | Mod: CPTII,S$GLB,, | Performed by: NURSE PRACTITIONER

## 2024-05-21 PROCEDURE — 99999 PR PBB SHADOW E&M-EST. PATIENT-LVL IV: CPT | Mod: PBBFAC,,, | Performed by: NURSE PRACTITIONER

## 2024-05-21 PROCEDURE — 4010F ACE/ARB THERAPY RXD/TAKEN: CPT | Mod: CPTII,S$GLB,, | Performed by: NURSE PRACTITIONER

## 2024-05-21 PROCEDURE — 81003 URINALYSIS AUTO W/O SCOPE: CPT | Mod: QW,S$GLB,, | Performed by: NURSE PRACTITIONER

## 2024-05-21 PROCEDURE — 82565 ASSAY OF CREATININE: CPT | Performed by: NURSE PRACTITIONER

## 2024-05-21 PROCEDURE — 36415 COLL VENOUS BLD VENIPUNCTURE: CPT | Performed by: NURSE PRACTITIONER

## 2024-05-21 PROCEDURE — 3008F BODY MASS INDEX DOCD: CPT | Mod: CPTII,S$GLB,, | Performed by: NURSE PRACTITIONER

## 2024-05-21 PROCEDURE — 3074F SYST BP LT 130 MM HG: CPT | Mod: CPTII,S$GLB,, | Performed by: NURSE PRACTITIONER

## 2024-05-21 NOTE — PROGRESS NOTES
Chief Complaint:   Gross hematuria    HPI:   Patient is a delightful 63-year-old female that is presenting with 1 episode of gross hematuria.  Patient states she had gross hematuria and presented to an urgent care setting,where she was prescribed antibiotics, Cipro.  Reports that urine culture was negative.  Patient was seen by primary care nurse practitioner and gyn nurse practitioner, prescribed Macrobid and Diflucan.  No  cancers in her family.  Patient has never smoked cigarettes.  States that gross hematuria spontaneously resolved.  Reports 2 negative urine cultures.  Allergies:  Flexeril [cyclobenzaprine]    Medications:  has a current medication list which includes the following prescription(s): albuterol, alprazolam, amlodipine-valsartan, bupropion, clindamycin, dorzolamide-timolol 2-0.5%, fluconazole, hydroxyzine hcl, latanoprost, mometasone, montelukast, spironolactone, tretinoin, and valacyclovir.    Review of Systems:  General: No fever, chills, fatigability, or weight loss.  Skin: No rashes, itching, or changes in color or texture of skin.  Chest: Denies CALIXTO, cyanosis, wheezing, cough, and sputum production.  Abdomen: Appetite fine. No weight loss. Denies diarrhea, abdominal pain, hematemesis, or blood in stool.  Musculoskeletal: No joint stiffness or swelling. Denies back pain.  : As above.  All other review of systems negative.    PMH:   has a past medical history of Allergic rhinitis, Anxiety, Asthma, Carpal tunnel syndrome, JAGDISH (generalized anxiety disorder), Glaucoma (increased eye pressure), Herpes labialis, Hypertension, Obesity, Pneumonia due to COVID-19 virus (2020), Seborrheic dermatitis, and Shingles (2016).    PSH:   has a past surgical history that includes Breast biopsy (); Tonsillectomy (2011);  section; Trigger finger release (Right, 10/04/2018); Carpal tunnel release (Right, 10/04/2018); Carpal tunnel release (Left, 2018); Colonoscopy (N/A, 2020);  Trigger finger release (Right, 01/21/2021); Excision of ganglion of wrist (Right, 01/21/2021); Synovectomy of finger (Right, 01/21/2021); Total abdominal hysterectomy (1985); Cataract extraction w/  intraocular lens implant (Right, 06/15/2022); and Cataract extraction w/  intraocular lens implant (Left, 07/27/2022).    FamHx: family history includes Breast cancer in her maternal cousin, maternal cousin, and maternal grandmother; Coronary artery disease in her father; Diabetes in her father and another family member; Glaucoma in her father; Heart attack in her father; Heart disease (age of onset: 32) in her mother; Hypertension in her sister; Kidney disease in her sister.    SocHx:  reports that she has never smoked. She has never used smokeless tobacco. She reports that she does not drink alcohol and does not use drugs.      Physical Exam:  Vitals:    05/21/24 0748   BP: 123/79   Pulse: 63   Resp: 16     General:  Morbidly obese female, A&Ox3, no apparent distress, no deformities  Neck: No masses, normal thyroid  Lungs: normal inspiration, no use of accessory muscles  Heart: normal pulse, no arrhythmias  Abdomen: Soft, NT, ND, no masses, no hernias, no hepatosplenomegaly  Lymphatic: Neck and groin nodes negative  Skin: The skin is warm and dry. No jaundice.  Ext: No c/c/e.    Labs/Studies:   Urine in clinic is negative  Impression/Plan:   I reviewed the definition of gross hematuria  and the etiology of microscopic hematuria with the patient including benign conditions such as kidney, ureteral, and bladder stones, kidney and bladder infections, large prostates(if she were male), but also noted that the most concerning etiology that we must rule out a is a renal or bladder neoplasm.  I reviewed the typical workup of hematuria which includes an imaging study, typically a CT urogram, and an office cystoscopy. We will move forward with scheduling a CT urogram with a office cystoscopy to follow.      F/u 1-2 weeks with  CT urogram and office cystoscopy

## 2024-05-21 NOTE — TELEPHONE ENCOUNTER
Returned call to patient, informed her that no sedation is necessary for procedure. She will be ambulatory upon discharge. Verbalized understanding.  ----- Message from Mildred Pulido sent at 5/21/2024 11:03 AM CDT -----  Regarding: concerns  Name of who is calling:   Carolee      What is the request in detail: Pt is requesting a call back in ref to procedure scheduled 07/01/2024 / will pt need someone to bring her and pick her up / sedation??      Can the clinic reply by [facility]:yes      What number to call back if not [facility]: [phone]

## 2024-06-17 DIAGNOSIS — H40.1131 PRIMARY OPEN ANGLE GLAUCOMA OF BOTH EYES, MILD STAGE: ICD-10-CM

## 2024-06-17 NOTE — TELEPHONE ENCOUNTER
Please see the attached refill request.    **Pt relocated to [place], she canceled appt for next month

## 2024-06-18 DIAGNOSIS — H40.1131 PRIMARY OPEN ANGLE GLAUCOMA OF BOTH EYES, MILD STAGE: ICD-10-CM

## 2024-06-18 RX ORDER — LATANOPROST 50 UG/ML
SOLUTION/ DROPS OPHTHALMIC
Qty: 7.5 ML | Refills: 4 | Status: SHIPPED | OUTPATIENT
Start: 2024-06-18 | End: 2024-06-19

## 2024-06-19 RX ORDER — LATANOPROST 50 UG/ML
1 SOLUTION/ DROPS OPHTHALMIC DAILY
Qty: 7.5 ML | Refills: 4 | Status: SHIPPED | OUTPATIENT
Start: 2024-06-19

## 2024-06-22 ENCOUNTER — PATIENT MESSAGE (OUTPATIENT)
Dept: OTHER | Facility: OTHER | Age: 64
End: 2024-06-22
Payer: COMMERCIAL

## 2024-12-04 DIAGNOSIS — Z78.0 MENOPAUSE: ICD-10-CM

## 2025-01-04 ENCOUNTER — PATIENT MESSAGE (OUTPATIENT)
Dept: ADMINISTRATIVE | Facility: OTHER | Age: 65
End: 2025-01-04
Payer: COMMERCIAL

## 2025-01-04 ENCOUNTER — PATIENT MESSAGE (OUTPATIENT)
Dept: ADMINISTRATIVE | Facility: HOSPITAL | Age: 65
End: 2025-01-04
Payer: COMMERCIAL

## 2025-05-08 ENCOUNTER — PATIENT MESSAGE (OUTPATIENT)
Dept: ADMINISTRATIVE | Facility: OTHER | Age: 65
End: 2025-05-08
Payer: COMMERCIAL

## (undated) DEVICE — ELECTRODE REM PLYHSV RETURN 9

## (undated) DEVICE — SEE MEDLINE ITEM 157117

## (undated) DEVICE — BANDAGE CONFORM 3IN STRL

## (undated) DEVICE — SEE MEDLINE ITEM 146308

## (undated) DEVICE — SEE MEDLINE ITEM 152522

## (undated) DEVICE — SUPPORT ULNA NERVE PROTECTOR

## (undated) DEVICE — APPLICATOR CHLORAPREP ORN 26ML

## (undated) DEVICE — GLOVE 8 PROTEXIS PI BLUE

## (undated) DEVICE — COVER CAMERA OPERATING ROOM

## (undated) DEVICE — NDL SAFETY 25G X 1.5 ECLIPSE

## (undated) DEVICE — SYR 10CC LUER LOCK

## (undated) DEVICE — POSITIONER HEAD DONUT 9IN FOAM

## (undated) DEVICE — COVER OVERHEAD SURG LT BLUE

## (undated) DEVICE — DRESSING XEROFORM FOIL PK 1X8

## (undated) DEVICE — PAD CAST SPECIALIST STRL 4

## (undated) DEVICE — SEE MEDLINE ITEM 157027

## (undated) DEVICE — TOURNIQUET SB QC DP 18X4IN

## (undated) DEVICE — BANDAGE DERMACEA STRETCH 4X1IN

## (undated) DEVICE — DRAPE PLASTIC U 60X72

## (undated) DEVICE — GOWN SURG 2XL DISP TIE BACK

## (undated) DEVICE — SEE MEDLINE ITEM 157173

## (undated) DEVICE — GLOVE PROTEXIS HYDROGEL SZ7.5

## (undated) DEVICE — DECANTER VIAL ASEPTIC TRANSFER

## (undated) DEVICE — SEE MEDLINE ITEM 157131

## (undated) DEVICE — GAUZE SPONGE 4X4 12PLY

## (undated) DEVICE — BANDAGE ELASTIC 3X5 VELCRO ST

## (undated) DEVICE — SOL 9P NACL IRR PIC IL

## (undated) DEVICE — GLOVE BIOGEL SZ 8 1/2

## (undated) DEVICE — UNDERGLOVES BIOGEL PI SIZE 8.5

## (undated) DEVICE — SEE MEDLINE ITEM 152622

## (undated) DEVICE — PAD CAST SPECIALIST STRL 3

## (undated) DEVICE — UNDERGLOVES BIOGEL PI SIZE 7.5

## (undated) DEVICE — COVER LIGHT HANDLE 80/CA

## (undated) DEVICE — GLOVE 7.5 PROTEXIS PI BLUE

## (undated) DEVICE — SUT 4-0 ETHILON 18 PS-2

## (undated) DEVICE — MANIFOLD 4 PORT

## (undated) DEVICE — ALCOHOL 70% ISOP RUBBING 4OZ

## (undated) DEVICE — SCRUB HIBICLENS 4% CHG 4OZ

## (undated) DEVICE — NDL HYPODERMIC BLUNT 18G 1.5IN

## (undated) DEVICE — SYR 3CC LUER LOC

## (undated) DEVICE — BLADE SURG #15 CARBON STEEL

## (undated) DEVICE — STOCKINETTE TUBULAR 2PL 6 X 4

## (undated) DEVICE — PAD ABD 8X10 STERILE

## (undated) DEVICE — CORD BIPOLAR ELECTROSURGICAL

## (undated) DEVICE — STAPLER SKIN PROXIMATE WIDE

## (undated) DEVICE — SUT VICRYL 2-0 CT-2 VCP269H